# Patient Record
Sex: MALE | Race: WHITE | Employment: OTHER | URBAN - METROPOLITAN AREA
[De-identification: names, ages, dates, MRNs, and addresses within clinical notes are randomized per-mention and may not be internally consistent; named-entity substitution may affect disease eponyms.]

---

## 2017-09-20 ENCOUNTER — HOSPITAL ENCOUNTER (OUTPATIENT)
Dept: GENERAL RADIOLOGY | Age: 82
Discharge: HOME | End: 2017-09-20
Payer: MEDICARE

## 2017-09-20 DIAGNOSIS — R94.2 ABNORMAL PFT: ICD-10-CM

## 2017-09-20 PROCEDURE — 71020 XR CHEST PA LAT: CPT

## 2017-10-27 PROBLEM — Z23 ENCOUNTER FOR IMMUNIZATION: Status: ACTIVE | Noted: 2017-10-27

## 2018-03-05 ENCOUNTER — HOSPITAL ENCOUNTER (OUTPATIENT)
Dept: GENERAL RADIOLOGY | Age: 83
Discharge: HOME | End: 2018-03-05
Payer: MEDICARE

## 2018-03-05 DIAGNOSIS — M54.2 CERVICAL PAIN (NECK): ICD-10-CM

## 2018-03-05 PROBLEM — M62.838 MUSCLE SPASM OF RIGHT SHOULDER: Status: ACTIVE | Noted: 2018-03-05

## 2018-03-05 PROCEDURE — 72040 X-RAY EXAM NECK SPINE 2-3 VW: CPT

## 2018-03-06 ENCOUNTER — HOSPITAL ENCOUNTER (OUTPATIENT)
Dept: PHYSICAL THERAPY | Age: 83
Discharge: HOME | End: 2018-03-06
Payer: MEDICARE

## 2018-03-06 DIAGNOSIS — M62.838 MUSCLE SPASM OF RIGHT SHOULDER: ICD-10-CM

## 2018-03-06 DIAGNOSIS — M54.2 CERVICALGIA: ICD-10-CM

## 2018-03-06 DIAGNOSIS — M62.838 MUSCLE SPASM OF LEFT SHOULDER: ICD-10-CM

## 2018-03-06 PROCEDURE — G8981 BODY POS CURRENT STATUS: HCPCS

## 2018-03-06 PROCEDURE — G8982 BODY POS GOAL STATUS: HCPCS

## 2018-03-06 PROCEDURE — 97162 PT EVAL MOD COMPLEX 30 MIN: CPT

## 2018-03-06 NOTE — THERAPY EVALUATION
Yuri Strong  : [facility] [address], [address] [place] [address]  [phone]   [phone]       OUTPATIENT PHYSICAL THERAPY:Initial Assessment 3/6/2018    ICD-10: Treatment Diagnosis: Cervicalgia (M54.2)   Precautions/Allergies:   Review of patient's allergies indicates no known allergies. Fall Risk Score: 1 (? 5 = High Risk)  MD Orders: Eval and Treat MEDICAL/REFERRING DIAGNOSIS:  Cervicalgia [M54.2]  Muscle spasm of right shoulder [M62.838]  Muscle spasm of left shoulder [M62.838]   DATE OF ONSET: two weeks prior  REFERRING PHYSICIAN: Anita Zamora PA-C  RETURN PHYSICIAN APPOINTMENT: TBD by patient      INITIAL ASSESSMENT:  Mr. Yuri Strong has attended 1 physical therapy session including initial evaluation. Yuri Strong presents with S/S of increased pain, decreased ROM, decreased strength, decreased functional tolerance consistent with S/S of cervical spine strain with exacerbation of cervical spondylosis. Yuri Strong will benefit from home exercise program, therapeutic and postural strengthening exercises, manual therapeutic techniques (ie. Distraction, SOR, myofascial release/soft tissue mobilization) as appropriate to address [address] [place] [address] [address] current condition. Yuri Strong will benefit from skilled PT (medically necessary) to address above deficits affecting participation in basic ADLs and overall functional tolerance. PROBLEM LIST (Impacting functional limitations):  1. Decreased Strength  2. Decreased ADL/Functional Activities  3. Decreased Transfer Abilities  4. Increased Pain  5. Decreased Activity Tolerance  6. Decreased Flexibility/Joint Mobility  7. Decreased Knowledge of Precautions  8. Decreased Hines with Home Exercise Program  9. postural awareness INTERVENTIONS PLANNED:  1. Balance Exercise  2. Bed Mobility  3. Cold  4. Cryotherapy  5. Family Education  6.  Gait Training  7. Heat  8. Home Exercise Program (HEP)  9. Manual Therapy  10. Neuromuscular Re-education/Strengthening  11. Range of Motion (ROM)  12. Therapeutic Activites  13. Therapeutic Exercise/Strengthening  14. Transfer Training   TREATMENT PLAN:  Effective Dates: 3/6/2018 TO 6/6/2018. Frequency/Duration: up to 2 times a week for 12 weeks   GOALS: (Goals have been discussed and agreed upon with patient.)  SHORT-TERM FUNCTIONAL GOALS: Time Frame: 4 weeks  1. Cody Chavez will report <=4/10 pain to cervical spine with performance of functional spinal mobility and rotation. 2.  Cody Chavez will demonstrate improved NDI score, indicating spinal improvement from 20/50 to 13/50 affecting minimal to no difficulty with performance of cervical mobility and strengthening. 3.  Cody Chavez to be independent with initial HEP for cervical region and UE's.   [address] will improve ROM to >=90% to assist with improved function during instrumental activities of daily living. Forest Blakely will improve MMT to >=4+/5 to all UE strengthening to return to PLOF and improve functional tolerance. DISCHARGE GOALS: Time Frame: 8 weeks  1. Cody Chavez will report <=2/10 pain to cervical spine with performance of functional spinal mobility and rotation and minimal to no difficulty with such tasks. 2. Cody Chavez will demonstrate improved NDI score, indicating spinal improvement from 13/50 to 6/50 affecting minimal to no difficulty with performance of cervical mobility and strengthening. 3. Cody Chavez to be independent with advanced HEP for cervical region and UE's. Rehabilitation Potential For Stated Goals: Good  Regarding Tequilalillian Angel Choudhury's therapy, I certify that the treatment plan above will be carried out by a therapist or under their direction.   Thank you for this referral,  Luc Arvizu, PT     Referring Physician Signature: Yessy Guillaume, PACO              Date                    HISTORY:   History of Present Injury/Illness (Reason for Referral):  Mr. Syd aLy reports that he has had persistent neck pain after looking up to watch a tree be cut down two weeks ago and he spent an extended period of time looking up. Since that time the has had pain with turing during driving (right>left), looking up, looking down, and pain with getting into and out of bed. He reports frequent sharp pain with turning or neck movement that has limited his daily activities, sleep, and community activities. His goals are to improve his neck pain, improve his tolerance with driving, and his sleep tolerance. Past Medical History/Comorbidities:   Mr. Syd Lay  has a past medical history of Acute maxillary sinusitis; Acute pharyngitis; Acute type 2 diabetes mellitus with manifestations ([address] Allergic rhinitis; Arrhythmia; Arthritis; Atrial fibrillation ([address] Backache; Body mass index 31.0-31.9, adult; Body mass index 32.0-32.9, adult; Body mass index 33.0-33.9, adult; Body mass index 34.0-34.9, adult; CAD (coronary artery disease); Chronic kidney disease, stage III (moderate); Chronic pain; Colon polyp; Diabetes ([address] (2006); ED (erectile dysfunction); Edema; Encounter for long-term (current) use of other medications; Fracture of left fibula (12/10/2001); Furrowed tongue; GERD (gastroesophageal reflux disease); Glaucoma; Hypertension; Hypertension, essential, benign; Indigestion; Long term (current) use of anticoagulants; Mixed hyperlipidemia (6/3/2015); Obesity (BMI 30-39.9); Open wound of finger without complication; Other ill-defined conditions(799.89); Other ill-defined conditions(799.89); Plantar fasciitis; Pneumonia; PUD (peptic ulcer disease); Right inguinal hernia; Sciatica; Sinusitis; Tobacco use disorder; Type II or unspecified type diabetes mellitus with renal manifestations, 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   Mr. Syd Lay  has a past surgical history that includes ablation, atrial fibrillation ; hx other surgical (1980's); hx back surgery (2012); hx knee arthroscopy (Right, 2012); and hx colonoscopy (2008). Social History/Living Environment:     lives alone in a single story home   Prior Level of Function/Work/Activity:  Retired      Current Medications:    Current Outpatient Prescriptions:     metaxalone (SKELAXIN) 800 mg tablet, Take 1 Tab by mouth three (3) times daily for 7 days. , Disp: 21 Tab, Rfl: 1    Omeprazole delayed release (PRILOSEC D/R) 20 mg tablet, Take 1 Tab by mouth two (2) times a day., Disp: 180 Tab, Rfl: 1    pravastatin (PRAVACHOL) 80 mg tablet, Take 1 Tab by mouth nightly., Disp: 90 Tab, Rfl: 1    amLODIPine (NORVASC) 5 mg tablet, Take 1 Tab by mouth nightly., Disp: 90 Tab, Rfl: 1    warfarin (COUMADIN) 5 mg tablet, TAKE 1 TAB BY MOUTH DAILY. , Disp: 90 Tab, Rfl: 4    albuterol sulfate (PROAIR RESPICLICK) 90 mcg/actuation aepb, Take 2 Puffs by inhalation every four (4) hours as needed (Wheeze). , Disp: 1 Inhaler, Rfl: 11    SITagliptin (JANUVIA) 100 mg tablet, Take 1 Tab by mouth daily. , Disp: 90 Tab, Rfl: 1    repaglinide (PRANDIN) 0.5 mg tablet, Take 1 Tab by mouth Before breakfast, lunch, and dinner., Disp: 270 Tab, Rfl: 1    warfarin (COUMADIN) 5 mg tablet, Take 1 Tab by mouth daily. , Disp: 90 Tab, Rfl: 5    bumetanide (BUMEX) 1 mg tablet, Take 1 Tab by mouth every morning., Disp: 90 Tab, Rfl: 1    amiodarone (CORDARONE) 200 mg tablet, Take 1 Tab by mouth daily. , Disp: 90 Tab, Rfl: 1    losartan-hydroCHLOROthiazide (HYZAAR) 100-25 mg per tablet, Take 1 Tab by mouth every morning. (Patient taking differently: Take 1 Tab by mouth every morning.  Take 1/2 tablet daily), Disp: 90 Tab, Rfl: 1    glucose blood VI test strips (TRUETEST TEST STRIPS) strip, Use to check blood sugars twice daily DX: E11.9, Disp: 200 Strip, Rfl: 5    fluticasone (FLONASE) 50 mcg/actuation nasal spray, 2 Sprays by Both Nostrils route two (2) times a day., Disp: 3 Bottle, Rfl: 1    TRAVATAN Z 0.004 % ophthalmic solution, PLACE 1 DROP INTO OU QHS, Disp: , Rfl: 12    melatonin tab tablet, Take  by mouth nightly., Disp: , Rfl:     cyanocobalamin 1,000 mcg tablet, Take 1,000 mcg by mouth daily. , Disp: , Rfl:     Potassium Gluconate 595 mg (99 mg) tablet, Take  by mouth daily. , Disp: , Rfl:     ONETOUCH DELICA LANCETS, by Does Not Apply route., Disp: , Rfl:     Lancets (MICROLET LANCET) misc, by Does Not Apply route., Disp: , Rfl:     Blood-Glucose Meter (TRUERESULT BLOOD GLUCOSE SYSTM) monitoring kit, by Does Not Apply route., Disp: , Rfl:     multivitamin (ONE A DAY) tablet, Take 1 Tab by mouth daily. Hold until after surgery , Disp: , Rfl:      Date Last Reviewed:  3/6/2018   Number of Personal Factors/Comorbidities that affect the Plan of Care: 3+: HIGH COMPLEXITY   EXAMINATION:   Observation/Orthostatic Postural Assessment:          Significant forward head posture, increased thoracic kyphosis and cervical mid-spine lordosis. Left mid-cervical shift. Palpation:          Significant cervical spine pain with palpation. X-rays negative for fracture. ttp in bilateral cervical paraspinals and bilateral upper trapezius. TTP in right greater than left sub-occipitals  ROM:    Joint: Eval Date: 3/6/2018 Re-Assess Date: Re-Assess Date:         Cervical AROM      Flexion (% of normal): 50     Extension (% of normal): 50     L sidebending (% of normal): 25     R sidebending (% of normal): >25     L rotation (% of normal): 25     R rotation (% of normal): >25                 Pain at end-range or with AROM? Yes/No Yes in all directions     Any pain with overpressure?  Yes/No Not tested due to patients pain with palpation       Strength:    Joint: Eval Date: 3/6/2018 Re-Assess Date:  Re-Assess Date:     RIGHT LEFT RIGHT LEFT RIGHT LEFT   Shoulder flexion:  4+/5 4+/5       Shoulder extension: 5/5 5/5       Shoulder abduction: 4+/5 4+/5 Shoulder IR: 5/5 5/5       Shoulder ER: 5/5 5/5       Elbow flexion: 5/5 5/5       Elbow extension: 5/5 5/5       Wrist flexion/extension: 5/5 5/5           Special Tests: Assessed @ Initial Visit    Spurling's Test: not tested     Neurological Screen: Radiating symptoms? Yes to right upper scapular region     Functional Mobility:  Assessed @ Initial Visit: pain with bed mobility and sit-supine transfers. Body Structures Involved:  1. Nerves  2. Bones  3. Joints  4. Muscles  5. Ligaments Body Functions Affected:  1. Sensory/Pain  2. Neuromusculoskeletal  3. Movement Related Activities and Participation Affected:  1. General Tasks and Demands  2. Mobility  3. Self Care  4. Domestic Life  5. Interpersonal Interactions and Relationships  6. Community, Social and [place]   Number of elements that affect the Plan of Care: 3: MODERATE COMPLEXITY   CLINICAL PRESENTATION:   Presentation: Evolving clinical presentation with changing clinical characteristics: MODERATE COMPLEXITY   CLINICAL DECISION MAKING:   Outcome Measure: Tool Used: Neck Disability Index (NDI)  Score:  Initial: 20/50  Most Recent: X/50 (Date: -- )   Interpretation of Score: The Neck Disability Index is a revised form of the Oswestry Low Back Pain Index and is designed to measure the activities of daily living in person's with neck pain. Each section is scored on a 0-5 scale, 5 representing the greatest disability. The scores of each section are added together for a total score of 50. Score 0 1-10 11-20 21-30 31-40 41-49 50   Modifier CH CI CJ CK CL CM CN     ?  Changing and Maintaining Body Position:     - CURRENT STATUS: CJ - 20%-39% impaired, limited or restricted    - GOAL STATUS: CI - 1%-19% impaired, limited or restricted    - D/C STATUS:  ---------------To be determined---------------    Medical Necessity:   · Skilled intervention continues to be required due to address above deficits affecting participation in basic ADLs and overall functional tolerance. Reason for Services/Other Comments:  · Patient continues to require skilled intervention due to address above deficits affecting participation in basic ADLs and overall functional tolerance. Use of outcome tool(s) and clinical judgement create a POC that gives a: Questionable prediction of patient's progress: MODERATE COMPLEXITY   TREATMENT:   (In addition to Assessment/Re-Assessment sessions the following treatments were rendered)  THERAPEUTIC EXERCISE: (10 minutes):  Exercises per grid below to improve mobility, strength, balance and coordination. Required minimal visual and verbal cues to promote proper body alignment and promote proper body posture. Progressed resistance, range, repetitions and complexity of movement as indicated. Date:  3/6/2018 Date:   Date:     Activity/Exercise Parameters Parameters Parameters   Supine cervcial rotation AROM 2x10 each direction     Scapular retraction with ER 2x10                                         MANUAL THERAPY: (0 minutes): Joint mobilization, Soft tissue mobilization and Manipulation was utilized and necessary because of the patient's restricted joint motion, painful spasm, loss of articular motion and restricted motion of soft tissue. MODALITIES: (0 minutes):    (Used abbreviations: MET - muscle energy technique; PNF - proprioceptive neuromuscular facilitation; NMR - neuromuscular re-education; AP - anterior to posterior; PA - posterior to anterior)    Pre-Treatment Assessment: See patient history. Treatment/Session Assessment: Patient demonstrates high pain irritability. · Pain/ Symptoms: Initial:   8 Post Session:  8 ·   Compliance with Program/Exercises: Will assess as treatment progresses. · Recommendations/Intent for next treatment session: \"Next visit will focus on advancements to more challenging activities and reduction in assistance provided\".   Total Treatment Duration:  PT Patient Time In/Time Out  Time In: 1600  Time Out: 1208 [facility], PT

## 2018-03-06 NOTE — PROGRESS NOTES
Neck xray noted no kylie changes that are new but seeing DDD - if wants referred to ortho for further eval let me know

## 2018-03-09 PROBLEM — E11.21 TYPE 2 DIABETES WITH NEPHROPATHY (HCC): Status: ACTIVE | Noted: 2018-03-09

## 2018-03-09 NOTE — PROGRESS NOTES
Ambulatory/Rehab Services H2 Model Falls Risk Assessment    Risk Factor Pts. ·   Confusion/Disorientation/Impulsivity  []    4 ·   Symptomatic Depression  []   2 ·   Altered Elimination  []   1 ·   Dizziness/Vertigo  []   1 ·   Gender (Male)  []   1 ·   Any administered antiepileptics (anticonvulsants):  []   2 ·   Any administered benzodiazepines:  []   1 ·   Visual Impairment (specify):  []   1 ·   Portable Oxygen Use  []   1 ·   Orthostatic ? BP  []   1 ·   History of Recent Falls (within 3 mos.)  []   5     Ability to Rise from Chair (choose one) Pts. ·   Ability to rise in a single movement  []   0 ·   Pushes up, successful in one attempt  [x]   1 ·   Multiple attempts, but successful  []   3 ·   Unable to rise without assistance  []   4   Total: (5 or greater = High Risk) 2     Falls Prevention Plan:   []                Physical Limitations to Exercise (specify):   []                Mobility Assistance Device (type):   []                Exercise/Equipment Adaptation (specify):    ©2010 [facility] [address] Patent #[id number].  Federal Law prohibits the replication, distribution or use without written permission from [facility] [employer]

## 2018-03-12 ENCOUNTER — HOSPITAL ENCOUNTER (OUTPATIENT)
Dept: PHYSICAL THERAPY | Age: 83
Discharge: HOME | End: 2018-03-12
Payer: MEDICARE

## 2018-03-12 PROCEDURE — 97140 MANUAL THERAPY 1/> REGIONS: CPT

## 2018-03-12 PROCEDURE — 97110 THERAPEUTIC EXERCISES: CPT

## 2018-03-12 NOTE — PROGRESS NOTES
Kae Michel  : [facility] [address], [address] [place] [address]  [phone]   [phone]       OUTPATIENT PHYSICAL THERAPY:Daily Note 3/12/2018    ICD-10: Treatment Diagnosis: Cervicalgia (M54.2)   Precautions/Allergies:   Review of patient's allergies indicates no known allergies. Fall Risk Score: 1 (? 5 = High Risk)  MD Orders: Eval and Treat MEDICAL/REFERRING DIAGNOSIS:  Cervicalgia [M54.2]  Muscle spasm of right shoulder [M62.838]  Muscle spasm of left shoulder [M62.838]   DATE OF ONSET: two weeks prior  REFERRING PHYSICIAN: Jana Lopez PA-C  RETURN PHYSICIAN APPOINTMENT: TBD by patient      INITIAL ASSESSMENT:  Mr. Kae Michel has attended 1 physical therapy session including initial evaluation. Kae Michel presents with S/S of increased pain, decreased ROM, decreased strength, decreased functional tolerance consistent with S/S of cervical spine strain with exacerbation of cervical spondylosis. Kae Michel will benefit from home exercise program, therapeutic and postural strengthening exercises, manual therapeutic techniques (ie. Distraction, SOR, myofascial release/soft tissue mobilization) as appropriate to address [address] [facility] [address] current condition. Kae Michel will benefit from skilled PT (medically necessary) to address above deficits affecting participation in basic ADLs and overall functional tolerance. PROBLEM LIST (Impacting functional limitations):  1. Decreased Strength  2. Decreased ADL/Functional Activities  3. Decreased Transfer Abilities  4. Increased Pain  5. Decreased Activity Tolerance  6. Decreased Flexibility/Joint Mobility  7. Decreased Knowledge of Precautions  8. Decreased [place] with Home Exercise Program  9. postural awareness INTERVENTIONS PLANNED:  1. Balance Exercise  2. Bed Mobility  3. Cold  4. Cryotherapy  5. Family Education  6.  Gait Training  7. Heat  8. Home Exercise Program (HEP)  9. Manual Therapy  10. Neuromuscular Re-education/Strengthening  11. Range of Motion (ROM)  12. Therapeutic Activites  13. Therapeutic Exercise/Strengthening  14. Transfer Training   TREATMENT PLAN:  Effective Dates: 3/6/2018 TO 6/6/2018. Frequency/Duration: up to 2 times a week for 12 weeks   GOALS: (Goals have been discussed and agreed upon with patient.)  SHORT-TERM FUNCTIONAL GOALS: Time Frame: 4 weeks  1. Jessica Mo will report <=4/10 pain to cervical spine with performance of functional spinal mobility and rotation. 2.  Jessica Mo will demonstrate improved NDI score, indicating spinal improvement from 20/50 to 13/50 affecting minimal to no difficulty with performance of cervical mobility and strengthening. 3.  Jessica Mo to be independent with initial HEP for cervical region and UE's.   [address] will improve ROM to >=90% to assist with improved function during instrumental activities of daily living. [address] will improve MMT to >=4+/5 to all UE strengthening to return to PLOF and improve functional tolerance. DISCHARGE GOALS: Time Frame: 8 weeks  1. Jessica Mo will report <=2/10 pain to cervical spine with performance of functional spinal mobility and rotation and minimal to no difficulty with such tasks. 2. Jessica Mo will demonstrate improved NDI score, indicating spinal improvement from 13/50 to 6/50 affecting minimal to no difficulty with performance of cervical mobility and strengthening. 3. Jessica Mo to be independent with advanced HEP for cervical region and UE's. Rehabilitation Potential For Stated Goals: Good  Regarding Shan Choudhury's therapy, I certify that the treatment plan above will be carried out by a therapist or under their direction.   Thank you for this referral,  Chrissie Arizmendi, PT     Referring Physician Signature: Chris Sherman, PACO              Date                    HISTORY:   History of Present Injury/Illness (Reason for Referral):  Mr. Silvio Pinto reports that he has had persistent neck pain after looking up to watch a tree be cut down two weeks ago and he spent an extended period of time looking up. Since that time the has had pain with turing during driving (right>left), looking up, looking down, and pain with getting into and out of bed. He reports frequent sharp pain with turning or neck movement that has limited his daily activities, sleep, and community activities. His goals are to improve his neck pain, improve his tolerance with driving, and his sleep tolerance. Past Medical History/Comorbidities:   Mr. Silvio Pinto  has a past medical history of Acute maxillary sinusitis; Acute pharyngitis; Acute type 2 diabetes mellitus with manifestations ([address] Allergic rhinitis; Arrhythmia; Arthritis; Atrial fibrillation ([address] Backache; Body mass index 31.0-31.9, adult; Body mass index 32.0-32.9, adult; Body mass index 33.0-33.9, adult; Body mass index 34.0-34.9, adult; CAD (coronary artery disease); Chronic kidney disease, stage III (moderate); Chronic pain; Colon polyp; Diabetes ([address] (2006); ED (erectile dysfunction); Edema; Encounter for long-term (current) use of other medications; Fracture of left fibula (12/10/2001); Furrowed tongue; GERD (gastroesophageal reflux disease); Glaucoma; Hypertension; Hypertension, essential, benign; Indigestion; Long term (current) use of anticoagulants; Mixed hyperlipidemia (6/3/2015); Obesity (BMI 30-39.9); Open wound of finger without complication; Other ill-defined conditions(799.89); Other ill-defined conditions(799.89); Plantar fasciitis; Pneumonia; PUD (peptic ulcer disease); Right inguinal hernia; Sciatica; Sinusitis; Tobacco use disorder; Type II or unspecified type diabetes mellitus with renal manifestations, 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   Mr. Silvio Pinto  has a past surgical history that includes ablation, atrial fibrillation ; hx other surgical (1980's); hx back surgery (2012); hx knee arthroscopy (Right, 2012); and hx colonoscopy (2008). Social History/Living Environment:     lives alone in a single story home   Prior Level of Function/Work/Activity:  Retired      Current Medications:    Current Outpatient Prescriptions:     Omeprazole delayed release (PRILOSEC D/R) 20 mg tablet, Take 1 Tab by mouth two (2) times a day., Disp: 180 Tab, Rfl: 1    pravastatin (PRAVACHOL) 80 mg tablet, Take 1 Tab by mouth nightly., Disp: 90 Tab, Rfl: 1    amLODIPine (NORVASC) 5 mg tablet, Take 1 Tab by mouth daily. , Disp: 90 Tab, Rfl: 1    warfarin (COUMADIN) 5 mg tablet, TAKE 1 TAB BY MOUTH DAILY. , Disp: 90 Tab, Rfl: 4    SITagliptin (JANUVIA) 100 mg tablet, Take 1 Tab by mouth daily. , Disp: 90 Tab, Rfl: 1    repaglinide (PRANDIN) 0.5 mg tablet, Take 1 Tab by mouth Before breakfast, lunch, and dinner., Disp: 270 Tab, Rfl: 1    bumetanide (BUMEX) 1 mg tablet, Take 1 Tab by mouth every morning., Disp: 90 Tab, Rfl: 1    amiodarone (CORDARONE) 200 mg tablet, Take 1 Tab by mouth daily. , Disp: 90 Tab, Rfl: 1    losartan-hydroCHLOROthiazide (HYZAAR) 100-25 mg per tablet, Take 1 Tab by mouth daily. , Disp: 90 Tab, Rfl: 1    glucose blood VI test strips (TRUETEST TEST STRIPS) strip, Use to check blood sugars twice daily DX: E11.9, Disp: 200 Strip, Rfl: 5    fluticasone (FLONASE) 50 mcg/actuation nasal spray, 2 Sprays by Both Nostrils route two (2) times a day., Disp: 3 Bottle, Rfl: 1    metaxalone (SKELAXIN) 800 mg tablet, Take 1 Tab by mouth three (3) times daily for 7 days. , Disp: 21 Tab, Rfl: 1    albuterol sulfate (PROAIR RESPICLICK) 90 mcg/actuation aepb, Take 2 Puffs by inhalation every four (4) hours as needed ([place]eze). , Disp: 1 Inhaler, Rfl: 11    warfarin (COUMADIN) 5 mg tablet, Take 1 Tab by mouth daily. , Disp: 90 Tab, Rfl: 5    TRAVATAN Z 0.004 % ophthalmic solution, PLACE 1 DROP INTO OU QHS, Disp: , Rfl: 12    melatonin tab tablet, Take  by mouth nightly., Disp: , Rfl:     cyanocobalamin 1,000 mcg tablet, Take 1,000 mcg by mouth daily. , Disp: , Rfl:     Potassium Gluconate 595 mg (99 mg) tablet, Take  by mouth daily. , Disp: , Rfl:     ONETOUCH DELICA LANCETS, by Does Not Apply route., Disp: , Rfl:     Lancets (MICROLET LANCET) misc, by Does Not Apply route., Disp: , Rfl:     Blood-Glucose Meter (TRUERESULT BLOOD GLUCOSE SYSTM) monitoring kit, by Does Not Apply route., Disp: , Rfl:     multivitamin (ONE A DAY) tablet, Take 1 Tab by mouth daily. Hold until after surgery , Disp: , Rfl:      Date Last Reviewed:  3/12/2018   Number of Personal Factors/Comorbidities that affect the Plan of Care: 3+: HIGH COMPLEXITY   EXAMINATION:   Observation/Orthostatic Postural Assessment:          Significant forward head posture, increased thoracic kyphosis and cervical mid-spine lordosis. Left mid-cervical shift. Palpation:          Significant cervical spine pain with palpation. X-rays negative for fracture. ttp in bilateral cervical paraspinals and bilateral upper trapezius. TTP in right greater than left sub-occipitals  ROM:    Joint: Eval Date: 3/6/2018 Re-Assess Date: Re-Assess Date:         Cervical AROM      Flexion (% of normal): 50     Extension (% of normal): 50     L sidebending (% of normal): 25     R sidebending (% of normal): >25     L rotation (% of normal): 25     R rotation (% of normal): >25                 Pain at end-range or with AROM? Yes/No Yes in all directions     Any pain with overpressure?  Yes/No Not tested due to patients pain with palpation       Strength:    Joint: Eval Date: 3/6/2018 Re-Assess Date:  Re-Assess Date:     RIGHT LEFT RIGHT LEFT RIGHT LEFT   Shoulder flexion:  4+/5 4+/5       Shoulder extension: 5/5 5/5       Shoulder abduction: 4+/5 4+/5       Shoulder IR: 5/5 5/5       Shoulder ER: 5/5 5/5       Elbow flexion: 5/5 5/5       Elbow extension: 5/5 5/5       Wrist flexion/extension: 5/5 5/5           Special Tests: Assessed @ Initial Visit    Spurling's Test: not tested     Neurological Screen: Radiating symptoms? Yes to right upper scapular region     Functional Mobility:  Assessed @ Initial Visit: pain with bed mobility and sit-supine transfers. Body Structures Involved:  1. Nerves  2. Bones  3. Joints  4. Muscles  5. Ligaments Body Functions Affected:  1. Sensory/Pain  2. Neuromusculoskeletal  3. Movement Related Activities and Participation Affected:  1. General Tasks and Demands  2. Mobility  3. Self Care  4. Domestic Life  5. Interpersonal Interactions and Relationships  6. Community, Social and [place]   Number of elements that affect the Plan of Care: 3: MODERATE COMPLEXITY   CLINICAL PRESENTATION:   Presentation: Evolving clinical presentation with changing clinical characteristics: MODERATE COMPLEXITY   CLINICAL DECISION MAKING:   Outcome Measure: Tool Used: Neck Disability Index (NDI)  Score:  Initial: 20/50  Most Recent: X/50 (Date: -- )   Interpretation of Score: The Neck Disability Index is a revised form of the Oswestry Low Back Pain Index and is designed to measure the activities of daily living in person's with neck pain. Each section is scored on a 0-5 scale, 5 representing the greatest disability. The scores of each section are added together for a total score of 50. Score 0 1-10 11-20 21-30 31-40 41-49 50   Modifier CH CI CJ CK CL CM CN     ? Changing and Maintaining Body Position:     - CURRENT STATUS: CJ - 20%-39% impaired, limited or restricted    - GOAL STATUS: CI - 1%-19% impaired, limited or restricted    - D/C STATUS:  ---------------To be determined---------------    Medical Necessity:   · Skilled intervention continues to be required due to address above deficits affecting participation in basic ADLs and overall functional tolerance.   Reason for Services/Other Comments:  · Patient continues to require skilled intervention due to address above deficits affecting participation in basic ADLs and overall functional tolerance. Use of outcome tool(s) and clinical judgement create a POC that gives a: Questionable prediction of patient's progress: MODERATE COMPLEXITY   TREATMENT:   (In addition to Assessment/Re-Assessment sessions the following treatments were rendered)  THERAPEUTIC EXERCISE: (15 minutes):  Exercises per grid below to improve mobility, strength, balance and coordination. Required minimal visual and verbal cues to promote proper body alignment and promote proper body posture. Progressed resistance, range, repetitions and complexity of movement as indicated. Date:  3/6/2018 Date:  3/12/2018 Date:     Activity/Exercise Parameters Parameters Parameters   Supine cervcial rotation AROM 2x10 each direction 2x10 each direction    Scapular retraction with ER 2x10 2x10    Nu-step with moist heat pack to lumbar and cervical spine  x10' for improved reciprocal UE and LE motion                                  MANUAL THERAPY: (25 minutes): Joint mobilization, Soft tissue mobilization and Manipulation was utilized and necessary because of the patient's restricted joint motion, painful spasm, loss of articular motion and restricted motion of soft tissue. Instrument assisted soft tissue mobilization to bilateral upper traps and levators with rolling technique and trigger point release. MODALITIES: (0 minutes):    (Used abbreviations: MET - muscle energy technique; PNF - proprioceptive neuromuscular facilitation; NMR - neuromuscular re-education; AP - anterior to posterior; PA - posterior to anterior)    Pre-Treatment Assessment: Patient reports no improved symptoms since last visit. Treatment/Session Assessment: Patient demonstrate improved pain with all ROM after treatment. · Pain/ Symptoms: Initial:   7 Post Session:  5 ·   Compliance with Program/Exercises:  Will assess as treatment progresses. · Recommendations/Intent for next treatment session: \"Next visit will focus on advancements to more challenging activities and reduction in assistance provided\".   Total Treatment Duration:  PT Patient Time In/Time Out  Time In: 0930  Time Out: [address], [place]

## 2018-03-14 ENCOUNTER — APPOINTMENT (OUTPATIENT)
Dept: PHYSICAL THERAPY | Age: 83
End: 2018-03-14
Payer: MEDICARE

## 2018-03-15 ENCOUNTER — HOSPITAL ENCOUNTER (OUTPATIENT)
Dept: PHYSICAL THERAPY | Age: 83
Discharge: HOME | End: 2018-03-15
Payer: MEDICARE

## 2018-03-15 PROCEDURE — 97140 MANUAL THERAPY 1/> REGIONS: CPT

## 2018-03-15 PROCEDURE — 97110 THERAPEUTIC EXERCISES: CPT

## 2018-03-15 NOTE — PROGRESS NOTES
Sukhdev Lopes  : [facility] [address]  [phone]   [phone]       OUTPATIENT PHYSICAL THERAPY:Daily Note 3/15/2018    ICD-10: Treatment Diagnosis: Cervicalgia (M54.2)   Precautions/Allergies:   Review of patient's allergies indicates no known allergies. Fall Risk Score: 1 (? 5 = High Risk)  MD Orders: Eval and Treat MEDICAL/REFERRING DIAGNOSIS:  Cervicalgia [M54.2]  Muscle spasm of right shoulder [M62.838]  Muscle spasm of left shoulder [M62.838]   DATE OF ONSET: two weeks prior  REFERRING PHYSICIAN: Curt Cervantes PA-C  RETURN PHYSICIAN APPOINTMENT: TBD by patient      INITIAL ASSESSMENT:  Mr. Sukhdev Lopes has attended 1 physical therapy session including initial evaluation. Sukhdev Lopes presents with S/S of increased pain, decreased ROM, decreased strength, decreased functional tolerance consistent with S/S of cervical spine strain with exacerbation of cervical spondylosis. Sukhdev Lopes will benefit from home exercise program, therapeutic and postural strengthening exercises, manual therapeutic techniques (ie. Distraction, SOR, myofascial release/soft tissue mobilization) as appropriate to address [address] [place] [address] [address] current condition. Sukhdev Lopes will benefit from skilled PT (medically necessary) to address above deficits affecting participation in basic ADLs and overall functional tolerance. PROBLEM LIST (Impacting functional limitations):  1. Decreased Strength  2. Decreased ADL/Functional Activities  3. Decreased Transfer Abilities  4. Increased Pain  5. Decreased Activity Tolerance  6. Decreased Flexibility/Joint Mobility  7. Decreased Knowledge of Precautions  8. Decreased [place] with Home Exercise Program  9. postural awareness INTERVENTIONS PLANNED:  1. Balance Exercise  2. Bed Mobility  3. Cold  4. Cryotherapy  5. Family Education  6.  Gait Training  7. Heat  8. Home Exercise Program (HEP)  9. Manual Therapy  10. Neuromuscular Re-education/Strengthening  11. Range of Motion (ROM)  12. Therapeutic Activites  13. Therapeutic Exercise/Strengthening  14. Transfer Training   TREATMENT PLAN:  Effective Dates: 3/6/2018 TO 6/6/2018. Frequency/Duration: up to 2 times a week for 12 weeks   GOALS: (Goals have been discussed and agreed upon with patient.)  SHORT-TERM FUNCTIONAL GOALS: Time Frame: 4 weeks  1. Jocelyn Brock will report <=4/10 pain to cervical spine with performance of functional spinal mobility and rotation. 2.  Jocelyn Brock will demonstrate improved NDI score, indicating spinal improvement from 20/50 to 13/50 affecting minimal to no difficulty with performance of cervical mobility and strengthening. 3.  Jocelyn Brock to be independent with initial HEP for cervical region and UE's.   [address] will improve ROM to >=90% to assist with improved function during instrumental activities of daily living. [address] will improve MMT to >=4+/5 to all UE strengthening to return to PLOF and improve functional tolerance. DISCHARGE GOALS: Time Frame: 8 weeks  1. Jocelyn Brock will report <=2/10 pain to cervical spine with performance of functional spinal mobility and rotation and minimal to no difficulty with such tasks. 2. Jocelyn Brock will demonstrate improved NDI score, indicating spinal improvement from 13/50 to 6/50 affecting minimal to no difficulty with performance of cervical mobility and strengthening. 3. Jocelyn Brock to be independent with advanced HEP for cervical region and UE's. Rehabilitation Potential For Stated Goals: Good  Regarding Silva Choudhury's therapy, I certify that the treatment plan above will be carried out by a therapist or under their direction.   Thank you for this referral,  Sindy Knight, PT     Referring Physician Signature: Richy Srivastava, PACO              Date                    HISTORY:   History of Present Injury/Illness (Reason for Referral):  Mr. Dulce Abel reports that he has had persistent neck pain after looking up to watch a tree be cut down two weeks ago and he spent an extended period of time looking up. Since that time the has had pain with turing during driving (right>left), looking up, looking down, and pain with getting into and out of bed. He reports frequent sharp pain with turning or neck movement that has limited his daily activities, sleep, and community activities. His goals are to improve his neck pain, improve his tolerance with driving, and his sleep tolerance. Past Medical History/Comorbidities:   Mr. Dulce Abel  has a past medical history of Acute maxillary sinusitis; Acute pharyngitis; Acute type 2 diabetes mellitus with manifestations ([address] Allergic rhinitis; Arrhythmia; Arthritis; Atrial fibrillation ([address] Backache; Body mass index 31.0-31.9, adult; Body mass index 32.0-32.9, adult; Body mass index 33.0-33.9, adult; Body mass index 34.0-34.9, adult; CAD (coronary artery disease); Chronic kidney disease, stage III (moderate); Chronic pain; Colon polyp; Diabetes ([address] (2006); ED (erectile dysfunction); Edema; Encounter for long-term (current) use of other medications; Fracture of left fibula (12/10/2001); Furrowed tongue; GERD (gastroesophageal reflux disease); Glaucoma; Hypertension; Hypertension, essential, benign; Indigestion; Long term (current) use of anticoagulants; Mixed hyperlipidemia (6/3/2015); Obesity (BMI 30-39.9); Open wound of finger without complication; Other ill-defined conditions(799.89); Other ill-defined conditions(799.89); Plantar fasciitis; Pneumonia; PUD (peptic ulcer disease); Right inguinal hernia; Sciatica; Sinusitis; Tobacco use disorder; Type II or unspecified type diabetes mellitus with renal manifestations, 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   Mr. Dulce Abel  has a past surgical history that includes ablation, atrial fibrillation ; hx other surgical (1980's); hx back surgery (2012); hx knee arthroscopy (Right, 2012); and hx colonoscopy (2008). Social History/Living Environment:     lives alone in a single story home   Prior Level of Function/Work/Activity:  Retired      Current Medications:    Current Outpatient Prescriptions:     amiodarone (CORDARONE) 200 mg tablet, TAKE 1 TABLET BY MOUTH EVERY DAY, Disp: 90 Tab, Rfl: 1    Omeprazole delayed release (PRILOSEC D/R) 20 mg tablet, Take 1 Tab by mouth two (2) times a day., Disp: 180 Tab, Rfl: 1    pravastatin (PRAVACHOL) 80 mg tablet, Take 1 Tab by mouth nightly., Disp: 90 Tab, Rfl: 1    amLODIPine (NORVASC) 5 mg tablet, Take 1 Tab by mouth daily. , Disp: 90 Tab, Rfl: 1    warfarin (COUMADIN) 5 mg tablet, TAKE 1 TAB BY MOUTH DAILY. , Disp: 90 Tab, Rfl: 4    SITagliptin (JANUVIA) 100 mg tablet, Take 1 Tab by mouth daily. , Disp: 90 Tab, Rfl: 1    repaglinide (PRANDIN) 0.5 mg tablet, Take 1 Tab by mouth Before breakfast, lunch, and dinner., Disp: 270 Tab, Rfl: 1    bumetanide (BUMEX) 1 mg tablet, Take 1 Tab by mouth every morning., Disp: 90 Tab, Rfl: 1    losartan-hydroCHLOROthiazide (HYZAAR) 100-25 mg per tablet, Take 1 Tab by mouth daily. , Disp: 90 Tab, Rfl: 1    glucose blood VI test strips (TRUETEST TEST STRIPS) strip, Use to check blood sugars twice daily DX: E11.9, Disp: 200 Strip, Rfl: 5    fluticasone (FLONASE) 50 mcg/actuation nasal spray, 2 Sprays by Both Nostrils route two (2) times a day., Disp: 3 Bottle, Rfl: 1    albuterol sulfate (PROAIR RESPICLICK) 90 mcg/actuation aepb, Take 2 Puffs by inhalation every four (4) hours as needed (Wheeze). , Disp: 1 Inhaler, Rfl: 11    warfarin (COUMADIN) 5 mg tablet, Take 1 Tab by mouth daily. , Disp: 90 Tab, Rfl: 5    TRAVATAN Z 0.004 % ophthalmic solution, PLACE 1 DROP INTO OU QHS, Disp: , Rfl: 12    melatonin tab tablet, Take  by mouth nightly., Disp: , Rfl:   cyanocobalamin 1,000 mcg tablet, Take 1,000 mcg by mouth daily. , Disp: , Rfl:     Potassium Gluconate 595 mg (99 mg) tablet, Take  by mouth daily. , Disp: , Rfl:     ONETOUCH DELICA LANCETS, by Does Not Apply route., Disp: , Rfl:     Lancets (MICROLET LANCET) misc, by Does Not Apply route., Disp: , Rfl:     Blood-Glucose Meter (TRUERESULT BLOOD GLUCOSE SYSTM) monitoring kit, by Does Not Apply route., Disp: , Rfl:     multivitamin (ONE A DAY) tablet, Take 1 Tab by mouth daily. Hold until after surgery , Disp: , Rfl:      Date Last Reviewed:  3/15/2018   Number of Personal Factors/Comorbidities that affect the Plan of Care: 3+: HIGH COMPLEXITY   EXAMINATION:   Observation/Orthostatic Postural Assessment:          Significant forward head posture, increased thoracic kyphosis and cervical mid-spine lordosis. Left mid-cervical shift. Palpation:          Significant cervical spine pain with palpation. X-rays negative for fracture. ttp in bilateral cervical paraspinals and bilateral upper trapezius. TTP in right greater than left sub-occipitals  ROM:    Joint: Eval Date: 3/6/2018 Re-Assess Date: Re-Assess Date:         Cervical AROM      Flexion (% of normal): 50     Extension (% of normal): 50     L sidebending (% of normal): 25     R sidebending (% of normal): >25     L rotation (% of normal): 25     R rotation (% of normal): >25                 Pain at end-range or with AROM? Yes/No Yes in all directions     Any pain with overpressure?  Yes/No Not tested due to patients pain with palpation       Strength:    Joint: Eval Date: 3/6/2018 Re-Assess Date:  Re-Assess Date:     RIGHT LEFT RIGHT LEFT RIGHT LEFT   Shoulder flexion:  4+/5 4+/5       Shoulder extension: 5/5 5/5       Shoulder abduction: 4+/5 4+/5       Shoulder IR: 5/5 5/5       Shoulder ER: 5/5 5/5       Elbow flexion: 5/5 5/5       Elbow extension: 5/5 5/5       Wrist flexion/extension: 5/5 5/5           Special Tests: Assessed @ Initial Visit Spurling's Test: not tested     Neurological Screen: Radiating symptoms? Yes to right upper scapular region     Functional Mobility:  Assessed @ Initial Visit: pain with bed mobility and sit-supine transfers. Body Structures Involved:  1. Nerves  2. Bones  3. Joints  4. Muscles  5. Ligaments Body Functions Affected:  1. Sensory/Pain  2. Neuromusculoskeletal  3. Movement Related Activities and Participation Affected:  1. General Tasks and Demands  2. Mobility  3. Self Care  4. Domestic Life  5. Interpersonal Interactions and Relationships  6. Community, Social and [place]   Number of elements that affect the Plan of Care: 3: MODERATE COMPLEXITY   CLINICAL PRESENTATION:   Presentation: Evolving clinical presentation with changing clinical characteristics: MODERATE COMPLEXITY   CLINICAL DECISION MAKING:   Outcome Measure: Tool Used: Neck Disability Index (NDI)  Score:  Initial: 20/50  Most Recent: X/50 (Date: -- )   Interpretation of Score: The Neck Disability Index is a revised form of the Oswestry Low Back Pain Index and is designed to measure the activities of daily living in person's with neck pain. Each section is scored on a 0-5 scale, 5 representing the greatest disability. The scores of each section are added together for a total score of 50. Score 0 1-10 11-20 21-30 31-40 41-49 50   Modifier CH CI CJ CK CL CM CN     ? Changing and Maintaining Body Position:     - CURRENT STATUS: CJ - 20%-39% impaired, limited or restricted    - GOAL STATUS: CI - 1%-19% impaired, limited or restricted    - D/C STATUS:  ---------------To be determined---------------    Medical Necessity:   · Skilled intervention continues to be required due to address above deficits affecting participation in basic ADLs and overall functional tolerance.   Reason for Services/Other Comments:  · Patient continues to require skilled intervention due to address above deficits affecting participation in basic ADLs and overall functional tolerance. Use of outcome tool(s) and clinical judgement create a POC that gives a: Questionable prediction of patient's progress: MODERATE COMPLEXITY   TREATMENT:   (In addition to Assessment/Re-Assessment sessions the following treatments were rendered)  THERAPEUTIC EXERCISE: (20 minutes):  Exercises per grid below to improve mobility, strength, balance and coordination. Required minimal visual and verbal cues to promote proper body alignment and promote proper body posture. Progressed resistance, range, repetitions and complexity of movement as indicated. Date:  3/6/2018 Date:  3/12/2018 Date:  3/14/2018   Activity/Exercise Parameters Parameters Parameters   Supine cervcial rotation AROM 2x10 each direction 2x10 each direction 2x10 each   Scapular retraction with ER 2x10 2x10 2x10   Nu-step with moist heat pack to lumbar and cervical spine  x10' for improved reciprocal UE and LE motion x10' for improved reciprocal UE and LE motion   TB rows   Green 2x10                           MANUAL THERAPY: (25 minutes): Joint mobilization, Soft tissue mobilization and Manipulation was utilized and necessary because of the patient's restricted joint motion, painful spasm, loss of articular motion and restricted motion of soft tissue. Instrument assisted soft tissue mobilization to bilateral upper traps and levators with rolling technique and trigger point release. MODALITIES: (0 minutes):    (Used abbreviations: MET - muscle energy technique; PNF - proprioceptive neuromuscular facilitation; NMR - neuromuscular re-education; AP - anterior to posterior; PA - posterior to anterior)    Pre-Treatment Assessment: Patient reports that he is feeling better overall  Treatment/Session Assessment: Patient demonstrate improved pain with all ROM after treatment. · Pain/ Symptoms: Initial:   5 Post Session:  3 ·   Compliance with Program/Exercises: Will assess as treatment progresses.   · Recommendations/Intent for next treatment session: \"Next visit will focus on advancements to more challenging activities and reduction in assistance provided\".   Total Treatment Duration:  PT Patient Time In/Time Out  Time In: 0900  Time Out: ERNESTINE Pascual

## 2018-03-19 ENCOUNTER — HOSPITAL ENCOUNTER (OUTPATIENT)
Dept: PHYSICAL THERAPY | Age: 83
Discharge: HOME | End: 2018-03-19
Payer: MEDICARE

## 2018-03-19 PROCEDURE — 97140 MANUAL THERAPY 1/> REGIONS: CPT

## 2018-03-19 PROCEDURE — 97110 THERAPEUTIC EXERCISES: CPT

## 2018-03-19 NOTE — PROGRESS NOTES
Shawn Jessica  : [facility] [address]  [phone]   [phone]       OUTPATIENT PHYSICAL THERAPY:Daily Note 3/19/2018    ICD-10: Treatment Diagnosis: Cervicalgia (M54.2)   Precautions/Allergies:   Review of patient's allergies indicates no known allergies. Fall Risk Score: 1 (? 5 = High Risk)  MD Orders: Eval and Treat MEDICAL/REFERRING DIAGNOSIS:  Cervicalgia [M54.2]  Muscle spasm of right shoulder [M62.838]  Muscle spasm of left shoulder [M62.838]   DATE OF ONSET: two weeks prior  REFERRING PHYSICIAN: Brie España PA-C  RETURN PHYSICIAN APPOINTMENT: TBD by patient      INITIAL ASSESSMENT:  Mr. Shawn Jessica has attended 1 physical therapy session including initial evaluation. Shawn Jessica presents with S/S of increased pain, decreased ROM, decreased strength, decreased functional tolerance consistent with S/S of cervical spine strain with exacerbation of cervical spondylosis. Shawn Jessica will benefit from home exercise program, therapeutic and postural strengthening exercises, manual therapeutic techniques (ie. Distraction, SOR, myofascial release/soft tissue mobilization) as appropriate to address [address] [facility] [address] current condition. Shawn Jessica will benefit from skilled PT (medically necessary) to address above deficits affecting participation in basic ADLs and overall functional tolerance. PROBLEM LIST (Impacting functional limitations):  1. Decreased Strength  2. Decreased ADL/Functional Activities  3. Decreased Transfer Abilities  4. Increased Pain  5. Decreased Activity Tolerance  6. Decreased Flexibility/Joint Mobility  7. Decreased Knowledge of Precautions  8. Decreased [place] with Home Exercise Program  9. postural awareness INTERVENTIONS PLANNED:  1. Balance Exercise  2. Bed Mobility  3. Cold  4. Cryotherapy  5. Family Education  6.  Gait Training  7. Heat  8. Home Exercise Program (HEP)  9. Manual Therapy  10. Neuromuscular Re-education/Strengthening  11. Range of Motion (ROM)  12. Therapeutic Activites  13. Therapeutic Exercise/Strengthening  14. Transfer Training   TREATMENT PLAN:  Effective Dates: 3/6/2018 TO 6/6/2018. Frequency/Duration: up to 2 times a week for 12 weeks   GOALS: (Goals have been discussed and agreed upon with patient.)  SHORT-TERM FUNCTIONAL GOALS: Time Frame: 4 weeks  1. Jocelyn Brock will report <=4/10 pain to cervical spine with performance of functional spinal mobility and rotation. 2.  Jocelyn Brock will demonstrate improved NDI score, indicating spinal improvement from 20/50 to 13/50 affecting minimal to no difficulty with performance of cervical mobility and strengthening. 3.  Jocelyn Brock to be independent with initial HEP for cervical region and UE's.   [address] will improve ROM to >=90% to assist with improved function during instrumental activities of daily living. [address] will improve MMT to >=4+/5 to all UE strengthening to return to PLOF and improve functional tolerance. DISCHARGE GOALS: Time Frame: 8 weeks  1. Jocelyn Brock will report <=2/10 pain to cervical spine with performance of functional spinal mobility and rotation and minimal to no difficulty with such tasks. 2. Jocelyn Brock will demonstrate improved NDI score, indicating spinal improvement from 13/50 to 6/50 affecting minimal to no difficulty with performance of cervical mobility and strengthening. 3. Jocelyn Brock to be independent with advanced HEP for cervical region and UE's. Rehabilitation Potential For Stated Goals: Good  Regarding Silva Choudhury's therapy, I certify that the treatment plan above will be carried out by a therapist or under their direction.   Thank you for this referral,  Sindy nKight, PT     Referring Physician Signature: Richy Srivastava, PACO              Date                    HISTORY:   History of Present Injury/Illness (Reason for Referral):  Mr. Tonja Barrow reports that he has had persistent neck pain after looking up to watch a tree be cut down two weeks ago and he spent an extended period of time looking up. Since that time the has had pain with turing during driving (right>left), looking up, looking down, and pain with getting into and out of bed. He reports frequent sharp pain with turning or neck movement that has limited his daily activities, sleep, and community activities. His goals are to improve his neck pain, improve his tolerance with driving, and his sleep tolerance. Past Medical History/Comorbidities:   Mr. Tonja Barrow  has a past medical history of Acute maxillary sinusitis; Acute pharyngitis; Acute type 2 diabetes mellitus with manifestations ([address] Allergic rhinitis; Arrhythmia; Arthritis; Atrial fibrillation ([address] Backache; Body mass index 31.0-31.9, adult; Body mass index 32.0-32.9, adult; Body mass index 33.0-33.9, adult; Body mass index 34.0-34.9, adult; CAD (coronary artery disease); Chronic kidney disease, stage III (moderate); Chronic pain; Colon polyp; Diabetes ([address] (2006); ED (erectile dysfunction); Edema; Encounter for long-term (current) use of other medications; Fracture of left fibula (12/10/2001); Furrowed tongue; GERD (gastroesophageal reflux disease); Glaucoma; Hypertension; Hypertension, essential, benign; Indigestion; Long term (current) use of anticoagulants; Mixed hyperlipidemia (6/3/2015); Obesity (BMI 30-39.9); Open wound of finger without complication; Other ill-defined conditions(799.89); Other ill-defined conditions(799.89); Plantar fasciitis; Pneumonia; PUD (peptic ulcer disease); Right inguinal hernia; Sciatica; Sinusitis; Tobacco use disorder; Type II or unspecified type diabetes mellitus with renal manifestations, 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   Mr. Tonja Barrow  has a past surgical history that includes ablation, atrial fibrillation ; hx other surgical (1980's); hx back surgery (2012); hx knee arthroscopy (Right, 2012); and hx colonoscopy (2008). Social History/Living Environment:     lives alone in a single story home   Prior Level of Function/Work/Activity:  Retired      Current Medications:    Current Outpatient Prescriptions:     bumetanide (BUMEX) 1 mg tablet, TAKE 1 TABLET BY MOUTH EVERY MORNING, Disp: 90 Tab, Rfl: 1    amiodarone (CORDARONE) 200 mg tablet, TAKE 1 TABLET BY MOUTH EVERY DAY, Disp: 90 Tab, Rfl: 1    Omeprazole delayed release (PRILOSEC D/R) 20 mg tablet, Take 1 Tab by mouth two (2) times a day., Disp: 180 Tab, Rfl: 1    pravastatin (PRAVACHOL) 80 mg tablet, Take 1 Tab by mouth nightly., Disp: 90 Tab, Rfl: 1    amLODIPine (NORVASC) 5 mg tablet, Take 1 Tab by mouth daily. , Disp: 90 Tab, Rfl: 1    warfarin (COUMADIN) 5 mg tablet, TAKE 1 TAB BY MOUTH DAILY. , Disp: 90 Tab, Rfl: 4    SITagliptin (JANUVIA) 100 mg tablet, Take 1 Tab by mouth daily. , Disp: 90 Tab, Rfl: 1    repaglinide (PRANDIN) 0.5 mg tablet, Take 1 Tab by mouth Before breakfast, lunch, and dinner., Disp: 270 Tab, Rfl: 1    losartan-hydroCHLOROthiazide (HYZAAR) 100-25 mg per tablet, Take 1 Tab by mouth daily. , Disp: 90 Tab, Rfl: 1    glucose blood VI test strips (TRUETEST TEST STRIPS) strip, Use to check blood sugars twice daily DX: E11.9, Disp: 200 Strip, Rfl: 5    fluticasone (FLONASE) 50 mcg/actuation nasal spray, 2 Sprays by Both Nostrils route two (2) times a day., Disp: 3 Bottle, Rfl: 1    albuterol sulfate (PROAIR RESPICLICK) 90 mcg/actuation aepb, Take 2 Puffs by inhalation every four (4) hours as needed (Wheeze). , Disp: 1 Inhaler, Rfl: 11    warfarin (COUMADIN) 5 mg tablet, Take 1 Tab by mouth daily. , Disp: 90 Tab, Rfl: 5    TRAVATAN Z 0.004 % ophthalmic solution, PLACE 1 DROP INTO OU QHS, Disp: , Rfl: 12    melatonin tab tablet, Take  by mouth nightly., Disp: , Rfl:     cyanocobalamin 1,000 mcg tablet, Take 1,000 mcg by mouth daily. , Disp: , Rfl:     Potassium Gluconate 595 mg (99 mg) tablet, Take  by mouth daily. , Disp: , Rfl:     ONETOUCH DELICA LANCETS, by Does Not Apply route., Disp: , Rfl:     Lancets (MICROLET LANCET) misc, by Does Not Apply route., Disp: , Rfl:     Blood-Glucose Meter (TRUERESULT BLOOD GLUCOSE SYSTM) monitoring kit, by Does Not Apply route., Disp: , Rfl:     multivitamin (ONE A DAY) tablet, Take 1 Tab by mouth daily. Hold until after surgery , Disp: , Rfl:      Date Last Reviewed:  3/19/2018   Number of Personal Factors/Comorbidities that affect the Plan of Care: 3+: HIGH COMPLEXITY   EXAMINATION:   Observation/Orthostatic Postural Assessment:          Significant forward head posture, increased thoracic kyphosis and cervical mid-spine lordosis. Left mid-cervical shift. Palpation:          Significant cervical spine pain with palpation. X-rays negative for fracture. ttp in bilateral cervical paraspinals and bilateral upper trapezius. TTP in right greater than left sub-occipitals  ROM:    Joint: Eval Date: 3/6/2018 Re-Assess Date: Re-Assess Date:         Cervical AROM      Flexion (% of normal): 50     Extension (% of normal): 50     L sidebending (% of normal): 25     R sidebending (% of normal): >25     L rotation (% of normal): 25     R rotation (% of normal): >25                 Pain at end-range or with AROM? Yes/No Yes in all directions     Any pain with overpressure?  Yes/No Not tested due to patients pain with palpation       Strength:    Joint: Eval Date: 3/6/2018 Re-Assess Date:  Re-Assess Date:     RIGHT LEFT RIGHT LEFT RIGHT LEFT   Shoulder flexion:  4+/5 4+/5       Shoulder extension: 5/5 5/5       Shoulder abduction: 4+/5 4+/5       Shoulder IR: 5/5 5/5       Shoulder ER: 5/5 5/5       Elbow flexion: 5/5 5/5       Elbow extension: 5/5 5/5       Wrist flexion/extension: 5/5 5/5           Special Tests: Assessed @ Initial Visit Spurling's Test: not tested     Neurological Screen: Radiating symptoms? Yes to right upper scapular region     Functional Mobility:  Assessed @ Initial Visit: pain with bed mobility and sit-supine transfers. Body Structures Involved:  1. Nerves  2. Bones  3. Joints  4. Muscles  5. Ligaments Body Functions Affected:  1. Sensory/Pain  2. Neuromusculoskeletal  3. Movement Related Activities and Participation Affected:  1. General Tasks and Demands  2. Mobility  3. Self Care  4. Domestic Life  5. Interpersonal Interactions and Relationships  6. Community, Social and [place]   Number of elements that affect the Plan of Care: 3: MODERATE COMPLEXITY   CLINICAL PRESENTATION:   Presentation: Evolving clinical presentation with changing clinical characteristics: MODERATE COMPLEXITY   CLINICAL DECISION MAKING:   Outcome Measure: Tool Used: Neck Disability Index (NDI)  Score:  Initial: 20/50  Most Recent: X/50 (Date: -- )   Interpretation of Score: The Neck Disability Index is a revised form of the Oswestry Low Back Pain Index and is designed to measure the activities of daily living in person's with neck pain. Each section is scored on a 0-5 scale, 5 representing the greatest disability. The scores of each section are added together for a total score of 50. Score 0 1-10 11-20 21-30 31-40 41-49 50   Modifier CH CI CJ CK CL CM CN     ? Changing and Maintaining Body Position:     - CURRENT STATUS: CJ - 20%-39% impaired, limited or restricted    - GOAL STATUS: CI - 1%-19% impaired, limited or restricted    - D/C STATUS:  ---------------To be determined---------------    Medical Necessity:   · Skilled intervention continues to be required due to address above deficits affecting participation in basic ADLs and overall functional tolerance.   Reason for Services/Other Comments:  · Patient continues to require skilled intervention due to address above deficits affecting participation in basic ADLs and overall functional tolerance. Use of outcome tool(s) and clinical judgement create a POC that gives a: Questionable prediction of patient's progress: MODERATE COMPLEXITY   TREATMENT:   (In addition to Assessment/Re-Assessment sessions the following treatments were rendered)  THERAPEUTIC EXERCISE: (25 minutes):  Exercises per grid below to improve mobility, strength, balance and coordination. Required minimal visual and verbal cues to promote proper body alignment and promote proper body posture. Progressed resistance, range, repetitions and complexity of movement as indicated. Date:  3/6/2018 Date:  3/12/2018 Date:  3/14/2018 Date:  3/19/2018   Activity/Exercise Parameters Parameters Parameters    Supine cervcial rotation AROM 2x10 each direction 2x10 each direction 2x10 each 2x10 ea   Scapular retraction with ER 2x10 2x10 2x10 Pink TB 2x10   Nu-step with moist heat pack to lumbar and cervical spine  x10' for improved reciprocal UE and LE motion x10' for improved reciprocal UE and LE motion x10' for improved reciprocal UE and LE motion   TB rows   Green 2x10 Green 2x10   SB up wall    2x10                       MANUAL THERAPY: (15 minutes): Joint mobilization, Soft tissue mobilization and Manipulation was utilized and necessary because of the patient's restricted joint motion, painful spasm, loss of articular motion and restricted motion of soft tissue. Instrument assisted soft tissue mobilization to bilateral upper traps and levators with rolling technique and trigger point release. MODALITIES: (0 minutes):    (Used abbreviations: MET - muscle energy technique; PNF - proprioceptive neuromuscular facilitation; NMR - neuromuscular re-education; AP - anterior to posterior; PA - posterior to anterior)    Pre-Treatment Assessment: Patient reports improved neck pain and ROM with all daily activities. Treatment/Session Assessment: Patient demonstrate improved pain with all ROM after treatment.   · Pain/ Symptoms: Initial:   2 Post Session:  0 ·   Compliance with Program/Exercises: Will assess as treatment progresses. · Recommendations/Intent for next treatment session: \"Next visit will focus on advancements to more challenging activities and reduction in assistance provided\".   Total Treatment Duration:  PT Patient Time In/Time Out  Time In: 1100  Time Out: [facility]

## 2018-03-21 ENCOUNTER — APPOINTMENT (OUTPATIENT)
Dept: PHYSICAL THERAPY | Age: 83
End: 2018-03-21
Payer: MEDICARE

## 2018-03-22 ENCOUNTER — HOSPITAL ENCOUNTER (OUTPATIENT)
Dept: PHYSICAL THERAPY | Age: 83
Discharge: HOME | End: 2018-03-22
Payer: MEDICARE

## 2018-03-22 PROCEDURE — 97110 THERAPEUTIC EXERCISES: CPT

## 2018-03-22 PROCEDURE — 97140 MANUAL THERAPY 1/> REGIONS: CPT

## 2018-03-22 NOTE — PROGRESS NOTES
Lance Bailon  : [facility] [address], [address] [place] [address]  [phone]   [phone]       OUTPATIENT PHYSICAL THERAPY:Daily Note 3/22/2018    ICD-10: Treatment Diagnosis: Cervicalgia (M54.2)   Precautions/Allergies:   Review of patient's allergies indicates no known allergies. Fall Risk Score: 1 (? 5 = High Risk)  MD Orders: Eval and Treat MEDICAL/REFERRING DIAGNOSIS:  Cervicalgia [M54.2]  Muscle spasm of right shoulder [M62.838]  Muscle spasm of left shoulder [M62.838]   DATE OF ONSET: two weeks prior  REFERRING PHYSICIAN: Niurka Peters PA-C  RETURN PHYSICIAN APPOINTMENT: TBD by patient      INITIAL ASSESSMENT:  Mr. Lance Bailon has attended 1 physical therapy session including initial evaluation. Lance Bailon presents with S/S of increased pain, decreased ROM, decreased strength, decreased functional tolerance consistent with S/S of cervical spine strain with exacerbation of cervical spondylosis. Lance Bailon will benefit from home exercise program, therapeutic and postural strengthening exercises, manual therapeutic techniques (ie. Distraction, SOR, myofascial release/soft tissue mobilization) as appropriate to address [address] [place] [address] [address] current condition. Lance Bailon will benefit from skilled PT (medically necessary) to address above deficits affecting participation in basic ADLs and overall functional tolerance. PROBLEM LIST (Impacting functional limitations):  1. Decreased Strength  2. Decreased ADL/Functional Activities  3. Decreased Transfer Abilities  4. Increased Pain  5. Decreased Activity Tolerance  6. Decreased Flexibility/Joint Mobility  7. Decreased Knowledge of Precautions  8. Decreased [place] with Home Exercise Program  9. postural awareness INTERVENTIONS PLANNED:  1. Balance Exercise  2. Bed Mobility  3. Cold  4. Cryotherapy  5. Family Education  6.  Gait Training  7. Heat  8. Home Exercise Program (HEP)  9. Manual Therapy  10. Neuromuscular Re-education/Strengthening  11. Range of Motion (ROM)  12. Therapeutic Activites  13. Therapeutic Exercise/Strengthening  14. Transfer Training   TREATMENT PLAN:  Effective Dates: 3/6/2018 TO 6/6/2018. Frequency/Duration: up to 2 times a week for 12 weeks   GOALS: (Goals have been discussed and agreed upon with patient.)  SHORT-TERM FUNCTIONAL GOALS: Time Frame: 4 weeks  1. Shira Guido will report <=4/10 pain to cervical spine with performance of functional spinal mobility and rotation. 2.  Shira Guido will demonstrate improved NDI score, indicating spinal improvement from 20/50 to 13/50 affecting minimal to no difficulty with performance of cervical mobility and strengthening. 3.  Shira Guido to be independent with initial HEP for cervical region and UE's.   [address] will improve ROM to >=90% to assist with improved function during instrumental activities of daily living. [address] Mirabeilene Blakely will improve MMT to >=4+/5 to all UE strengthening to return to PLOF and improve functional tolerance. DISCHARGE GOALS: Time Frame: 8 weeks  1. Shira Guido will report <=2/10 pain to cervical spine with performance of functional spinal mobility and rotation and minimal to no difficulty with such tasks. 2. Shira Guido will demonstrate improved NDI score, indicating spinal improvement from 13/50 to 6/50 affecting minimal to no difficulty with performance of cervical mobility and strengthening. 3. Shira Guido to be independent with advanced HEP for cervical region and UE's. Rehabilitation Potential For Stated Goals: Good  Regarding Terri Choudhury's therapy, I certify that the treatment plan above will be carried out by a therapist or under their direction.   Thank you for this referral,  Angel Cruz PT     Referring Physician Signature: Leonidas Naik PACO              Date                    HISTORY:   History of Present Injury/Illness (Reason for Referral):  Mr. Qamar Nelson reports that he has had persistent neck pain after looking up to watch a tree be cut down two weeks ago and he spent an extended period of time looking up. Since that time the has had pain with turing during driving (right>left), looking up, looking down, and pain with getting into and out of bed. He reports frequent sharp pain with turning or neck movement that has limited his daily activities, sleep, and community activities. His goals are to improve his neck pain, improve his tolerance with driving, and his sleep tolerance. Past Medical History/Comorbidities:   Mr. Qamar Nelson  has a past medical history of Acute maxillary sinusitis; Acute pharyngitis; Acute type 2 diabetes mellitus with manifestations ([address] Allergic rhinitis; Arrhythmia; Arthritis; Atrial fibrillation ([address] Backache; Body mass index 31.0-31.9, adult; Body mass index 32.0-32.9, adult; Body mass index 33.0-33.9, adult; Body mass index 34.0-34.9, adult; CAD (coronary artery disease); Chronic kidney disease, stage III (moderate); Chronic pain; Colon polyp; Diabetes ([address] (2006); ED (erectile dysfunction); Edema; Encounter for long-term (current) use of other medications; Fracture of left fibula (12/10/2001); Furrowed tongue; GERD (gastroesophageal reflux disease); Glaucoma; Hypertension; Hypertension, essential, benign; Indigestion; Long term (current) use of anticoagulants; Mixed hyperlipidemia (6/3/2015); Obesity (BMI 30-39.9); Open wound of finger without complication; Other ill-defined conditions(799.89); Other ill-defined conditions(799.89); Plantar fasciitis; Pneumonia; PUD (peptic ulcer disease); Right inguinal hernia; Sciatica; Sinusitis; Tobacco use disorder; Type II or unspecified type diabetes mellitus with renal manifestations, 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   Mr. Qamar Nelson  has a past surgical history that includes ablation, atrial fibrillation ; hx other surgical (1980's); hx back surgery (2012); hx knee arthroscopy (Right, 2012); and hx colonoscopy (2008). Social History/Living Environment:     lives alone in a single story home   Prior Level of Function/Work/Activity:  Retired      Current Medications:    Current Outpatient Prescriptions:     bumetanide (BUMEX) 1 mg tablet, TAKE 1 TABLET BY MOUTH EVERY MORNING, Disp: 90 Tab, Rfl: 1    amiodarone (CORDARONE) 200 mg tablet, TAKE 1 TABLET BY MOUTH EVERY DAY, Disp: 90 Tab, Rfl: 1    Omeprazole delayed release (PRILOSEC D/R) 20 mg tablet, Take 1 Tab by mouth two (2) times a day., Disp: 180 Tab, Rfl: 1    pravastatin (PRAVACHOL) 80 mg tablet, Take 1 Tab by mouth nightly., Disp: 90 Tab, Rfl: 1    amLODIPine (NORVASC) 5 mg tablet, Take 1 Tab by mouth daily. , Disp: 90 Tab, Rfl: 1    warfarin (COUMADIN) 5 mg tablet, TAKE 1 TAB BY MOUTH DAILY. , Disp: 90 Tab, Rfl: 4    SITagliptin (JANUVIA) 100 mg tablet, Take 1 Tab by mouth daily. , Disp: 90 Tab, Rfl: 1    repaglinide (PRANDIN) 0.5 mg tablet, Take 1 Tab by mouth Before breakfast, lunch, and dinner., Disp: 270 Tab, Rfl: 1    losartan-hydroCHLOROthiazide (HYZAAR) 100-25 mg per tablet, Take 1 Tab by mouth daily. , Disp: 90 Tab, Rfl: 1    glucose blood VI test strips (TRUETEST TEST STRIPS) strip, Use to check blood sugars twice daily DX: E11.9, Disp: 200 Strip, Rfl: 5    fluticasone (FLONASE) 50 mcg/actuation nasal spray, 2 Sprays by Both Nostrils route two (2) times a day., Disp: 3 Bottle, Rfl: 1    albuterol sulfate (PROAIR RESPICLICK) 90 mcg/actuation aepb, Take 2 Puffs by inhalation every four (4) hours as needed (Wheeze). , Disp: 1 Inhaler, Rfl: 11    warfarin (COUMADIN) 5 mg tablet, Take 1 Tab by mouth daily. , Disp: 90 Tab, Rfl: 5    TRAVATAN Z 0.004 % ophthalmic solution, PLACE 1 DROP INTO OU QHS, Disp: , Rfl: 12    melatonin tab tablet, Take  by mouth nightly., Disp: , Rfl:     cyanocobalamin 1,000 mcg tablet, Take 1,000 mcg by mouth daily. , Disp: , Rfl:     Potassium Gluconate 595 mg (99 mg) tablet, Take  by mouth daily. , Disp: , Rfl:     ONETOUCH DELICA LANCETS, by Does Not Apply route., Disp: , Rfl:     Lancets (MICROLET LANCET) misc, by Does Not Apply route., Disp: , Rfl:     Blood-Glucose Meter (TRUERESULT BLOOD GLUCOSE SYSTM) monitoring kit, by Does Not Apply route., Disp: , Rfl:     multivitamin (ONE A DAY) tablet, Take 1 Tab by mouth daily. Hold until after surgery , Disp: , Rfl:      Date Last Reviewed:  3/22/2018   Number of Personal Factors/Comorbidities that affect the Plan of Care: 3+: HIGH COMPLEXITY   EXAMINATION:   Observation/Orthostatic Postural Assessment:          Significant forward head posture, increased thoracic kyphosis and cervical mid-spine lordosis. Left mid-cervical shift. Palpation:          Significant cervical spine pain with palpation. X-rays negative for fracture. ttp in bilateral cervical paraspinals and bilateral upper trapezius. TTP in right greater than left sub-occipitals  ROM:    Joint: Eval Date: 3/6/2018 Re-Assess Date: Re-Assess Date:         Cervical AROM      Flexion (% of normal): 50     Extension (% of normal): 50     L sidebending (% of normal): 25     R sidebending (% of normal): >25     L rotation (% of normal): 25     R rotation (% of normal): >25                 Pain at end-range or with AROM? Yes/No Yes in all directions     Any pain with overpressure?  Yes/No Not tested due to patients pain with palpation       Strength:    Joint: Eval Date: 3/6/2018 Re-Assess Date:  Re-Assess Date:     RIGHT LEFT RIGHT LEFT RIGHT LEFT   Shoulder flexion:  4+/5 4+/5       Shoulder extension: 5/5 5/5       Shoulder abduction: 4+/5 4+/5       Shoulder IR: 5/5 5/5       Shoulder ER: 5/5 5/5       Elbow flexion: 5/5 5/5       Elbow extension: 5/5 5/5       Wrist flexion/extension: 5/5 5/5           Special Tests: Assessed @ Initial Visit Spurling's Test: not tested     Neurological Screen: Radiating symptoms? Yes to right upper scapular region     Functional Mobility:  Assessed @ Initial Visit: pain with bed mobility and sit-supine transfers. Body Structures Involved:  1. Nerves  2. Bones  3. Joints  4. Muscles  5. Ligaments Body Functions Affected:  1. Sensory/Pain  2. Neuromusculoskeletal  3. Movement Related Activities and Participation Affected:  1. General Tasks and Demands  2. Mobility  3. Self Care  4. Domestic Life  5. Interpersonal Interactions and Relationships  6. Community, Social and [place]   Number of elements that affect the Plan of Care: 3: MODERATE COMPLEXITY   CLINICAL PRESENTATION:   Presentation: Evolving clinical presentation with changing clinical characteristics: MODERATE COMPLEXITY   CLINICAL DECISION MAKING:   Outcome Measure: Tool Used: Neck Disability Index (NDI)  Score:  Initial: 20/50  Most Recent: X/50 (Date: -- )   Interpretation of Score: The Neck Disability Index is a revised form of the Oswestry Low Back Pain Index and is designed to measure the activities of daily living in person's with neck pain. Each section is scored on a 0-5 scale, 5 representing the greatest disability. The scores of each section are added together for a total score of 50. Score 0 1-10 11-20 21-30 31-40 41-49 50   Modifier CH CI CJ CK CL CM CN     ? Changing and Maintaining Body Position:     - CURRENT STATUS: CJ - 20%-39% impaired, limited or restricted    - GOAL STATUS: CI - 1%-19% impaired, limited or restricted    - D/C STATUS:  ---------------To be determined---------------    Medical Necessity:   · Skilled intervention continues to be required due to address above deficits affecting participation in basic ADLs and overall functional tolerance.   Reason for Services/Other Comments:  · Patient continues to require skilled intervention due to address above deficits affecting participation in basic ADLs and overall functional tolerance. Use of outcome tool(s) and clinical judgement create a POC that gives a: Questionable prediction of patient's progress: MODERATE COMPLEXITY   TREATMENT:   (In addition to Assessment/Re-Assessment sessions the following treatments were rendered)  THERAPEUTIC EXERCISE: (30 minutes):  Exercises per grid below to improve mobility, strength, balance and coordination. Required minimal visual and verbal cues to promote proper body alignment and promote proper body posture. Progressed resistance, range, repetitions and complexity of movement as indicated. Date:  3/23/2018   Activity/Exercise    Supine cervcial rotation AROM 2x10 ea   Scapular retraction with ER Pink TB 2x10   UBE 3'/3'   TB rows Green 2x10   SB up wall 2x10   Laser tracking for improved cervical spine stability x10'             MANUAL THERAPY: (15 minutes): Joint mobilization, Soft tissue mobilization and Manipulation was utilized and necessary because of the patient's restricted joint motion, painful spasm, loss of articular motion and restricted motion of soft tissue. Instrument assisted soft tissue mobilization to bilateral upper traps and levators with rolling technique and trigger point release. MODALITIES: (0 minutes):    (Used abbreviations: MET - muscle energy technique; PNF - proprioceptive neuromuscular facilitation; NMR - neuromuscular re-education; AP - anterior to posterior; PA - posterior to anterior)    Pre-Treatment Assessment: Patient reports improved neck pain and ROM with all daily activities and improved ROM  Treatment/Session Assessment: Patient demonstrate improved pain with all ROM after treatment. Fair control with laser feed back. · Pain/ Symptoms: Initial:   2 Post Session:  0 ·   Compliance with Program/Exercises: Will assess as treatment progresses. · Recommendations/Intent for next treatment session:  \"Next visit will focus on advancements to more challenging activities and reduction in assistance provided\".   Total Treatment Duration:  PT Patient Time In/Time Out  Time In: 0900  Time Out: ERNESTINE Pascual

## 2018-03-26 ENCOUNTER — HOSPITAL ENCOUNTER (OUTPATIENT)
Dept: PHYSICAL THERAPY | Age: 83
Discharge: HOME | End: 2018-03-26
Payer: MEDICARE

## 2018-03-26 PROCEDURE — 97110 THERAPEUTIC EXERCISES: CPT

## 2018-03-26 PROCEDURE — 97140 MANUAL THERAPY 1/> REGIONS: CPT

## 2018-03-26 NOTE — PROGRESS NOTES
Jocelyn Brock  : [facility] [address], [facility]  [phone]   [phone]       OUTPATIENT PHYSICAL THERAPY:Daily Note 3/26/2018    ICD-10: Treatment Diagnosis: Cervicalgia (M54.2)   Precautions/Allergies:   Review of patient's allergies indicates no known allergies. Fall Risk Score: 1 (? 5 = High Risk)  MD Orders: Eval and Treat MEDICAL/REFERRING DIAGNOSIS:  Cervicalgia [M54.2]  Muscle spasm of right shoulder [M62.838]  Muscle spasm of left shoulder [M62.838]   DATE OF ONSET: two weeks prior  REFERRING PHYSICIAN: Richy Srivastava PA-C  RETURN PHYSICIAN APPOINTMENT: TBD by patient      INITIAL ASSESSMENT:  Mr. Jocelyn Brock has attended 1 physical therapy session including initial evaluation. Jocelyn Brock presents with S/S of increased pain, decreased ROM, decreased strength, decreased functional tolerance consistent with S/S of cervical spine strain with exacerbation of cervical spondylosis. Jocelyn Brock will benefit from home exercise program, therapeutic and postural strengthening exercises, manual therapeutic techniques (ie. Distraction, SOR, myofascial release/soft tissue mobilization) as appropriate to address [address] [place] [address] [address] current condition. Jocelyn Brock will benefit from skilled PT (medically necessary) to address above deficits affecting participation in basic ADLs and overall functional tolerance. PROBLEM LIST (Impacting functional limitations):  1. Decreased Strength  2. Decreased ADL/Functional Activities  3. Decreased Transfer Abilities  4. Increased Pain  5. Decreased Activity Tolerance  6. Decreased Flexibility/Joint Mobility  7. Decreased Knowledge of Precautions  8. Decreased [place] with Home Exercise Program  9. postural awareness INTERVENTIONS PLANNED:  1. Balance Exercise  2. Bed Mobility  3. Cold  4. Cryotherapy  5. Family Education  6.  Gait Training  7. Heat  8. Home Exercise Program (HEP)  9. Manual Therapy  10. Neuromuscular Re-education/Strengthening  11. Range of Motion (ROM)  12. Therapeutic Activites  13. Therapeutic Exercise/Strengthening  14. Transfer Training   TREATMENT PLAN:  Effective Dates: 3/6/2018 TO 6/6/2018. Frequency/Duration: up to 2 times a week for 12 weeks   GOALS: (Goals have been discussed and agreed upon with patient.)  SHORT-TERM FUNCTIONAL GOALS: Time Frame: 4 weeks  1. Kae Creola will report <=4/10 pain to cervical spine with performance of functional spinal mobility and rotation. 2.  Kae Creola will demonstrate improved NDI score, indicating spinal improvement from 20/50 to 13/50 affecting minimal to no difficulty with performance of cervical mobility and strengthening. 3.  Kae Creola to be independent with initial HEP for cervical region and UE's.   [address] will improve ROM to >=90% to assist with improved function during instrumental activities of daily living. [address] will improve MMT to >=4+/5 to all UE strengthening to return to PLOF and improve functional tolerance. DISCHARGE GOALS: Time Frame: 8 weeks  1. Kae Creola will report <=2/10 pain to cervical spine with performance of functional spinal mobility and rotation and minimal to no difficulty with such tasks. 2. Kae Creola will demonstrate improved NDI score, indicating spinal improvement from 13/50 to 6/50 affecting minimal to no difficulty with performance of cervical mobility and strengthening. 3. Kae Creola to be independent with advanced HEP for cervical region and UE's. Rehabilitation Potential For Stated Goals: Good  Regarding Alanalillian Choudhury's therapy, I certify that the treatment plan above will be carried out by a therapist or under their direction.   Thank you for this referral,  Sachin Snider, PT     Referring Physician Signature: Jana Lopez, PACO              Date                    HISTORY:   History of Present Injury/Illness (Reason for Referral):  Mr. Paula Peña reports that he has had persistent neck pain after looking up to watch a tree be cut down two weeks ago and he spent an extended period of time looking up. Since that time the has had pain with turing during driving (right>left), looking up, looking down, and pain with getting into and out of bed. He reports frequent sharp pain with turning or neck movement that has limited his daily activities, sleep, and community activities. His goals are to improve his neck pain, improve his tolerance with driving, and his sleep tolerance. Past Medical History/Comorbidities:   Mr. Paula Peña  has a past medical history of Acute maxillary sinusitis; Acute pharyngitis; Acute type 2 diabetes mellitus with manifestations ([address] Allergic rhinitis; Arrhythmia; Arthritis; Atrial fibrillation ([address] Backache; Body mass index 31.0-31.9, adult; Body mass index 32.0-32.9, adult; Body mass index 33.0-33.9, adult; Body mass index 34.0-34.9, adult; CAD (coronary artery disease); Chronic kidney disease, stage III (moderate); Chronic pain; Colon polyp; Diabetes ([address] (2006); ED (erectile dysfunction); Edema; Encounter for long-term (current) use of other medications; Fracture of left fibula (12/10/2001); Furrowed tongue; GERD (gastroesophageal reflux disease); Glaucoma; Hypertension; Hypertension, essential, benign; Indigestion; Long term (current) use of anticoagulants; Mixed hyperlipidemia (6/3/2015); Obesity (BMI 30-39.9); Open wound of finger without complication; Other ill-defined conditions(799.89); Other ill-defined conditions(799.89); Plantar fasciitis; Pneumonia; PUD (peptic ulcer disease); Right inguinal hernia; Sciatica; Sinusitis; Tobacco use disorder; Type II or unspecified type diabetes mellitus with renal manifestations, 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   Mr. Paula Peña  has a past surgical history that includes ablation, atrial fibrillation ; hx other surgical (1980's); hx back surgery (2012); hx knee arthroscopy (Right, 2012); and hx colonoscopy (2008). Social History/Living Environment:     lives alone in a single story home   Prior Level of Function/Work/Activity:  Retired      Current Medications:    Current Outpatient Prescriptions:     bumetanide (BUMEX) 1 mg tablet, TAKE 1 TABLET BY MOUTH EVERY MORNING, Disp: 90 Tab, Rfl: 1    amiodarone (CORDARONE) 200 mg tablet, TAKE 1 TABLET BY MOUTH EVERY DAY, Disp: 90 Tab, Rfl: 1    Omeprazole delayed release (PRILOSEC D/R) 20 mg tablet, Take 1 Tab by mouth two (2) times a day., Disp: 180 Tab, Rfl: 1    pravastatin (PRAVACHOL) 80 mg tablet, Take 1 Tab by mouth nightly., Disp: 90 Tab, Rfl: 1    amLODIPine (NORVASC) 5 mg tablet, Take 1 Tab by mouth daily. , Disp: 90 Tab, Rfl: 1    warfarin (COUMADIN) 5 mg tablet, TAKE 1 TAB BY MOUTH DAILY. , Disp: 90 Tab, Rfl: 4    SITagliptin (JANUVIA) 100 mg tablet, Take 1 Tab by mouth daily. , Disp: 90 Tab, Rfl: 1    repaglinide (PRANDIN) 0.5 mg tablet, Take 1 Tab by mouth Before breakfast, lunch, and dinner., Disp: 270 Tab, Rfl: 1    losartan-hydroCHLOROthiazide (HYZAAR) 100-25 mg per tablet, Take 1 Tab by mouth daily. , Disp: 90 Tab, Rfl: 1    glucose blood VI test strips (TRUETEST TEST STRIPS) strip, Use to check blood sugars twice daily DX: E11.9, Disp: 200 Strip, Rfl: 5    fluticasone (FLONASE) 50 mcg/actuation nasal spray, 2 Sprays by Both Nostrils route two (2) times a day., Disp: 3 Bottle, Rfl: 1    albuterol sulfate (PROAIR RESPICLICK) 90 mcg/actuation aepb, Take 2 Puffs by inhalation every four (4) hours as needed (Wheeze). , Disp: 1 Inhaler, Rfl: 11    warfarin (COUMADIN) 5 mg tablet, Take 1 Tab by mouth daily. , Disp: 90 Tab, Rfl: 5    TRAVATAN Z 0.004 % ophthalmic solution, PLACE 1 DROP INTO OU QHS, Disp: , Rfl: 12    melatonin tab tablet, Take  by mouth nightly., Disp: , Rfl:     cyanocobalamin 1,000 mcg tablet, Take 1,000 mcg by mouth daily. , Disp: , Rfl:     Potassium Gluconate 595 mg (99 mg) tablet, Take  by mouth daily. , Disp: , Rfl:     ONETOUCH DELICA LANCETS, by Does Not Apply route., Disp: , Rfl:     Lancets (MICROLET LANCET) misc, by Does Not Apply route., Disp: , Rfl:     Blood-Glucose Meter (TRUERESULT BLOOD GLUCOSE SYSTM) monitoring kit, by Does Not Apply route., Disp: , Rfl:     multivitamin (ONE A DAY) tablet, Take 1 Tab by mouth daily. Hold until after surgery , Disp: , Rfl:      Date Last Reviewed:  3/26/2018   Number of Personal Factors/Comorbidities that affect the Plan of Care: 3+: HIGH COMPLEXITY   EXAMINATION:   Observation/Orthostatic Postural Assessment:          Significant forward head posture, increased thoracic kyphosis and cervical mid-spine lordosis. Left mid-cervical shift. Palpation:          Significant cervical spine pain with palpation. X-rays negative for fracture. ttp in bilateral cervical paraspinals and bilateral upper trapezius. TTP in right greater than left sub-occipitals  ROM:    Joint: Eval Date: 3/6/2018 Re-Assess Date: Re-Assess Date:         Cervical AROM      Flexion (% of normal): 50     Extension (% of normal): 50     L sidebending (% of normal): 25     R sidebending (% of normal): >25     L rotation (% of normal): 25     R rotation (% of normal): >25                 Pain at end-range or with AROM? Yes/No Yes in all directions     Any pain with overpressure?  Yes/No Not tested due to patients pain with palpation       Strength:    Joint: Eval Date: 3/6/2018 Re-Assess Date:  Re-Assess Date:     RIGHT LEFT RIGHT LEFT RIGHT LEFT   Shoulder flexion:  4+/5 4+/5       Shoulder extension: 5/5 5/5       Shoulder abduction: 4+/5 4+/5       Shoulder IR: 5/5 5/5       Shoulder ER: 5/5 5/5       Elbow flexion: 5/5 5/5       Elbow extension: 5/5 5/5       Wrist flexion/extension: 5/5 5/5           Special Tests: Assessed @ Initial Visit Spurling's Test: not tested     Neurological Screen: Radiating symptoms? Yes to right upper scapular region     Functional Mobility:  Assessed @ Initial Visit: pain with bed mobility and sit-supine transfers. Body Structures Involved:  1. Nerves  2. Bones  3. Joints  4. Muscles  5. Ligaments Body Functions Affected:  1. Sensory/Pain  2. Neuromusculoskeletal  3. Movement Related Activities and Participation Affected:  1. General Tasks and Demands  2. Mobility  3. Self Care  4. Domestic Life  5. Interpersonal Interactions and Relationships  6. Community, Social and [place]   Number of elements that affect the Plan of Care: 3: MODERATE COMPLEXITY   CLINICAL PRESENTATION:   Presentation: Evolving clinical presentation with changing clinical characteristics: MODERATE COMPLEXITY   CLINICAL DECISION MAKING:   Outcome Measure: Tool Used: Neck Disability Index (NDI)  Score:  Initial: 20/50  Most Recent: X/50 (Date: -- )   Interpretation of Score: The Neck Disability Index is a revised form of the Oswestry Low Back Pain Index and is designed to measure the activities of daily living in person's with neck pain. Each section is scored on a 0-5 scale, 5 representing the greatest disability. The scores of each section are added together for a total score of 50. Score 0 1-10 11-20 21-30 31-40 41-49 50   Modifier CH CI CJ CK CL CM CN     ? Changing and Maintaining Body Position:     - CURRENT STATUS: CJ - 20%-39% impaired, limited or restricted    - GOAL STATUS: CI - 1%-19% impaired, limited or restricted    - D/C STATUS:  ---------------To be determined---------------    Medical Necessity:   · Skilled intervention continues to be required due to address above deficits affecting participation in basic ADLs and overall functional tolerance.   Reason for Services/Other Comments:  · Patient continues to require skilled intervention due to address above deficits affecting participation in basic ADLs and overall functional tolerance. Use of outcome tool(s) and clinical judgement create a POC that gives a: Questionable prediction of patient's progress: MODERATE COMPLEXITY   TREATMENT:   (In addition to Assessment/Re-Assessment sessions the following treatments were rendered)  THERAPEUTIC EXERCISE: (30 minutes):  Exercises per grid below to improve mobility, strength, balance and coordination. Required minimal visual and verbal cues to promote proper body alignment and promote proper body posture. Progressed resistance, range, repetitions and complexity of movement as indicated. Date:  3/26/2018   Activity/Exercise    Supine cervcial rotation AROM 2x10 ea   Scapular retraction with ER Pink TB 2x10   UBE 3'/3'   TB rows Green 2x10   SB up wall 2x10   Laser tracking for improved cervical spine stability x10'             MANUAL THERAPY: (15 minutes): Joint mobilization, Soft tissue mobilization and Manipulation was utilized and necessary because of the patient's restricted joint motion, painful spasm, loss of articular motion and restricted motion of soft tissue. Instrument assisted soft tissue mobilization to bilateral upper traps and levators with rolling technique and trigger point release. MODALITIES: (0 minutes):    (Used abbreviations: MET - muscle energy technique; PNF - proprioceptive neuromuscular facilitation; NMR - neuromuscular re-education; AP - anterior to posterior; PA - posterior to anterior)    Pre-Treatment Assessment: Patient reports improved pain with all motions and daily activities. Treatment/Session Assessment: Patient demonstrate improved pain with all ROM after treatment. Fair control with laser feed back. Patient will be decreased to once per week in light of current level of progress  · Pain/ Symptoms: Initial:   2 Post Session:  0 ·   Compliance with Program/Exercises: Will assess as treatment progresses. · Recommendations/Intent for next treatment session:  \"Next visit will focus on advancements to more challenging activities and reduction in assistance provided\".   Total Treatment Duration:  PT Patient Time In/Time Out  Time In: 0930  Time Out: [address], PT

## 2018-03-28 ENCOUNTER — APPOINTMENT (OUTPATIENT)
Dept: PHYSICAL THERAPY | Age: 83
End: 2018-03-28
Payer: MEDICARE

## 2018-03-29 ENCOUNTER — APPOINTMENT (OUTPATIENT)
Dept: PHYSICAL THERAPY | Age: 83
End: 2018-03-29
Payer: MEDICARE

## 2018-04-02 ENCOUNTER — HOSPITAL ENCOUNTER (OUTPATIENT)
Dept: PHYSICAL THERAPY | Age: 83
Discharge: HOME | End: 2018-04-02
Payer: MEDICARE

## 2018-04-02 PROCEDURE — G8982 BODY POS GOAL STATUS: HCPCS

## 2018-04-02 PROCEDURE — G8983 BODY POS D/C STATUS: HCPCS

## 2018-04-02 PROCEDURE — 97110 THERAPEUTIC EXERCISES: CPT

## 2018-04-02 NOTE — THERAPY DISCHARGE
Meghan Early  : [facility] [address]  [phone]   [phone]       OUTPATIENT PHYSICAL THERAPY:Daily Note and discharge 2018    ICD-10: Treatment Diagnosis: Cervicalgia (M54.2)   Precautions/Allergies:   Review of patient's allergies indicates no known allergies. Fall Risk Score: 1 (? 5 = High Risk)  MD Orders: Eval and Treat MEDICAL/REFERRING DIAGNOSIS:  Cervicalgia [M54.2]  Muscle spasm of right shoulder [M62.838]  Muscle spasm of left shoulder [M62.838]   DATE OF ONSET: two weeks prior  REFERRING PHYSICIAN: Osmany Camacho PA-C  RETURN PHYSICIAN APPOINTMENT: TBD by patient      INITIAL ASSESSMENT:  Mr. Meghan Early has attended 7 physical therapy session including initial evaluation. Meghan Early presents with improved ROM, pain, and strength. He reports no symptoms with all daily activities and home duties. Patient will be discharged after this visit. PROBLEM LIST (Impacting functional limitations):  1. Decreased Strength  2. Decreased ADL/Functional Activities  3. Decreased Transfer Abilities  4. Increased Pain  5. Decreased Activity Tolerance  6. Decreased Flexibility/Joint Mobility  7. Decreased Knowledge of Precautions  8. Decreased [place] with Home Exercise Program  9. postural awareness INTERVENTIONS PLANNED:  1. Balance Exercise  2. Bed Mobility  3. Cold  4. Cryotherapy  5. Family Education  6. Gait Training  7. Heat  8. Home Exercise Program (HEP)  9. Manual Therapy  10. Neuromuscular Re-education/Strengthening  11. Range of Motion (ROM)  12. Therapeutic Activites  13. Therapeutic Exercise/Strengthening  14. Transfer Training   TREATMENT PLAN:  Effective Dates: 3/6/2018 TO 2018. Frequency/Duration: up to 2 times a week for 12 weeks. All goals met! GOALS: (Goals have been discussed and agreed upon with patient.)  SHORT-TERM FUNCTIONAL GOALS: Time Frame: 4 weeks  1. Nayeli Nevarez will report <=4/10 pain to cervical spine with performance of functional spinal mobility and rotation. 2.  Nayeli Nevarez will demonstrate improved NDI score, indicating spinal improvement from 20/50 to 13/50 affecting minimal to no difficulty with performance of cervical mobility and strengthening. 3.  Nayeli Nevarez to be independent with initial HEP for cervical region and UE's.   [address] will improve ROM to >=90% to assist with improved function during instrumental activities of daily living. [address] will improve MMT to >=4+/5 to all UE strengthening to return to PLOF and improve functional tolerance. DISCHARGE GOALS: Time Frame: 8 weeks  1. Nayeli Nevarez will report <=2/10 pain to cervical spine with performance of functional spinal mobility and rotation and minimal to no difficulty with such tasks. 2. Nayeli Nevarez will demonstrate improved NDI score, indicating spinal improvement from 13/50 to 6/50 affecting minimal to no difficulty with performance of cervical mobility and strengthening. 3. Nayeli Nevarez to be independent with advanced HEP for cervical region and UE's. Rehabilitation Potential For Stated Goals: Good  Regarding Vee José Luis Choudhury's therapy, I certify that the treatment plan above will be carried out by a therapist or under their direction. Thank you for this referral,  Kenan Thomason, PT     Referring Physician Signature: Nicol Bray              Date                    HISTORY:   History of Present Injury/Illness (Reason for Referral):  Mr. Pipe Hutchison reports that he has had persistent neck pain after looking up to watch a tree be cut down two weeks ago and he spent an extended period of time looking up. Since that time the has had pain with turing during driving (right>left), looking up, looking down, and pain with getting into and out of bed.   He reports frequent sharp pain with turning or neck movement that has limited his daily activities, sleep, and community activities. His goals are to improve his neck pain, improve his tolerance with driving, and his sleep tolerance. Past Medical History/Comorbidities:   Mr. Tomas Marino  has a past medical history of Allergic rhinitis; Atrial fibrillation ([facility] [address] Chronic kidney disease, stage III (moderate); Chronic pain; Colon polyp; Coronary artery disease; Erectile dysfunction; Fracture of left fibula; GERD (gastroesophageal reflux disease); Glaucoma; Hypertension; Multinodular goiter; Nephrolithiasis; Obesity (BMI 30-39.9); Osteoarthritis; Peptic ulcer disease; Plantar fasciitis; Right inguinal hernia; Sciatica; Tobacco use disorder; and Type 2 diabetes mellitus ([facility] [address] Mr. Tomas Marino  has a past surgical history that includes hx back surgery (2012); hx knee arthroscopy (Right, 2012); hx colonoscopy (2008); hx gi (1980's); and hx afib ablation. Social History/Living Environment:     lives alone in a single story home   Prior Level of Function/Work/Activity:  Retired      Current Medications:    Current Outpatient Prescriptions:     bumetanide (BUMEX) 1 mg tablet, TAKE 1 TABLET BY MOUTH EVERY MORNING, Disp: 90 Tab, Rfl: 1    amiodarone (CORDARONE) 200 mg tablet, TAKE 1 TABLET BY MOUTH EVERY DAY, Disp: 90 Tab, Rfl: 1    Omeprazole delayed release (PRILOSEC D/R) 20 mg tablet, Take 1 Tab by mouth two (2) times a day., Disp: 180 Tab, Rfl: 1    pravastatin (PRAVACHOL) 80 mg tablet, Take 1 Tab by mouth nightly., Disp: 90 Tab, Rfl: 1    amLODIPine (NORVASC) 5 mg tablet, Take 1 Tab by mouth daily. , Disp: 90 Tab, Rfl: 1    warfarin (COUMADIN) 5 mg tablet, TAKE 1 TAB BY MOUTH DAILY. , Disp: 90 Tab, Rfl: 4    SITagliptin (JANUVIA) 100 mg tablet, Take 1 Tab by mouth daily. , Disp: 90 Tab, Rfl: 1    repaglinide (PRANDIN) 0.5 mg tablet, Take 1 Tab by mouth Before breakfast, lunch, and dinner., Disp: 270 Tab, Rfl: 1    losartan-hydroCHLOROthiazide (HYZAAR) 100-25 mg per tablet, Take 1 Tab by mouth daily. , Disp: 90 Tab, Rfl: 1    glucose blood VI test strips (TRUETEST TEST STRIPS) strip, Use to check blood sugars twice daily DX: E11.9, Disp: 200 Strip, Rfl: 5    fluticasone (FLONASE) 50 mcg/actuation nasal spray, 2 Sprays by Both Nostrils route two (2) times a day., Disp: 3 Bottle, Rfl: 1    TRAVATAN Z 0.004 % ophthalmic solution, PLACE 1 DROP INTO OU QHS, Disp: , Rfl: 12    melatonin tab tablet, Take  by mouth nightly., Disp: , Rfl:     cyanocobalamin 1,000 mcg tablet, Take 1,000 mcg by mouth daily. , Disp: , Rfl:     Potassium Gluconate 595 mg (99 mg) tablet, Take  by mouth daily. , Disp: , Rfl:     ONETOUCH DELICA LANCETS, by Does Not Apply route., Disp: , Rfl:     Lancets (MICROLET LANCET) misc, by Does Not Apply route., Disp: , Rfl:     Blood-Glucose Meter (TRUERESULT BLOOD GLUCOSE SYSTM) monitoring kit, by Does Not Apply route., Disp: , Rfl:     multivitamin (ONE A DAY) tablet, Take 1 Tab by mouth daily. Hold until after surgery , Disp: , Rfl:      Date Last Reviewed:  4/2/2018   Number of Personal Factors/Comorbidities that affect the Plan of Care: 3+: HIGH COMPLEXITY   EXAMINATION:   Observation/Orthostatic Postural Assessment:          Significant forward head posture, increased thoracic kyphosis and cervical mid-spine lordosis. Palpation:          No cervical spine pain with palpation. X-rays negative for fracture. ROM:    Joint: Eval Date: 3/6/2018 Re-Assess Date: 4/2/2018 Re-Assess Date:         Cervical AROM      Flexion (% of normal): 50 75    Extension (% of normal): 50 50    L sidebending (% of normal): 25 50    R sidebending (% of normal): >25 50    L rotation (% of normal): 25 50    R rotation (% of normal): >25 50                Pain at end-range or with AROM? Yes/No no     Any pain with overpressure?  Yes/No no       Strength:    Joint: Eval Date: 3/6/2018 Re-Assess Date: 4/2/2018 Re-Assess Date:     RIGHT LEFT RIGHT LEFT RIGHT LEFT Shoulder flexion:  4+/5 4+/5 5 5     Shoulder extension: 5/5 5/5 5 5     Shoulder abduction: 4+/5 4+/5 5 5     Shoulder IR: 5/5 5/5 5 5     Shoulder ER: 5/5 5/5 5 5     Elbow flexion: 5/5 5/5 5 5     Elbow extension: 5/5 5/5 5 5     Wrist flexion/extension: 5/5 5/5 5 5         Special Tests: Assessed @ Initial Visit    Spurling's Test: negative     Neurological Screen: Radiating symptoms? no     Functional Mobility:  Assessed @ Initial Visit: pain with bed mobility and sit-supine transfers. Body Structures Involved:  1. Nerves  2. Bones  3. Joints  4. Muscles  5. Ligaments Body Functions Affected:  1. Sensory/Pain  2. Neuromusculoskeletal  3. Movement Related Activities and Participation Affected:  1. General Tasks and Demands  2. Mobility  3. Self Care  4. Domestic Life  5. Interpersonal Interactions and Relationships  6. Community, Social and [place]   Number of elements that affect the Plan of Care: 3: MODERATE COMPLEXITY   CLINICAL PRESENTATION:   Presentation: Evolving clinical presentation with changing clinical characteristics: MODERATE COMPLEXITY   CLINICAL DECISION MAKING:   Outcome Measure: Tool Used: Neck Disability Index (NDI)  Score:  Initial: 20/50  Most Recent: 2/50 (Date: -- )   Interpretation of Score: The Neck Disability Index is a revised form of the Oswestry Low Back Pain Index and is designed to measure the activities of daily living in person's with neck pain. Each section is scored on a 0-5 scale, 5 representing the greatest disability. The scores of each section are added together for a total score of 50. Score 0 1-10 11-20 21-30 31-40 41-49 50   Modifier CH CI CJ CK CL CM CN     ?  Changing and Maintaining Body Position:    [id number] - CURRENT STATUS: CI - 1%-19% impaired, limited or restricted    - GOAL STATUS: CI - 1%-19% impaired, limited or restricted    - D/C STATUS:  CI - 1%-19% impaired, limited or restricted    Medical Necessity:   · Skilled intervention continues to be required due to address above deficits affecting participation in basic ADLs and overall functional tolerance. Reason for Services/Other Comments:  · Patient continues to require skilled intervention due to address above deficits affecting participation in basic ADLs and overall functional tolerance. Use of outcome tool(s) and clinical judgement create a POC that gives a: Questionable prediction of patient's progress: MODERATE COMPLEXITY   TREATMENT:   (In addition to Assessment/Re-Assessment sessions the following treatments were rendered)  THERAPEUTIC EXERCISE: (40 minutes):  Exercises per grid below to improve mobility, strength, balance and coordination. Required minimal visual and verbal cues to promote proper body alignment and promote proper body posture. Progressed resistance, range, repetitions and complexity of movement as indicated. Date:  4/2/2018   Activity/Exercise    Supine cervcial rotation AROM 2x10 ea   Scapular retraction with ER Pink TB 2x10   UBE 3'/3'   TB rows Green 2x10   SB up wall 2x10   Laser tracking for improved cervical spine stability x10'             MANUAL THERAPY: (0 minutes): Joint mobilization, Soft tissue mobilization and Manipulation was utilized and necessary because of the patient's restricted joint motion, painful spasm, loss of articular motion and restricted motion of soft tissue. Instrument assisted soft tissue mobilization to bilateral upper traps and levators with rolling technique and trigger point release. MODALITIES: (0 minutes):    (Used abbreviations: MET - muscle energy technique; PNF - proprioceptive neuromuscular facilitation; NMR - neuromuscular re-education; AP - anterior to posterior; PA - posterior to anterior)    Pre-Treatment Assessment: Patient reports improved pain with all motions and daily activities. Treatment/Session Assessment: Patient demonstrate improved pain with all ROM after treatment.   Fair control with laser feed back.  Patient will be decreased to once per week in light of current level of progress  · Pain/ Symptoms: Initial:   2 Post Session:  0 ·   Compliance with Program/Exercises: Will assess as treatment progresses. · Recommendations/Intent for next treatment session: \"Next visit will focus on advancements to more challenging activities and reduction in assistance provided\".   Total Treatment Duration:  PT Patient Time In/Time Out  Time In: 1030  Time Out: [facility],

## 2018-08-27 ENCOUNTER — HOSPITAL ENCOUNTER (OUTPATIENT)
Dept: LAB | Age: 83
Discharge: HOME | End: 2018-08-27

## 2018-08-27 PROCEDURE — 88305 TISSUE EXAM BY PATHOLOGIST: CPT

## 2018-09-07 PROBLEM — N18.30 TYPE 2 DIABETES MELLITUS WITH STAGE 3 CHRONIC KIDNEY DISEASE, WITHOUT LONG-TERM CURRENT USE OF INSULIN (HCC): Chronic | Status: ACTIVE | Noted: 2018-09-07

## 2018-09-07 PROBLEM — N18.30 TYPE 2 DIABETES MELLITUS WITH STAGE 3 CHRONIC KIDNEY DISEASE, WITHOUT LONG-TERM CURRENT USE OF INSULIN (HCC): Status: ACTIVE | Noted: 2018-09-07

## 2018-09-07 PROBLEM — E11.22 TYPE 2 DIABETES MELLITUS WITH STAGE 3 CHRONIC KIDNEY DISEASE, WITHOUT LONG-TERM CURRENT USE OF INSULIN (HCC): Chronic | Status: ACTIVE | Noted: 2018-09-07

## 2018-09-07 PROBLEM — E11.22 TYPE 2 DIABETES MELLITUS WITH STAGE 3 CHRONIC KIDNEY DISEASE, WITHOUT LONG-TERM CURRENT USE OF INSULIN (HCC): Status: ACTIVE | Noted: 2018-09-07

## 2018-10-07 ENCOUNTER — APPOINTMENT (OUTPATIENT)
Dept: GENERAL RADIOLOGY | Age: 83
End: 2018-10-07
Attending: EMERGENCY MEDICINE
Payer: MEDICARE

## 2018-10-07 ENCOUNTER — HOSPITAL ENCOUNTER (EMERGENCY)
Age: 83
Discharge: HOME | End: 2018-10-07
Attending: EMERGENCY MEDICINE
Payer: MEDICARE

## 2018-10-07 VITALS
RESPIRATION RATE: 16 BRPM | BODY MASS INDEX: 30.48 KG/M2 | DIASTOLIC BLOOD PRESSURE: 75 MMHG | SYSTOLIC BLOOD PRESSURE: 182 MMHG | OXYGEN SATURATION: 95 % | HEART RATE: 61 BPM | HEIGHT: 72 IN | TEMPERATURE: 97.8 F | WEIGHT: 225 LBS

## 2018-10-07 DIAGNOSIS — M25.532 WRIST PAIN, ACUTE, LEFT: Primary | ICD-10-CM

## 2018-10-07 PROCEDURE — 74011250637 HC RX REV CODE- 250/637: Performed by: EMERGENCY MEDICINE

## 2018-10-07 PROCEDURE — 99283 EMERGENCY DEPT VISIT LOW MDM: CPT | Performed by: EMERGENCY MEDICINE

## 2018-10-07 PROCEDURE — 73090 X-RAY EXAM OF FOREARM: CPT

## 2018-10-07 RX ORDER — HYDROCODONE BITARTRATE AND ACETAMINOPHEN 5; 325 MG/1; MG/1
1 TABLET ORAL
Qty: 10 TAB | Refills: 0 | Status: SHIPPED | OUTPATIENT
Start: 2018-10-07 | End: 2019-01-23

## 2018-10-07 RX ORDER — COLCHICINE 0.6 MG/1
TABLET ORAL
Qty: 60 TAB | Refills: 0 | Status: SHIPPED | OUTPATIENT
Start: 2018-10-07 | End: 2019-01-23

## 2018-10-07 RX ORDER — COLCHICINE 0.6 MG/1
0.6 TABLET ORAL
Status: COMPLETED | OUTPATIENT
Start: 2018-10-07 | End: 2018-10-07

## 2018-10-07 RX ADMIN — COLCHICINE 0.6 MG: 0.6 TABLET, FILM COATED ORAL at 23:09

## 2018-10-08 NOTE — ED PROVIDER NOTES
Patient is a 82 y.o. male presenting with arm pain. The history is provided by the patient and the spouse. Arm Pain This is a new problem. The current episode started yesterday. The problem occurs constantly. The problem has been gradually worsening. The pain is present in the left wrist. The quality of the pain is described as aching and sharp. The pain is at a severity of 6/10. Associated symptoms include limited range of motion and stiffness. Pertinent negatives include no numbness, no tingling, no itching, no back pain and no neck pain. The symptoms are aggravated by movement and palpation. He has tried rest for the symptoms. The treatment provided no relief. There has been no history of extremity trauma. Past Medical History:  
Diagnosis Date  Allergic rhinitis  Atrial fibrillation ([address]  Chronic kidney disease, stage III (moderate)  Chronic pain  Colon polyp  Coronary artery disease  Erectile dysfunction  Fracture of left fibula  GERD (gastroesophageal reflux disease)  Glaucoma  Hypertension  Multinodular goiter  Nephrolithiasis  Obesity (BMI 30-39. 9)  Osteoarthritis  Peptic ulcer disease  Plantar fasciitis  Right inguinal hernia  Sciatica  Tobacco use disorder  Type 2 diabetes mellitus ([address] Past Surgical History:  
Procedure Laterality Date  HX AFIB ABLATION    
 HX BACK SURGERY  2012  
 fusion  HX COLONOSCOPY  2008  HX COLONOSCOPY  2018  HX GI  1980's Rectal fistula repair  HX KNEE ARTHROSCOPY Right 2012 Family History:  
Problem Relation Age of Onset  Colon Cancer Father 80  Thyroid Disease Father   
  goiter  Breast Cancer Sister  Heart Disease Brother  Heart Disease Sister  Cancer Sister   
  liver  Heart Disease Brother  Crohn's Disease Son  Thyroid Cancer Neg Hx Social History Social History  Marital status:  Spouse name: N/A  
 Number of children: N/A  
 Years of education: N/A Occupational History  Not on file. Social History Main Topics  Smoking status: Former Smoker Packs/day: 0.25 Years: 5.00 Quit date: 7/8/1996  Smokeless tobacco: Never Used Comment: quit 1990  Alcohol use No  
 Drug use: Yes Special: Prescription  Sexual activity: Yes  
  Partners: Female Other Topics Concern  Not on file Social History Narrative ALLERGIES: Review of patient's allergies indicates no known allergies. Review of Systems Constitutional: Negative for chills and fever. Musculoskeletal: Positive for arthralgias and stiffness. Negative for back pain and neck pain. Skin: Negative for itching. Neurological: Negative for tingling and numbness. All other systems reviewed and are negative. Vitals:  
 10/07/18 2143 BP: 182/75 Pulse: 61 Resp: 16 Temp: 97.8 °F (36.6 °C) SpO2: 95% Weight: 102.1 kg (225 lb) Height: 6' (1.829 m) Physical Exam  
Constitutional: He is oriented to person, place, and time. He appears well-developed and well-nourished. No distress. HENT:  
Head: Normocephalic and atraumatic. Right Ear: Tympanic membrane and external ear normal.  
Left Ear: Tympanic membrane and external ear normal.  
Mouth/Throat: Oropharynx is clear and moist.  
Eyes: Conjunctivae and EOM are normal. Pupils are equal, round, and reactive to light. Neck: Normal range of motion. Neck supple. Cardiovascular: Normal rate, regular rhythm, normal heart sounds and intact distal pulses. Exam reveals no gallop and no friction rub. No murmur heard. Pulmonary/Chest: Effort normal and breath sounds normal. No respiratory distress. He has no wheezes. Musculoskeletal: He exhibits no edema. Left wrist: He exhibits decreased range of motion and tenderness.  He exhibits no swelling, no effusion, no crepitus, no deformity and no laceration. Neurological: He is alert and oriented to person, place, and time. He has normal strength. He displays normal reflexes. No cranial nerve deficit or sensory deficit. Skin: Skin is warm and dry. No rash noted. He is not diaphoretic. No erythema. Psychiatric: He has a normal mood and affect. His speech is normal.  
Nursing note and vitals reviewed. MDM Number of Diagnoses or Management Options Wrist pain, acute, left: new and requires workup Amount and/or Complexity of Data Reviewed Tests in the radiology section of CPT®: ordered and reviewed Review and summarize past medical records: yes Risk of Complications, Morbidity, and/or Mortality Presenting problems: low Diagnostic procedures: low Management options: low Patient Progress Patient progress: stable ED Course Procedures Results Reviewed: 
 
XR FOREARM LT AP/LAT Final Result IMPRESSION: Degenerative change in the radiocarpal joint. No acute findings. I discussed the results of all labs, procedures, radiographs, and treatments with the patient and available family. Treatment plan is agreed upon and the patient is ready for discharge. All voiced understanding of the discharge plan and medication instructions or changes as appropriate. Questions about treatment in the ED were answered. All were encouraged to return should symptoms worsen or new problems develop.

## 2018-10-08 NOTE — ED TRIAGE NOTES
Pt states he is having left arm pain and that it has been going on since yesterday. Denies injury. Noted swelling to wrist area

## 2018-10-08 NOTE — DISCHARGE INSTRUCTIONS
Joint Pain: Care Instructions  Your Care Instructions    Many people have small aches and pains from overuse or injury to muscles and joints. Joint injuries often happen during sports or recreation, work tasks, or projects around the home. An overuse injury can happen when you put too much stress on a joint or when you do an activity that stresses the joint over and over, such as using the computer or rowing a boat. You can take action at home to help your muscles and joints get better. You should feel better in 1 to 2 weeks, but it can take 3 months or more to heal completely. Follow-up care is a key part of your treatment and safety. Be sure to make and go to all appointments, and call your doctor if you are having problems. It's also a good idea to know your test results and keep a list of the medicines you take. How can you care for yourself at home? · Do not put weight on the injured joint for at least a day or two. · For the first day or two after an injury, do not take hot showers or baths, and do not use hot packs. The heat could make swelling worse. · Put ice or a cold pack on the sore joint for 10 to 20 minutes at a time. Try to do this every 1 to 2 hours for the next 3 days (when you are awake) or until the swelling goes down. Put a thin cloth between the ice and your skin. · Wrap the injury in an elastic bandage. Do not wrap it too tightly because this can cause more swelling. · Prop up the sore joint on a pillow when you ice it or anytime you sit or lie down during the next 3 days. Try to keep it above the level of your heart. This will help reduce swelling. · Take an over-the-counter pain medicine, such as acetaminophen (Tylenol), ibuprofen (Advil, Motrin), or naproxen (Aleve). Read and follow all instructions on the label. · After 1 or 2 days of rest, begin moving the joint gently.  While the joint is still healing, you can begin to exercise using activities that do not strain or hurt the painful joint. When should you call for help? Call your doctor now or seek immediate medical care if:    · You have signs of infection, such as:  ¨ Increased pain, swelling, warmth, and redness. ¨ Red streaks leading from the joint. ¨ A fever.    Watch closely for changes in your health, and be sure to contact your doctor if:    · Your movement or symptoms are not getting better after 1 to 2 weeks of home treatment. Where can you learn more? Go to http://jeremie-garrison.info/. Enter P205 in the search box to learn more about \"Joint Pain: Care Instructions. \"  Current as of: November 29, 2017  Content Version: 11.8  © 9580-2861 [employer]. Care instructions adapted under license by [employer] (which disclaims liability or warranty for this information). If you have questions about a medical condition or this instruction, always ask your healthcare professional. [address] any warranty or liability for your use of this information.

## 2018-10-08 NOTE — ED NOTES
I have reviewed discharge instructions with the patient. The patient verbalized understanding. Patient left ED via Discharge Method: ambulatory to Home with family. Opportunity for questions and clarification provided. Patient given 2 scripts. Pt in no acute distress at time of discharge. To continue your aftercare when you leave the hospital, you may receive an automated call from our care team to check in on how you are doing. This is a free service and part of our promise to provide the best care and service to meet your aftercare needs.  If you have questions, or wish to unsubscribe from this service please call [phone]. Thank you for Choosing our [facility] Emergency Department.

## 2019-02-06 ENCOUNTER — HOSPITAL ENCOUNTER (OUTPATIENT)
Dept: MRI IMAGING | Age: 84
Discharge: HOME | End: 2019-02-06
Attending: FAMILY MEDICINE
Payer: MEDICARE

## 2019-02-06 ENCOUNTER — HOSPITAL ENCOUNTER (OUTPATIENT)
Dept: ULTRASOUND IMAGING | Age: 84
Discharge: HOME | End: 2019-02-06
Attending: FAMILY MEDICINE
Payer: MEDICARE

## 2019-02-06 DIAGNOSIS — H93.A3 PULSATILE TINNITUS OF BOTH EARS: ICD-10-CM

## 2019-02-06 PROCEDURE — A9575 INJ GADOTERATE MEGLUMI 0.1ML: HCPCS | Performed by: FAMILY MEDICINE

## 2019-02-06 PROCEDURE — 70553 MRI BRAIN STEM W/O & W/DYE: CPT

## 2019-02-06 PROCEDURE — 93880 EXTRACRANIAL BILAT STUDY: CPT

## 2019-02-06 PROCEDURE — 74011250636 HC RX REV CODE- 250/636: Performed by: FAMILY MEDICINE

## 2019-02-06 RX ORDER — GADOTERATE MEGLUMINE 376.9 MG/ML
20 INJECTION INTRAVENOUS
Status: COMPLETED | OUTPATIENT
Start: 2019-02-06 | End: 2019-02-06

## 2019-02-06 RX ORDER — SODIUM CHLORIDE 0.9 % (FLUSH) 0.9 %
10 SYRINGE (ML) INJECTION
Status: COMPLETED | OUTPATIENT
Start: 2019-02-06 | End: 2019-02-06

## 2019-02-06 RX ADMIN — Medication 10 ML: at 08:50

## 2019-02-06 RX ADMIN — GADOTERATE MEGLUMINE 20 ML: 376.9 INJECTION INTRAVENOUS at 08:49

## 2019-02-07 NOTE — PROGRESS NOTES
Please let Mr. 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 The stroke would have been months or years ago. I would like to refer him to a neurologist for further evaluation.

## 2019-02-15 ENCOUNTER — HOSPITAL ENCOUNTER (OUTPATIENT)
Dept: ULTRASOUND IMAGING | Age: 84
Discharge: HOME | End: 2019-02-15
Attending: INTERNAL MEDICINE
Payer: MEDICARE

## 2019-02-15 DIAGNOSIS — N18.30 CKD (CHRONIC KIDNEY DISEASE), STAGE III (HCC): ICD-10-CM

## 2019-02-15 PROCEDURE — 76770 US EXAM ABDO BACK WALL COMP: CPT

## 2019-02-22 PROBLEM — F41.1 GENERALIZED ANXIETY DISORDER: Status: ACTIVE | Noted: 2019-02-22

## 2019-03-06 ENCOUNTER — HOSPITAL ENCOUNTER (OUTPATIENT)
Dept: MRI IMAGING | Age: 84
Discharge: HOME | End: 2019-03-06
Attending: FAMILY MEDICINE
Payer: MEDICARE

## 2019-03-06 DIAGNOSIS — H93.A9 PULSATILE TINNITUS: ICD-10-CM

## 2019-03-06 PROCEDURE — 70544 MR ANGIOGRAPHY HEAD W/O DYE: CPT

## 2019-03-07 NOTE — PROGRESS NOTES
Called and spoke with patient. He stated he understood. Encouraged him to keep follow up appointment with you.

## 2019-05-28 ENCOUNTER — ANESTHESIA EVENT (OUTPATIENT)
Dept: SURGERY | Age: 84
End: 2019-05-28
Payer: MEDICARE

## 2019-05-28 RX ORDER — LORAZEPAM 0.5 MG/1
TABLET ORAL
COMMUNITY
End: 2020-07-02

## 2019-05-28 NOTE — PROGRESS NOTES
Patient pre-assessment complete for Pacemaker with DR Nel Gordon scheduled for 19 at 12pm, arrival time 10am. Patient verified using . Patient instructed to bring all home medications in labeled bottles on the day of procedure. NPO status reinforced. Patient instructed to HOLD coumadin x 2 days (last dose at 19) & in am HOLD januvia, bumex & losartan/HCTZ. Instructed they can take all other medications excluding vitamins & supplements. Patient verbalizes understanding of all instructions & denies any questions at this time.

## 2019-05-29 ENCOUNTER — HOSPITAL ENCOUNTER (OUTPATIENT)
Age: 84
Setting detail: OBSERVATION
Discharge: HOME | End: 2019-05-30
Attending: INTERNAL MEDICINE | Admitting: INTERNAL MEDICINE
Payer: MEDICARE

## 2019-05-29 ENCOUNTER — APPOINTMENT (OUTPATIENT)
Dept: GENERAL RADIOLOGY | Age: 84
End: 2019-05-29
Attending: INTERNAL MEDICINE
Payer: MEDICARE

## 2019-05-29 ENCOUNTER — APPOINTMENT (OUTPATIENT)
Dept: CARDIAC CATH/INVASIVE PROCEDURES | Age: 84
End: 2019-05-29
Payer: MEDICARE

## 2019-05-29 ENCOUNTER — ANESTHESIA (OUTPATIENT)
Dept: SURGERY | Age: 84
End: 2019-05-29
Payer: MEDICARE

## 2019-05-29 DIAGNOSIS — Z95.0 STATUS POST BIVENTRICULAR PACEMAKER: Primary | ICD-10-CM

## 2019-05-29 LAB
ANION GAP SERPL CALC-SCNC: 8 MMOL/L (ref 7–16)
ATRIAL RATE: 61 BPM
ATRIAL RATE: 61 BPM
BUN SERPL-MCNC: 39 MG/DL (ref 8–23)
CALCIUM SERPL-MCNC: 8.9 MG/DL (ref 8.3–10.4)
CALCULATED P AXIS, ECG09: 49 DEGREES
CALCULATED P AXIS, ECG09: 80 DEGREES
CALCULATED R AXIS, ECG10: -42 DEGREES
CALCULATED R AXIS, ECG10: -7 DEGREES
CALCULATED T AXIS, ECG11: 48 DEGREES
CALCULATED T AXIS, ECG11: 59 DEGREES
CHLORIDE SERPL-SCNC: 106 MMOL/L (ref 98–107)
CO2 SERPL-SCNC: 28 MMOL/L (ref 21–32)
CREAT SERPL-MCNC: 1.74 MG/DL (ref 0.8–1.5)
DIAGNOSIS, 93000: NORMAL
DIAGNOSIS, 93000: NORMAL
ERYTHROCYTE [DISTWIDTH] IN BLOOD BY AUTOMATED COUNT: 12.9 % (ref 11.9–14.6)
GLUCOSE BLD STRIP.AUTO-MCNC: 147 MG/DL (ref 65–100)
GLUCOSE BLD STRIP.AUTO-MCNC: 187 MG/DL (ref 65–100)
GLUCOSE SERPL-MCNC: 178 MG/DL (ref 65–100)
HCT VFR BLD AUTO: 44.7 % (ref 41.1–50.3)
HGB BLD-MCNC: 14.6 G/DL (ref 13.6–17.2)
INR PPP: 1.8
MAGNESIUM SERPL-MCNC: 2.2 MG/DL (ref 1.8–2.4)
MCH RBC QN AUTO: 31.5 PG (ref 26.1–32.9)
MCHC RBC AUTO-ENTMCNC: 32.7 G/DL (ref 31.4–35)
MCV RBC AUTO: 96.5 FL (ref 79.6–97.8)
NRBC # BLD: 0 K/UL (ref 0–0.2)
P-R INTERVAL, ECG05: 200 MS
P-R INTERVAL, ECG05: 212 MS
PLATELET # BLD AUTO: 224 K/UL (ref 150–450)
PMV BLD AUTO: 11.8 FL (ref 9.4–12.3)
POTASSIUM SERPL-SCNC: 3.8 MMOL/L (ref 3.5–5.1)
PROTHROMBIN TIME: 21 SEC (ref 11.7–14.5)
Q-T INTERVAL, ECG07: 466 MS
Q-T INTERVAL, ECG07: 476 MS
QRS DURATION, ECG06: 102 MS
QRS DURATION, ECG06: 108 MS
QTC CALCULATION (BEZET), ECG08: 469 MS
QTC CALCULATION (BEZET), ECG08: 476 MS
RBC # BLD AUTO: 4.63 M/UL (ref 4.23–5.6)
SODIUM SERPL-SCNC: 142 MMOL/L (ref 136–145)
VENTRICULAR RATE, ECG03: 60 BPM
VENTRICULAR RATE, ECG03: 61 BPM
WBC # BLD AUTO: 12.5 K/UL (ref 4.3–11.1)

## 2019-05-29 PROCEDURE — 74011250637 HC RX REV CODE- 250/637: Performed by: ANESTHESIOLOGY

## 2019-05-29 PROCEDURE — 77030013687 HC GD NDL BARD -B

## 2019-05-29 PROCEDURE — C1751 CATH, INF, PER/CENT/MIDLINE: HCPCS

## 2019-05-29 PROCEDURE — C1769 GUIDE WIRE: HCPCS

## 2019-05-29 PROCEDURE — C1892 INTRO/SHEATH,FIXED,PEEL-AWAY: HCPCS

## 2019-05-29 PROCEDURE — 99218 HC RM OBSERVATION: CPT

## 2019-05-29 PROCEDURE — 71045 X-RAY EXAM CHEST 1 VIEW: CPT

## 2019-05-29 PROCEDURE — 74011250637 HC RX REV CODE- 250/637: Performed by: INTERNAL MEDICINE

## 2019-05-29 PROCEDURE — C1900 LEAD, CORONARY VENOUS: HCPCS

## 2019-05-29 PROCEDURE — 74011250636 HC RX REV CODE- 250/636: Performed by: ANESTHESIOLOGY

## 2019-05-29 PROCEDURE — C1898 LEAD, PMKR, OTHER THAN TRANS: HCPCS

## 2019-05-29 PROCEDURE — 33225 L VENTRIC PACING LEAD ADD-ON: CPT

## 2019-05-29 PROCEDURE — A4565 SLINGS: HCPCS

## 2019-05-29 PROCEDURE — 83735 ASSAY OF MAGNESIUM: CPT

## 2019-05-29 PROCEDURE — 74011636320 HC RX REV CODE- 636/320: Performed by: INTERNAL MEDICINE

## 2019-05-29 PROCEDURE — 77030002912 HC SUT ETHBND J&J -A

## 2019-05-29 PROCEDURE — 74011250636 HC RX REV CODE- 250/636

## 2019-05-29 PROCEDURE — 82962 GLUCOSE BLOOD TEST: CPT

## 2019-05-29 PROCEDURE — 74011000250 HC RX REV CODE- 250: Performed by: INTERNAL MEDICINE

## 2019-05-29 PROCEDURE — 77030012935 HC DRSG AQUACEL BMS -B

## 2019-05-29 PROCEDURE — 85027 COMPLETE CBC AUTOMATED: CPT

## 2019-05-29 PROCEDURE — 85610 PROTHROMBIN TIME: CPT

## 2019-05-29 PROCEDURE — 74011250636 HC RX REV CODE- 250/636: Performed by: INTERNAL MEDICINE

## 2019-05-29 PROCEDURE — 80048 BASIC METABOLIC PNL TOTAL CA: CPT

## 2019-05-29 PROCEDURE — 33208 INSRT HEART PM ATRIAL & VENT: CPT

## 2019-05-29 PROCEDURE — C2621 PMKR, OTHER THAN SING/DUAL: HCPCS

## 2019-05-29 PROCEDURE — 93005 ELECTROCARDIOGRAM TRACING: CPT | Performed by: INTERNAL MEDICINE

## 2019-05-29 PROCEDURE — C1887 CATHETER, GUIDING: HCPCS

## 2019-05-29 PROCEDURE — 74011636637 HC RX REV CODE- 636/637: Performed by: INTERNAL MEDICINE

## 2019-05-29 PROCEDURE — 74011000272 HC RX REV CODE- 272: Performed by: INTERNAL MEDICINE

## 2019-05-29 PROCEDURE — 77030022704 HC SUT VLOC COVD -B

## 2019-05-29 PROCEDURE — 76060000034 HC ANESTHESIA 1.5 TO 2 HR: Performed by: INTERNAL MEDICINE

## 2019-05-29 RX ORDER — PRAVASTATIN SODIUM 80 MG/1
80 TABLET ORAL
Status: DISCONTINUED | OUTPATIENT
Start: 2019-05-29 | End: 2019-05-30 | Stop reason: HOSPADM

## 2019-05-29 RX ORDER — SODIUM CHLORIDE 0.9 % (FLUSH) 0.9 %
5-40 SYRINGE (ML) INJECTION EVERY 8 HOURS
Status: DISCONTINUED | OUTPATIENT
Start: 2019-05-29 | End: 2019-05-30 | Stop reason: HOSPADM

## 2019-05-29 RX ORDER — OMEPRAZOLE 10 MG/1
20 CAPSULE, DELAYED RELEASE ORAL 2 TIMES DAILY
Status: DISCONTINUED | OUTPATIENT
Start: 2019-05-29 | End: 2019-05-30 | Stop reason: HOSPADM

## 2019-05-29 RX ORDER — BUPIVACAINE HYDROCHLORIDE AND EPINEPHRINE 5; 5 MG/ML; UG/ML
60 INJECTION, SOLUTION EPIDURAL; INTRACAUDAL; PERINEURAL ONCE
Status: COMPLETED | OUTPATIENT
Start: 2019-05-29 | End: 2019-05-29

## 2019-05-29 RX ORDER — CEFAZOLIN SODIUM/WATER 2 G/20 ML
2 SYRINGE (ML) INTRAVENOUS EVERY 8 HOURS
Status: COMPLETED | OUTPATIENT
Start: 2019-05-29 | End: 2019-05-30

## 2019-05-29 RX ORDER — PROPOFOL 10 MG/ML
INJECTION, EMULSION INTRAVENOUS AS NEEDED
Status: DISCONTINUED | OUTPATIENT
Start: 2019-05-29 | End: 2019-05-29 | Stop reason: HOSPADM

## 2019-05-29 RX ORDER — MORPHINE SULFATE 10 MG/ML
2 INJECTION, SOLUTION INTRAMUSCULAR; INTRAVENOUS
Status: DISCONTINUED | OUTPATIENT
Start: 2019-05-29 | End: 2019-05-30 | Stop reason: HOSPADM

## 2019-05-29 RX ORDER — ACETAMINOPHEN 500 MG
1000 TABLET ORAL
Status: DISCONTINUED | OUTPATIENT
Start: 2019-05-29 | End: 2019-05-30 | Stop reason: SDUPTHER

## 2019-05-29 RX ORDER — LIDOCAINE HYDROCHLORIDE 20 MG/ML
INJECTION, SOLUTION EPIDURAL; INFILTRATION; INTRACAUDAL; PERINEURAL AS NEEDED
Status: DISCONTINUED | OUTPATIENT
Start: 2019-05-29 | End: 2019-05-29 | Stop reason: HOSPADM

## 2019-05-29 RX ORDER — HYDROMORPHONE HYDROCHLORIDE 2 MG/ML
0.5 INJECTION, SOLUTION INTRAMUSCULAR; INTRAVENOUS; SUBCUTANEOUS
Status: DISCONTINUED | OUTPATIENT
Start: 2019-05-29 | End: 2019-05-29 | Stop reason: ALTCHOICE

## 2019-05-29 RX ORDER — SODIUM CHLORIDE, SODIUM LACTATE, POTASSIUM CHLORIDE, CALCIUM CHLORIDE 600; 310; 30; 20 MG/100ML; MG/100ML; MG/100ML; MG/100ML
150 INJECTION, SOLUTION INTRAVENOUS CONTINUOUS
Status: DISCONTINUED | OUTPATIENT
Start: 2019-05-29 | End: 2019-05-29

## 2019-05-29 RX ORDER — ACETAMINOPHEN 325 MG/1
650 TABLET ORAL
Status: DISCONTINUED | OUTPATIENT
Start: 2019-05-29 | End: 2019-05-30 | Stop reason: HOSPADM

## 2019-05-29 RX ORDER — SODIUM CHLORIDE 0.9 % (FLUSH) 0.9 %
5-40 SYRINGE (ML) INJECTION AS NEEDED
Status: DISCONTINUED | OUTPATIENT
Start: 2019-05-29 | End: 2019-05-30 | Stop reason: HOSPADM

## 2019-05-29 RX ORDER — HYDROCODONE BITARTRATE AND ACETAMINOPHEN 5; 325 MG/1; MG/1
1 TABLET ORAL AS NEEDED
Status: DISCONTINUED | OUTPATIENT
Start: 2019-05-29 | End: 2019-05-29 | Stop reason: ALTCHOICE

## 2019-05-29 RX ORDER — SODIUM CHLORIDE, SODIUM LACTATE, POTASSIUM CHLORIDE, CALCIUM CHLORIDE 600; 310; 30; 20 MG/100ML; MG/100ML; MG/100ML; MG/100ML
150 INJECTION, SOLUTION INTRAVENOUS CONTINUOUS
Status: DISCONTINUED | OUTPATIENT
Start: 2019-05-29 | End: 2019-05-30

## 2019-05-29 RX ORDER — LORAZEPAM 0.5 MG/1
0.5 TABLET ORAL
Status: DISCONTINUED | OUTPATIENT
Start: 2019-05-29 | End: 2019-05-30 | Stop reason: HOSPADM

## 2019-05-29 RX ORDER — SODIUM CHLORIDE 9 MG/ML
50 INJECTION, SOLUTION INTRAVENOUS CONTINUOUS
Status: DISCONTINUED | OUTPATIENT
Start: 2019-05-29 | End: 2019-05-30

## 2019-05-29 RX ORDER — PROPOFOL 10 MG/ML
INJECTION, EMULSION INTRAVENOUS
Status: DISCONTINUED | OUTPATIENT
Start: 2019-05-29 | End: 2019-05-29 | Stop reason: HOSPADM

## 2019-05-29 RX ORDER — ACETAMINOPHEN, DIPHENHYDRAMINE HCL, PHENYLEPHRINE HCL 325; 25; 5 MG/1; MG/1; MG/1
10 TABLET ORAL
Status: DISCONTINUED | OUTPATIENT
Start: 2019-05-29 | End: 2019-05-30 | Stop reason: HOSPADM

## 2019-05-29 RX ORDER — INSULIN LISPRO 100 [IU]/ML
INJECTION, SOLUTION INTRAVENOUS; SUBCUTANEOUS
Status: DISCONTINUED | OUTPATIENT
Start: 2019-05-29 | End: 2019-05-30 | Stop reason: HOSPADM

## 2019-05-29 RX ORDER — AMLODIPINE BESYLATE 5 MG/1
5 TABLET ORAL DAILY
Status: DISCONTINUED | OUTPATIENT
Start: 2019-05-30 | End: 2019-05-30 | Stop reason: HOSPADM

## 2019-05-29 RX ORDER — FAMOTIDINE 20 MG/1
20 TABLET, FILM COATED ORAL ONCE
Status: COMPLETED | OUTPATIENT
Start: 2019-05-29 | End: 2019-05-29

## 2019-05-29 RX ORDER — LIDOCAINE HYDROCHLORIDE 10 MG/ML
0.1 INJECTION INFILTRATION; PERINEURAL AS NEEDED
Status: DISCONTINUED | OUTPATIENT
Start: 2019-05-29 | End: 2019-05-29

## 2019-05-29 RX ORDER — AMIODARONE HYDROCHLORIDE 200 MG/1
200 TABLET ORAL DAILY
Status: DISCONTINUED | OUTPATIENT
Start: 2019-05-30 | End: 2019-05-30 | Stop reason: HOSPADM

## 2019-05-29 RX ORDER — SERTRALINE HYDROCHLORIDE 100 MG/1
100 TABLET, FILM COATED ORAL DAILY
Status: DISCONTINUED | OUTPATIENT
Start: 2019-05-30 | End: 2019-05-30 | Stop reason: HOSPADM

## 2019-05-29 RX ORDER — LATANOPROST 50 UG/ML
1 SOLUTION/ DROPS OPHTHALMIC
Status: DISCONTINUED | OUTPATIENT
Start: 2019-05-29 | End: 2019-05-30 | Stop reason: HOSPADM

## 2019-05-29 RX ORDER — HYDROCODONE BITARTRATE AND ACETAMINOPHEN 5; 325 MG/1; MG/1
1 TABLET ORAL
Qty: 10 TAB | Refills: 0 | Status: SHIPPED | OUTPATIENT
Start: 2019-05-29 | End: 2019-06-01

## 2019-05-29 RX ORDER — HYDROCODONE BITARTRATE AND ACETAMINOPHEN 5; 325 MG/1; MG/1
1 TABLET ORAL
Status: DISCONTINUED | OUTPATIENT
Start: 2019-05-29 | End: 2019-05-30 | Stop reason: HOSPADM

## 2019-05-29 RX ORDER — BUMETANIDE 1 MG/1
1 TABLET ORAL DAILY
Status: DISCONTINUED | OUTPATIENT
Start: 2019-05-30 | End: 2019-05-30 | Stop reason: HOSPADM

## 2019-05-29 RX ORDER — CEFAZOLIN SODIUM/WATER 2 G/20 ML
2 SYRINGE (ML) INTRAVENOUS
Status: COMPLETED | OUTPATIENT
Start: 2019-05-29 | End: 2019-05-29

## 2019-05-29 RX ORDER — SODIUM CHLORIDE, SODIUM LACTATE, POTASSIUM CHLORIDE, CALCIUM CHLORIDE 600; 310; 30; 20 MG/100ML; MG/100ML; MG/100ML; MG/100ML
INJECTION, SOLUTION INTRAVENOUS
Status: DISCONTINUED | OUTPATIENT
Start: 2019-05-29 | End: 2019-05-29 | Stop reason: HOSPADM

## 2019-05-29 RX ORDER — REPAGLINIDE 0.5 MG/1
0.5 TABLET ORAL
Status: DISCONTINUED | OUTPATIENT
Start: 2019-05-29 | End: 2019-05-30 | Stop reason: HOSPADM

## 2019-05-29 RX ADMIN — OMEPRAZOLE 20 MG: 10 CAPSULE, DELAYED RELEASE ORAL at 17:09

## 2019-05-29 RX ADMIN — BUPIVACAINE HYDROCHLORIDE AND EPINEPHRINE BITARTRATE 300 MG: 5; .005 INJECTION, SOLUTION EPIDURAL; INTRACAUDAL; PERINEURAL at 11:58

## 2019-05-29 RX ADMIN — CHLORASEPTIC 1 SPRAY: 1.5 LIQUID ORAL at 17:07

## 2019-05-29 RX ADMIN — PRAVASTATIN SODIUM 80 MG: 80 TABLET ORAL at 21:41

## 2019-05-29 RX ADMIN — PROPOFOL 140 MCG/KG/MIN: 10 INJECTION, EMULSION INTRAVENOUS at 10:36

## 2019-05-29 RX ADMIN — ACETAMINOPHEN, DIPHENHYDRAMINE HCL, PHENYLEPHRINE HCL 10 MG: 325; 25; 5 TABLET ORAL at 21:41

## 2019-05-29 RX ADMIN — BACITRACIN: 50000 INJECTION, POWDER, FOR SOLUTION INTRAMUSCULAR at 11:58

## 2019-05-29 RX ADMIN — IOPAMIDOL 20 ML: 755 INJECTION, SOLUTION INTRAVENOUS at 12:01

## 2019-05-29 RX ADMIN — INSULIN LISPRO 2 UNITS: 100 INJECTION, SOLUTION INTRAVENOUS; SUBCUTANEOUS at 21:40

## 2019-05-29 RX ADMIN — REPAGLINIDE 0.5 MG: 0.5 TABLET ORAL at 17:09

## 2019-05-29 RX ADMIN — Medication 2 G: at 20:30

## 2019-05-29 RX ADMIN — PROPOFOL 50 MG: 10 INJECTION, EMULSION INTRAVENOUS at 10:36

## 2019-05-29 RX ADMIN — SODIUM CHLORIDE, SODIUM LACTATE, POTASSIUM CHLORIDE, CALCIUM CHLORIDE: 600; 310; 30; 20 INJECTION, SOLUTION INTRAVENOUS at 10:28

## 2019-05-29 RX ADMIN — LATANOPROST 1 DROP: 50 SOLUTION OPHTHALMIC at 21:40

## 2019-05-29 RX ADMIN — LIDOCAINE HYDROCHLORIDE 40 MG: 20 INJECTION, SOLUTION EPIDURAL; INFILTRATION; INTRACAUDAL; PERINEURAL at 10:36

## 2019-05-29 RX ADMIN — Medication 2 G: at 10:39

## 2019-05-29 RX ADMIN — FAMOTIDINE 20 MG: 20 TABLET ORAL at 09:58

## 2019-05-29 RX ADMIN — Medication 10 ML: at 21:42

## 2019-05-29 NOTE — PROGRESS NOTES
Bedside and Verbal shift change report given to Cramine Hodge RN (oncoming nurse) by self John Flores nurse). Report included the following information SBAR, Kardex, Intake/Output, MAR, Recent Results and Med Rec Status. [facility] site assessed at bedside. BP cycling.

## 2019-05-29 NOTE — PROGRESS NOTES
Bedside and Verbal shift change report given to self (oncoming nurse) by Pb Preston RN (offgoing nurse). Report included the following information SBAR, Kardex, MAR and Recent Results. Left subclavian site assessed and WDL.

## 2019-05-29 NOTE — PROGRESS NOTES
Patient complains of throat pain with deep breathing . Dr. Ely Head notified. No new orders received.

## 2019-05-29 NOTE — PROGRESS NOTES
Report received from [place]. Procedural findings communicated. Intra procedural  medication administration reviewed. Progression of care discussed. Patient received into CPRU [place] [address] post BI-V Pacer implantation.     Access site without bleeding or swelling yes    Dressing dry and intact yes    Patient instructed to limit movement to left upper extremity    Routine post procedural vital signs and site assessment initiated yes

## 2019-05-29 NOTE — PROGRESS NOTES
Care Management Interventions  PCP Verified by CM: Yes  Last Visit to PCP: 05/23/19  Mode of Transport at Discharge: Other (see comment)(Kamila Pappas Friend [phone] )  Transition of Care Consult (CM Consult): Discharge Planning(Kamila Pappas Friend [phone] )  Discharge Durable Medical Equipment: No  Physical Therapy Consult: No  Occupational Therapy Consult: No  Speech Therapy Consult: No  Current Support Network: Own Home(Lives with GF)  Confirm Follow Up Transport: Family  Plan discussed with Pt/Family/Caregiver: Yes  Freedom of Choice Offered: Yes  Discharge Location  Discharge Placement: Home    Pt admitted to 3rd floor tele for symptomatic bradycardia. CM met with pt to discuss CM needs & DCP. Pt is A&Ox4. Pt is indep at home with all ADLS. Pt lives with GF. Pt has no DME needs. Pt has no difficulty with obtaining medications or transport. DCP home with GF. No further needs noted. CM to continue to monitor.

## 2019-05-29 NOTE — PROCEDURES
Pre-Procedure Diagnosis  1. Documented non-reversible symptomatic bradycardia due to sinus node dysfunction. 2. Atrial fibrillation failing medical therapy  3. Anticipated high degree of ventricular pacing, planned staged AV node ablation  4. NYHA class II-III symptoms    Procedure Performed  1. Insertion of Biotronik biventricular permanent pacemaker. 2. Insertion of left ventricular lead at time of new system Implantation. Anesthesia: MAC     Estimated Blood Loss: Less than 10 mL     Specimens: * No specimens in log *     The procedure, indications, risks, benefits, and alternatives were discussed with the patient and family members, who desired to proceed after questions were answered and informed consent was documented. Procedure: After informed consent was obtained, the patient was brought to the Electrophysiology Laboratory in a fasting state and was prepped and draped in sterile fashion. Prophylactic antibiotic was administered 10 minutes prior to skin incision (refer to anesthesia documentation for details). MAC was administered by the anesthesia team with continuous oxygen saturation measurement and blood pressure measurement. Local anesthetic (sensorcaine) was delivered to the left pectoral region and an incision was made parallel to the deltopectoral groove. A subcutaneous pocket was created using blunt dissection and electrocautery, and adequate hemostasis was established. Access x 3 was obtained under ultrasound guidance using a modified Seldinger technique with placement of 3 short wires down into the RA and advanced below the diaphragm. Next, placement of a peel-away sheath over the first guidewire was performed. A permanent RV pacemaker lead was then advanced under fluoroscopic guidance into the RV apex in a stable position with satisfactory sensing and pacing characteristics. The peel away sheath was removed. Another Safe-sheath was then placed over the second guidewire.  A permanent pacing lead was advanced under fluoroscopic guidance and positioned in the right atrium at the location of the RAA. Stable RA appendage position with satisfactory sensing characteristics were obtained. The sheath was peeled. The leads were sutured to the underlying pectoralis fascia using 2 non-absorbable sutures around each lead's collar. The lead slack and position was assessed under fluoroscopy while securing the lead collars to ensure proper length. After positioning the RA and RV leads, a standard BlackStratus Saira LV delivery sheath was advanced over the long access wire and dilator and wire were removed. The braided inner sheath was then advanced into the [id number] and used to cannulate the coronary sinus using contrast when necessary. A Glide wire and was used to allow a smooth transition into the CS body. A coronary sinus venogram was then performed using a balloon occluding catheter. A Merit renal inner sheath was used with an Acuity Straight trak to cannulate a posterolateral branch. The renal was advanced into the posterolateral and a sub selected venogram was performed. The left ventricular lead was then advanced into a posterolateral brach over an angioplasty wire. Adequate thresholds were obtained with an adequate margin between phrenic stim and loss of capture. The delivery sheaths were then slit with fluoroscopy demonstrating stable position. The lead was then sutured to the underlying pectoralis fascia using 2 non-absorbable sutures around the lead collar. Final lead testing via the pacing system analyzer (PSA) demonstrated stable sensing, impedance and pacing thresholds. The lead pins were then cleaned with antibiotic soaked gauze, dried gently, and attached to a new pacemaker generator. Pins were directly observed to pass the tip electrode, and the ring hex wrench screws were secured, and leads tug tested.  The device and leads were gently positioned within the pocket after the pocket was irrigated copiously with a saline antimicrobial solution. The wound was closed with multiple layers of absorbable suture followed skin closure with staples. Fluoroscopic images were interpreted by me, in multiple projections. Final device position was confirmed by stored fluoroscopic image from device pocket to lead tips in AP projection. The patient tolerated the procedure well and left the lab in good condition. Lead Data:    Pulse Generator Model #  Serial # Location Implant/Explant   [id number] Biotronik [id number] Left Pectoral Implant     Lead Model Number  Serial Number Lead position Implant/Explant   RA [id number] Biotronik [id number] RA Appendage Implant   RV [id number] Biotronik [id number] RV [place] Implant   LV [id number] Biotronik [id number] LV Lateral Implant     Lead Sensitivity and Threshold  Lead R or P sensitivity (mv) Threshold (V) Threshold PW (msec) Impedance (ohms) Final output Voltage (V) Final PW (msec)   RA 2.0 0.8 0.4 507 3.6 0.4   RV 6.9 0.6 0.4 624 3.6 0.4   LV 4.3 1.4 0.4 858 2.0 0.4     Bradycardia Settings  Scar Mode LRL URL Pace AVD (ms) Sense AVD (ms) Rate Response Mode Switching Mode SW Rate   DDD 60 120 300 300 On On 160     20 Contrast    LV Pacing Configuration: [id number]? LV1tip     Fluoro Time:  7.2 min    Complications: None    IMPRESSION: Successful implantation of Biotronik biventricular permanent pacemaker    Bayron Bassett MD, MS  Clinical Cardiac Electrophysiology  5/29/2019  12:00 PM

## 2019-05-29 NOTE — PROGRESS NOTES
Patient received to [address] [place] [address] room # 10  Ambulatory from [facility]. Patient scheduled for PPM today with Dr Socrates Carrero. Procedure reviewed & questions answered, voiced good understanding consent obtained & placed on chart. All medications and medical history reviewed. Will prep patient per orders. Patient & family updated on plan of care. The patient has a fraility score of 3-MANAGING WELL, based on patient A&Ox3, patient able to ambulate to room without difficulty.

## 2019-05-29 NOTE — ANESTHESIA PREPROCEDURE EVALUATION
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       Cardiovascular    Hypertension: well controlled        Dysrhythmias (Hx Afib s/p cardioversion) : atrial fibrillation      Exercise tolerance: >4 METS     GI/Hepatic/Renal     GERD: well controlled    Renal disease: CRI       Endo/Other    Diabetes         Other Findings            Physical Exam    Airway  Mallampati: II  TM Distance: > 6 cm  Neck ROM: normal range of motion   Mouth opening: Normal     Cardiovascular    Rhythm: irregular  Rate: abnormal         Dental    Dentition: Edentulous     Pulmonary  Breath sounds clear to auscultation               Abdominal         Other Findings            Anesthetic Plan    ASA: 3  Anesthesia type: total IV anesthesia          Induction: Intravenous  Anesthetic plan and risks discussed with: Patient and Family

## 2019-05-29 NOTE — PROGRESS NOTES
TRANSFER - IN REPORT:    Verbal report received from Brissa Lenz RN on Naveen Nazario being received from [facility] for routine progression of care      Report consisted of patients Situation, Background, Assessment and Recommendations(SBAR). Information from the following report(s) SBAR, Kardex and Procedure Summary was reviewed with the receiving nurse. Opportunity for questions and clarification was provided. Assessment completed upon patients arrival to unit and care assumed. Patient arrived to floor via stretcher. [address] [place] [address] with aquaseal and pressure dressing assessed at bedside. L arm in sling. Patient educated about bedrest for 6 hours and limit movement of L arm. Patient verbalized understanding of education. BP cycling q15 minutes. Bed low and locked. Call light within reach. Side rails X2. Skin assessment completed. Sacrum intact. Heels are intact. [address] [place] [address] s/p pacer. No other skin issues noted at this time.

## 2019-05-29 NOTE — ANESTHESIA POSTPROCEDURE EVALUATION
Procedure(s):  PACEMAKER.    total IV anesthesia    Anesthesia Post Evaluation      Multimodal analgesia: multimodal analgesia not used between 6 hours prior to anesthesia start to PACU discharge  Patient location during evaluation: bedside  Patient participation: complete - patient participated  Level of consciousness: awake and alert  Pain management: adequate  Airway patency: patent  Anesthetic complications: no  Cardiovascular status: hemodynamically stable  Respiratory status: spontaneous ventilation  Hydration status: euvolemic  Comments: Patient stable and may discharge at this time. Vitals Value Taken Time   BP     Temp     Pulse 60 5/29/2019  2:27 PM   Resp 13 5/29/2019  2:27 PM   SpO2 92 % 5/29/2019  2:27 PM   Vitals shown include unvalidated device data.

## 2019-05-29 NOTE — PROGRESS NOTES
Bedside and Verbal shift change report given to LISANDRO SEPULVEDA (oncoming nurse) by self Layman Mini nurse). Report included the following information SBAR, Kardex, MAR and Recent Results. Left subclavian site assessed and WDL.

## 2019-05-29 NOTE — PROGRESS NOTES
TRANSFER - OUT REPORT:    Verbal report given to [address] [place] [address] Darwin RN(name) on Colin Rossi  being transferred to telemetry(unit) for routine progression of care       Report consisted of patients Situation, Background, Assessment and   Recommendations(SBAR). Information from the following report(s) Kardex, Procedure Summary and Cardiac Rhythm NSR was reviewed with the receiving nurse. Lines:   Peripheral IV 05/29/19 Right Antecubital (Active)        Opportunity for questions and clarification was provided. Patient transported with:   O2 @ 0 liters   Accompanied by patient transport.

## 2019-05-29 NOTE — PROGRESS NOTES
Pt admitted at Observation status. Pt instructed on home medication policy; pt voices understanding. Pt provided copies of the following: Admission pamphlet with Observation insert and Medicare FAQ's. Home meds ordered per MD, verified with [facility] and supplied by patient. No narcotic meds. Medications verified and labeled per pharmacy and placed in locked box in patient's room. The medications received from the patient include Amiodarone, amlodipine, bumetadine, pravastatin, sertraline, Losartan-HCTZ, januvia, repaglinide, melatonin, and omeprazole.

## 2019-05-30 VITALS
HEIGHT: 72 IN | TEMPERATURE: 99.2 F | WEIGHT: 198.9 LBS | BODY MASS INDEX: 26.94 KG/M2 | OXYGEN SATURATION: 92 % | DIASTOLIC BLOOD PRESSURE: 59 MMHG | SYSTOLIC BLOOD PRESSURE: 144 MMHG | HEART RATE: 61 BPM | RESPIRATION RATE: 18 BRPM

## 2019-05-30 LAB — GLUCOSE BLD STRIP.AUTO-MCNC: 177 MG/DL (ref 65–100)

## 2019-05-30 PROCEDURE — 82962 GLUCOSE BLOOD TEST: CPT

## 2019-05-30 PROCEDURE — 74011250637 HC RX REV CODE- 250/637: Performed by: INTERNAL MEDICINE

## 2019-05-30 PROCEDURE — 99218 HC RM OBSERVATION: CPT

## 2019-05-30 PROCEDURE — 74011250636 HC RX REV CODE- 250/636: Performed by: INTERNAL MEDICINE

## 2019-05-30 PROCEDURE — 74011636637 HC RX REV CODE- 636/637: Performed by: INTERNAL MEDICINE

## 2019-05-30 RX ADMIN — Medication 2 G: at 02:49

## 2019-05-30 RX ADMIN — ACETAMINOPHEN 650 MG: 325 TABLET, FILM COATED ORAL at 02:58

## 2019-05-30 RX ADMIN — OMEPRAZOLE 20 MG: 10 CAPSULE, DELAYED RELEASE ORAL at 08:26

## 2019-05-30 RX ADMIN — AMLODIPINE BESYLATE 5 MG: 5 TABLET ORAL at 08:25

## 2019-05-30 RX ADMIN — SITAGLIPTIN 50 MG: 50 TABLET, FILM COATED ORAL at 08:22

## 2019-05-30 RX ADMIN — BUMETANIDE 1 MG: 1 TABLET ORAL at 08:25

## 2019-05-30 RX ADMIN — REPAGLINIDE 0.5 MG: 0.5 TABLET ORAL at 08:26

## 2019-05-30 RX ADMIN — AMIODARONE HYDROCHLORIDE 200 MG: 200 TABLET ORAL at 08:24

## 2019-05-30 RX ADMIN — INSULIN LISPRO 2 UNITS: 100 INJECTION, SOLUTION INTRAVENOUS; SUBCUTANEOUS at 08:23

## 2019-05-30 RX ADMIN — Medication 10 ML: at 05:30

## 2019-05-30 NOTE — PROGRESS NOTES
Care Management Interventions  PCP Verified by CM: Yes  Last Visit to PCP: 05/23/19  Mode of Transport at Discharge:  Other (see comment)(MianKamila Friend [phone] )  Transition of Care Consult (CM Consult): Discharge Planning(MianKamila Friend [phone] )  Discharge Durable Medical Equipment: No  Physical Therapy Consult: No  Occupational Therapy Consult: No  Speech Therapy Consult: No  Current Support Network: Own Home(Lives with GF)  Confirm Follow Up Transport: Family  Plan discussed with Pt/Family/Caregiver: Yes  Freedom of Choice Offered: Yes  Discharge Location  Discharge Placement: Home

## 2019-05-30 NOTE — DISCHARGE INSTRUCTIONS
DISPOSITION: Patient has been instructed to keep affected arm below shoulder level for the next 4 weeks or until cleared by doctor. The arm sling should be worn while sleeping. The dressing will be removed at follow-up. The incision site must be kept clean and dry. The patient may shower in a few days. Lotions, powders, or creams should be avoided as these can increase the risk of infection. The affected arm should not be used for any pushing, pulling, or lifting until cleared by doctor. Driving is prohibited until cleared by doctor in a follow up appointment. Any signs of infection including increased redness, suspicious drainage, or unexplained fever should be reported to the doctor immediately by calling [phone]. Bradycardia: Care Instructions  Your Care Instructions    Bradycardia is a slow heart rate. If your heart beats too slowly, it can't supply your body with enough blood. This can make you weak or dizzy. Or it may make you pass out. Sometimes medicine can cause this problem. If this happens, your doctor may have you adjust one of your medicines. If a medicine is not the problem, your doctor may recommend a pacemaker. It is important to treat bradycardia so that you don't get more serious health problems. Your doctor will want to see you on a routine schedule to make sure that your heartbeat is normal.  Follow-up care is a key part of your treatment and safety. Be sure to make and go to all appointments, and call your doctor if you are having problems. It's also a good idea to know your test results and keep a list of the medicines you take. How can you care for yourself at home? · Take your medicines exactly as prescribed. Call your doctor if you think you are having a problem with your medicine. If your bradycardia is caused by another disease, your doctor will try to treat the disease. If it is caused by heart medicines, he or she will adjust your medicines.   · Make lifestyle changes to improve your heart health. ? Get regular exercise. Try for 30 minutes on most days of the week. If you do not have other heart problems, you likely do not have limits on the type or level of activity that you can do. You may want to walk, swim, bike, or do other activities. Ask your doctor what level of exercise is safe for you. ? To control your cholesterol, avoid foods with a lot of fat, saturated fat, or sodium. Try to eat more fiber. And if your doctor says it's okay, get some exercise on most days. ? Do not smoke. Smoking can make your heart condition worse. If you need help quitting, talk to your doctor about stop-smoking programs and medicines. These can increase your chances of quitting for good. ? Limit alcohol to 2 drinks a day for men and 1 drink a day for women. Too much alcohol can cause health problems. Pacemaker  If you have a pacemaker, you will get more specific information about it. Be sure to:  · Check your pulse as your doctor tells you. · Have your pacemaker checked as often as your doctor recommends. You may be able to do this over the phone or computer. · Avoid strong magnetic or electrical fields. These include MRIs, welding equipment, and generators. · You will be checked several times right after you get your pacemaker and when it is time to have the battery changed. Batteries last for 5 to 15 years. · You can talk on a cell phone. But keep it 6 inches away from your pacemaker. · Microwaves, TVs, radios, and kitchen and bathroom appliances won't harm you. When should you call for help? Call 911 anytime you think you may need emergency care. For example, call if:    · You have symptoms of sudden heart failure. These may include:  ? Severe trouble breathing. ? A fast or irregular heartbeat. ? Coughing up pink, foamy mucus. ? You passed out.     · You have symptoms of a stroke.  These may include:  ? Sudden numbness, tingling, weakness, or loss of movement in your face, arm, or leg, especially on only one side of your body. ? Sudden vision changes. ? Sudden trouble speaking. ? Sudden confusion or trouble understanding simple statements. ? Sudden problems with walking or balance. ? A sudden, severe headache that is different from past headaches.    Call your doctor now or seek immediate medical care if:    · You have new or changed symptoms of heart failure, such as:  ? New or increased shortness of breath. ? New or worse swelling in your legs, ankles, or feet. ? Sudden weight gain, such as more than 2 to 3 pounds in a day or 5 pounds in a week. (Your doctor may suggest a different range of weight gain.)  ? Feeling dizzy or lightheaded or like you may faint. ? Feeling so tired or weak that you cannot do your usual activities. ? Not sleeping well. Shortness of breath wakes you at night. You need extra pillows to prop yourself up to breathe easier.    Watch closely for changes in your health, and be sure to contact your doctor if:    · You do not get better as expected. Where can you learn more? Go to http://jeremie-garrison.info/. Enter A441 in the search box to learn more about \"Bradycardia: Care Instructions. \"  Current as of: July 22, 2018  Content Version: 11.9  © 6980-4729 [employer]. Care instructions adapted under license by [employer] (which disclaims liability or warranty for this information). If you have questions about a medical condition or this instruction, always ask your healthcare professional. [place] [address] any warranty or liability for your use of this information. Pacemaker Placement: What to Expect at [facility] placement is surgery to put a pacemaker in your chest. This surgery may be done if you have bradycardia (a slow heart rate). A pacemaker is a small, battery-powered device. It sends electrical signals to the heart. These signals work to keep the heartbeat steady. Thin wires, called leads, carry the signals from the pacemaker to the heart. A pacemaker can prevent or reduce dizziness, fainting, and shortness of breath caused by a slow or unsteady heartbeat. Your chest may be sore where the doctor made the cut (incision) and put in the pacemaker. You also may have a bruise and mild swelling. These symptoms usually get better in 1 to 2 weeks. You may feel a hard ridge along the incision. This usually gets softer in the months after surgery. You may be able to see or feel the outline of the pacemaker under your skin. You will probably be able to go back to work or your usual routine 1 to 2 weeks after surgery. Pacemaker batteries usually last 5 to 15 years. Your doctor will talk to you about how often you will need to have your pacemaker checked. You can safely use most household and office electronics. This includes things such as kitchen appliances, electric power tools, and computers. You will need to stay away from things with strong magnetic and electrical fields. This includes things such as an MRI machine (unless your pacemaker is safe for an MRI), welding equipment, and power generators. You can use a cell phone, but keep it at least 6 inches away from your pacemaker. Check with your doctor about what you need to stay away from, what you need to use with care, and what is okay to use. This care sheet gives you a general idea about how long it will take for you to recover. But each person recovers at a different pace. Follow the steps below to get better as quickly as possible. How can you care for yourself at home? Activity    · Rest when you feel tired.     · Be active. Walking is a good choice.     · For 4 to 6 weeks:  ? Avoid activities that strain your chest or upper arm muscles. This includes pushing a  or vacuum, or mopping floors. It also includes swimming, or swinging a golf club or tennis racquet.   ? Do not raise your arm (the one on the side of your body where the pacemaker is located) above your shoulder. ? Allow your body to heal. Don't move quickly or lift anything heavy until you are feeling better.     · Many people are able to return to work within 1 to 2 weeks after surgery.     · Ask your doctor when it is okay for you to have sex. Diet    · You can eat your normal diet. If your stomach is upset, try bland, low-fat foods like plain rice, broiled chicken, toast, and yogurt. Medicines    · Your doctor will tell you if and when you can restart your medicines. He or she will also give you instructions about taking any new medicines.     · If you take aspirin or some other blood thinner, be sure to talk to your doctor. He or she will tell you if and when to start taking this medicine again. Make sure that you understand exactly what your doctor wants you to do.     · Be safe with medicines. Read and follow all instructions on the label. ? If the doctor gave you a prescription medicine for pain, take it as prescribed. ? If you are not taking a prescription pain medicine, ask your doctor if you can take an over-the-counter medicine. ? Do not take aspirin, ibuprofen (Advil, Motrin), naproxen (Aleve), or other nonsteroidal anti-inflammatory drugs (NSAIDs) unless your doctor says it is okay.     · If your doctor prescribed antibiotics, take them as directed. Do not stop taking them just because you feel better. You need to take the full course of antibiotics. Incision care    · If you have strips of tape on the incision, leave the tape on for a week or until it falls off.     · Keep the incision dry while it heals. Your doctor may recommend sponge baths for about 7 days, but do not get the incision wet. Your doctor will let you know when you may take showers. After a shower, pat the incision dry.     · Don't use hydrogen peroxide or alcohol on the incision, which can slow healing.  You may cover the area with a gauze bandage if it oozes fluid or rubs against clothing. Change the bandage every day.     · Do not take a bath or get into a hot tub for the first 2 weeks, or until your doctor tells you it is okay. Other instructions    · Keep a medical ID card with you at all times that says you have a pacemaker. The card should include the  and model information.     · Wear medical alert jewelry stating that you have a pacemaker. You can buy this at most drugstores.     · Check your pulse as directed by your doctor.     · Have your pacemaker checked as often as your doctor recommends. In some cases, this may be done over the phone or the Internet. Your doctor will give you instructions about how to do this. Follow-up care is a key part of your treatment and safety. Be sure to make and go to all appointments, and call your doctor if you are having problems. It's also a good idea to know your test results and keep a list of the medicines you take. When should you call for help? Call 911 anytime you think you may need emergency care. For example, call if:    · You passed out (lost consciousness).     · You have trouble breathing.    Call your doctor now or seek immediate medical care if:    · You are dizzy or light-headed, or you feel like you may faint.     · You have pain that does not get better after you take pain medicine.     · You hear an alarm or feel a vibration from your pacemaker.     · You have loose stitches, or your incision comes open.     · Bright red blood has soaked through the bandage over your incision.     · You have signs of infection, such as:  ? Increased pain, swelling, warmth, or redness. ? Red streaks leading from the incision. ? Pus draining from the incision. ? A fever.    Watch closely for changes in your health, and be sure to contact your doctor if:    · You have any problems with your pacemaker. Where can you learn more? Go to http://jeremie-garrison.info/.   Enter W153 in the search box to learn more about \"Pacemaker Placement: What to Expect at Home. \"  Current as of: July 22, 2018  Content Version: 11.9  © 1541-6881 [employer]. Care instructions adapted under license by [employer] (which disclaims liability or warranty for this information). If you have questions about a medical condition or this instruction, always ask your healthcare professional. [address] any warranty or liability for your use of this information. Heart-Healthy Diet: Care Instructions  Your Care Instructions    A heart-healthy diet has lots of vegetables, fruits, nuts, beans, and whole grains, and is low in salt. It limits foods that are high in saturated fat, such as meats, cheeses, and fried foods. It may be hard to change your diet, but even small changes can lower your risk of heart attack and heart disease. Follow-up care is a key part of your treatment and safety. Be sure to make and go to all appointments, and call your doctor if you are having problems. It's also a good idea to know your test results and keep a list of the medicines you take. How can you care for yourself at home? Watch your portions  · Learn what a serving is. A \"serving\" and a \"portion\" are not always the same thing. Make sure that you are not eating larger portions than are recommended. For example, a serving of pasta is ½ cup. A serving size of meat is 2 to 3 ounces. A 3-ounce serving is about the size of a deck of cards. Measure serving sizes until you are good at [place]" them. Keep in mind that restaurants often serve portions that are 2 or 3 times the size of one serving. · To keep your energy level up and keep you from feeling hungry, eat often but in smaller portions. · Eat only the number of calories you need to stay at a healthy weight. If you need to lose weight, eat fewer calories than your body burns (through exercise and other physical activity).   Eat more fruits and vegetables  · Eat a variety of fruit and vegetables every day. Dark green, deep orange, red, or yellow fruits and vegetables are especially good for you. Examples include spinach, carrots, peaches, and berries. · Keep carrots, celery, and other veggies handy for snacks. Buy fruit that is in season and store it where you can see it so that you will be tempted to eat it. · Cook dishes that have a lot of veggies in them, such as stir-fries and soups. Limit saturated and trans fat  · Read food labels, and try to avoid saturated and trans fats. They increase your risk of heart disease. Trans fat is found in many processed foods such as cookies and crackers. · Use olive or canola oil when you cook. Try cholesterol-lowering spreads, such as Benecol or Take Control. · Bake, broil, grill, or steam foods instead of frying them. · Choose lean meats instead of high-fat meats such as hot dogs and sausages. Cut off all visible fat when you prepare meat. · Eat fish, skinless poultry, and meat alternatives such as soy products instead of high-fat meats. Soy products, such as tofu, may be especially good for your heart. · Choose low-fat or fat-free milk and dairy products. Eat fish  · Eat at least two servings of fish a week. Certain fish, such as salmon and tuna, contain omega-3 fatty acids, which may help reduce your risk of heart attack. Eat foods high in fiber  · Eat a variety of grain products every day. Include whole-grain foods that have lots of fiber and nutrients. Examples of whole-grain foods include oats, whole wheat bread, and brown rice. · Buy whole-grain breads and cereals, instead of white bread or pastries. Limit salt and sodium  · Limit how much salt and sodium you eat to help lower your blood pressure. · Taste food before you salt it. Add only a little salt when you think you need it. With time, your taste buds will adjust to less salt.   · Eat fewer snack items, fast foods, and other high-salt, processed foods. Check food labels for the amount of sodium in packaged foods. · Choose low-sodium versions of canned goods (such as soups, vegetables, and beans). Limit sugar  · Limit drinks and foods with added sugar. These include candy, desserts, and soda pop. Limit alcohol  · Limit alcohol to no more than 2 drinks a day for men and 1 drink a day for women. Too much alcohol can cause health problems. When should you call for help? Watch closely for changes in your health, and be sure to contact your doctor if:    · You would like help planning heart-healthy meals. Where can you learn more? Go to http://jeremieGanymed Pharmaceuticalsgarrison.info/. Enter V137 in the search box to learn more about \"Heart-Healthy Diet: Care Instructions. \"  Current as of: July 22, 2018  Content Version: 11.9  © 9770-6306 [employer]. Care instructions adapted under license by [employer] (which disclaims liability or warranty for this information). If you have questions about a medical condition or this instruction, always ask your healthcare professional. [place] [address] any warranty or liability for your use of this information. DISCHARGE SUMMARY from Nurse    PATIENT INSTRUCTIONS:    After general anesthesia or intravenous sedation, for 24 hours or while taking prescription Narcotics:  · Limit your activities  · Do not drive and operate hazardous machinery  · Do not make important personal or business decisions  · Do  not drink alcoholic beverages  · If you have not urinated within 8 hours after discharge, please contact your surgeon on call.     Report the following to your surgeon:  · Excessive pain, swelling, redness or odor of or around the surgical area  · Temperature over 100.5  · Nausea and vomiting lasting longer than 4 hours or if unable to take medications  · Any signs of decreased circulation or nerve impairment to extremity: change in color, persistent  numbness, tingling, coldness or increase pain  · Any questions    What to do at Home:    *  Please give a list of your current medications to your Primary Care Provider. *  Please update this list whenever your medications are discontinued, doses are      changed, or new medications (including over-the-counter products) are added. *  Please carry medication information at all times in case of emergency situations. These are general instructions for a healthy lifestyle:    No smoking/ No tobacco products/ Avoid exposure to second hand smoke  Surgeon General's Warning:  Quitting smoking now greatly reduces serious risk to your health. Obesity, smoking, and sedentary lifestyle greatly increases your risk for illness    A healthy diet, regular physical exercise & weight monitoring are important for maintaining a healthy lifestyle    You may be retaining fluid if you have a history of heart failure or if you experience any of the following symptoms:  Weight gain of 3 pounds or more overnight or 5 pounds in a week, increased swelling in our hands or feet or shortness of breath while lying flat in bed. Please call your doctor as soon as you notice any of these symptoms; do not wait until your next office visit. Recognize signs and symptoms of STROKE:    F-face looks uneven    A-arms unable to move or move unevenly    S-speech slurred or non-existent    T-time-call 911 as soon as signs and symptoms begin-DO NOT go       Back to bed or wait to see if you get better-TIME IS BRAIN. Warning Signs of HEART ATTACK     Call 911 if you have these symptoms:   Chest discomfort. Most heart attacks involve discomfort in the center of the chest that lasts more than a few minutes, or that goes away and comes back. It can feel like uncomfortable pressure, squeezing, fullness, or pain.  Discomfort in other areas of the upper body.  Symptoms can include pain or discomfort in one or both arms, the back, neck, jaw, or stomach.  Shortness of breath with or without chest discomfort.  Other signs may include breaking out in a cold sweat, nausea, or lightheadedness. Don't wait more than five minutes to call 911 - MINUTES MATTER! Fast action can save your life. Calling 911 is almost always the fastest way to get lifesaving treatment. Emergency Medical Services staff can begin treatment when they arrive -- up to an hour sooner than if someone gets to the hospital by car. The discharge information has been reviewed with the patient. The patient verbalized understanding. Discharge medications reviewed with the patient and appropriate educational materials and side effects teaching were provided.   ___________________________________________________________________________________________________________________________________

## 2019-05-30 NOTE — PROGRESS NOTES
Verbal bedside report given to Solange Castellanos, oncoming RN. Patient's situation, background, assessment and recommendations provided. Opportunity for questions provided. Oncoming RN assumed care of patient. Left subclavian site visualized.

## 2019-05-30 NOTE — PROGRESS NOTES
Problem: Pacer/ICD: Post-Procedure  Goal: Activity/Safety  Outcome: Progressing Towards Goal  Goal: Nutrition/Diet  Outcome: Progressing Towards Goal  Goal: Medications  Outcome: Progressing Towards Goal  Goal: Respiratory  Outcome: Progressing Towards Goal  Goal: Treatments/Interventions/Procedures  Outcome: Progressing Towards Goal  Goal: Psychosocial  Outcome: Progressing Towards Goal  Goal: *Optimal pain control at patient's stated goal  Outcome: Progressing Towards Goal  Goal: *Absence of signs and symptoms of infection or wound complication  Outcome: Progressing Towards Goal     Problem: Falls - Risk of  Goal: *Absence of Falls  Description  Document Edgar Brunner Fall Risk and appropriate interventions in the flowsheet.   Outcome: Progressing Towards Goal

## 2019-05-30 NOTE — DISCHARGE SUMMARY
[address] [place] [address] Cardiology Discharge Summary     Patient ID:  Chuck Bowen  [id number]  32 y.o.  1935    Admit date: 5/29/2019    Discharge date:  5/30/2019    Admitting Physician: Princess Genesis MD     Discharge Physician: Nazario Jmienez NP/Dr. Domingo Fulton    Admission Diagnoses: Symptomatic bradycardia [R00.1]  Symptomatic bradycardia [R00.1]    Discharge Diagnoses:    Diagnosis    Type 2 diabetes mellitus with stage 3 chronic kidney disease, without long-term current use of insulin (HCC)    Symptomatic bradycardia    Paroxysmal atrial fibrillation (HCC)    Mixed hyperlipidemia    Hypertension    Chronic kidney disease, stage III (moderate) [facility])       Cardiology Procedures this admission:  biventricular pacemaker implantation, CXR  Consults: None    Hospital Course: Patient was seen at the office of [address] [place] [address] Cardiology by Dr. Nida Alcaraz for management of symptomatic bradycardia with AFIB and was subsequently scheduled for an AM Admission PM implantation at [facility] on 5/29/19. Patient was taken to the EP lab and underwent successful implantation of Biotronik biventricular PM by Dr. Nida Alcaraz. Patient tolerated the procedure well and was taken to the telemetry floor for recovery. Follow up chest xray showed no pneumothorax. The following morning patient was up feeling well without any complaints of chest pain, shortness of breath, or palpitations. Patient's left subclavian cath site was clean, dry and intact without hematoma. Patient's labs were WNL. Patient was seen and examined by Dr. Domingo Fulton and determined stable and ready for discharge. Patient was instructed on the importance of medication compliance and outpatient follow up. Patient has been scheduled for follow-up with [address] [place] [address] Cardiology -- device clinic in 10-14 days. DISPOSITION: Patient has been instructed to keep affected arm below shoulder level for the next 4 weeks or until cleared by doctor.   The arm sling should be worn while sleeping. The dressing will be removed at follow-up. The incision site must be kept clean and dry. The patient may shower in a few days. Lotions, powders, or creams should be avoided as these can increase the risk of infection. The affected arm should not be used for any pushing, pulling, or lifting until cleared by doctor. Driving is prohibited until cleared by doctor in a follow up appointment. Any signs of infection including increased redness, suspicious drainage, or unexplained fever should be reported to the doctor immediately by calling [phone]. Discharge Exam:  Patient has been seen by Dr. Contreras Marrow: see his progress note for exam details.   Visit Vitals  /68 (BP 1 Location: Right arm, BP Patient Position: At rest)   Pulse 60   Temp 98.3 °F (36.8 °C)   Resp 18   Ht 6' (1.829 m)   Wt 100.7 kg (222 lb)   SpO2 95%   BMI 30.11 kg/m²         Recent Results (from the past 24 hour(s))   CBC W/O DIFF    Collection Time: 05/29/19  9:34 AM   Result Value Ref Range    WBC 12.5 (H) 4.3 - 11.1 K/uL    RBC 4.63 4.23 - 5.6 M/uL    HGB 14.6 13.6 - 17.2 g/dL    HCT 44.7 41.1 - 50.3 %    MCV 96.5 79.6 - 97.8 FL    MCH 31.5 26.1 - 32.9 PG    MCHC 32.7 31.4 - 35.0 g/dL    RDW 12.9 11.9 - 14.6 %    PLATELET 036 421 - 681 K/uL    MPV 11.8 9.4 - 12.3 FL    ABSOLUTE NRBC 0.00 0.0 - 0.2 K/uL   MAGNESIUM    Collection Time: 05/29/19  9:34 AM   Result Value Ref Range    Magnesium 2.2 1.8 - 2.4 mg/dL   METABOLIC PANEL, BASIC    Collection Time: 05/29/19  9:34 AM   Result Value Ref Range    Sodium 142 136 - 145 mmol/L    Potassium 3.8 3.5 - 5.1 mmol/L    Chloride 106 98 - 107 mmol/L    CO2 28 21 - 32 mmol/L    Anion gap 8 7 - 16 mmol/L    Glucose 178 (H) 65 - 100 mg/dL    BUN 39 (H) 8 - 23 MG/DL    Creatinine 1.74 (H) 0.8 - 1.5 MG/DL    GFR est AA 49 (L) >60 ml/min/1.73m2    GFR est non-AA 40 (L) >60 ml/min/1.73m2    Calcium 8.9 8.3 - 10.4 MG/DL   PROTHROMBIN TIME + INR    Collection Time: 05/29/19  9:34 AM   Result Value Ref Range    Prothrombin time 21.0 (H) 11.7 - 14.5 sec    INR 1.8     EKG, 12 LEAD, INITIAL    Collection Time: 05/29/19 10:05 AM   Result Value Ref Range    Ventricular Rate 61 BPM    Atrial Rate 61 BPM    P-R Interval 200 ms    QRS Duration 108 ms    Q-T Interval 466 ms    QTC Calculation (Bezet) 469 ms    Calculated P Axis 49 degrees    Calculated R Axis -7 degrees    Calculated T Axis 59 degrees    Diagnosis       Normal sinus rhythm  Septal infarct , age undetermined  Abnormal ECG  When compared with ECG of 06-SEP-2013 15:22,  Incomplete right bundle branch block is no longer Present  Septal infarct is now Present  Confirmed by MELANI SAPP ()Joseph ([id number]) on 5/29/2019 10:35:38 AM     GLUCOSE, POC    Collection Time: 05/29/19  3:00 PM   Result Value Ref Range    Glucose (POC) 147 (H) 65 - 100 mg/dL   EKG, 12 LEAD, INITIAL    Collection Time: 05/29/19  3:31 PM   Result Value Ref Range    Ventricular Rate 60 BPM    Atrial Rate 61 BPM    P-R Interval 212 ms    QRS Duration 102 ms    Q-T Interval 476 ms    QTC Calculation (Bezet) 476 ms    Calculated P Axis 80 degrees    Calculated R Axis -42 degrees    Calculated T Axis 48 degrees    Diagnosis       Atrial-paced rhythm with prolonged AV conduction  Left axis deviation  Abnormal ECG  When compared with ECG of 29-MAY-2019 10:05,  Electronic atrial pacemaker has replaced Sinus rhythm  Criteria for Septal infarct are no longer Present  Confirmed by MELANI SAPP ()Joseph ([id number]) on 5/29/2019 4:24:14 PM     GLUCOSE, POC    Collection Time: 05/29/19  9:28 PM   Result Value Ref Range    Glucose (POC) 187 (H) 65 - 100 mg/dL         Patient Instructions:   Current Discharge Medication List      START taking these medications    Details   HYDROcodone-acetaminophen (NORCO) 5-325 mg per tablet Take 1 Tab by mouth every four (4) hours as needed for Pain for up to 3 days. Max Daily Amount: 6 Tabs.   Qty: 10 Tab, Refills: 0    Associated Diagnoses: Status post biventricular pacemaker         CONTINUE these medications which have NOT CHANGED    Details   JANUVIA 100 mg tablet TAKE 1 TABLET BY MOUTH EVERY DAY  Qty: 90 Tab, Refills: 0    Associated Diagnoses: Type 2 diabetes mellitus with stage 3 chronic kidney disease, without long-term current use of insulin ([facility])      amLODIPine (NORVASC) 5 mg tablet TAKE 1 TABLET BY MOUTH EVERY DAY  Qty: 90 Tab, Refills: 0    Associated Diagnoses: Essential hypertension      Omeprazole delayed release (PRILOSEC D/R) 20 mg tablet Take 1 Tab by mouth two (2) times a day. Qty: 180 Tab, Refills: 1    Associated Diagnoses: Gastroesophageal reflux disease without esophagitis      sertraline (ZOLOFT) 100 mg tablet Take 1 Tab by mouth daily. Qty: 90 Tab, Refills: 1    Associated Diagnoses: Generalized anxiety disorder      fluticasone (FLONASE) 50 mcg/actuation nasal spray 2 Sprays by Both Nostrils route two (2) times a day. Qty: 1 Bottle, Refills: 5      amiodarone (CORDARONE) 200 mg tablet TAKE 1 TABLET BY MOUTH EVERY DAY  Qty: 90 Tab, Refills: 1      bumetanide (BUMEX) 1 mg tablet TAKE 1 TABLET BY MOUTH EVERY MORNING  Qty: 90 Tab, Refills: 1      losartan-hydroCHLOROthiazide (HYZAAR) 100-25 mg per tablet Take 1 Tab by mouth daily. Qty: 90 Tab, Refills: 1      pravastatin (PRAVACHOL) 80 mg tablet Take 1 Tab by mouth nightly. Qty: 90 Tab, Refills: 1    Associated Diagnoses: Hyperlipidemia, mixed      repaglinide (PRANDIN) 0.5 mg tablet Take 1 Tab by mouth Before breakfast, lunch, and dinner. Qty: 270 Tab, Refills: 1    Associated Diagnoses: Type 2 diabetes mellitus with stage 3 chronic kidney disease, without long-term current use of insulin ([facility])      TRAVATAN Z 0.004 % ophthalmic solution PLACE 1 DROP INTO OU QHS  Refills: 12      melatonin tab tablet Take  by mouth nightly. cyanocobalamin 1,000 mcg tablet Take 1,000 mcg by mouth daily. Potassium Gluconate 595 mg (99 mg) tablet Take  by mouth daily.       LORazepam (ATIVAN) 0.5 mg tablet Take  by mouth nightly as needed for Anxiety. warfarin (COUMADIN) 5 mg tablet TAKE 1 TAB BY MOUTH DAILY.   Qty: 90 Tab, Refills: 4               Signed:  Octavia Clarke NP  5/29/2019 10:53 PM

## 2019-05-30 NOTE — PROGRESS NOTES
Bedside and Verbal shift change report given to self (oncoming nurse) by Rhina Simmons RN (offgoing nurse). Report included the following information SBAR, Kardex, MAR and Recent Results. Left subclavian site assessed and WDL.

## 2019-05-30 NOTE — PROGRESS NOTES
Discharge instructions were reviewed with patient. An opportunity was given for questions. All medications were reviewed, and information was given on the new medications - norco. Patient verbalized understanding, and has no questions at this time. IV and telemetry monitor removed.

## 2019-06-11 PROBLEM — I63.9 OCCIPITAL CEREBRAL INFARCTION (HCC): Status: ACTIVE | Noted: 2019-06-11

## 2019-06-11 PROBLEM — R26.89 IMBALANCE: Status: ACTIVE | Noted: 2019-06-11

## 2019-07-22 PROBLEM — E11.42 DIABETIC POLYNEUROPATHY ASSOCIATED WITH TYPE 2 DIABETES MELLITUS (HCC): Status: ACTIVE | Noted: 2019-07-22

## 2019-08-18 ENCOUNTER — TELEPHONE (OUTPATIENT)
Dept: CARDIOLOGY | Age: 84
End: 2019-08-18

## 2019-08-18 DIAGNOSIS — I49.3 PVC'S (PREMATURE VENTRICULAR CONTRACTIONS): ICD-10-CM

## 2019-08-18 DIAGNOSIS — I48.20 CHRONIC ATRIAL FIBRILLATION (HCC): Primary | ICD-10-CM

## 2019-08-19 DIAGNOSIS — I49.3 PVC'S (PREMATURE VENTRICULAR CONTRACTIONS): ICD-10-CM

## 2019-08-19 NOTE — TELEPHONE ENCOUNTER
Pt called to report low HR. States took BP tonight and HR on machine reading 33. States he rechecked this and HR now 62. States BP \"fine. \" Feels fine. States had PM placed 5/29/19. Instructed that device has LRL 61 and HR should be lower than that. Sometimes monitors do not  well. Instructed that as long as he feels well and HR is stable is OK. However, can call office in AM for device check to ensure proper function. Pt v/u. Instructed that if he is concerned or has new s/s or persistently low HR can come to ED for evaluation. Pt v/u. Message sent to device clinic.        LAZARUS LeongC Unna Boot Text: An Unna boot was placed to help immobilize the limb and facilitate more rapid healing.

## 2019-08-19 NOTE — TELEPHONE ENCOUNTER
Normal pacer function. PVC counter noted increasing pvc's. Denies complaints. Patient remains on amio and is scheduled to see Dr. Norman Hicks next month. Patient is on bumex and losartan. Patient sent for bmp and mag.

## 2019-08-21 ENCOUNTER — HOSPITAL ENCOUNTER (OUTPATIENT)
Dept: LAB | Age: 84
Discharge: HOME | End: 2019-08-21
Payer: MEDICARE

## 2019-08-21 DIAGNOSIS — I49.3 PVC'S (PREMATURE VENTRICULAR CONTRACTIONS): ICD-10-CM

## 2019-08-21 LAB
ANION GAP SERPL CALC-SCNC: 10 MMOL/L (ref 7–16)
BUN SERPL-MCNC: 46 MG/DL (ref 8–23)
CALCIUM SERPL-MCNC: 8.4 MG/DL (ref 8.3–10.4)
CHLORIDE SERPL-SCNC: 108 MMOL/L (ref 98–107)
CO2 SERPL-SCNC: 26 MMOL/L (ref 21–32)
CREAT SERPL-MCNC: 1.69 MG/DL (ref 0.8–1.5)
GLUCOSE SERPL-MCNC: 296 MG/DL (ref 65–100)
MAGNESIUM SERPL-MCNC: 2 MG/DL (ref 1.8–2.4)
POTASSIUM SERPL-SCNC: 4.1 MMOL/L (ref 3.5–5.1)
SODIUM SERPL-SCNC: 144 MMOL/L (ref 136–145)

## 2019-08-21 PROCEDURE — 83735 ASSAY OF MAGNESIUM: CPT

## 2019-08-21 PROCEDURE — 36415 COLL VENOUS BLD VENIPUNCTURE: CPT

## 2019-08-21 PROCEDURE — 80048 BASIC METABOLIC PNL TOTAL CA: CPT

## 2019-09-24 PROBLEM — Z23 ENCOUNTER FOR IMMUNIZATION: Status: RESOLVED | Noted: 2017-10-27 | Resolved: 2019-09-24

## 2019-09-27 ENCOUNTER — HOSPITAL ENCOUNTER (OUTPATIENT)
Dept: LAB | Age: 84
Discharge: HOME | End: 2019-09-27
Payer: MEDICARE

## 2019-09-27 LAB — MAGNESIUM SERPL-MCNC: 2 MG/DL (ref 1.8–2.4)

## 2019-09-27 PROCEDURE — 83735 ASSAY OF MAGNESIUM: CPT

## 2019-09-27 PROCEDURE — 36415 COLL VENOUS BLD VENIPUNCTURE: CPT

## 2019-10-02 NOTE — PROGRESS NOTES
Patient pre-assessment complete for AV Node Ablation with Dr [employer] scheduled for 10/3/19 at 2pm, arrival time 12pm. Patient verified using . Patient instructed to bring all home medications in labeled bottles on the day of procedure. NPO status reinforced. Patient instructed to HOLD bumex, januvia & losartan/HCTZ in am . Instructed they can take all other medications excluding vitamins & supplements. Patient verbalizes understanding of all instructions & denies any questions at this time.

## 2019-10-03 ENCOUNTER — ANESTHESIA (OUTPATIENT)
Dept: SURGERY | Age: 84
End: 2019-10-03
Payer: MEDICARE

## 2019-10-03 ENCOUNTER — HOSPITAL ENCOUNTER (OUTPATIENT)
Dept: CARDIAC CATH/INVASIVE PROCEDURES | Age: 84
Setting detail: OUTPATIENT SURGERY
Discharge: HOME | End: 2019-10-03
Attending: INTERNAL MEDICINE | Admitting: INTERNAL MEDICINE
Payer: MEDICARE

## 2019-10-03 ENCOUNTER — ANESTHESIA EVENT (OUTPATIENT)
Dept: SURGERY | Age: 84
End: 2019-10-03
Payer: MEDICARE

## 2019-10-03 VITALS
SYSTOLIC BLOOD PRESSURE: 176 MMHG | OXYGEN SATURATION: 97 % | DIASTOLIC BLOOD PRESSURE: 74 MMHG | RESPIRATION RATE: 20 BRPM | HEIGHT: 72 IN | HEART RATE: 62 BPM | BODY MASS INDEX: 30.48 KG/M2 | WEIGHT: 225 LBS | TEMPERATURE: 98.6 F

## 2019-10-03 LAB
ANION GAP SERPL CALC-SCNC: 6 MMOL/L (ref 7–16)
ATRIAL RATE: 69 BPM
BUN SERPL-MCNC: 34 MG/DL (ref 8–23)
CALCIUM SERPL-MCNC: 9.1 MG/DL (ref 8.3–10.4)
CALCULATED P AXIS, ECG09: 36 DEGREES
CALCULATED R AXIS, ECG10: 5 DEGREES
CALCULATED T AXIS, ECG11: 66 DEGREES
CHLORIDE SERPL-SCNC: 107 MMOL/L (ref 98–107)
CO2 SERPL-SCNC: 30 MMOL/L (ref 21–32)
CREAT SERPL-MCNC: 1.45 MG/DL (ref 0.8–1.5)
DIAGNOSIS, 93000: NORMAL
ERYTHROCYTE [DISTWIDTH] IN BLOOD BY AUTOMATED COUNT: 13.2 % (ref 11.9–14.6)
GLUCOSE BLD STRIP.AUTO-MCNC: 154 MG/DL (ref 65–100)
GLUCOSE SERPL-MCNC: 172 MG/DL (ref 65–100)
HCT VFR BLD AUTO: 44.1 % (ref 41.1–50.3)
HGB BLD-MCNC: 14.3 G/DL (ref 13.6–17.2)
INR PPP: 2.3
MAGNESIUM SERPL-MCNC: 2.1 MG/DL (ref 1.8–2.4)
MCH RBC QN AUTO: 31.5 PG (ref 26.1–32.9)
MCHC RBC AUTO-ENTMCNC: 32.4 G/DL (ref 31.4–35)
MCV RBC AUTO: 97.1 FL (ref 79.6–97.8)
NRBC # BLD: 0 K/UL (ref 0–0.2)
P-R INTERVAL, ECG05: 196 MS
PLATELET # BLD AUTO: 206 K/UL (ref 150–450)
PMV BLD AUTO: 11.8 FL (ref 9.4–12.3)
POTASSIUM SERPL-SCNC: 3.7 MMOL/L (ref 3.5–5.1)
PROTHROMBIN TIME: 25.9 SEC (ref 11.7–14.5)
Q-T INTERVAL, ECG07: 446 MS
QRS DURATION, ECG06: 104 MS
QTC CALCULATION (BEZET), ECG08: 477 MS
RBC # BLD AUTO: 4.54 M/UL (ref 4.23–5.6)
SODIUM SERPL-SCNC: 143 MMOL/L (ref 136–145)
VENTRICULAR RATE, ECG03: 69 BPM
WBC # BLD AUTO: 9.5 K/UL (ref 4.3–11.1)

## 2019-10-03 PROCEDURE — 80048 BASIC METABOLIC PNL TOTAL CA: CPT

## 2019-10-03 PROCEDURE — 93650 ICAR CATH ABLTJ AV NODE FUNC: CPT

## 2019-10-03 PROCEDURE — 74011250636 HC RX REV CODE- 250/636: Performed by: INTERNAL MEDICINE

## 2019-10-03 PROCEDURE — 77030002912 HC SUT ETHBND J&J -A

## 2019-10-03 PROCEDURE — 76060000032 HC ANESTHESIA 0.5 TO 1 HR: Performed by: INTERNAL MEDICINE

## 2019-10-03 PROCEDURE — 77030013687 HC GD NDL BARD -B

## 2019-10-03 PROCEDURE — 77030027107 HC PTCH EXT REF CARTO3 J&J -F

## 2019-10-03 PROCEDURE — 74011250636 HC RX REV CODE- 250/636: Performed by: ANESTHESIOLOGY

## 2019-10-03 PROCEDURE — 74011250636 HC RX REV CODE- 250/636

## 2019-10-03 PROCEDURE — 85027 COMPLETE CBC AUTOMATED: CPT

## 2019-10-03 PROCEDURE — 82962 GLUCOSE BLOOD TEST: CPT

## 2019-10-03 PROCEDURE — 74011000250 HC RX REV CODE- 250: Performed by: INTERNAL MEDICINE

## 2019-10-03 PROCEDURE — 93005 ELECTROCARDIOGRAM TRACING: CPT | Performed by: INTERNAL MEDICINE

## 2019-10-03 PROCEDURE — C1732 CATH, EP, DIAG/ABL, 3D/VECT: HCPCS

## 2019-10-03 PROCEDURE — 83735 ASSAY OF MAGNESIUM: CPT

## 2019-10-03 PROCEDURE — 77030035291 HC TBNG PMP SMARTABLATE J&J -B

## 2019-10-03 PROCEDURE — 93281 PM DEVICE PROGR EVAL MULTI: CPT

## 2019-10-03 PROCEDURE — C1894 INTRO/SHEATH, NON-LASER: HCPCS

## 2019-10-03 PROCEDURE — 85610 PROTHROMBIN TIME: CPT

## 2019-10-03 RX ORDER — CEFAZOLIN SODIUM/WATER 2 G/20 ML
2 SYRINGE (ML) INTRAVENOUS ONCE
Status: COMPLETED | OUTPATIENT
Start: 2019-10-03 | End: 2019-10-03

## 2019-10-03 RX ORDER — PROPOFOL 10 MG/ML
INJECTION, EMULSION INTRAVENOUS
Status: DISCONTINUED | OUTPATIENT
Start: 2019-10-03 | End: 2019-10-03 | Stop reason: HOSPADM

## 2019-10-03 RX ORDER — SODIUM CHLORIDE 0.9 % (FLUSH) 0.9 %
5-40 SYRINGE (ML) INJECTION EVERY 8 HOURS
Status: CANCELLED | OUTPATIENT
Start: 2019-10-03

## 2019-10-03 RX ORDER — SODIUM CHLORIDE, SODIUM LACTATE, POTASSIUM CHLORIDE, CALCIUM CHLORIDE 600; 310; 30; 20 MG/100ML; MG/100ML; MG/100ML; MG/100ML
75 INJECTION, SOLUTION INTRAVENOUS CONTINUOUS
Status: DISCONTINUED | OUTPATIENT
Start: 2019-10-03 | End: 2019-10-04 | Stop reason: HOSPADM

## 2019-10-03 RX ORDER — LIDOCAINE HYDROCHLORIDE 10 MG/ML
10 INJECTION, SOLUTION EPIDURAL; INFILTRATION; INTRACAUDAL; PERINEURAL
Status: DISCONTINUED | OUTPATIENT
Start: 2019-10-03 | End: 2019-10-04 | Stop reason: HOSPADM

## 2019-10-03 RX ORDER — LIDOCAINE HYDROCHLORIDE 10 MG/ML
10 INJECTION INFILTRATION; PERINEURAL
Status: DISCONTINUED | OUTPATIENT
Start: 2019-10-03 | End: 2019-10-03 | Stop reason: SDUPTHER

## 2019-10-03 RX ORDER — OXYCODONE AND ACETAMINOPHEN 10; 325 MG/1; MG/1
1 TABLET ORAL AS NEEDED
Status: CANCELLED | OUTPATIENT
Start: 2019-10-03

## 2019-10-03 RX ORDER — PROPOFOL 10 MG/ML
INJECTION, EMULSION INTRAVENOUS AS NEEDED
Status: DISCONTINUED | OUTPATIENT
Start: 2019-10-03 | End: 2019-10-03 | Stop reason: HOSPADM

## 2019-10-03 RX ORDER — HEPARIN SODIUM 200 [USP'U]/100ML
2000 INJECTION, SOLUTION INTRAVENOUS CONTINUOUS
Status: DISCONTINUED | OUTPATIENT
Start: 2019-10-03 | End: 2019-10-04 | Stop reason: HOSPADM

## 2019-10-03 RX ORDER — SODIUM CHLORIDE 0.9 % (FLUSH) 0.9 %
5-40 SYRINGE (ML) INJECTION AS NEEDED
Status: CANCELLED | OUTPATIENT
Start: 2019-10-03

## 2019-10-03 RX ORDER — HYDROMORPHONE HYDROCHLORIDE 2 MG/ML
0.5 INJECTION, SOLUTION INTRAMUSCULAR; INTRAVENOUS; SUBCUTANEOUS
Status: CANCELLED | OUTPATIENT
Start: 2019-10-03

## 2019-10-03 RX ORDER — OXYCODONE HYDROCHLORIDE 5 MG/1
5 TABLET ORAL
Status: CANCELLED | OUTPATIENT
Start: 2019-10-03 | End: 2019-10-04

## 2019-10-03 RX ADMIN — SODIUM CHLORIDE, SODIUM LACTATE, POTASSIUM CHLORIDE, AND CALCIUM CHLORIDE: 600; 310; 30; 20 INJECTION, SOLUTION INTRAVENOUS at 15:08

## 2019-10-03 RX ADMIN — LIDOCAINE HYDROCHLORIDE 10 ML: 10 INJECTION, SOLUTION EPIDURAL; INFILTRATION; INTRACAUDAL; PERINEURAL at 15:30

## 2019-10-03 RX ADMIN — PROPOFOL 100 MCG/KG/MIN: 10 INJECTION, EMULSION INTRAVENOUS at 15:15

## 2019-10-03 RX ADMIN — Medication 2 G: at 15:15

## 2019-10-03 RX ADMIN — HEPARIN SODIUM 2000 UNITS/HR: 200 INJECTION, SOLUTION INTRAVENOUS at 15:33

## 2019-10-03 RX ADMIN — PROPOFOL 20 MG: 10 INJECTION, EMULSION INTRAVENOUS at 15:21

## 2019-10-03 RX ADMIN — PROPOFOL 50 MG: 10 INJECTION, EMULSION INTRAVENOUS at 15:14

## 2019-10-03 NOTE — PROGRESS NOTES
Patient received to [address] [place] [address] room # 10  Ambulatory from [facility]. Patient scheduled for AV Node Ablation today with Dr Abdulkadir Lilly. Procedure reviewed & questions answered, voiced good understanding consent obtained & placed on chart. All medications and medical history reviewed. Will prep patient per orders. Patient & family updated on plan of care. The patient has a fraility score of 3-MANAGING WELL, based on independent of ADLs/ambulation.  Increased symptoms with exertion

## 2019-10-03 NOTE — ANESTHESIA PREPROCEDURE EVALUATION
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       Cardiovascular    Hypertension: well controlled        Dysrhythmias (Hx Afib s/p cardioversion) : atrial fibrillation      Exercise tolerance: >4 METS     GI/Hepatic/Renal     GERD: well controlled    Renal disease: CRI       Endo/Other    Diabetes         Other Findings              Physical Exam    Airway  Mallampati: II  TM Distance: > 6 cm  Neck ROM: normal range of motion   Mouth opening: Normal     Cardiovascular    Rhythm: irregular  Rate: abnormal         Dental    Dentition: Edentulous     Pulmonary  Breath sounds clear to auscultation               Abdominal         Other Findings            Anesthetic Plan    ASA: 3  Anesthesia type: total IV anesthesia          Induction: Intravenous  Anesthetic plan and risks discussed with: Patient and Spouse

## 2019-10-03 NOTE — PROGRESS NOTES
Suture removed from right groin. Dressing reapplied to right groin. 5lb sandbag applied to right groin. Will continue to monitor for any signs of bleeding or hematoma.

## 2019-10-03 NOTE — PROCEDURES
Pre-Procedure Diagnosis  1. Persistent atrial fibrillation     Procedure Performed  1. AV node ablation     Anesthesia: MAC      Estimated Blood Loss: Less than 10 mL          Specimens: * No specimens in log *      The procedure, indications, risks, benefits, and alternatives were discussed with the patient and family members, who desired to proceed after questions were answered and informed consent was documented. Procedure: After informed consent was obtained, the patient was brought to the Electrophysiology Laboratory in a fasting state and was prepped and draped in sterile fashion. Local anesthetic was delivered to the region over the right femoral vein. Access x 1 was obtained under ultrasound guidance using a modified Seldinger technique with placement of a short sheath into the right femoral vein. An 3.5 mm Smart Touch Biosense Seo irrigated ablation catheter was used with CARTO to create an electroanatomical map of the RA focusing on the His location. The ablation catheter was advanced to the region of the AV junction at the location of the marked His on CARTO and with delivery of RF there was complete heart block. This remained after a 15 minute waiting period. Aida Bassett MD, MS  Clinical Cardiac Electrophysiology  10/3/2019  3:37 PM

## 2019-10-03 NOTE — ANESTHESIA POSTPROCEDURE EVALUATION
Procedure(s):  AV NODE  ABLATION. total IV anesthesia    Anesthesia Post Evaluation      Multimodal analgesia: multimodal analgesia not used between 6 hours prior to anesthesia start to PACU discharge  Patient location during evaluation: bedside  Patient participation: complete - patient participated  Level of consciousness: awake and alert  Pain management: adequate  Airway patency: patent  Anesthetic complications: no  Cardiovascular status: hemodynamically stable  Respiratory status: spontaneous ventilation  Hydration status: euvolemic  Comments: Patient stable and may discharge at this time. Vitals Value Taken Time   BP     Temp     Pulse 65 10/3/2019  6:38 PM   Resp     SpO2     Vitals shown include unvalidated device data.

## 2019-10-03 NOTE — DISCHARGE INSTRUCTIONS
AV NODE ABLATION DISCHARGE INSTRUCTIONS    1. Check puncture site frequently for swelling or bleeding. If there is any bleeding, lie down and apply pressure over the area with a clean towel or washcloth. Call 911. Notify your doctor for any redness, swelling, drainage, or oozing from the puncture site. Notify your doctor for any fever or chills. 2. If the extremity becomes cold, numb, or painful call [facility] at [phone].  3. Activity should be limited for the next 48 hours. Climb stairs as little as possible and avoid any stooping, bending, or strenuous activity for 48 hours. No heavy lifting (anything over 10 pounds) for 3 days. 4. You may resume your usual diet. Drink more fluids than usual.  5. Have a responsible person drive you home and stay with you for at least 24 hours after your heart catheterization/angiography. Do not drive for the next 24 hours. 6. You may remove bandage from your Right groin in 24 hours. You may shower in 24 hours. No tub baths, hot tubs, or swimming for 1 week. Do not place any lotions, creams, powders, or ointments over puncture site for 1 week. You may place a clean band-aid over the puncture site each day for 5 days. Change daily. 7. Please continue your medications as prescribed by your physician. I have read the above instructions and have had the opportunity to ask questions.       Patient: ________________________   Date: 10/3/2019    Witness: _______________________   Date: 10/3/2019

## 2019-10-03 NOTE — PROGRESS NOTES
TRANSFER - IN REPORT:    Verbal report received from [id number] Fredrick RN(name) on Candida Israel  being received from EP lab(unit) for routine progression of care      Report consisted of patients Situation, Background, Assessment and   Recommendations(SBAR). Information from the following report(s) Procedure Summary was reviewed with the receiving nurse. Opportunity for questions and clarification was provided. Assessment completed upon patients arrival to unit and care assumed.

## 2020-10-14 ENCOUNTER — HOSPITAL ENCOUNTER (OUTPATIENT)
Dept: PHYSICAL THERAPY | Age: 85
Discharge: HOME | End: 2020-10-14
Payer: MEDICARE

## 2020-10-14 PROCEDURE — 97110 THERAPEUTIC EXERCISES: CPT

## 2020-10-14 PROCEDURE — 97162 PT EVAL MOD COMPLEX 30 MIN: CPT

## 2020-10-14 NOTE — THERAPY EVALUATION
Wilfrido Thao : 1935 Primary: [employer] Medicare Advantage Secondary:  [facility]rapy Center at [address], [address] [place] [address] [phone]   [phone] OUTPATIENT PHYSICAL THERAPY:Initial Assessment 10/14/2020 ICD-10: Treatment Diagnosis: Low back pain [M54.5] Treatment Diagnosis 2: Generalized Muscle Weakness [M62.81] Treatment Diagnosis 3: other abnormalities of gait and mobility [R26.89] Precautions: PACEMAKER, SOB, AFib Allergies: Patient has no known allergies. TREATMENT PLAN: 
Effective Dates: 10/14/2020 TO 2020 (60 days). Frequency/Duration: 2 times a week for 60 Day(s) MEDICAL/REFERRING DIAGNOSIS: 
Low back pain [M54.5] Other specified enthesopathies of right lower limb, excluding foot [C02.046] Unilateral primary osteoarthritis, right knee [M17.11] DATE OF ONSET: Chronic (Greater then 1 year) REFERRING PHYSICIAN: Veda Varela MD MD Orders: Evaluate and Treat Return MD Appointment: Patient says not follow-up is currently scheduled. INITIAL ASSESSMENT:  Mr. Timbo Truong is a 82 y.o. male presenting to physical therapy with complaints of low back pain, right hip, right knee pain. He denies any specific mechanism of injury, reports history of low back pain with a lumbar fusion approximately 10 years ago. He reports using a back brace from time to time. He reports over the last approximately 3-4 weeks he has started having hip and right knee pain. He reports having trouble ascending and descending stairs, inability to kneel, difficulty squatting and with yard work. Patient presents with increased pain, decreased strength, decreased ROM, decreased flexibility, impaired gait, impaired posture, decreased activity tolerance, and overall impaired functional mobility.  Patient is a good candidate for skilled physical therapy interventions to include manual therapy, therapeutic exercise, balance training, gait training, transfer training, postural re-education, body mechanics training, and pain modalities as needed. PROBLEM LIST (Impacting functional limitations): 1. Decreased Strength 2. Decreased ADL/Functional Activities 3. Decreased Transfer Abilities 4. Decreased Ambulation Ability/Technique 5. Decreased Balance 6. Increased Pain 7. Decreased Activity Tolerance 8. Increased Shortness of Breath 9. Decreased Flexibility/Joint Mobility 10. Decreased [place] with Home Exercise Program INTERVENTIONS PLANNED: (Treatment may consist of any combination of the following) 1. Balance Exercise 2. Cryotherapy 3. Electrical Stimulation 4. Gait Training 5. Heat 6. Home Exercise Program (HEP) 7. Manual Therapy 8. Neuromuscular Re-education/Strengthening 9. Range of Motion (ROM) 10. Therapeutic Activites 11. Therapeutic Exercise/Strengthening 12. Transcutaneous Electrical Nerve Stimulation (TENS) GOALS: (Goals have been discussed and agreed upon with patient.) Short-Term Functional Goals: Time Frame: 10/14/2020 to 11/13/2020 1. Patient demonstrates independence with home exercise program without verbal cueing provided by therapist.  
2. Patient will report no more than 5/10 pain at rest in order to demonstrate improved self pain control and tolerance. 3. Patient will be educated in and demonstrate improved upright posture including standing and walking. 4. Patient will be educated in and demonstrate proper squat lift technique in order to reduce stress on low back, hip, knee to improve safety, and reduce risk of injury. 5. Patient will improve lumbar flexion to 30 degrees. 6. Patient will improve dorsiflexion to 10 deg. Discharge Goals: Time Frame: 10/14/2020 to 12/13/2020 1. Patient will improve lumbar flexion to 40 deg to assist with squatting with decreased difficulty. 2. Patient will improve hip flexion to 120 deg to assist with ability to return to yard work and [facility]. 3. Patient will improve knee flexion to 130 deg to assist with ability to kneel. 4. Patient will improve strength to 4/5 to be able to ascend and descend stairs and steps without difficulty. 5. Patient will improve Modified Oswestry Scale score to 25/50 from 18/50. Outcome Measure: Tool Used: Lower Extremity Functional Scale (LEFS) Score:  Initial: 54/80 Most Recent: X/80 (Date: -- ) Interpretation of Score: 20 questions each scored on a 5 point scale with 0 representing \"extreme difficulty or unable to perform\" and 4 representing \"no difficulty\". The lower the score, the greater the functional disability. 80/80 represents no disability. Minimal detectable change is 9 points. Tool Used: Modified Oswestry Low Back Pain Questionnaire Score:  Initial: 25/50  Most Recent: X/50 (Date: -- ) Interpretation of Score: Each section is scored on a 0-5 scale, 5 representing the greatest disability. The scores of each section are added together for a total score of 50. Medical Necessity:  
· Patient is expected to demonstrate progress in strength, range of motion, balance and functional technique to improved tolerance to stairs, kneeling, squatting, household and yardwork activities. · Skilled intervention continues to be required due to weakness, decreased ROM, decreased flexibility, pain, and functional limitations. Reason for Services/Other Comments: 
· Patient continues to require skilled intervention due to increasing complexity of exercises. Total Evaluation Duration: 36 minutes Rehabilitation Potential For Stated Goals: Good Regarding Quan Choudhury's therapy, I certify that the treatment plan above will be carried out by a therapist or under their direction.  
Thank you for this referral, 
Luis Gaspar, PT    
 Referring Physician Signature: Delores Romero MD              Date PAIN/SUBJECTIVE:  
 Initial: Pain Intensity 1: 4 Pain Location 1: Back, Hip, Knee Pain Orientation 1: Right  Post Session:  2/10 HISTORY:  
 History of Injury/Illness (Reason for Referral): Mr. Netta Shipley is a 82 y.o. male presenting to physical therapy with complaints of low back pain, right hip, right knee pain. He denies any specific mechanism of injury, reports history of low back pain with a lumbar fusion approximately 10 years ago. He reports using a back brace from time to time. He reports over the last approximately 3-4 weeks he has started having hip and right knee pain. He reports having trouble ascending and descending stairs, inability to kneel, difficulty squatting and with yard work. Past Medical History/Comorbidities:  
Mr. Netta Shipley  has a past medical history of Allergic rhinitis, Atrial fibrillation ([address] Chronic kidney disease, Chronic kidney disease, stage III (moderate) ([address] Chronic pain, Colon polyp, Coronary artery disease, Diabetic polyneuropathy associated with type 2 diabetes mellitus ([address] (7/22/2019), Erectile dysfunction, Fracture of left fibula, GERD (gastroesophageal reflux disease), Glaucoma, Hypertension, Imbalance (6/11/2019), Multinodular goiter, Nephrolithiasis, Obesity (BMI 30-39.9), Peptic ulcer disease, Plantar fasciitis, Psychiatric disorder, Right inguinal hernia, Sciatica, Stroke ([address] Tobacco use disorder, and Type 2 diabetes mellitus ([address] He also has no past medical history of Arthritis, Asthma, Autoimmune disease ([address] Cancer ([address] Chronic obstructive pulmonary disease ([address] Difficult intubation, Heart failure ([address] Liver disease, Malignant hyperthermia due to anesthesia, Nausea & vomiting, Pseudocholinesterase deficiency, Seizures ([address] Sleep apnea, or Thromboembolus ([address]   Mr. Netta Shipley  has a past surgical history that includes hx back surgery (2012); hx knee arthroscopy (Right, 2012); hx gi (1980's); hx afib ablation; pr cardiac surg procedure unlist; pr cardiac surg procedure unlist (2019); hx colonoscopy (2008); and hx colonoscopy (2018). Social History/Living Environment:  
 Patient does not live alone. Patient has 5 steps/stairs to enter and exit his home. He has both a tub/shower and walk-in shower. Prior Level of Function/Work/Activity: 
       Patient was independent without dysfunction. He is retired. He is avid about working in his yard. Dominant Side:  
      [facility] H2 Model Falls Risk Assessment Risk Factors: 
     (1)  Gender [Male] Ability to Rise from Chair: 
     (1)  Pushes up, successful in one attempt Falls Prevention Plan: No modifications necessary Total: (5 or greater = High Risk): 2  
 ©2010 [facility] [address] [place] [address] Patent #[id number]. Federal Law prohibits the replication, distribution or use without written permission from [facility] [employer] Current Medications:   
   
Current Outpatient Medications:  
  fluticasone propionate (FLONASE) 50 mcg/actuation nasal spray, USE 2 SPRAYS IN EACH NOSTRIL EVERY DAY, Disp: 1 Bottle, Rfl: 1 
  bumetanide (BUMEX) 1 mg tablet, TAKE 1 TABLET BY MOUTH EVERY DAY IN THE MORNING, Disp: 90 Tab, Rfl: 1 
  losartan-hydroCHLOROthiazide (HYZAAR) 50-12.5 mg per tablet, TAKE 1 TABLET BY MOUTH EVERY DAY, Disp: 90 Tab, Rfl: 1 
  latanoprost (XALATAN) 0.005 % ophthalmic solution, , Disp: , Rfl:  
  tamsulosin (FLOMAX) 0.4 mg capsule, Take 0.4 mg by mouth daily. , Disp: , Rfl:  
  sertraline (ZOLOFT) 100 mg tablet, TAKE 1 TABLET BY MOUTH EVERY DAY, Disp: 90 Tab, Rfl: 1   insulin degludec [place] FlexTouch U-200) 200 unit/mL (3 mL) inpn, 30 Units by SubCUTAneous route daily. , Disp: 4.5 mL, Rfl: 3 
  amLODIPine (NORVASC) 5 mg tablet, TAKE 1 1/2 TABLET DAILY, Disp: 135 Tab, Rfl: 1   pravastatin (PRAVACHOL) 80 mg tablet, TAKE 1 TABLET BY MOUTH EVERY DAY AT NIGHT, Disp: 90 Tab, Rfl: 1 
  omeprazole (PRILOSEC) 20 mg capsule, TAKE 1 CAPSULE BY MOUTH TWICE A DAY **INSURANCE WILL PAY FOR 1 CAPSULE PER DAY, Disp: 180 Cap, Rfl: 1 
  Insulin Needles, Disposable, 31 gauge x 5/16\" ndle, Use daily with Tresiba pens, Disp: 1 Package, Rfl: 11 
  magnesium oxide (MAG-OX) 400 mg tablet, Take 400 mg by mouth daily. , Disp: , Rfl:  
  ONETOUCH ULTRA BLUE TEST STRIP strip, USE TO CHECK BLOOD SUGARS TWICE DAILY DX: E11.9 [employer], Disp: 200 Strip, Rfl: 5 
  TRAVATAN Z 0.004 % ophthalmic solution, PLACE 1 DROP INTO OU QHS, Disp: , Rfl: 12 
  melatonin tab tablet, Take  by mouth nightly., Disp: , Rfl:  
  cyanocobalamin 1,000 mcg tablet, Take 1,000 mcg by mouth daily. , Disp: , Rfl:  
  Potassium Gluconate 595 mg (99 mg) tablet, Take  by mouth daily. , Disp: , Rfl:   
 Date Last Reviewed:  10/14/2020 Number of Personal Factors/Comorbidities that affect the Plan of Care: 
 1-2: MODERATE COMPLEXITY EXAMINATION:  
 Patient denies any LE paresthesia. Patient denies any increase of symptoms with cough, sneeze or valsalva. Patient denies any saddle paresthesia or bowel/bladder deficits. Patient denies any headaches, changes in vision, dizziness, vertigo, nausea, drop attacks, black outs, tinnitus, dysphagia, dysarthria, LE symptoms or bowel/bladder dysfunction. Observation/Orthostatic Postural Assessment:   
      Patient presents with decreased lumbar lordosis, bilateral anterior pelvic tilt. Palpation:   
      Patient presents with bilateral thoracolumbar paraspinal tightness and tenderness. Patient presents with right gluteal, Iliotibial band, hamstring, adductor, generalized knee tenderness, tightness, trigger points. ROM:   
AROM (degrees) Lumbar Flexion 23 Lumbar Extension 20 Lumbar Right Sidebend 18 Lumbar Left Sidebend 11  
 
AROM/ PROM Left (degrees) Right (degrees) Hip Flexion 115 110 Hip Extension 0 0 Hip Abduction 28 20 Hip Internal Rotation  
(90 degrees flexion) 25 20 Hip External Rotation  
(90 degrees flexion) 55 52 Knee Flexion 133 125 Knee Extension 0 0 Dorsiflexion Knee Extended 5 2 Dorsiflexion Knee Flexed 8 5 Strength: Motion Tested Left   (*/5) Right  (*/5) Hip Flexion 3 3 Hip Extension 3 3 Hip Abduction 3 3 Knee Flexion 3 3 Knee Extension 4 4 Ankle Dorsiflexion 3 3 Ankle Plantarflexion 3- 3-  
Gross core strength 3/5 Special Tests:   
      Neural tension tests: Passive straight leg raise (SLR) test is Negative. Crossed SLR test is Negative. Slump test is Negative. Femoral nerve stretch test is Negative. Passive Accessory Motion: Hypomobility Thoracic and Lumbar, Right Hip Lateral and Inferior, Right Patellofemoral and Tibiofemoral Hypomobility All Directions. Neurological Screen: Myotomes: Key muscle strength testing through bilateral equal. 
 Dermatomes: Sensation to light touch for bilateral LE is intact from L3 to S1. Reflexes: Patellar (L3/ L4): Intact Bilaterally Equal 
               Achilles (S1/ S2): Intact Bilaterally Equal 
       
Functional Mobility:  
      Gait/Ambulation:  Patient demonstrated decreased trunk rotation, decreased right hip extension. Transfers:  Patient utilized bilateral upper extremity 100% of time. Balance:   
      Patient denies any falls or trouble with balance. Body Structures Involved: 1. Bones 2. Joints 3. Muscles 4. Ligaments Body Functions Affected: 1. Sensory/Pain 2. Neuromusculoskeletal 
3. Movement Related Activities and Participation Affected: 1. General Tasks and Demands 2. Mobility 3. Self Care 4. Domestic Life 5. Interpersonal Interactions and Relationships 6. Community, Social and [place] Number of elements (examined above) that affect the Plan of Care: 3: MODERATE COMPLEXITY CLINICAL PRESENTATION:  
 Presentation: Evolving clinical presentation with changing clinical characteristics: MODERATE COMPLEXITY CLINICAL DECISION MAKING:  
 Use of outcome tool(s) and clinical judgement create a POC that gives a: Questionable prediction of patient's progress: MODERATE COMPLEXITY

## 2020-10-14 NOTE — PROGRESS NOTES
Nathanel Sacks  : 1935  Primary: [employer] Medicare Advantage  Secondary:  [address] at [address], [address] [place] [address]  [phone]   [phone]      OUTPATIENT PHYSICAL THERAPY: Daily Treatment Note 10/14/2020    ICD-10: Treatment Diagnosis: Low back pain [M54.5]                Treatment Diagnosis 2: Generalized Muscle Weakness [M62.81]                Treatment Diagnosis 3: other abnormalities of gait and mobility [R26.89]  Precautions: PACEMAKER, SOB, AFib  Allergies: Patient has no known allergies. TREATMENT PLAN:  Effective Dates: 10/14/2020 TO 2020 (60 days). Frequency/Duration: 2 times a week for 60 Day(s) MEDICAL/REFERRING DIAGNOSIS:  Low back pain [M54.5]  Other specified enthesopathies of right lower limb, excluding foot [M76.891]  Unilateral primary osteoarthritis, right knee [M17.11]   DATE OF ONSET: Chronic (Greater then 1 year)  REFERRING PHYSICIAN: Veronique Olivares MD MD Orders: Evaluate and Treat  Return MD Appointment: Patient says not follow-up is currently scheduled. Pre-treatment Symptoms/Complaints: See evaluation note from today. Pain: Initial: Pain Intensity 1: 4  Pain Location 1: Back, Hip, Knee  Pain Orientation 1: Right  Post Session:  2/10   Medications Last Reviewed:  10/14/2020  Updated Objective Findings:  See evaluation note from today   TREATMENT:   THERAPEUTIC EXERCISE: (24 minutes):  Exercises per grid below to improve mobility and strength. Required moderate visual, verbal, manual and tactile cues to promote proper body alignment, promote proper body posture, promote proper body mechanics and promote proper body breathing techniques. Progressed resistance, range, repetitions and complexity of movement as indicated.      Date:  10/14/2020 Date:   Date:     Activity/Exercise Parameters Parameters Parameters   Knee Fall Outs (Single Leg) 3 x 10 Red (Bilateral)     Bridge 3 x 10 Red     Clams 3 x 10 Red (Bilateral)                               Time spent with patient reviewing proper muscle recruitment and technique with exercises. Therapeutic Neuroscience Education, Sensitive Nerve System 10 minutes    MANUAL THERAPY: (0 minutes): Joint mobilization, Soft tissue mobilization and Manipulation was utilized and necessary because of the patient's restricted joint motion, painful spasm, loss of articular motion and restricted motion of soft tissue   None Today    MODALITIES: (0 minutes):      None Today. HEP: As above; handouts given to patient for all exercises. Treatment/Session Summary:    · Response to Treatment:  Patient tolerated initial session well. He reported no increase in pain with treatment. He required verbal cues for posture and form. He was instructed in an initial home exercise program..  · Baseline vitals (10/14/2020): BP: 138/68, HR: 78, SpO: 96  · Communication/Consultation:  HEP  · Equipment provided today:  Red [employer]ra Band  · Recommendations/Intent for next treatment session: Next visit will focus on range of motion, mobility, flexibility, strength, balance, and functional abilities.     Total Treatment Billable Duration:  60 minutes: 36 minutes evaluation, 24 minutes therapeutic exercise  PT Patient Time In/Time Out  Time In: 1330  Time Out: [place] [address], PT

## 2020-10-21 ENCOUNTER — HOSPITAL ENCOUNTER (OUTPATIENT)
Dept: PHYSICAL THERAPY | Age: 85
Discharge: HOME | End: 2020-10-21
Payer: MEDICARE

## 2020-10-21 PROCEDURE — 97140 MANUAL THERAPY 1/> REGIONS: CPT

## 2020-10-21 PROCEDURE — 97110 THERAPEUTIC EXERCISES: CPT

## 2020-10-21 NOTE — PROGRESS NOTES
Christy Jasmine  : 1935  Primary: [employer] Medicare Advantage  Secondary:  [address] at [address], [address]  [phone]   [phone]      OUTPATIENT PHYSICAL THERAPY: Daily Treatment Note 10/21/2020    ICD-10: Treatment Diagnosis: Low back pain [M54.5]                Treatment Diagnosis 2: Generalized Muscle Weakness [M62.81]                Treatment Diagnosis 3: other abnormalities of gait and mobility [R26.89]  Precautions: PACEMAKER, SOB, AFib  Allergies: Patient has no known allergies. TREATMENT PLAN:  Effective Dates: 10/14/2020 TO 2020 (60 days). Frequency/Duration: 2 times a week for 60 Day(s) MEDICAL/REFERRING DIAGNOSIS:  Low back pain [M54.5]  Other specified enthesopathies of right lower limb, excluding foot [M76.891]  Unilateral primary osteoarthritis, right knee [M17.11]   DATE OF ONSET: Chronic (Greater then 1 year)  REFERRING PHYSICIAN: Delores Romero MD MD Orders: Evaluate and Treat  Return MD Appointment: Patient says not follow-up is currently scheduled. Pre-treatment Symptoms/Complaints: Patient reports pain today is mostly in his distal thighs on the inside and outside of his knees and down his shins. Pain: Initial: Pain Intensity 1: 6  Pain Location 1: Knee  Pain Orientation 1: Right, Left  Post Session:  0/10   Medications Last Reviewed:  10/21/2020  Updated Objective Findings:  Palpation: Bilateral Vastus Lateralis, Vastus Medialis/Adductor [place], Anterior Tibialis Tenderness, Tightness, Trigger Points. TREATMENT:   THERAPEUTIC EXERCISE: (24 minutes):  Exercises per grid below to improve mobility and strength. Required moderate visual, verbal, manual and tactile cues to promote proper body alignment, promote proper body posture, promote proper body mechanics and promote proper body breathing techniques.   Progressed resistance, range, repetitions and complexity of movement as indicated. Date:  10/14/2020 Date:  10/21/2020 Date:     Activity/Exercise Parameters Parameters Parameters   Knee Fall Outs (Single Leg) 3 x 10 Red (Bilateral)     Bridge 3 x 10 Red     Clams 3 x 10 Red (Bilateral)     Nu Step  10 min (Working on Pelvis, Hip, Knee)    Calf Stretch  Slantboard 8 x 30 sec    Trunk Rotation  Supine x 30 reps    Sidelying Rotation  X 30 reps Each Side                        Time spent with patient reviewing proper muscle recruitment and technique with exercises. MANUAL THERAPY: (36 minutes): Joint mobilization, Soft tissue mobilization and Manipulation was utilized and necessary because of the patient's restricted joint motion, painful spasm, loss of articular motion and restricted motion of soft tissue   Bilateral Vastus Medialis, Vastus Lateralis Soft Tissue Mobilization   Bilateral Anterior Tibialis Soft     MODALITIES: (0 minutes):      None Today. HEP: As above; handouts given to patient for all exercises. Treatment/Session Summary:    · Response to Treatment:  Patient reported decreased tightness and reported no pain after treatment. He continues to have deficits soft tissue and joint mobility and overall flexibility. Patient would benefit from continued progression of mobility, flexibility, proximal core and hip activation and strength to progress functionally. .  · Baseline vitals (10/14/2020): BP: 138/68, HR: 78, SpO: 96  · Communication/Consultation:  None today  · Equipment provided today:  None today  · Recommendations/Intent for next treatment session: Next visit will focus on range of motion, mobility, flexibility, strength, balance, and functional abilities.     Total Treatment Billable Duration:  62 minutes  PT Patient Time In/Time Out  Time In: 1325  Time Out: [address] Leonel [address], PT

## 2020-10-23 ENCOUNTER — HOSPITAL ENCOUNTER (OUTPATIENT)
Dept: PHYSICAL THERAPY | Age: 85
Discharge: HOME | End: 2020-10-23
Payer: MEDICARE

## 2020-10-23 PROCEDURE — 97110 THERAPEUTIC EXERCISES: CPT

## 2020-10-23 PROCEDURE — 97140 MANUAL THERAPY 1/> REGIONS: CPT

## 2020-10-23 NOTE — PROGRESS NOTES
Jaimie Venkat  : 1935  Primary: Sofia Sanchez Medicare Advantage  Secondary:  [address] at [address], Gavin hackett, [address] [place] [address]  [phone]   [phone]      OUTPATIENT PHYSICAL THERAPY: Daily Treatment Note 10/23/2020    ICD-10: Treatment Diagnosis: Low back pain [M54.5]                Treatment Diagnosis 2: Generalized Muscle Weakness [M62.81]                Treatment Diagnosis 3: other abnormalities of gait and mobility [R26.89]  Precautions: PACEMAKER, SOB, AFib  Allergies: Patient has no known allergies. TREATMENT PLAN:  Effective Dates: 10/14/2020 TO 2020 (60 days). 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 Pre-treatment Symptoms/Complaints: Patient arrived to therapy with no reports of pain in left leg and minimal pain in the right. Patient stated that he continued to feel the positive effects of the last treatment session. Pain: Initial: Pain Intensity 1: 2  Pain Location 1: Knee  Pain Orientation 1: Right  Post Session:  0/10   Medications Last Reviewed:  10/23/2020  Updated Objective Findings:  Patient required verbal and tactile cueing while performing exercises; pelvic stabilization needed during sidelying trunk rotation. TREATMENT:   THERAPEUTIC EXERCISE: (25 minutes):  Exercises per grid below to improve mobility and strength. Required moderate visual, verbal, manual and tactile cues to promote proper body alignment, promote proper body posture, promote proper body mechanics and promote proper body breathing techniques.   Progressed resistance, range, repetitions and complexity of movement as indicated. Date:  10/14/2020 Date:  10/21/2020 Date:  10/23/2020   Activity/Exercise Parameters Parameters Parameters   Knee Fall Outs (Single Leg) 3 x 10 Red (Bilateral)     Bridge 3 x 10 Red     Clams 3 x 10 Red (Bilateral)     Nu Step  10 min (Working on Pelvis, Hip, Knee) 10 min (Working on Pelvis, Hip, Knee)   Calf Stretch  Slantboard 8 x 30 sec Slantboard 8 x 30 sec   Trunk Rotation  Supine x 30 reps Supine x 30 reps   Sidelying Rotation  X 30 reps Each Side X 30 reps Each Side                       Time spent with patient reviewing proper muscle recruitment and technique with exercises. MANUAL THERAPY: (30 minutes): Joint mobilization, Soft tissue mobilization and Manipulation was utilized and necessary because of the patient's restricted joint motion, painful spasm, loss of articular motion and restricted motion of soft tissue   Bilateral Vastus Medialis, Vastus Lateralis Soft Tissue Mobilization   Bilateral Anterior Tibialis Soft     MODALITIES: (0 minutes):      None Today. HEP: As above; handouts given to patient for all exercises. Treatment/Session Summary:    · Response to Treatment:  Patient continues to have no reports of pain following treatment. Patient would benefit from further skilled therapy to improve Functional strength and ROM within a pain free range. Treatment supervised by Jennifer Ellington PT and Chana Hazel PT but majority of treatment provided by JACKIE Peralta. · Baseline vitals (10/14/2020): BP: 138/68, HR: 78, SpO: 96  · Communication/Consultation:  None today  · Equipment provided today:  None today  · Recommendations/Intent for next treatment session: Next visit will focus on range of motion, mobility, flexibility, strength, balance, and functional abilities.     Total Treatment Billable Duration:  55 minutes  PT Patient Time In/Time Out  Time In: 1100  Time Out: 1700 [facility],

## 2020-10-28 ENCOUNTER — HOSPITAL ENCOUNTER (OUTPATIENT)
Dept: PHYSICAL THERAPY | Age: 85
Discharge: HOME | End: 2020-10-28
Payer: MEDICARE

## 2020-10-28 PROCEDURE — 97110 THERAPEUTIC EXERCISES: CPT

## 2020-10-28 PROCEDURE — 97140 MANUAL THERAPY 1/> REGIONS: CPT

## 2020-10-28 NOTE — PROGRESS NOTES
Chrissie Fairdealing  : 1935  Primary: Jocelyn Purdy Medicare Advantage  Secondary:  [address] at [facility]  [address] [place] [address], Gavin hackett, [address] [place] [address]  [phone]   [phone]      OUTPATIENT PHYSICAL THERAPY: Daily Treatment Note 10/28/2020    ICD-10: Treatment Diagnosis: Low back pain [M54.5]                Treatment Diagnosis 2: Generalized Muscle Weakness [M62.81]                Treatment Diagnosis 3: other abnormalities of gait and mobility [R26.89]  Precautions: PACEMAKER, SOB, AFib  Allergies: Patient has no known allergies. TREATMENT PLAN:  Effective Dates: 10/14/2020 TO 2020 (60 days). 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 Pre-treatment Symptoms/Complaints: Patient reported that overall still feeling improved. He reports having some pain in the outside of his right knee. He reports tweaking it getting out of the car prior to coming into therapy. Pain: Initial: Pain Intensity 1: 2  Pain Location 1: Knee  Pain Orientation 1: Right  Post Session:  0/10   Medications Last Reviewed:  10/28/2020  Updated Objective Findings:  Palpation: Bilateral Anterior Tibialis, Gastrocnemius, Soleus Tenderness, Tightness, Trigger Points. TREATMENT:   THERAPEUTIC EXERCISE: (25 minutes):  Exercises per grid below to improve mobility and strength. Required moderate visual, verbal, manual and tactile cues to promote proper body alignment, promote proper body posture, promote proper body mechanics and promote proper body breathing techniques.   Progressed resistance, range, repetitions and complexity of movement as indicated. Date:  10/28/2020 Date:  10/21/2020 Date:  10/23/2020   Activity/Exercise Parameters Parameters Parameters   Knee Fall Outs (Single Leg) 3 x 10 Red (Bilateral)     Bridge      Clams 3 x 10 Red (Bilateral)     Nu Step 10 min (Working on Pelvis, Hip, Knee) 10 min (Working on Pelvis, Hip, Knee) 10 min (Working on Pelvis, Hip, Knee)   Calf Stretch Slantboard 8 x 30 sec Slantboard 8 x 30 sec Slantboard 8 x 30 sec   Trunk Rotation Supine x 30 reps Supine x 30 reps Supine x 30 reps   Sidelying Rotation X 30 reps Each Side X 30 reps Each Side X 30 reps Each Side                       Time spent with patient reviewing proper muscle recruitment and technique with exercises. MANUAL THERAPY: (30 minutes): Joint mobilization, Soft tissue mobilization and Manipulation was utilized and necessary because of the patient's restricted joint motion, painful spasm, loss of articular motion and restricted motion of soft tissue   Bilateral Gastrocnemius, Soleus Soft Tissue Mobilization   Bilateral Anterior Tibialis Soft     MODALITIES: (0 minutes):      None Today. HEP: As above; handouts given to patient for all exercises. Treatment/Session Summary:    · Response to Treatment:  Patient reported no pain after treatment. He continues to have limitations in mobility, flexibility, and strenght, specifically lower extremity soft tissue mobility and hip muscle activation and strength. He would benefit from continued progression of soft tissue mobility, flexibility, and strength to progress functionally. · Baseline vitals (10/14/2020): BP: 138/68, HR: 78, SpO: 96  · Communication/Consultation:  None today  · Equipment provided today:  None today  · Recommendations/Intent for next treatment session: Next visit will focus on range of motion, mobility, flexibility, strength, balance, and functional abilities.     Total Treatment Billable Duration:  55 minutes  PT Patient Time In/Time Out  Time In: 1429  Time Out: [place] 27, PT

## 2020-10-30 ENCOUNTER — HOSPITAL ENCOUNTER (OUTPATIENT)
Dept: PHYSICAL THERAPY | Age: 85
Discharge: HOME | End: 2020-10-30
Payer: MEDICARE

## 2020-10-30 PROCEDURE — 97110 THERAPEUTIC EXERCISES: CPT

## 2020-10-30 PROCEDURE — 97140 MANUAL THERAPY 1/> REGIONS: CPT

## 2020-10-30 NOTE — PROGRESS NOTES
Brett Blum  : 1935  Primary: Kassidy Penn Medicare Advantage  Secondary:  [employer] at [facility]  [address] [facility], [address]  [phone]   [phone]      OUTPATIENT PHYSICAL THERAPY: Daily Treatment Note 10/30/2020    ICD-10: Treatment Diagnosis: Low back pain [M54.5]                Treatment Diagnosis 2: Generalized Muscle Weakness [M62.81]                Treatment Diagnosis 3: other abnormalities of gait and mobility [R26.89]  Precautions: PACEMAKER, SOB, AFib  Allergies: Patient has no known allergies. TREATMENT PLAN:  Effective Dates: 10/14/2020 TO 2020 (60 days). 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 Pre-treatment Symptoms/Complaints: Patient reported a decrease in nightly \"muscle cramping\" in bilateral lower extremity following last treament. Pain: Initial: Pain Intensity 1: 2  Pain Location 1: Knee  Pain Orientation 1: Right, Left  Post Session:  0/10   Medications Last Reviewed:  10/30/2020  Updated Objective Findings:  Patient able to progress resistance on band exercises to green. TREATMENT:   THERAPEUTIC EXERCISE: (35 minutes):  Exercises per grid below to improve mobility and strength. Required moderate visual, verbal, manual and tactile cues to promote proper body alignment, promote proper body posture, promote proper body mechanics and promote proper body breathing techniques. Progressed resistance, range, repetitions and complexity of movement as indicated.      Date:  10/28/2020 Date:  10/30/2020 Date:  10/23/2020 Activity/Exercise Parameters Parameters Parameters   Knee Fall Outs (Single Leg) 3 x 10 Red (Bilateral) 3 x 10 Green (Bilateral)    Bridge      Clams 3 x 10 Red (Bilateral) 3 x 10 Green (Bilateral)    Nu Step 10 min (Working on Pelvis, Hip, Knee) 10 min (Working on Pelvis, Hip, Knee) 10 min (Working on Pelvis, Hip, Knee)   Calf Stretch Slantboard 8 x 30 sec Slantboard 8 x 30 sec Slantboard 8 x 30 sec   Trunk Rotation Supine x 30 reps Supine x 30 reps Supine x 30 reps   Sidelying Rotation X 30 reps Each Side X 30 reps Each Side X 30 reps Each Side                       Time spent with patient reviewing proper muscle recruitment and technique with exercises. MANUAL THERAPY: (25 minutes): Joint mobilization, Soft tissue mobilization and Manipulation was utilized and necessary because of the patient's restricted joint motion, painful spasm, loss of articular motion and restricted motion of soft tissue   Bilateral Gastrocnemius, Soleus Soft Tissue Mobilization   Bilateral Anterior Tibialis Soft     MODALITIES: (0 minutes):      None Today. HEP: As above; handouts given to patient for all exercises. Treatment/Session Summary:    · Response to Treatment:  Patient was able to progress exercises without significant increase symptoms and continues to show progress towards goals Treatment supervised by Marisa Lockwood, ERNESTINE but treatment administered by JACKIE Gaviria    · Baseline vitals (10/14/2020): BP: 138/68, HR: 78, SpO: 96  · Communication/Consultation:  None today  · Equipment provided today:  None today  · Recommendations/Intent for next treatment session: Next visit will focus on range of motion, mobility, flexibility, strength, balance, and functional abilities.     Total Treatment Billable Duration:  60 minutes  PT Patient Time In/Time Out  Time In: 1105  Time Out: 18[address] [facility],

## 2020-11-03 ENCOUNTER — HOSPITAL ENCOUNTER (OUTPATIENT)
Dept: PHYSICAL THERAPY | Age: 85
Discharge: HOME | End: 2020-11-03
Payer: MEDICARE

## 2020-11-03 PROCEDURE — 97110 THERAPEUTIC EXERCISES: CPT

## 2020-11-03 PROCEDURE — 97140 MANUAL THERAPY 1/> REGIONS: CPT

## 2020-11-06 ENCOUNTER — HOSPITAL ENCOUNTER (OUTPATIENT)
Dept: PHYSICAL THERAPY | Age: 85
Discharge: HOME | End: 2020-11-06
Payer: MEDICARE

## 2020-11-06 PROCEDURE — 97110 THERAPEUTIC EXERCISES: CPT

## 2020-11-06 PROCEDURE — 97140 MANUAL THERAPY 1/> REGIONS: CPT

## 2020-11-06 NOTE — PROGRESS NOTES
Stephanie Running  : 1935  Primary: Clif Damon Medicare Advantage  Secondary:  [address] at [facility]  [address] [place] [address], [place], [address] [place] [address]  [phone]   [phone]      OUTPATIENT PHYSICAL THERAPY: Daily Treatment Note 2020    ICD-10: Treatment Diagnosis: Low back pain [M54.5]                Treatment Diagnosis 2: Generalized Muscle Weakness [M62.81]                Treatment Diagnosis 3: other abnormalities of gait and mobility [R26.89]  Precautions: PACEMAKER, SOB, AFib  Allergies: Patient has no known allergies. TREATMENT PLAN:  Effective Dates: 10/14/2020 TO 2020 (60 days). 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 Pre-treatment Symptoms/Complaints: Patient had nothing new to report. Pain: Initial: Pain Intensity 1: 0  Pain Location 1: Knee  Pain Orientation 1: Right, Left  Post Session:  0/10   Medications Last Reviewed:  2020  Updated Objective Findings:  None today   TREATMENT:   THERAPEUTIC EXERCISE: (45 minutes):  Exercises per grid below to improve mobility and strength. Required moderate visual, verbal, manual and tactile cues to promote proper body alignment, promote proper body posture, promote proper body mechanics and promote proper body breathing techniques. Progressed resistance, range, repetitions and complexity of movement as indicated.      Date:  10/28/2020 Date:  11/3/2020 Date:  2020   Activity/Exercise Parameters Parameters Parameters   Knee Fall Outs (Single Leg) 3 x 10 Red (Bilateral) 3 x 10 Blue (Bilateral) 3 x 10 Blue (Bilateral)   Bridge  1 x10 with 3 second hold in supine 1 x 10 with 3 second hold supine   Clams 3 x 10 Red (Bilateral) 3 x 10 Blue (Bilateral) ---   Nu Step 10 min (Working on Pelvis, Hip, Knee) 5 min Level 4, 5 min level 2 (Working on Pelvis, Hip, Knee) 10 min Level 2 (Working on Pelvis, Hip, Knee)   Calf Stretch Slantboard 8 x 30 sec Slantboard 8 x 30 sec Slantboard 3 x 30 sec, 3 x 30 sec soleus   Trunk Rotation Supine x 30 reps Supine x 30 reps Supine x 30 reps   Sidelying Rotation X 30 reps Each Side  X 30 reps Each Side   Balance board  Anterior/Posterior, Medial/Lateral 1 x 10 second hold each  In standing  Anterior/Posterior, Medial/Lateral 4 x 15 second hold each  In standing  For propreioception, LE strength, and core stability    Alternating Cone Tap   X 20 standing for LE strength, balance, and corrdination   Sit to stand   1 x 10      Time spent with patient reviewing proper muscle recruitment and technique with exercises. MANUAL THERAPY: (10 minutes): Joint mobilization, Soft tissue mobilization and Manipulation was utilized and necessary because of the patient's restricted joint motion, painful spasm, loss of articular motion and restricted motion of soft tissue   Bilateral Gastrocnemius, Soleus Soft Tissue Mobilization   Bilateral Anterior Tibialis Soft     MODALITIES: (0 minutes):      None Today. HEP: As above; handouts given to patient for all exercises.     Treatment/Session Summary:    · Response to Treatment:  Patient continues to show increase strength and ROM progress towards goals Treatment supervised by Denise Henderson PT but treatment administered by Ernst Homans, SPTA    · Baseline vitals (10/14/2020): BP: 138/68, HR: 78, SpO: 96  · Communication/Consultation:  None today  · Equipment provided today:  None today  · Recommendations/Intent for next treatment session: Next visit will focus on range of motion, mobility, flexibility, strength, balance, and functional abilities.     Total Treatment Billable Duration:  55minutes  PT Patient Time In/Time Out  Time In: 1105  Time Out: 1700 [facility],

## 2020-11-11 ENCOUNTER — HOSPITAL ENCOUNTER (OUTPATIENT)
Dept: PHYSICAL THERAPY | Age: 85
Discharge: HOME | End: 2020-11-11
Payer: MEDICARE

## 2020-11-11 PROCEDURE — 97140 MANUAL THERAPY 1/> REGIONS: CPT

## 2020-11-11 PROCEDURE — 97110 THERAPEUTIC EXERCISES: CPT

## 2020-11-11 NOTE — PROGRESS NOTES
Natasha Vishnu  : 1935  Primary: Jarrod Ari Medicare Advantage  Secondary:  [address] at [facility]  [address] [place] [address], [place], [address] [place] [address]  [phone]   [phone]      OUTPATIENT PHYSICAL THERAPY: Daily Treatment Note 2020    ICD-10: Treatment Diagnosis: Low back pain [M54.5]                Treatment Diagnosis 2: Generalized Muscle Weakness [M62.81]                Treatment Diagnosis 3: other abnormalities of gait and mobility [R26.89]  Precautions: PACEMAKER, SOB, AFib  Allergies: Patient has no known allergies. TREATMENT PLAN:  Effective Dates: 10/14/2020 TO 2020 (60 days). 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 Pre-treatment Symptoms/Complaints: Patient reported no pain and doing well. Pain: Initial: Pain Intensity 1: 0  Pain Location 1: Back, Hip, Knee  Pain Orientation 1: Left, Right  Post Session:  0/10   Medications Last Reviewed:  2020  Updated Objective Findings:  Pt. required multiple rest breaks due shortness of breath. Pt. stated he had a check up for his pacemaker next month . TREATMENT:   THERAPEUTIC EXERCISE: (45 minutes):  Exercises per grid below to improve mobility and strength. Required moderate visual, verbal, manual and tactile cues to promote proper body alignment, promote proper body posture, promote proper body mechanics and promote proper body breathing techniques. Progressed resistance, range, repetitions and complexity of movement as indicated.      Date:  2020 Date:  11/3/2020 Date:  2020   Activity/Exercise Parameters Parameters Parameters   Knee Fall Outs (Single Leg) 3 x 10 blue (Bilateral) 3 x 10 Blue (Bilateral) 3 x 10 Blue (Bilateral)   Bridge 2x10 reps 3 sec hold in supine 1 x10 with 3 second hold in supine 1 x 10 with 3 second hold supine   Clams 3 x 10 blue  (Bilateral) 3 x 10 Blue (Bilateral) ---   Nu Step 10 min level 3 (Working on Pelvis, Hip, Knee) 5 min Level 4, 5 min level 2 (Working on Pelvis, Hip, Knee) 10 min Level 2 (Working on Pelvis, Hip, Knee)   Calf Stretch Slantboard 8 x 30 sec Slantboard 8 x 30 sec Slantboard 3 x 30 sec, 3 x 30 sec soleus   Trunk Rotation Supine x 30 reps Supine x 30 reps Supine x 30 reps   Sidelying Rotation X 30 reps Each Side  X 30 reps Each Side   Balance board AP & lateral x 10 reps x 10 sec hold each in standing Anterior/Posterior, Medial/Lateral 1 x 10 second hold each  In standing  Anterior/Posterior, Medial/Lateral 4 x 15 second hold each  In standing  For propreioception, LE strength, and core stability    Alternating Cone Tap X 20 reps standing for LE strength and coordination   X 20 standing for LE strength, balance, and corrdination   Sit to stand 2 X 10 reps cushion no UE assit   1 x 10      Time spent with patient reviewing proper muscle recruitment and technique with exercises. MANUAL THERAPY: (10 minutes): Joint mobilization, Soft tissue mobilization and Manipulation was utilized and necessary because of the patient's restricted joint motion, painful spasm, loss of articular motion and restricted motion of soft tissue   Bilateral Gastrocnemius, Soleus Soft Tissue Mobilization   Bilateral Anterior Tibialis Soft     MODALITIES: (0 minutes):      None Today. HEP: As above; handouts given to patient for all exercises. Treatment/Session Summary:    · Response to Treatment:  Pt. was compliant with all exercises and reported no pain and less tightness in bilateral calves.    · Baseline vitals (10/14/2020): BP: 138/68, HR: 78, SpO: 96  · Communication/Consultation:  None today  · Equipment provided today:  None today  · Recommendations/Intent for next treatment session: Next visit will focus on range of motion, mobility, flexibility, strength, balance, and functional abilities.     Total Treatment Billable Duration:  55 minutes  PT Patient Time In/Time Out  Time In: 1335  Time Out: [place] 37, PTA

## 2020-11-13 ENCOUNTER — HOSPITAL ENCOUNTER (OUTPATIENT)
Dept: PHYSICAL THERAPY | Age: 85
Discharge: HOME | End: 2020-11-13
Payer: MEDICARE

## 2020-11-13 PROCEDURE — 97140 MANUAL THERAPY 1/> REGIONS: CPT

## 2020-11-13 PROCEDURE — 97110 THERAPEUTIC EXERCISES: CPT

## 2020-11-13 NOTE — PROGRESS NOTES
Jaimie Robledo  : 1935  Primary: Sofia Sanchez Medicare Advantage  Secondary:  [address] at [address], [place], [address] [place] [address]  [phone]   [phone]       OUTPATIENT PHYSICAL THERAPY:Progress Report 2020    ICD-10: Treatment Diagnosis: Low back pain [M54.5]                Treatment Diagnosis 2: Generalized Muscle Weakness [M62.81]                Treatment Diagnosis 3: other abnormalities of gait and mobility [R26.89]  Precautions: PACEMAKER, SOB, AFib  Allergies: Patient has no known allergies. TREATMENT PLAN:  Effective Dates: 10/14/2020 TO 2020 (60 days). 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 INITIAL ASSESSMENT:  Mr. Kings Man is a 82 y.o. male presenting to physical therapy with complaints of low back pain, right hip, right knee pain. He denies any specific mechanism of injury, reports history of low back pain with a lumbar fusion approximately 10 years ago. He reports using a back brace from time to time. He reports over the last approximately 3-4 weeks he has started having hip and right knee pain. He reports having trouble ascending and descending stairs, inability to kneel, difficulty squatting and with yard work. Patient presents with increased pain, decreased strength, decreased ROM, decreased flexibility, impaired gait, impaired posture, decreased activity tolerance, and overall impaired functional mobility.  Patient is a good candidate for skilled physical therapy interventions to include manual therapy, therapeutic exercise, balance training, gait training, transfer training, postural re-education, body mechanics training, and pain modalities as needed. PROGRESS NOTE ASSESSMENT: (11/13/2020) Mr. Ernie Guan is a 82 y.o. male presenting to physical therapy with complaints of low back pain, right hip, right knee pain. Patient has been seen for 9 sessions of physical therapy from 10/14/2020 to 11/13/2020. He reports feeling 50% better with all daily activities. He has demonstrated improved range of motion, mobility, flexibility, and strength. He continues to have limitations from a flexibility and strength and from a dynamic stability standpoint. Patient will benefit from continued skilled physical therapy to address remaining goals and deficits. PROBLEM LIST (Impacting functional limitations):  1. Decreased Strength  2. Decreased ADL/Functional Activities  3. Decreased Transfer Abilities  4. Decreased Ambulation Ability/Technique  5. Decreased Balance  6. Increased Pain  7. Decreased Activity Tolerance  8. Increased Shortness of Breath  9. Decreased Flexibility/Joint Mobility  10. Decreased [place] with Home Exercise Program INTERVENTIONS PLANNED: (Treatment may consist of any combination of the following)  1. Balance Exercise  2. Cryotherapy  3. Electrical Stimulation  4. Gait Training  5. Heat  6. Home Exercise Program (HEP)  7. Manual Therapy  8. Neuromuscular Re-education/Strengthening  9. Range of Motion (ROM)  10. Therapeutic Activites  11. Therapeutic Exercise/Strengthening  12. Transcutaneous Electrical Nerve Stimulation (TENS)     GOALS: (Goals have been discussed and agreed upon with patient.)  Short-Term Functional Goals: Time Frame: 10/14/2020 to 11/13/2020  1. Patient demonstrates independence with home exercise program without verbal cueing provided by therapist. -ONGOING  2. Patient will report no more than 5/10 pain at rest in order to demonstrate improved self pain control and tolerance. -MET  3.  Patient will be educated in and demonstrate improved upright posture including standing and walking. -ONGOING  4. Patient will be educated in and demonstrate proper squat lift technique in order to reduce stress on low back, hip, knee to improve safety, and reduce risk of injury. -ONGOING  5. Patient will improve lumbar flexion to 30 degrees. -MET  6. Patient will improve dorsiflexion to 10 deg. -MET  Discharge Goals: Time Frame: 10/14/2020 to 12/13/2020  1. Patient will improve lumbar flexion to 40 deg to assist with squatting with decreased difficulty. -ONGOING  2. Patient will improve hip flexion to 120 deg to assist with ability to return to yard work and [facility]. -MET  3. Patient will improve knee flexion to 130 deg to assist with ability to kneel. -ONGOING  4. Patient will improve strength to 4/5 to be able to ascend and descend stairs and steps without difficulty. -ONGOING   5. Patient will improve Modified Oswestry Scale score to 25/50 from 18/50. -ONGOING    Outcome Measure: Tool Used: Lower Extremity Functional Scale (LEFS)  Score:  Initial: 54/80 Most Recent: 57/80 (Date: 11/13/2020 )   Interpretation of Score: 20 questions each scored on a 5 point scale with 0 representing \"extreme difficulty or unable to perform\" and 4 representing \"no difficulty\". The lower the score, the greater the functional disability. 80/80 represents no disability. Minimal detectable change is 9 points. Tool Used: Modified Oswestry Low Back Pain Questionnaire  Score:  Initial: 25/50  Most Recent: 2/50 (Date: 11/13/2020 )   Interpretation of Score: Each section is scored on a 0-5 scale, 5 representing the greatest disability. The scores of each section are added together for a total score of 50. UPDATED OBJECTIVE MEASURES: (11/13/2020)  Patient denies any LE paresthesia. Patient denies any increase of symptoms with cough, sneeze or valsalva. Patient denies any saddle paresthesia or bowel/bladder deficits.    Patient denies any headaches, changes in vision, dizziness, vertigo, nausea, drop attacks, black outs, tinnitus, dysphagia, dysarthria, LE symptoms or bowel/bladder dysfunction. Observation/Orthostatic Postural Assessment:          Patient presents with decreased lumbar lordosis, bilateral anterior pelvic tilt. Palpation:          Patient presents with bilateral thoracolumbar paraspinal tightness and tenderness. Patient presents with right gluteal, Iliotibial band, hamstring, adductor, generalized knee tenderness, tightness, trigger points. ROM:    AROM (degrees)   Lumbar Flexion 33 (from 23)   Lumbar Extension 23 (from 20)   Lumbar Right Sidebend 25 (from 18)   Lumbar Left Sidebend 20 (from 11)     AROM/ PROM Left (degrees) Right (degrees)   Hip Flexion 120 (from 115) 120 (from 110)   Hip Extension 0 0   Hip Abduction 28 30 (from 20)   Hip Internal Rotation   (90 degrees flexion) 25 30 (from 20)   Hip External Rotation   (90 degrees flexion) 55 52   Knee Flexion 133 135 (from 125)   Knee Extension 0 0   Dorsiflexion Knee Extended 10 (from 5) 10 (from 2)   Dorsiflexion Knee Flexed 11 (from 8) 10 (from 5)     Strength: Motion Tested Left   (*/5) Right  (*/5)   Hip Flexion 3 3   Hip Extension 3 3   Hip Abduction 3 3   Knee Flexion 3 3   Knee Extension 4 4   Ankle Dorsiflexion 3 3   Ankle Plantarflexion 3- 3-   Gross core strength 3/5  Special Tests:          Neural tension tests: Passive straight leg raise (SLR) test is Negative. Crossed SLR test is Negative. Slump test is Negative. Femoral nerve stretch test is Negative. Passive Accessory Motion:         Hypomobility Thoracic and Lumbar, Right Hip Lateral and Inferior, Right Patellofemoral and Tibiofemoral Hypomobility All Directions. Neurological Screen:              Myotomes: Key muscle strength testing through bilateral equal.   Dermatomes: Sensation to light touch for bilateral LE is intact from L3 to S1. Reflexes: Patellar (L3/ L4):  Intact Bilaterally Equal Achilles (S1/ S2): Intact Bilaterally Equal          Functional Mobility:         Gait/Ambulation:  Patient demonstrated decreased trunk rotation, decreased right hip extension. Transfers:  Patient utilized bilateral upper extremity 100% of time. Balance:          Patient denies any falls or trouble with balance. Medical Necessity:   · Patient is expected to demonstrate progress in strength, range of motion, balance and functional technique to improved tolerance to stairs, kneeling, squatting, household and yardwork activities. · Skilled intervention continues to be required due to weakness, decreased ROM, decreased flexibility, pain, and functional limitations. Reason for Services/Other Comments:  · Patient continues to require skilled intervention due to increasing complexity of exercises. Rehabilitation Potential For Stated Goals: Good  Regarding Lizette Choudhury's therapy, I certify that the treatment plan above will be carried out by a therapist or under their direction.   Thank you for this referral,  Terrence Ritchie, PT

## 2020-11-13 NOTE — PROGRESS NOTES
Juliette Click  : 1935  Primary: Laci Renner Medicare Advantage  Secondary:  [address] at [facility]  [address] [place] [address], [place], [address] [place] [address]  [phone]   [phone]      OUTPATIENT PHYSICAL THERAPY: Daily Treatment Note 2020    ICD-10: Treatment Diagnosis: Low back pain [M54.5]                Treatment Diagnosis 2: Generalized Muscle Weakness [M62.81]                Treatment Diagnosis 3: other abnormalities of gait and mobility [R26.89]  Precautions: PACEMAKER, SOB, AFib  Allergies: Patient has no known allergies. TREATMENT PLAN:  Effective Dates: 10/14/2020 TO 2020 (60 days). 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 Pre-treatment Symptoms/Complaints: Patient reports that some things are a whole lot better and still has some troubles, mainly still has right knee pain. He reports overall 50% better since starting therapy. Pain: Initial: Pain Intensity 1: 2  Pain Location 1: Knee  Pain Orientation 1: Right  Post Session:  0/10   Medications Last Reviewed:  2020  Updated Objective Findings:  See evaluation note from today   TREATMENT:   THERAPEUTIC EXERCISE: (38 minutes):  Exercises per grid below to improve mobility and strength. Required moderate visual, verbal, manual and tactile cues to promote proper body alignment, promote proper body posture, promote proper body mechanics and promote proper body breathing techniques. Progressed resistance, range, repetitions and complexity of movement as indicated.      Date:  2020 Date:  2020 Date:  11/5/2020   Activity/Exercise Parameters Parameters Parameters   Knee Fall Outs (Single Leg) 3 x 10 blue (Bilateral)  3 x 10 Blue (Bilateral)   Bridge 2x10 reps 3 sec hold in supine  1 x 10 with 3 second hold supine   Clams 3 x 10 blue  (Bilateral)  ---   Nu Step 10 min level 3 (Working on Pelvis, Hip, Knee) 10 min Level 1 (Working on Pelvis, Hip, Knee) 10 min Level 2 (Working on Pelvis, Hip, Knee)   Calf Stretch Slantboard 8 x 30 sec Slantboard 10 x 30 sec Slantboard 3 x 30 sec, 3 x 30 sec soleus   Trunk Rotation Supine x 30 reps  Supine x 30 reps   Sidelying Rotation X 30 reps Each Side  X 30 reps Each Side   Balance board AP & lateral x 10 reps x 10 sec hold each in standing Anterior/Posteri 4 x 10 second hold each  In standing, 4 x 20 Anterior/Posterior, Medial/Lateral 4 x 15 second hold each  In standing  For propreioception, LE strength, and core stability    Alternating Cone Tap X 20 reps standing for LE strength and coordination  X 20 reps standing for LE strength and coordination each leg X 20 standing for LE strength, balance, and corrdination   Sit to stand 2 X 10 reps cushion no UE assit  2 X 10 reps cushion no UE assit  1 x 10    SKTC  10 x 30 sec Each Leg    Hamstring  Seated 10 x 10 sec Each Leg                  Time spent with patient reviewing proper muscle recruitment and technique with exercises. MANUAL THERAPY: (18 minutes): Joint mobilization, Soft tissue mobilization and Manipulation was utilized and necessary because of the patient's restricted joint motion, painful spasm, loss of articular motion and restricted motion of soft tissue   Bilateral Gastrocnemius, Soleus Soft Tissue Mobilization   Bilateral Anterior Tibialis, Medial and Lateral Hamstring Soft Mobilization    MODALITIES: (0 minutes):      None Today. HEP: As above; handouts given to patient for all exercises.     Treatment/Session Summary:    · Response to Treatment:  Patient continues to require verbal cues for posture, form, and mechanics. He continues to have limited mobility and flexibility in his hamstrings. He required verbal cues for posture, form, and mechanics. He would benefit from continued progression of mobility, flexibility, along with static and dynamic strength and stability to progress functionally. · Baseline vitals (10/14/2020): BP: 138/68, HR: 78, SpO: 96  · Communication/Consultation:  None today  · Equipment provided today:  None today  · Recommendations/Intent for next treatment session: Next visit will focus on range of motion, mobility, flexibility, strength, balance, and functional abilities.     Total Treatment Billable Duration:  56 minutes  PT Patient Time In/Time Out  Time In: 1100  Time Out: [address] [facility],

## 2020-11-18 ENCOUNTER — HOSPITAL ENCOUNTER (OUTPATIENT)
Dept: PHYSICAL THERAPY | Age: 85
Discharge: HOME | End: 2020-11-18
Payer: MEDICARE

## 2020-11-18 PROCEDURE — 97140 MANUAL THERAPY 1/> REGIONS: CPT

## 2020-11-18 PROCEDURE — 97110 THERAPEUTIC EXERCISES: CPT

## 2020-11-18 NOTE — PROGRESS NOTES
Kylah An  : 1935  Primary: Shimon Herrera Medicare Advantage  Secondary:  [address] at [facility]  [address] [facility], [place], [address] [place] [address]  [phone]   [phone]      OUTPATIENT PHYSICAL THERAPY: Daily Treatment Note 2020    ICD-10: Treatment Diagnosis: Low back pain [M54.5]                Treatment Diagnosis 2: Generalized Muscle Weakness [M62.81]                Treatment Diagnosis 3: other abnormalities of gait and mobility [R26.89]  Precautions: PACEMAKER, SOB, AFib  Allergies: Patient has no known allergies. TREATMENT PLAN:  Effective Dates: 10/14/2020 TO 2020 (60 days). 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 Pre-treatment Symptoms/Complaints: Patient reports that some things are a whole lot better and still has some troubles, mainly still has right knee pain. He reports overall 50% better since starting therapy. Pain: Initial: Pain Intensity 1: 3  Pain Location 1: Back, Hip, Leg  Pain Orientation 1: Right  Post Session:  1/10   Medications Last Reviewed:  2020  Updated Objective Findings:  Pt. had shortness of breath and required multiple rest breaks. Pt. has an appointment to get his pace maker checked out. TREATMENT:   THERAPEUTIC EXERCISE: (30 minutes):  Exercises per grid below to improve mobility and strength. Required moderate visual, verbal, manual and tactile cues to promote proper body alignment, promote proper body posture, promote proper body mechanics and promote proper body breathing techniques.   Progressed resistance, range, repetitions and complexity of movement as indicated. Date:  11/11/2020 Date:  11/13/2020 Date:  11/18/2020   Activity/Exercise Parameters Parameters Parameters   Knee Fall Outs (Single Leg) 3 x 10 blue (Bilateral)  3 x 10 Blue (Bilateral)   Bridge 2x10 reps 3 sec hold in supine  1 x 10 with 3 second hold supine   Clams 3 x 10 blue  (Bilateral)  3x10 blue bilateral blue    Nu Step 10 min level 3 (Working on Pelvis, Hip, Knee) 10 min Level 1 (Working on Pelvis, Hip, Knee) 10 min Level 2 (Working on Pelvis, Hip, Knee)   Calf Stretch Slantboard 8 x 30 sec Slantboard 10 x 30 sec Slantboard 3 x 30 sec, 3 x 30 sec soleus   Trunk Rotation Supine x 30 reps  Supine x 30 reps   Sidelying Rotation X 30 reps Each Side  X 30 reps Each Side   Balance board AP & lateral x 10 reps x 10 sec hold each in standing Anterior/Posteri 4 x 10 second hold each  In standing, 4 x 20 Anterior/Posterior, Medial/Lateral 4 x 15 second hold each  In standing  For propreioception, LE strength, and core stability    Alternating Cone Tap X 20 reps standing for LE strength and coordination  X 20 reps standing for LE strength and coordination each leg X 20 standing for LE strength, balance, and corrdination   Sit to stand 2 X 10 reps cushion no UE assit  2 X 10 reps cushion no UE assit  -------   SKTC  10 x 30 sec Each Leg 4x30 sec hold BLE's    IT band stretch   4x30 strap BLE's    Hamstring  Seated 10 x 10 sec Each Leg 4x30 sec hold strap BLE's    Piriformis    4x30 sec hold BLE's            Time spent with patient reviewing proper muscle recruitment and technique with exercises.      MANUAL THERAPY: (25 minutes): Joint mobilization, Soft tissue mobilization and Manipulation was utilized and necessary because of the patient's restricted joint motion, painful spasm, loss of articular motion and restricted motion of soft tissue   Bilateral Gastrocnemius, Soleus Soft Tissue Mobilization   Bilateral Anterior Tibialis, Medial and Lateral Hamstring Soft Mobilization   IT band soft tissue     MODALITIES: (10 minutes):      Pt. received ice pack to right low back and IT band in supine with BLE's on incline to decrease pain and tightness. HEP: As above; handouts given to patient for all exercises. Treatment/Session Summary:    · Response to Treatment:  Patient was compliant with all exercises and had decrease IT band pain and tightness after session. · Baseline vitals (10/14/2020): BP: 138/68, HR: 78, SpO: 96  · Communication/Consultation:  None today  · Equipment provided today:  None today  · Recommendations/Intent for next treatment session: Next visit will focus on range of motion, mobility, flexibility, strength, balance, and functional abilities.     Total Treatment Billable Duration:  55 minutes  PT Patient Time In/Time Out  Time In: 1330  Time Out: Sierra [address], PTA

## 2020-11-20 ENCOUNTER — HOSPITAL ENCOUNTER (OUTPATIENT)
Dept: PHYSICAL THERAPY | Age: 85
Discharge: HOME | End: 2020-11-20
Payer: MEDICARE

## 2020-11-20 PROCEDURE — 97110 THERAPEUTIC EXERCISES: CPT

## 2020-11-20 NOTE — THERAPY DISCHARGE
Constance Ballesteros  : 1935  Primary: Margarito Corrales Medicare Advantage  Secondary:  [address] at [address], [place], [address] [place] [address]  [phone]   [phone]       OUTPATIENT PHYSICAL [facility] 2020    ICD-10: Treatment Diagnosis: Low back pain [M54.5]                Treatment Diagnosis 2: Generalized Muscle Weakness [M62.81]                Treatment Diagnosis 3: other abnormalities of gait and mobility [R26.89]  Precautions: PACEMAKER, SOB, AFib  Allergies: Patient has no known allergies. TREATMENT PLAN:  Effective Dates: 10/14/2020 TO 2020 (60 days). Frequency/Duration: 2 times a week for 60 Day(s) MEDICAL/REFERRING DIAGNOSIS:  Low back pain [M54.5]  Other specified enthesopathies of right lower limb, excluding foot [M76.891]  Unilateral primary osteoarthritis, right knee [M17.11]   DATE OF ONSET: Chronic (Greater then 1 year)  REFERRING PHYSICIAN: Heena Ansari MD MD Orders: Evaluate and Treat  Return MD Appointment: Patient says not follow-up is currently scheduled. INITIAL ASSESSMENT:  Mr. Damaris Jovel is a 82 y.o. male presenting to physical therapy with complaints of low back pain, right hip, right knee pain. He denies any specific mechanism of injury, reports history of low back pain with a lumbar fusion approximately 10 years ago. He reports using a back brace from time to time. He reports over the last approximately 3-4 weeks he has started having hip and right knee pain. He reports having trouble ascending and descending stairs, inability to kneel, difficulty squatting and with yard work. Patient presents with increased pain, decreased strength, decreased ROM, decreased flexibility, impaired gait, impaired posture, decreased activity tolerance, and overall impaired functional mobility.  Patient is a good candidate for skilled physical therapy interventions to include manual therapy, therapeutic exercise, balance training, gait training, transfer training, postural re-education, body mechanics training, and pain modalities as needed. DISCHARGE ASSESSMENT:  Mr. Nicole Michaud is a 82 y.o. male presenting to physical therapy with complaints of low back pain, right hip, right knee pain. Patient has been seen for 11 sessions of physical therapy from 10/14/2020 to 11/20/2020. He reports feeling 80% better with all daily activities. He has demonstrated improved range of motion, mobility, flexibility, and strength. He has achieved established goals and will be discharged at this time to his HEP, per patient request. His HEP was reviewed and progressed. PROBLEM LIST (Impacting functional limitations):  1. Decreased Strength  2. Decreased ADL/Functional Activities  3. Decreased Transfer Abilities  4. Decreased Ambulation Ability/Technique  5. Decreased Balance  6. Increased Pain  7. Decreased Activity Tolerance  8. Increased Shortness of Breath  9. Decreased Flexibility/Joint Mobility  10. Decreased [place] with Home Exercise Program INTERVENTIONS PLANNED: (Treatment may consist of any combination of the following)  1. Balance Exercise  2. Cryotherapy  3. Electrical Stimulation  4. Gait Training  5. Heat  6. Home Exercise Program (HEP)  7. Manual Therapy  8. Neuromuscular Re-education/Strengthening  9. Range of Motion (ROM)  10. Therapeutic Activites  11. Therapeutic Exercise/Strengthening  12. Transcutaneous Electrical Nerve Stimulation (TENS)     GOALS: (Goals have been discussed and agreed upon with patient.)  Short-Term Functional Goals: Time Frame: 10/14/2020 to 11/13/2020  1. Patient demonstrates independence with home exercise program without verbal cueing provided by therapist. -MET  2. Patient will report no more than 5/10 pain at rest in order to demonstrate improved self pain control and tolerance. -MET  3.  Patient will be educated in and demonstrate improved upright posture including standing and walking. -MET  4. Patient will be educated in and demonstrate proper squat lift technique in order to reduce stress on low back, hip, knee to improve safety, and reduce risk of injury. -MET  5. Patient will improve lumbar flexion to 30 degrees. -MET  6. Patient will improve dorsiflexion to 10 deg. -MET  Discharge Goals: Time Frame: 10/14/2020 to 12/13/2020  1. Patient will improve lumbar flexion to 40 deg to assist with squatting with decreased difficulty. -MET  2. Patient will improve hip flexion to 120 deg to assist with ability to return to yard work and [facility]. -MET  3. Patient will improve knee flexion to 130 deg to assist with ability to kneel. -MET  4. Patient will improve strength to 4/5 to be able to ascend and descend stairs and steps without difficulty. -MET  5. Patient will improve Modified Oswestry Scale score to 25/50 from 18/50. -MET    Outcome Measure: Tool Used: Lower Extremity Functional Scale (LEFS)  Score:  Initial: 54/80 Most Recent: 57/80 (Date: 11/20/2020 )   Interpretation of Score: 20 questions each scored on a 5 point scale with 0 representing \"extreme difficulty or unable to perform\" and 4 representing \"no difficulty\". The lower the score, the greater the functional disability. 80/80 represents no disability. Minimal detectable change is 9 points. Tool Used: Modified Oswestry Low Back Pain Questionnaire  Score:  Initial: 25/50  Most Recent: 0/50 (Date: 11/20/2020 )   Interpretation of Score: Each section is scored on a 0-5 scale, 5 representing the greatest disability. The scores of each section are added together for a total score of 50. UPDATED OBJECTIVE MEASURES:   Patient denies any LE paresthesia. Patient denies any increase of symptoms with cough, sneeze or valsalva. Patient denies any saddle paresthesia or bowel/bladder deficits.    Patient denies any headaches, changes in vision, dizziness, vertigo, nausea, drop attacks, black outs, tinnitus, dysphagia, dysarthria, LE symptoms or bowel/bladder dysfunction. Observation/Orthostatic Postural Assessment:          Patient presents with decreased lumbar lordosis, bilateral anterior pelvic tilt. Palpation:          Patient presents with bilateral thoracolumbar paraspinal tightness and tenderness. Patient presents with right gluteal, Iliotibial band, hamstring, adductor, generalized knee tenderness, tightness, trigger points. ROM:    AROM (degrees)   Lumbar Flexion 40 (from 23)   Lumbar Extension 30 (from 20)   Lumbar Right Sidebend 25 (from 18)   Lumbar Left Sidebend 24 (from 11)     AROM/ PROM Left (degrees) Right (degrees)   Hip Flexion 120 (from 115) 120 (from 110)   Hip Extension 0 0   Hip Abduction 28 30 (from 20)   Hip Internal Rotation   (90 degrees flexion) 25 30 (from 20)   Hip External Rotation   (90 degrees flexion) 55 52   Knee Flexion 133 135 (from 125)   Knee Extension 0 0   Dorsiflexion Knee Extended 10 (from 5) 14 (from 2)   Dorsiflexion Knee Flexed 11 (from 8) 20 (from 5)     Strength: Motion Tested Left   (*/5) Right  (*/5)   Hip Flexion 5 (from 3) 5 (from 3)   Hip Extension 5 (from 3) 5 (from 3)   Hip Abduction 5 (from 3) 5 (from 3)   Knee Flexion 5 (from 3) 5 (from 3)   Knee Extension 5 (from 4) 5 (from 4)   Ankle Dorsiflexion 5 (from 3) 5 (from 3)   Ankle Plantarflexion 4 (from 3-) 4 (from 3-)   Gross core strength 4/5 (from 3/5  Special Tests:          Neural tension tests: Passive straight leg raise (SLR) test is Negative. Crossed SLR test is Negative. Slump test is Negative. Femoral nerve stretch test is Negative. Passive Accessory Motion:         Hypomobility Thoracic and Lumbar, Right Hip Lateral and Inferior, Right Patellofemoral and Tibiofemoral Hypomobility All Directions. Neurological Screen:              Myotomes: Key muscle strength testing through bilateral equal.   Dermatomes: Sensation to light touch for bilateral LE is intact from L3 to S1.    Reflexes: Patellar (L3/ L4): Intact Bilaterally Equal                 Achilles (S1/ S2): Intact Bilaterally Equal          Functional Mobility:         Gait/Ambulation:  Patient demonstrated decreased trunk rotation, decreased right hip extension. Transfers:  Patient utilized bilateral upper extremity 100% of time. Balance:          Patient denies any falls or trouble with balance. Reason for Services/Other Comments:  · Discharge to [facility], per patient request. His HEP was reviewed and progress with patient. .    Rehabilitation Potential For Stated Goals: Good  Regarding Liz Choudhury's therapy, I certify that the treatment plan above will be carried out by a therapist or under their direction.   Thank you for this referral,  Mirian Olivas, PT

## 2020-11-20 NOTE — PROGRESS NOTES
Polly Jose  : 1935  Primary: Martin Daily Medicare Advantage  Secondary:  [address] at [facility]  [address] [place] [address], [place], [address] [place] [address]  [phone]   [phone]      OUTPATIENT PHYSICAL THERAPY: Daily Treatment Note 2020    ICD-10: Treatment Diagnosis: Low back pain [M54.5]                Treatment Diagnosis 2: Generalized Muscle Weakness [M62.81]                Treatment Diagnosis 3: other abnormalities of gait and mobility [R26.89]  Precautions: PACEMAKER, SOB, AFib  Allergies: Patient has no known allergies. TREATMENT PLAN:  Effective Dates: 10/14/2020 TO 2020 (60 days). Frequency/Duration: 2 times a week for 60 Day(s) MEDICAL/REFERRING DIAGNOSIS:  Low back pain [M54.5]  Other specified enthesopathies of right lower limb, excluding foot [M76.891]  Unilateral primary osteoarthritis, right knee [M17.11]   DATE OF ONSET: Chronic (Greater then 1 year)  REFERRING PHYSICIAN: Rut Logan MD MD Orders: Evaluate and Treat  Return MD Appointment: Patient says not follow-up is currently scheduled. Pre-treatment Symptoms/Complaints: Patient reports that he is not really having any pain and reports that he is ready to continue on his own with his HEP. Pain: Initial: Pain Intensity 1: 0  Pain Location 1: Back, Hip, Leg  Pain Orientation 1: Right  Post Session:  0/10   Medications Last Reviewed:  2020  Updated Objective Findings:  See evaluation note from today   TREATMENT:   THERAPEUTIC EXERCISE: (55 minutes):  Exercises per grid below to improve mobility and strength. Required moderate visual, verbal, manual and tactile cues to promote proper body alignment, promote proper body posture, promote proper body mechanics and promote proper body breathing techniques. Progressed resistance, range, repetitions and complexity of movement as indicated.      Date:  2020 Date:  2020 Date:  2020   Activity/Exercise Parameters Parameters Parameters   Knee Fall Outs (Single Leg) 3 x 10 blue (Bilateral)  3 x 10 Blue (Bilateral)   Bridge 3x10 reps 3 sec hold in supine  1 x 10 with 3 second hold supine   Clams 3 x 10 blue  (Bilateral)  3x10 blue bilateral blue    Nu Step 10 min level 3 (Working on Pelvis, Hip, Knee) 10 min Level 1 (Working on Pelvis, Hip, Knee) 10 min Level 2 (Working on Pelvis, Hip, Knee)   Calf Stretch Slantboard 8 x 30 sec Slantboard 10 x 30 sec Slantboard 3 x 30 sec, 3 x 30 sec soleus   Trunk Rotation Supine x 30 reps  Supine x 30 reps   Sidelying Rotation X 30 reps Each Side  X 30 reps Each Side   Balance board  Anterior/Posteri 4 x 10 second hold each  In standing, 4 x 20 Anterior/Posterior, Medial/Lateral 4 x 15 second hold each  In standing  For propreioception, LE strength, and core stability    Alternating Cone Tap  X 20 reps standing for LE strength and coordination each leg X 20 standing for LE strength, balance, and corrdination   Sit to stand  2 X 10 reps cushion no UE assit  -------   SKTC Seated 6 x 30 sec Each Side 10 x 30 sec Each Leg 4x30 sec hold BLE's    IT band stretch   4x30 strap BLE's    Hamstring  Seated 10 x 10 sec Each Leg 4x30 sec hold strap BLE's    Piriformis  Seated 6 x 30 sec Each Side  4x30 sec hold BLE's            Time spent with patient reviewing proper muscle recruitment and technique with exercises. MANUAL THERAPY: (0 minutes): Joint mobilization, Soft tissue mobilization and Manipulation was utilized and necessary because of the patient's restricted joint motion, painful spasm, loss of articular motion and restricted motion of soft tissue   None Today    MODALITIES: (0 minutes):      None Today. HEP: As above; handouts given to patient for all exercises. Treatment/Session Summary:    · Response to Treatment:  Patient reported no pain with treatment. His home exercise program was reviewed and progressed with patient and he will be discharged at this time to his HEP. · Baseline vitals (10/14/2020): BP: 138/68, HR: 78, SpO: 96  · Communication/Consultation:  HEP  · Equipment provided today:  [employer]  · Recommendations: Discharge to HEP per patient request.    Total Treatment Billable Duration:  55 minutes  PT Patient Time In/Time Out  Time In: 1056  Time Out: [facility]

## 2020-11-24 PROBLEM — Z95.0 PACEMAKER: Status: ACTIVE | Noted: 2020-11-24

## 2020-11-25 ENCOUNTER — APPOINTMENT (OUTPATIENT)
Dept: PHYSICAL THERAPY | Age: 85
End: 2020-11-25
Payer: MEDICARE

## 2021-01-14 ENCOUNTER — HOSPITAL ENCOUNTER (OUTPATIENT)
Dept: GENERAL RADIOLOGY | Age: 86
Discharge: HOME | End: 2021-01-14
Payer: MEDICARE

## 2021-01-14 DIAGNOSIS — R07.81 RIB PAIN ON LEFT SIDE: ICD-10-CM

## 2021-01-14 PROCEDURE — 71046 X-RAY EXAM CHEST 2 VIEWS: CPT

## 2021-02-05 ENCOUNTER — HOSPITAL ENCOUNTER (OUTPATIENT)
Dept: ULTRASOUND IMAGING | Age: 86
Discharge: HOME | End: 2021-02-05
Attending: FAMILY MEDICINE
Payer: MEDICARE

## 2021-02-05 DIAGNOSIS — R10.10 PAIN OF UPPER ABDOMEN: ICD-10-CM

## 2021-02-05 PROCEDURE — 76700 US EXAM ABDOM COMPLETE: CPT

## 2021-02-23 ENCOUNTER — APPOINTMENT (OUTPATIENT)
Dept: CT IMAGING | Age: 86
End: 2021-02-23
Attending: FAMILY MEDICINE

## 2021-04-02 ENCOUNTER — HOSPITAL ENCOUNTER (OUTPATIENT)
Dept: GENERAL RADIOLOGY | Age: 86
Discharge: HOME | End: 2021-04-02
Payer: MEDICARE

## 2021-04-02 DIAGNOSIS — I27.20 PULMONARY HTN (HCC): ICD-10-CM

## 2021-04-02 DIAGNOSIS — R07.81 RIB PAIN: ICD-10-CM

## 2021-04-02 DIAGNOSIS — J84.9 ILD (INTERSTITIAL LUNG DISEASE) (HCC): ICD-10-CM

## 2021-04-02 PROCEDURE — 71046 X-RAY EXAM CHEST 2 VIEWS: CPT

## 2021-05-28 ENCOUNTER — HOSPITAL ENCOUNTER (OUTPATIENT)
Dept: CT IMAGING | Age: 86
Discharge: HOME | End: 2021-05-28
Attending: INTERNAL MEDICINE
Payer: MEDICARE

## 2021-05-28 DIAGNOSIS — J98.4 RESTRICTIVE LUNG DISEASE: ICD-10-CM

## 2021-05-28 PROCEDURE — 71250 CT THORAX DX C-: CPT

## 2021-06-02 NOTE — PROGRESS NOTES
Chest ct was ok except some scarring in R middle lobe probably from previous infection-please let him know

## 2021-09-05 ENCOUNTER — HOSPITAL ENCOUNTER (OUTPATIENT)
Dept: SLEEP MEDICINE | Age: 86
Discharge: HOME | End: 2021-09-05
Payer: MEDICARE

## 2021-09-05 PROCEDURE — 95811 POLYSOM 6/>YRS CPAP 4/> PARM: CPT

## 2021-10-10 ENCOUNTER — HOSPITAL ENCOUNTER (OUTPATIENT)
Dept: SLEEP MEDICINE | Age: 86
Discharge: HOME | End: 2021-10-10
Payer: MEDICARE

## 2021-10-10 PROCEDURE — 95811 POLYSOM 6/>YRS CPAP 4/> PARM: CPT

## 2022-01-25 ENCOUNTER — HOSPITAL ENCOUNTER (OUTPATIENT)
Dept: GENERAL RADIOLOGY | Age: 87
Discharge: HOME | End: 2022-01-25
Payer: MEDICARE

## 2022-01-25 DIAGNOSIS — L03.032 CELLULITIS OF TOE OF LEFT FOOT: ICD-10-CM

## 2022-01-25 PROCEDURE — 73660 X-RAY EXAM OF TOE(S): CPT

## 2022-01-26 NOTE — PROGRESS NOTES
Please let Mr. Floyd Ramirez know that he did fracture his toe. I would like to refer him to ortho/foot to make sure that there is nothing else we can do for him.

## 2022-02-04 ENCOUNTER — HOSPITAL ENCOUNTER (OUTPATIENT)
Dept: CT IMAGING | Age: 87
Discharge: HOME | End: 2022-02-04
Attending: FAMILY MEDICINE
Payer: MEDICARE

## 2022-02-04 DIAGNOSIS — J98.4 RESTRICTIVE LUNG DISEASE: ICD-10-CM

## 2022-02-04 DIAGNOSIS — R10.84 GENERALIZED ABDOMINAL PAIN: ICD-10-CM

## 2022-02-04 PROCEDURE — 74011000636 HC RX REV CODE- 636: Performed by: FAMILY MEDICINE

## 2022-02-04 PROCEDURE — 74177 CT ABD & PELVIS W/CONTRAST: CPT

## 2022-02-04 PROCEDURE — 74011000258 HC RX REV CODE- 258: Performed by: FAMILY MEDICINE

## 2022-02-04 RX ORDER — SODIUM CHLORIDE 0.9 % (FLUSH) 0.9 %
10 SYRINGE (ML) INJECTION
Status: COMPLETED | OUTPATIENT
Start: 2022-02-04 | End: 2022-02-04

## 2022-02-04 RX ADMIN — IOPAMIDOL 100 ML: 755 INJECTION, SOLUTION INTRAVENOUS at 14:24

## 2022-02-04 RX ADMIN — Medication 10 ML: at 14:25

## 2022-02-04 RX ADMIN — SODIUM CHLORIDE 100 ML: 9 INJECTION, SOLUTION INTRAVENOUS at 14:24

## 2022-02-04 RX ADMIN — DIATRIZOATE MEGLUMINE AND DIATRIZOATE SODIUM 15 ML: 660; 100 LIQUID ORAL; RECTAL at 14:24

## 2022-02-07 NOTE — PROGRESS NOTES
Please let Mr. Nancy Tavera know that his CT scan shows gallstones. I think he might need to see a surgeon to discuss taking his gallbladder out. It might be causing his abdominal pain.

## 2022-02-24 PROBLEM — K80.10 CHRONIC CALCULOUS CHOLECYSTITIS: Status: ACTIVE | Noted: 2022-02-24

## 2022-03-18 PROBLEM — M62.838 MUSCLE SPASM OF LEFT SHOULDER: Status: ACTIVE | Noted: 2018-03-05

## 2022-03-18 PROBLEM — I63.9 OCCIPITAL CEREBRAL INFARCTION (HCC): Status: ACTIVE | Noted: 2019-06-11

## 2022-03-18 PROBLEM — E11.42 DIABETIC POLYNEUROPATHY ASSOCIATED WITH TYPE 2 DIABETES MELLITUS (HCC): Status: ACTIVE | Noted: 2019-07-22

## 2022-03-18 PROBLEM — K80.10 CHRONIC CALCULOUS CHOLECYSTITIS: Status: ACTIVE | Noted: 2022-02-24

## 2022-03-18 PROBLEM — Z95.0 PACEMAKER: Status: ACTIVE | Noted: 2020-11-24

## 2022-03-18 PROBLEM — E11.21 TYPE 2 DIABETES WITH NEPHROPATHY (HCC): Status: ACTIVE | Noted: 2018-03-09

## 2022-03-18 PROBLEM — M54.2 CERVICALGIA: Status: ACTIVE | Noted: 2018-03-05

## 2022-03-18 PROBLEM — F41.1 GENERALIZED ANXIETY DISORDER: Status: ACTIVE | Noted: 2019-02-22

## 2022-03-19 PROBLEM — E11.22 TYPE 2 DIABETES MELLITUS WITH STAGE 3 CHRONIC KIDNEY DISEASE, WITHOUT LONG-TERM CURRENT USE OF INSULIN (HCC): Status: ACTIVE | Noted: 2018-09-07

## 2022-03-19 PROBLEM — R26.89 IMBALANCE: Status: ACTIVE | Noted: 2019-06-11

## 2022-03-19 PROBLEM — N18.30 TYPE 2 DIABETES MELLITUS WITH STAGE 3 CHRONIC KIDNEY DISEASE, WITHOUT LONG-TERM CURRENT USE OF INSULIN (HCC): Status: ACTIVE | Noted: 2018-09-07

## 2022-06-03 ENCOUNTER — NURSE ONLY (OUTPATIENT)
Dept: FAMILY MEDICINE CLINIC | Facility: CLINIC | Age: 87
End: 2022-06-03
Payer: MEDICARE

## 2022-06-03 DIAGNOSIS — Z79.01 LONG TERM CURRENT USE OF ANTICOAGULANT: ICD-10-CM

## 2022-06-03 DIAGNOSIS — I48.0 PAROXYSMAL ATRIAL FIBRILLATION (HCC): Primary | ICD-10-CM

## 2022-06-03 LAB — INTERNATIONAL NORMALIZATION RATIO, POC: 2.9

## 2022-06-03 PROCEDURE — 85610 PROTHROMBIN TIME: CPT | Performed by: STUDENT IN AN ORGANIZED HEALTH CARE EDUCATION/TRAINING PROGRAM

## 2022-06-03 PROCEDURE — 93793 ANTICOAG MGMT PT WARFARIN: CPT | Performed by: STUDENT IN AN ORGANIZED HEALTH CARE EDUCATION/TRAINING PROGRAM

## 2022-06-20 DIAGNOSIS — Z00.00 ROUTINE GENERAL MEDICAL EXAMINATION AT A HEALTH CARE FACILITY: Primary | ICD-10-CM

## 2022-06-24 DIAGNOSIS — Z00.00 ROUTINE GENERAL MEDICAL EXAMINATION AT A HEALTH CARE FACILITY: ICD-10-CM

## 2022-06-24 LAB
ALBUMIN SERPL-MCNC: 3.7 G/DL (ref 3.2–4.6)
ALBUMIN/GLOB SERPL: 1.1 {RATIO} (ref 1.2–3.5)
ALP SERPL-CCNC: 68 U/L (ref 50–136)
ALT SERPL-CCNC: 36 U/L (ref 12–65)
ANION GAP SERPL CALC-SCNC: 9 MMOL/L (ref 7–16)
AST SERPL-CCNC: 26 U/L (ref 15–37)
BILIRUB SERPL-MCNC: 1 MG/DL (ref 0.2–1.1)
BUN SERPL-MCNC: 44 MG/DL (ref 8–23)
CALCIUM SERPL-MCNC: 8.9 MG/DL (ref 8.3–10.4)
CHLORIDE SERPL-SCNC: 102 MMOL/L (ref 98–107)
CHOLEST SERPL-MCNC: 90 MG/DL
CO2 SERPL-SCNC: 29 MMOL/L (ref 21–32)
CREAT SERPL-MCNC: 1.8 MG/DL (ref 0.8–1.5)
GLOBULIN SER CALC-MCNC: 3.3 G/DL (ref 2.3–3.5)
GLUCOSE SERPL-MCNC: 253 MG/DL (ref 65–100)
HDLC SERPL-MCNC: 40 MG/DL (ref 40–60)
HDLC SERPL: 2.3 {RATIO}
LDLC SERPL CALC-MCNC: 32 MG/DL
POTASSIUM SERPL-SCNC: 4.2 MMOL/L (ref 3.5–5.1)
PROT SERPL-MCNC: 7 G/DL (ref 6.3–8.2)
SODIUM SERPL-SCNC: 140 MMOL/L (ref 138–145)
TRIGL SERPL-MCNC: 90 MG/DL (ref 35–150)
TSH W FREE THYROID IF ABNORMAL: 2.4 UIU/ML (ref 0.36–3.74)
VLDLC SERPL CALC-MCNC: 18 MG/DL (ref 6–23)

## 2022-06-25 LAB
BASOPHILS # BLD: 0 K/UL (ref 0–0.2)
BASOPHILS NFR BLD: 0 % (ref 0–2)
CREAT UR-MCNC: 97 MG/DL
DIFFERENTIAL METHOD BLD: ABNORMAL
EOSINOPHIL # BLD: 0.4 K/UL (ref 0–0.8)
EOSINOPHIL NFR BLD: 6 % (ref 0.5–7.8)
ERYTHROCYTE [DISTWIDTH] IN BLOOD BY AUTOMATED COUNT: 13.4 % (ref 11.9–14.6)
EST. AVERAGE GLUCOSE BLD GHB EST-MCNC: 192 MG/DL
HBA1C MFR BLD: 8.3 % (ref 4.2–6.3)
HCT VFR BLD AUTO: 36.8 % (ref 41.1–50.3)
HGB BLD-MCNC: 11.8 G/DL (ref 13.6–17.2)
IMM GRANULOCYTES # BLD AUTO: 0 K/UL (ref 0–0.5)
IMM GRANULOCYTES NFR BLD AUTO: 0 % (ref 0–5)
LYMPHOCYTES # BLD: 1.5 K/UL (ref 0.5–4.6)
LYMPHOCYTES NFR BLD: 21 % (ref 13–44)
MCH RBC QN AUTO: 32.1 PG (ref 26.1–32.9)
MCHC RBC AUTO-ENTMCNC: 32.1 G/DL (ref 31.4–35)
MCV RBC AUTO: 100 FL (ref 79.6–97.8)
MICROALBUMIN UR-MCNC: 4.56 MG/DL
MICROALBUMIN/CREAT UR-RTO: 47 MG/G
MONOCYTES # BLD: 0.6 K/UL (ref 0.1–1.3)
MONOCYTES NFR BLD: 9 % (ref 4–12)
NEUTS SEG # BLD: 4.6 K/UL (ref 1.7–8.2)
NEUTS SEG NFR BLD: 64 % (ref 43–78)
NRBC # BLD: 0 K/UL (ref 0–0.2)
PLATELET # BLD AUTO: 155 K/UL (ref 150–450)
PMV BLD AUTO: 12.2 FL (ref 9.4–12.3)
RBC # BLD AUTO: 3.68 M/UL (ref 4.23–5.6)
WBC # BLD AUTO: 7 K/UL (ref 4.3–11.1)

## 2022-07-08 ENCOUNTER — OFFICE VISIT (OUTPATIENT)
Dept: FAMILY MEDICINE CLINIC | Facility: CLINIC | Age: 87
End: 2022-07-08
Payer: MEDICARE

## 2022-07-08 DIAGNOSIS — R06.09 DOE (DYSPNEA ON EXERTION): ICD-10-CM

## 2022-07-08 DIAGNOSIS — N18.30 TYPE 2 DIABETES MELLITUS WITH STAGE 3 CHRONIC KIDNEY DISEASE, WITHOUT LONG-TERM CURRENT USE OF INSULIN, UNSPECIFIED WHETHER STAGE 3A OR 3B CKD (HCC): Primary | ICD-10-CM

## 2022-07-08 DIAGNOSIS — N18.30 STAGE 3 CHRONIC KIDNEY DISEASE, UNSPECIFIED WHETHER STAGE 3A OR 3B CKD (HCC): ICD-10-CM

## 2022-07-08 DIAGNOSIS — E11.22 TYPE 2 DIABETES MELLITUS WITH STAGE 3 CHRONIC KIDNEY DISEASE, WITHOUT LONG-TERM CURRENT USE OF INSULIN, UNSPECIFIED WHETHER STAGE 3A OR 3B CKD (HCC): Primary | ICD-10-CM

## 2022-07-08 DIAGNOSIS — I10 PRIMARY HYPERTENSION: ICD-10-CM

## 2022-07-08 DIAGNOSIS — I48.0 PAROXYSMAL ATRIAL FIBRILLATION (HCC): ICD-10-CM

## 2022-07-08 PROCEDURE — 3052F HG A1C>EQUAL 8.0%<EQUAL 9.0%: CPT | Performed by: STUDENT IN AN ORGANIZED HEALTH CARE EDUCATION/TRAINING PROGRAM

## 2022-07-08 PROCEDURE — 99214 OFFICE O/P EST MOD 30 MIN: CPT | Performed by: STUDENT IN AN ORGANIZED HEALTH CARE EDUCATION/TRAINING PROGRAM

## 2022-07-08 PROCEDURE — 1123F ACP DISCUSS/DSCN MKR DOCD: CPT | Performed by: STUDENT IN AN ORGANIZED HEALTH CARE EDUCATION/TRAINING PROGRAM

## 2022-07-08 RX ORDER — SERTRALINE HYDROCHLORIDE 100 MG/1
100 TABLET, FILM COATED ORAL DAILY
Qty: 90 TABLET | Refills: 1 | Status: SHIPPED | OUTPATIENT
Start: 2022-07-08 | End: 2022-10-11 | Stop reason: SDUPTHER

## 2022-07-08 RX ORDER — LATANOPROST 50 UG/ML
SOLUTION/ DROPS OPHTHALMIC
COMMUNITY
Start: 2022-05-06

## 2022-07-08 RX ORDER — AMLODIPINE BESYLATE 5 MG/1
TABLET ORAL
Qty: 135 TABLET | Refills: 1 | Status: SHIPPED | OUTPATIENT
Start: 2022-07-08 | End: 2022-10-11 | Stop reason: SDUPTHER

## 2022-07-08 RX ORDER — WARFARIN SODIUM 5 MG/1
TABLET ORAL
Qty: 90 TABLET | Refills: 1 | Status: SHIPPED | OUTPATIENT
Start: 2022-07-08 | End: 2022-10-11 | Stop reason: SDUPTHER

## 2022-07-08 RX ORDER — BRIMONIDINE TARTRATE 0.15 %
1 DROPS OPHTHALMIC (EYE)
COMMUNITY

## 2022-07-08 RX ORDER — PRAVASTATIN SODIUM 80 MG/1
80 TABLET ORAL DAILY
Qty: 90 TABLET | Refills: 1 | Status: SHIPPED | OUTPATIENT
Start: 2022-07-08 | End: 2022-10-11 | Stop reason: SDUPTHER

## 2022-07-08 RX ORDER — LANOLIN ALCOHOL/MO/W.PET/CERES
325 CREAM (GRAM) TOPICAL
Qty: 90 TABLET | Refills: 1 | Status: SHIPPED | OUTPATIENT
Start: 2022-07-08 | End: 2022-10-11 | Stop reason: SDUPTHER

## 2022-07-08 RX ORDER — DOXAZOSIN MESYLATE 4 MG/1
4 TABLET ORAL NIGHTLY
Qty: 90 TABLET | Refills: 1 | Status: SHIPPED | OUTPATIENT
Start: 2022-07-08 | End: 2022-10-11 | Stop reason: SDUPTHER

## 2022-07-08 RX ORDER — OMEPRAZOLE 20 MG/1
20 CAPSULE, DELAYED RELEASE ORAL 2 TIMES DAILY
Qty: 180 CAPSULE | Refills: 1 | Status: SHIPPED | OUTPATIENT
Start: 2022-07-08 | End: 2022-10-11

## 2022-07-08 RX ORDER — LOSARTAN POTASSIUM AND HYDROCHLOROTHIAZIDE 12.5; 5 MG/1; MG/1
1 TABLET ORAL DAILY
Qty: 90 TABLET | Refills: 1 | Status: SHIPPED | OUTPATIENT
Start: 2022-07-08 | End: 2022-10-11 | Stop reason: SDUPTHER

## 2022-07-08 RX ORDER — BUMETANIDE 1 MG/1
1 TABLET ORAL DAILY
Qty: 90 TABLET | Refills: 1 | Status: SHIPPED | OUTPATIENT
Start: 2022-07-08 | End: 2022-10-11 | Stop reason: SDUPTHER

## 2022-07-08 RX ORDER — INSULIN DEGLUDEC 200 U/ML
32 INJECTION, SOLUTION SUBCUTANEOUS DAILY
Qty: 3 ML | Refills: 5 | Status: SHIPPED | OUTPATIENT
Start: 2022-07-08 | End: 2022-10-11 | Stop reason: SDUPTHER

## 2022-07-08 RX ORDER — WARFARIN SODIUM 7.5 MG/1
7.5 TABLET ORAL DAILY
Qty: 90 TABLET | Refills: 1 | Status: SHIPPED | OUTPATIENT
Start: 2022-07-08 | End: 2022-10-11 | Stop reason: SDUPTHER

## 2022-07-08 ASSESSMENT — PATIENT HEALTH QUESTIONNAIRE - PHQ9
1. LITTLE INTEREST OR PLEASURE IN DOING THINGS: 0
SUM OF ALL RESPONSES TO PHQ QUESTIONS 1-9: 0
SUM OF ALL RESPONSES TO PHQ9 QUESTIONS 1 & 2: 0
SUM OF ALL RESPONSES TO PHQ QUESTIONS 1-9: 0
2. FEELING DOWN, DEPRESSED OR HOPELESS: 0

## 2022-07-08 ASSESSMENT — ENCOUNTER SYMPTOMS: BLOOD IN STOOL: 0

## 2022-07-08 ASSESSMENT — ANXIETY QUESTIONNAIRES
GAD7 TOTAL SCORE: 0
IF YOU CHECKED OFF ANY PROBLEMS ON THIS QUESTIONNAIRE, HOW DIFFICULT HAVE THESE PROBLEMS MADE IT FOR YOU TO DO YOUR WORK, TAKE CARE OF THINGS AT HOME, OR GET ALONG WITH OTHER PEOPLE: NOT DIFFICULT AT ALL
3. WORRYING TOO MUCH ABOUT DIFFERENT THINGS: 0
5. BEING SO RESTLESS THAT IT IS HARD TO SIT STILL: 0
6. BECOMING EASILY ANNOYED OR IRRITABLE: 0
2. NOT BEING ABLE TO STOP OR CONTROL WORRYING: 0
7. FEELING AFRAID AS IF SOMETHING AWFUL MIGHT HAPPEN: 0
4. TROUBLE RELAXING: 0
1. FEELING NERVOUS, ANXIOUS, OR ON EDGE: 0

## 2022-07-08 NOTE — PROGRESS NOTES
[facility], [address]  Phone [phone]  Fax:  [phone]    Joan Leone (:  1935) is a 82 y.o. male here for evaluation of the following chief complaint(s):  Atrial Fibrillation, Cholesterol Problem, Hypertension (review labs refills), Diabetes, and Benign Prostatic Hypertrophy       ASSESSMENT/PLAN:  1. Type 2 diabetes mellitus with stage 3 chronic kidney disease, without long-term current use of insulin, unspecified whether stage 3a or 3b CKD (HCC)  2. Paroxysmal atrial fibrillation (HCC)  3. Stage 3 chronic kidney disease, unspecified whether stage 3a or 3b CKD ([facility] [address]  4. RODRIGUEZ (dyspnea on exertion)  5. Primary hypertension    Hemoglobin A1c a few weeks ago elevated at 8.3, down from 9.7 however. Continue Tresiba 32 units daily and Tradjenta. Patient's sugars sometimes go lowish in the late afternoon so we will hold off on being more aggressive with diabetes treatment. Followed by nephrology for his CKD. Blood pressure well controlled, continue current regimen. Reports chronic RODRIGUEZ for the past several years, he is followed by cardiology and sees pulmonology . Return in about 3 months (around 10/8/2022) for routine f/u. Subjective   SUBJECTIVE/OBJECTIVE:  HPI   82-year-old male with PMH of A. fib, CAD, DM2, CKD, CVA, HLD, ANURADHA (on BPAP), and restrictive lung disease who presents for regular follow-up.  -chronic RODRIGUEZ past several years  -not using any inhalers right now, reports no difference in breathing with them, does not feel like any inhalers have helped with his breathing  -last echo 2020  -sees pulm   -on Tresiba 32u daily and Tradjenta   -fasting sugars around 150, sometimes goes low in the late afternoon  -takes Fe daily  -will see Nephro in September    Review of Systems   Gastrointestinal:  Negative for blood in stool.         Objective     Vitals:    22 1447   BP: 128/78   Pulse: 78   Temp: 97 °F (36.1 °C) SpO2: 95%       Physical Exam  Vitals reviewed. Constitutional:       General: He is not in acute distress. Appearance: He is obese. HENT:      Head: Normocephalic and atraumatic. Cardiovascular:      Rate and Rhythm: Normal rate and regular rhythm. Pulmonary:      Effort: Pulmonary effort is normal.      Breath sounds: Normal breath sounds. Musculoskeletal:      Comments: Bilateral 1+ pitting edema lower extremities   Skin:     General: Skin is warm and dry. Neurological:      General: No focal deficit present. Mental Status: He is alert and oriented to person, place, and time. An electronic signature was used to authenticate this note.     --Herman Lee MD

## 2022-07-12 NOTE — PROGRESS NOTES
He is an 44-year-old male seen today for follow-up of shortness of breath, pulmonary hypertension, restrictive lung defect. I saw him 2022 when he reported worsening cough and shortness of breath. Ambulatory oximetry demonstrated adequate saturation on room air. HRCT revealed no evidence of ILD. CPFT's demonstrated restrictive impairment. Has significant truncal obesity. COMPLETE PULMONARY FUNCTION STUDY      SPIROMETRY:      FVC                            2.38 L         (53% predicted). FEV1                                      1.85            (55% predicted). FEV1/FVC was          78%. FEF 25-75 was      64% of predicted. The flow volume loop reveals restrictive lung disease. LUNG VOLUMES:  Obtained by nitrogen wash out. Total Lung Capacity:           59%    FRC:                           54%   Residual Volume:       71%   RV/T      DIFFUSION CAPACITY:      Diffusion Capacity:  76   DLCO corrected for alveolar volume:  135      IMPRESSION:      Spirometry is consistent with a moderate restrictive pattern. Lung volumes were decreased. The diffusion capacity corrected for alveolar volume was normal.  This is study is consistent with a diagnosis of restrictive lung disease. This study was compared  with last study on 10/17/18 and there is decrease in spirometry by 15-20%, decrease TLC by 13% and increase DLCO by 19%.       Alison Locke MD

## 2022-07-13 ENCOUNTER — OFFICE VISIT (OUTPATIENT)
Dept: PULMONOLOGY | Age: 87
End: 2022-07-13
Payer: MEDICARE

## 2022-07-13 VITALS
DIASTOLIC BLOOD PRESSURE: 76 MMHG | SYSTOLIC BLOOD PRESSURE: 128 MMHG | HEIGHT: 72 IN | HEART RATE: 79 BPM | TEMPERATURE: 97.2 F | WEIGHT: 241 LBS | OXYGEN SATURATION: 95 % | BODY MASS INDEX: 32.64 KG/M2

## 2022-07-13 DIAGNOSIS — I27.20 PULMONARY HYPERTENSION (HCC): Primary | ICD-10-CM

## 2022-07-13 DIAGNOSIS — I51.7 CARDIOMEGALY: ICD-10-CM

## 2022-07-13 DIAGNOSIS — R06.02 SHORTNESS OF BREATH: ICD-10-CM

## 2022-07-13 DIAGNOSIS — G47.33 OSA (OBSTRUCTIVE SLEEP APNEA): ICD-10-CM

## 2022-07-13 DIAGNOSIS — J98.4 RESTRICTIVE LUNG DISEASE: ICD-10-CM

## 2022-07-13 PROCEDURE — 99214 OFFICE O/P EST MOD 30 MIN: CPT | Performed by: NURSE PRACTITIONER

## 2022-07-13 PROCEDURE — 1123F ACP DISCUSS/DSCN MKR DOCD: CPT | Performed by: NURSE PRACTITIONER

## 2022-07-13 ASSESSMENT — ENCOUNTER SYMPTOMS
CONSTIPATION: 0
WHEEZING: 0
SHORTNESS OF BREATH: 1
COUGH: 0
NAUSEA: 0
ABDOMINAL PAIN: 0
CHEST TIGHTNESS: 0
VOMITING: 0
DIARRHEA: 0

## 2022-07-13 NOTE — PROGRESS NOTES
[address] [facility]. [address] [facility], [address] [facility]  [phone]    Patient Name:  Zoila Bravo    YOB: 1935    Office Visit 7/13/2022      CHIEF COMPLAINT:      Chief Complaint   Patient presents with    Follow-up     shortness of breath, restsrictive lung defect, obesity, pulm htn         HISTORY OF PRESENT ILLNESS:     He is an 44-year-old male seen today for follow-up of shortness of breath, pulmonary hypertension, restrictive lung defect. I saw him 5/2022 when he reported worsening cough and shortness of breath.     Ambulatory oximetry demonstrated adequate saturation on room air.  HRCT revealed no evidence of ILD.  CPFT's demonstrated restrictive impairment.  Has significant truncal obesity. To recap, he had RX for prednisone, trelegy in December per Dr. Theressa Bamberger. CPFT's were obtained and showed restrictive defect. Prior HCT last year showed no evidence of ILD. At his last visit with me, was advised to use levalbuterol 4 times daily on scheduled basis to assess response. He did not do this. Previously had Trelegy inhaler by Dr. Theressa Bamberger, he only took this for a few doses. Past Medical History:   Diagnosis Date    Allergic rhinitis     Atrial fibrillation (HCC)     Chronic kidney disease     \"a little\"    Chronic kidney disease, stage III (moderate) (HCC)     Chronic pain     Colon polyp     Coronary artery disease     Diabetic polyneuropathy associated with type 2 diabetes mellitus ([address] 7/22/2019    Erectile dysfunction     Fracture of left fibula     GERD (gastroesophageal reflux disease)     Glaucoma     Hypertension     Imbalance 6/11/2019    Multinodular goiter     Nephrolithiasis     Obesity (BMI 30-39. 9)          Peptic ulcer disease     Plantar fasciitis     Psychiatric disorder     Right inguinal hernia     Sciatica     Stroke ([address]     Tobacco use disorder     Type 2 diabetes mellitus ([address] Patient Active Problem List   Diagnosis    Generalized anxiety disorder    Diabetic polyneuropathy associated with type 2 diabetes mellitus ([facility]    Pacemaker    [facility]    Arrhythmia    Chronic calculous cholecystitis    Type 2 diabetes with nephropathy ([facility]    Chronic pain    Hyperlipidemia, mixed    Cervicalgia    Muscle spasm of left shoulder    Occipital cerebral infarction ([facility]    Hypertension    History of vertebral fracture    CAD (coronary artery disease)    Multinodular goiter    Type 2 diabetes mellitus with stage 3 chronic kidney disease, without long-term current use of insulin (HCC)    GERD (gastroesophageal reflux disease)    Tobacco use disorder    Paroxysmal atrial fibrillation (HCC)    Stage 3 chronic kidney disease ([facility]    Long term current use of anticoagulant    Imbalance    URI (upper respiratory infection)    Symptomatic bradycardia         Past Surgical History:   Procedure Laterality Date    ABLATION OF DYSRHYTHMIC FOCUS      BACK SURGERY      fusion    COLONOSCOPY  2018    COLONOSCOPY      GI  's    Rectal fistula repair    KNEE ARTHROSCOPY Right 2012    DC CARDIAC SURG PROCEDURE UNLIST  2019    pacemaker placed    DC CARDIAC SURG PROCEDURE UNLIST      atrial fib ablation         Social History     Socioeconomic History    Marital status: Single     Spouse name: None    Number of children: None    Years of education: None    Highest education level: None   Occupational History    None   Tobacco Use    Smoking status: Former Smoker     Packs/day: 0.25     Years: 44.00     Pack years: 11.00     Quit date: 1996     Years since quittin.0    Smokeless tobacco: Never Used    Tobacco comment: Quit smoking: quit Patient formally smoked 3 packs a day but says he did not smoke the whole cigarette but burned 3 cigarettes 3 packs/day. He smoked close to 40 years but quit in .    Vaping Use    Vaping Use: Never used   Substance and Sexual Activity    Alcohol use: No    Drug use: Yes     Types: Prescription    Sexual activity: Not Currently     Birth control/protection: None   Other Topics Concern    None   Social History Narrative    None     Social Determinants of Health     Financial Resource Strain:     Difficulty of Paying Living Expenses: Not on file   Food Insecurity:     Worried About Running Out of Food in the Last Year: Not on file    Cheryl of Food in the Last Year: Not on file   Transportation Needs:     Lack of Transportation (Medical): Not on file    Lack of Transportation (Non-Medical):  Not on file   Physical Activity:     Days of Exercise per Week: Not on file    Minutes of Exercise per Session: Not on file   Stress:     Feeling of Stress : Not on file   Social Connections:     Frequency of Communication with Friends and Family: Not on file    Frequency of Social Gatherings with Friends and Family: Not on file    Attends Jewish Services: Not on file    Active Member of Clubs or Organizations: Not on file    Attends Club or Organization Meetings: Not on file    Marital Status: Not on file   Intimate Partner Violence:     Fear of Current or Ex-Partner: Not on file    Emotionally Abused: Not on file    Physically Abused: Not on file    Sexually Abused: Not on file   Housing Stability:     Unable to Pay for Housing in the Last Year: Not on file    Number of [place] in the Last Year: Not on file    Unstable Housing in the Last Year: Not on file       Family History   Problem Relation Age of Onset    Colon Cancer Father 80    Thyroid Disease Father         goiter    No Known Problems Paternal Grandfather     No Known Problems Paternal Grandmother     No Known Problems Maternal Grandfather     No Known Problems Maternal Grandmother     Crohn's Disease Son     No Known Problems Brother     No Known Problems Brother     No Known Problems Brother     Heart Disease Brother  Cancer Sister         liver    Heart Disease Sister     Heart Disease Brother     Breast Cancer Sister     Thyroid Cancer Neg Hx        No Known Allergies    Current Outpatient Medications   Medication Sig    brimonidine (ALPHAGAN P) 0.15 % ophthalmic solution 1 drop    latanoprost (XALATAN) 0.005 % ophthalmic solution LOCATION: BOTH EYES. INSTILL ONE DROP INTO BOTH EYES AT NIGHT BEFORE BED.  amLODIPine (NORVASC) 5 MG tablet TAKE 1 AND 1/2 TABLET DAILY    bumetanide (BUMEX) 1 MG tablet Take 1 tablet by mouth daily    cyanocobalamin 1000 MCG tablet Take 1 tablet by mouth daily    doxazosin (CARDURA) 4 MG tablet Take 1 tablet by mouth nightly TAKE 1 TABLET BY MOUTH EVERY DAY    ferrous sulfate (FE TABS 325) 325 (65 Fe) MG EC tablet Take 1 tablet by mouth every morning (before breakfast)    Insulin Degludec (TRESIBA FLEXTOUCH) 200 UNIT/ML SOPN Inject 32 Units into the skin daily    linagliptin (TRADJENTA) 5 MG tablet Take 1 tablet by mouth daily    losartan-hydroCHLOROthiazide (HYZAAR) 50-12.5 MG per tablet Take 1 tablet by mouth daily    omeprazole (PRILOSEC) 20 MG delayed release capsule Take 1 capsule by mouth 2 times daily    pravastatin (PRAVACHOL) 80 MG tablet Take 1 tablet by mouth daily    sertraline (ZOLOFT) 100 MG tablet Take 1 tablet by mouth daily    warfarin (COUMADIN) 5 MG tablet TAKE 1 TABLET BY MOUTH EVERY DAY (Patient taking differently: TAKING ONLY 2 DAYS A WEEK)    warfarin (COUMADIN) 7.5 MG tablet Take 1 tablet by mouth daily (Patient taking differently: Take 7.5 mg by mouth daily Taking only five days a week)    fluticasone (FLONASE) 50 MCG/ACT nasal spray SPRAY 2 SPRAYS INTO EACH NOSTRIL EVERY DAY    magnesium oxide (MAG-OX) 400 MG tablet Take 400 mg by mouth daily    melatonin 5 MG TABS tablet Take by mouth    potassium gluconate 550 mg tablet Take by mouth daily     No current facility-administered medications for this visit.          REVIEW OF SYSTEMS:    Review of Systems   Constitutional: Negative for chills, fatigue, fever and unexpected weight change. Respiratory: Positive for shortness of breath. Negative for cough, chest tightness and wheezing. Cardiovascular: Negative for chest pain, palpitations and leg swelling. Intermittent chest discomfort. Gastrointestinal: Negative for abdominal pain, constipation, diarrhea, nausea and vomiting. Neurological: Positive for headaches. Negative for dizziness, tremors, seizures and weakness. PHYSICAL EXAM:    Vitals:    07/13/22 1346   BP: 128/76   Pulse: 79   Temp: 97.2 °F (36.2 °C)   TempSrc: Skin   SpO2: 95%   Weight: 241 lb (109.3 kg)   Height: 6' (1.829 m)        Body mass index is 32.69 kg/m². GENERAL APPEARANCE:    The patient is obese and in no respiratory distress. HEENT:  PERRL. Conjunctivae unremarkable. NECK/LYMPHATIC:   Symmetrical with no elevation of jugular venous pulsation. Trachea midline. No thyroid enlargement. No cervical adenopathy. LUNGS:   Normal respiratory effort with symmetrical lung expansion. Breath sounds - decreased but completely clear. HEART:   There is a normal rate and regular rhythm. No murmur, rub, or gallop. There is no edema in the lower extremities. ABDOMEN:  Soft and non-tender. No hepatosplenomegaly. Bowel sounds are normal.      NEURO:  The patient is alert and oriented to person, place, and time. Memory appears intact and mood is normal.  No gross sensorimotor deficits are present. DIAGNOSTIC TESTS: Studies were personally reviewed by me and discussed with the patient. Ambulatory oximetry 7/13/2022-baseline saturation 93% room air at rest, 98 with ambulation. Baseline heart rate 95, maximum 120 with ambulation. ASSESSMENT:   (Medical Decision Making)                                                                                                                                          Encounter Diagnoses   Name Primary?     Pulmonary hypertension (HCC) Yes    Shortness of breath     Restrictive lung disease     Cardiomegaly     ANURADHA (obstructive sleep apnea)      Patient has had chronic dyspnea for years, reported increased over the past several months. Was seen 12/2021 by Dr. Marielle Cuevas, was treated with prednisone and given Trelegy inhaler. He stopped Trelegy after only a few doses and reported that there was significant elevation in his blood sugar with prednisone. He had perceived some response to short acting beta agonist, initially reported swelling of lip with albuterol, was changed to levalbuterol; now he is using ProAir respiclick, without any definite allergic response. At his last visit, was advised to use levalbuterol 4 times daily on scheduled basis to assess for response. He did not do this. BMI is 33, has truncal obesity, has not lost weight, in fact weight has increased. Has gained 11 pounds over the past 3 months. He is compliant with CPAP and notes good response. HRCT demonstrated no evidence of interstitial lung disease. Cardiomegaly is noted. Last echo 12/2020 demonstrated RVSP estimated at 46 mmHg. He has reported saturation dropping into the 80s on his personal oximeter at home. Ambulatory oximetry today reveals adequate saturation on room air. PLAN:     Have recommended trial of Trelegy on scheduled basis every morning for minimum of 2 weeks, he never took this more than for a few days. He is agreeable. Sample of Trelegy is provided;  if he perceives shortness of breath is better at the end of that timeframe. We will send in prescription. He may continue either ProAir inhaler or levalbuterol inhaler 2 puffs 4 times daily if needed for shortness of breath or wheezing. Weight loss is encouraged, discussed dietary modification.     Echocardiogram.    Follow-up and Dispositions    · Return for Dr Marielle Cuevas, 3 months, shortness of breath, restrictive defect, ECHO, arrive 15 min prior to appt. Orders Placed This Encounter    Ambulatory referral to Cardiology     Referral Priority:   Routine     Referral Type: Other     Referral Reason:   Specialty Services Required     Number of Visits Requested:   1           Jovani JUAREZ, APRN - CNP    Total  time spent with patient - 35 min. Over 50% of today's office visit was spent in face to face time reviewing test results, prognosis, importance of compliance, education about disease process, benefits of medications, instructions for management of acute symptoms, and follow up plans. Collaborating MD: Dr. Ritesh Orr    Electronically signed. Dictated using voice recognition software.   Proof read but unrecognized errors may exist.

## 2022-07-13 NOTE — PATIENT INSTRUCTIONS
Sample of Trelegy is provided, I have asked him to take this every morning for 2 weeks and contact us if he perceives shortness of breath is better at the end of that timeframe. We will send in prescription. He may continue either ProAir inhaler or levalbuterol inhaler 2 puffs 4 times daily if needed for shortness of breath or wheezing. Weight loss is encouraged, discussed dietary modification.   Echocardiogram.

## 2022-07-15 ENCOUNTER — TELEPHONE (OUTPATIENT)
Dept: CARDIOLOGY CLINIC | Age: 87
End: 2022-07-15

## 2022-07-15 NOTE — TELEPHONE ENCOUNTER
Cardiac Clearance        Physician or Practice Requesting:Dr Noé Blair Person: Heather Luevanoagatha Phone Number: [phone]  Fax Number: [phone]  Date of Surgery/Procedure: 7/27/22  Type of Surgery or Procedure: EDG  Type of Anesthesia:   Type of Clearance Requested: Medication Hold Only  Medication to Hold:Warfarin  Days to Hold: 5 days prior & up to 14 days post

## 2022-07-16 VITALS
HEIGHT: 72 IN | TEMPERATURE: 97 F | HEART RATE: 78 BPM | OXYGEN SATURATION: 95 % | WEIGHT: 239 LBS | DIASTOLIC BLOOD PRESSURE: 78 MMHG | BODY MASS INDEX: 32.37 KG/M2 | SYSTOLIC BLOOD PRESSURE: 128 MMHG

## 2022-07-19 ENCOUNTER — TELEPHONE (OUTPATIENT)
Dept: FAMILY MEDICINE CLINIC | Facility: CLINIC | Age: 87
End: 2022-07-19

## 2022-07-19 NOTE — TELEPHONE ENCOUNTER
Received a call from [facility]. I saw where Dr. Len Rose is out this week and pt will need to start warfarin hold on 7/22. I called the coumadin dept and spoke with José Miguel Taylor who confirmed that patient's warfarin isn't managed by [facility], but by his PCP - Dr. Fran Pritchard. I informed the lady I spoke with at [facility] that Dr. Fran Pritchard manages his warfarin. She verbalized understanding and states she will redirect this request to Dr. Manjit Garcia office.

## 2022-07-19 NOTE — TELEPHONE ENCOUNTER
Call from [facility] requesting pt hold med schedule prior to the EGD he will have on 7-. She states that he would need to start holding 5 days prior. Pt takes for a fib. Last INR was 2.9 on 6-3-2022. Pt takes 7.5 mg daily except wed and fri takes 5 mg.

## 2022-07-20 ENCOUNTER — TELEPHONE (OUTPATIENT)
Dept: FAMILY MEDICINE CLINIC | Facility: CLINIC | Age: 87
End: 2022-07-20

## 2022-07-20 RX ORDER — FLUTICASONE PROPIONATE 50 MCG
2 SPRAY, SUSPENSION (ML) NASAL DAILY
Qty: 16 G | Refills: 5 | Status: SHIPPED | OUTPATIENT
Start: 2022-07-20 | End: 2022-10-11 | Stop reason: SDUPTHER

## 2022-07-27 ENCOUNTER — HOSPITAL ENCOUNTER (OUTPATIENT)
Dept: GENERAL RADIOLOGY | Age: 87
Discharge: HOME | End: 2022-07-30
Payer: MEDICARE

## 2022-07-27 DIAGNOSIS — K21.9 GASTROESOPHAGEAL REFLUX DISEASE WITHOUT ESOPHAGITIS: ICD-10-CM

## 2022-07-27 PROCEDURE — A4641 RADIOPHARM DX AGENT NOC: HCPCS | Performed by: INTERNAL MEDICINE

## 2022-07-27 PROCEDURE — 2500000003 HC RX 250 WO HCPCS: Performed by: INTERNAL MEDICINE

## 2022-07-27 PROCEDURE — 6370000000 HC RX 637 (ALT 250 FOR IP): Performed by: INTERNAL MEDICINE

## 2022-07-27 PROCEDURE — 74221 X-RAY XM ESOPHAGUS 2CNTRST: CPT

## 2022-07-27 PROCEDURE — 6360000004 HC RX CONTRAST MEDICATION: Performed by: INTERNAL MEDICINE

## 2022-07-27 RX ADMIN — ANTACID/ANTIFLATULENT 1 EACH: 380; 550; 10; 10 GRANULE, EFFERVESCENT ORAL at 09:15

## 2022-07-27 RX ADMIN — BARIUM SULFATE 1 TABLET: 700 TABLET ORAL at 09:22

## 2022-07-27 RX ADMIN — BARIUM SULFATE 140 ML: 980 POWDER, FOR SUSPENSION ORAL at 09:15

## 2022-07-27 RX ADMIN — BARIUM SULFATE 355 ML: 0.6 SUSPENSION ORAL at 09:19

## 2022-08-03 ENCOUNTER — NURSE ONLY (OUTPATIENT)
Dept: FAMILY MEDICINE CLINIC | Facility: CLINIC | Age: 87
End: 2022-08-03
Payer: MEDICARE

## 2022-08-03 DIAGNOSIS — Z79.01 LONG TERM CURRENT USE OF ANTICOAGULANT: Primary | ICD-10-CM

## 2022-08-03 LAB — INTERNATIONAL NORMALIZATION RATIO, POC: 2.8

## 2022-08-03 PROCEDURE — 85610 PROTHROMBIN TIME: CPT | Performed by: STUDENT IN AN ORGANIZED HEALTH CARE EDUCATION/TRAINING PROGRAM

## 2022-08-08 DIAGNOSIS — J98.4 RESTRICTIVE LUNG DISEASE: Primary | ICD-10-CM

## 2022-08-08 DIAGNOSIS — R06.02 SHORTNESS OF BREATH: ICD-10-CM

## 2022-08-08 RX ORDER — FLUTICASONE FUROATE, UMECLIDINIUM BROMIDE AND VILANTEROL TRIFENATATE 100; 62.5; 25 UG/1; UG/1; UG/1
POWDER RESPIRATORY (INHALATION)
Qty: 3 EACH | Refills: 3 | Status: SHIPPED | OUTPATIENT
Start: 2022-08-08

## 2022-08-08 NOTE — TELEPHONE ENCOUNTER
Patient says he would like to get a prescription for the Trelegy . He said the sample he had worked good .  Ask for it to be sent to [facility]

## 2022-08-18 ENCOUNTER — TELEPHONE (OUTPATIENT)
Dept: FAMILY MEDICINE CLINIC | Facility: CLINIC | Age: 87
End: 2022-08-18

## 2022-08-18 RX ORDER — GLUCOSAMINE HCL/CHONDROITIN SU 500-400 MG
CAPSULE ORAL
Qty: 200 STRIP | Refills: 5 | Status: SHIPPED | OUTPATIENT
Start: 2022-08-18

## 2022-09-06 PROCEDURE — 93294 REM INTERROG EVL PM/LDLS PM: CPT | Performed by: INTERNAL MEDICINE

## 2022-09-06 PROCEDURE — 93296 REM INTERROG EVL PM/IDS: CPT | Performed by: INTERNAL MEDICINE

## 2022-09-09 ENCOUNTER — NURSE ONLY (OUTPATIENT)
Dept: FAMILY MEDICINE CLINIC | Facility: CLINIC | Age: 87
End: 2022-09-09

## 2022-09-09 DIAGNOSIS — Z79.01 LONG TERM CURRENT USE OF ANTICOAGULANT: Primary | ICD-10-CM

## 2022-09-09 LAB — INR BLD: 2.6

## 2022-09-27 ENCOUNTER — OFFICE VISIT (OUTPATIENT)
Dept: FAMILY MEDICINE CLINIC | Facility: CLINIC | Age: 87
End: 2022-09-27
Payer: MEDICARE

## 2022-09-27 ENCOUNTER — HOSPITAL ENCOUNTER (OUTPATIENT)
Dept: GENERAL RADIOLOGY | Age: 87
Discharge: HOME | End: 2022-09-30
Payer: MEDICARE

## 2022-09-27 VITALS
WEIGHT: 232.5 LBS | BODY MASS INDEX: 31.49 KG/M2 | OXYGEN SATURATION: 95 % | HEIGHT: 72 IN | TEMPERATURE: 97.6 F | SYSTOLIC BLOOD PRESSURE: 115 MMHG | DIASTOLIC BLOOD PRESSURE: 59 MMHG | HEART RATE: 94 BPM

## 2022-09-27 DIAGNOSIS — M25.561 ACUTE PAIN OF RIGHT KNEE: Primary | ICD-10-CM

## 2022-09-27 DIAGNOSIS — M25.561 ACUTE PAIN OF RIGHT KNEE: ICD-10-CM

## 2022-09-27 PROCEDURE — 1123F ACP DISCUSS/DSCN MKR DOCD: CPT | Performed by: FAMILY MEDICINE

## 2022-09-27 PROCEDURE — 73560 X-RAY EXAM OF KNEE 1 OR 2: CPT

## 2022-09-27 PROCEDURE — 99213 OFFICE O/P EST LOW 20 MIN: CPT | Performed by: FAMILY MEDICINE

## 2022-09-27 NOTE — PROGRESS NOTES
[facility]  Jeaneth Escalante  Phone [phone]  Fax:  [phone]    Patient: Christianne Jacob  YOB: 1935  Patient Age 82 y.o. Patient sex: male  Medical Record:  [id number]  Visit Date: 09/27/22    [facility] Note     Chief Complaint   Patient presents with    Knee Injury     Right, fell x 1 week ago, painful & swollen       History of Present Illness:  Pt here c/o R knee pain. Lum Brochure over the coffee table last tues. No dizziness or syncope. R knee swelled immediately. Did ice it but did not continue to ice it. Taking tylenol at bed at night and cannot tell if helps. Pain is worse when walking. But pain all the time. Has no particular injury. Does have arthritis in the knee. Has  had prior injections. Seen by ortho. Last injection over a yr ago. DM - sugars higher since fall. Last A1c in J[place] was 8.3 - better than previous of 9.2. States sugars drop in the pm.       Allergies:No Known Allergies    Current Medications:   Medications marked \"taking\" at this time:    Current Outpatient Medications:     blood glucose monitor strips, Use to check blood sugars twice daily DXE11.9, Disp: 200 strip, Rfl: 5    Insulin Pen Needle 29G X 12MM MISC, 1 each by Does not apply route daily Use daily to inject insulin. DXE11.9, Disp: 100 each, Rfl: 5    fluticasone-umeclidin-vilant (TRELEGY ELLIPTA) 100-62.5-25 MCG/INH AEPB, 1 inhalation every morning, rinse mouth after use, Disp: 3 each, Rfl: 3    fluticasone (FLONASE) 50 MCG/ACT nasal spray, 2 sprays by Nasal route in the morning.  SPRAY 2 SPRAYS INTO EACH NOSTRIL EVERY DAY., Disp: 16 g, Rfl: 5    brimonidine (ALPHAGAN P) 0.15 % ophthalmic solution, 1 drop, Disp: , Rfl:     latanoprost (XALATAN) 0.005 % ophthalmic solution, LOCATION: BOTH EYES. INSTILL ONE DROP INTO BOTH EYES AT NIGHT BEFORE BED., Disp: , Rfl:     amLODIPine (NORVASC) 5 MG tablet, TAKE 1 AND 1/2 TABLET DAILY, Disp: 135 tablet, Rfl: 1 bumetanide (BUMEX) 1 MG tablet, Take 1 tablet by mouth daily, Disp: 90 tablet, Rfl: 1    cyanocobalamin 1000 MCG tablet, Take 1 tablet by mouth daily, Disp: 90 tablet, Rfl: 1    doxazosin (CARDURA) 4 MG tablet, Take 1 tablet by mouth nightly TAKE 1 TABLET BY MOUTH EVERY DAY, Disp: 90 tablet, Rfl: 1    ferrous sulfate (FE TABS 325) 325 (65 Fe) MG EC tablet, Take 1 tablet by mouth every morning (before breakfast), Disp: 90 tablet, Rfl: 1    Insulin Degludec (TRESIBA FLEXTOUCH) 200 UNIT/ML SOPN, Inject 32 Units into the skin daily, Disp: 3 mL, Rfl: 5    linagliptin (TRADJENTA) 5 MG tablet, Take 1 tablet by mouth daily, Disp: 90 tablet, Rfl: 1    losartan-hydroCHLOROthiazide (HYZAAR) 50-12.5 MG per tablet, Take 1 tablet by mouth daily, Disp: 90 tablet, Rfl: 1    omeprazole (PRILOSEC) 20 MG delayed release capsule, Take 1 capsule by mouth 2 times daily, Disp: 180 capsule, Rfl: 1    pravastatin (PRAVACHOL) 80 MG tablet, Take 1 tablet by mouth daily, Disp: 90 tablet, Rfl: 1    sertraline (ZOLOFT) 100 MG tablet, Take 1 tablet by mouth daily, Disp: 90 tablet, Rfl: 1    warfarin (COUMADIN) 5 MG tablet, TAKE 1 TABLET BY MOUTH EVERY DAY (Patient taking differently: TAKING ONLY 2 DAYS A WEEK), Disp: 90 tablet, Rfl: 1    warfarin (COUMADIN) 7.5 MG tablet, Take 1 tablet by mouth daily (Patient taking differently: Take 7.5 mg by mouth daily Taking only five days a week), Disp: 90 tablet, Rfl: 1    magnesium oxide (MAG-OX) 400 MG tablet, Take 400 mg by mouth daily, Disp: , Rfl:     melatonin 5 MG TABS tablet, Take by mouth, Disp: , Rfl:     potassium gluconate 550 mg tablet, Take by mouth daily, Disp: , Rfl:     Current Problem List:   Patient Active Problem List   Diagnosis    Generalized anxiety disorder    Diabetic polyneuropathy associated with type 2 diabetes mellitus (HCC)    Pacemaker    Pulmonary HTN ([facility]    Arrhythmia    Chronic calculous cholecystitis    Type 2 diabetes with nephropathy (HCC)    Chronic pain Hyperlipidemia, mixed    Cervicalgia    Muscle spasm of left shoulder    Occipital cerebral infarction (HCC)    Hypertension    History of vertebral fracture    CAD (coronary artery disease)    Multinodular goiter    Type 2 diabetes mellitus with stage 3 chronic kidney disease, without long-term current use of insulin (HCC)    GERD (gastroesophageal reflux disease)    Tobacco use disorder    Paroxysmal atrial fibrillation (HCC)    Stage 3 chronic kidney disease ([facility]    Long term current use of anticoagulant    Imbalance    URI (upper respiratory infection)    Symptomatic bradycardia       Social History:   Social History     Tobacco Use    Smoking status: Former     Packs/day: 0.25     Years: 44.00     Pack years: 11.00     Types: Cigarettes     Quit date: 1996     Years since quittin.2    Smokeless tobacco: Never    Tobacco comments:     Quit smoking: quit Patient formally smoked 3 packs a day but says he did not smoke the whole cigarette but burned 3 cigarettes 3 packs/day. He smoked close to 40 years but quit in .    Substance Use Topics    Alcohol use: No       Family History:   Family History   Problem Relation Age of Onset    Colon Cancer Father 80    Thyroid Disease Father         goiter    No Known Problems Paternal Grandfather     No Known Problems Paternal Grandmother     No Known Problems Maternal Grandfather     No Known Problems Maternal Grandmother     Crohn's Disease Son     No Known Problems Brother     No Known Problems Brother     No Known Problems Brother     Heart Disease Brother     Cancer Sister         liver    Heart Disease Sister     Heart Disease Brother     Breast Cancer Sister     Thyroid Cancer Neg Hx        Surgical History:  Past Surgical History:   Procedure Laterality Date    ABLATION OF DYSRHYTHMIC FOCUS      BACK SURGERY  2012    fusion    COLONOSCOPY  2018    COLONOSCOPY      GI  's    Rectal fistula repair    KNEE ARTHROSCOPY Right 2012    [facility]

## 2022-09-28 ENCOUNTER — TELEPHONE (OUTPATIENT)
Dept: FAMILY MEDICINE CLINIC | Facility: CLINIC | Age: 87
End: 2022-09-28

## 2022-09-28 NOTE — TELEPHONE ENCOUNTER
Tried to call patient with xray results, mail box full Detail Level: Generalized Detail Level: Detailed Detail Level: Simple

## 2022-09-29 ENCOUNTER — OFFICE VISIT (OUTPATIENT)
Dept: ORTHOPEDIC SURGERY | Age: 87
End: 2022-09-29

## 2022-09-29 VITALS — HEIGHT: 72 IN | WEIGHT: 232 LBS | BODY MASS INDEX: 31.42 KG/M2

## 2022-09-29 DIAGNOSIS — L03.115 CELLULITIS OF RIGHT LOWER EXTREMITY: ICD-10-CM

## 2022-09-29 DIAGNOSIS — M79.89 RIGHT LEG SWELLING: Primary | ICD-10-CM

## 2022-09-29 DIAGNOSIS — S89.91XA INJURY OF RIGHT KNEE, INITIAL ENCOUNTER: ICD-10-CM

## 2022-09-29 DIAGNOSIS — M25.561 ACUTE PAIN OF RIGHT KNEE: ICD-10-CM

## 2022-09-29 DIAGNOSIS — M79.671 RIGHT FOOT PAIN: ICD-10-CM

## 2022-09-29 PROCEDURE — L1830 KO IMMOB CANVAS LONG PRE OTS: HCPCS | Performed by: ORTHOPAEDIC SURGERY

## 2022-09-29 PROCEDURE — L2397 SUSPENSION SLEEVE LOWER EXT: HCPCS | Performed by: ORTHOPAEDIC SURGERY

## 2022-09-29 PROCEDURE — 99214 OFFICE O/P EST MOD 30 MIN: CPT | Performed by: ORTHOPAEDIC SURGERY

## 2022-09-29 PROCEDURE — 1123F ACP DISCUSS/DSCN MKR DOCD: CPT | Performed by: ORTHOPAEDIC SURGERY

## 2022-09-29 RX ORDER — OMEPRAZOLE 40 MG/1
CAPSULE, DELAYED RELEASE ORAL
COMMUNITY
Start: 2022-07-15 | End: 2022-10-11 | Stop reason: SDUPTHER

## 2022-09-29 RX ORDER — AMOXICILLIN AND CLAVULANATE POTASSIUM 875; 125 MG/1; MG/1
1 TABLET, FILM COATED ORAL 2 TIMES DAILY
Qty: 28 TABLET | Refills: 2 | Status: SHIPPED | OUTPATIENT
Start: 2022-09-29 | End: 2022-10-13

## 2022-09-29 NOTE — PROGRESS NOTES
Patient was fitted and instructed on a Reparel Leg Sleeve for the right knee. 18 inch knee immobilizer for patients right knee. I instructed patient that the strips of support velco should align on the medial and lateral sides of the leg. Patient was informed to tigthen the bottom strap first to anchor the brace, followed by the strap above the knee and below the knee. Finally tighthening the very top strap Patient read and signed documenting they understand and agree to [facility]'s current DME return policy.
Reported immediate right knee swelling. X-rays ordered  2022: [facility] [place] [place] x-ray 2 views knee: Radiologic impression negative for acute fracture. Mild osteoarthritis  2022: My review of those x-rays concur with no definite fracture. Knee osteoarthritis. Posterior lateral soft tissue calcification uncertain etiology  Injection: Hold injection today    2022: Right LE Duplex U/S radiology impression: Negative for DVT     Assessment:    Right knee injury with persistent knee effusion/knee swelling  Right anterior tibial shin abrasion with surrounding cellulitis  Right lower extremity bruising and swelling    Plan:   The patient and I discussed the above assessment. We explored treatment options. I believe his injury resulted in a knee effusion and possible ligament tear resulting in Coumadin related bruising into the foot  With his swelling and calf pain, duplex ultrasound ordered even though he is on Coumadin  We also discussed MRI scan of the knee to ensure no surgical pathology  Advanced medical imaging:   Right lower extremity duplex ultrasound: Assess for DVT [read as no DVT]  Right knee MRI scan: Assess acute injury for partial quadriceps tear; meniscal tear beyond degenerative meniscus    DME: Reparel leg sleeve; knee immobilizer  We discussed knee care and sleeve/brace protection  PT: PT will depend on MRI scan    Medication - OTC meds prn: Limited due to Coumadin and diabetes:   Prescribed: Augmentin 875 m p.o. twice daily: 14 days: 2 refills   Surgical discussion: Surgery will depend on MRI scan  Follow up: After MRI scan  Work status: Sitting  History and discussion of management with: Mr. Matthewsandrosalind Johnson    This note was created using Dragon voice recognition software which may result in errors of speech and spelling recognition and word/phrase syntax errors.

## 2022-09-30 ENCOUNTER — HOSPITAL ENCOUNTER (OUTPATIENT)
Dept: ULTRASOUND IMAGING | Age: 87
Discharge: HOME | End: 2022-10-03
Payer: MEDICARE

## 2022-09-30 DIAGNOSIS — L03.115 CELLULITIS OF RIGHT LOWER EXTREMITY: ICD-10-CM

## 2022-09-30 DIAGNOSIS — M79.89 RIGHT LEG SWELLING: ICD-10-CM

## 2022-09-30 PROCEDURE — 93971 EXTREMITY STUDY: CPT

## 2022-10-05 DIAGNOSIS — N18.30 STAGE 3 CHRONIC KIDNEY DISEASE, UNSPECIFIED WHETHER STAGE 3A OR 3B CKD (HCC): ICD-10-CM

## 2022-10-05 DIAGNOSIS — I10 PRIMARY HYPERTENSION: ICD-10-CM

## 2022-10-05 DIAGNOSIS — E11.22 TYPE 2 DIABETES MELLITUS WITH STAGE 3 CHRONIC KIDNEY DISEASE, WITHOUT LONG-TERM CURRENT USE OF INSULIN, UNSPECIFIED WHETHER STAGE 3A OR 3B CKD (HCC): Primary | ICD-10-CM

## 2022-10-05 DIAGNOSIS — D50.9 IRON DEFICIENCY ANEMIA, UNSPECIFIED IRON DEFICIENCY ANEMIA TYPE: ICD-10-CM

## 2022-10-05 DIAGNOSIS — N18.30 TYPE 2 DIABETES MELLITUS WITH STAGE 3 CHRONIC KIDNEY DISEASE, WITHOUT LONG-TERM CURRENT USE OF INSULIN, UNSPECIFIED WHETHER STAGE 3A OR 3B CKD (HCC): Primary | ICD-10-CM

## 2022-10-06 ENCOUNTER — TELEPHONE (OUTPATIENT)
Dept: FAMILY MEDICINE CLINIC | Facility: CLINIC | Age: 87
End: 2022-10-06

## 2022-10-06 DIAGNOSIS — D50.9 IRON DEFICIENCY ANEMIA, UNSPECIFIED IRON DEFICIENCY ANEMIA TYPE: ICD-10-CM

## 2022-10-06 DIAGNOSIS — N18.30 STAGE 3 CHRONIC KIDNEY DISEASE, UNSPECIFIED WHETHER STAGE 3A OR 3B CKD (HCC): ICD-10-CM

## 2022-10-06 DIAGNOSIS — I10 PRIMARY HYPERTENSION: ICD-10-CM

## 2022-10-06 DIAGNOSIS — N18.30 TYPE 2 DIABETES MELLITUS WITH STAGE 3 CHRONIC KIDNEY DISEASE, WITHOUT LONG-TERM CURRENT USE OF INSULIN, UNSPECIFIED WHETHER STAGE 3A OR 3B CKD (HCC): ICD-10-CM

## 2022-10-06 DIAGNOSIS — E11.22 TYPE 2 DIABETES MELLITUS WITH STAGE 3 CHRONIC KIDNEY DISEASE, WITHOUT LONG-TERM CURRENT USE OF INSULIN, UNSPECIFIED WHETHER STAGE 3A OR 3B CKD (HCC): ICD-10-CM

## 2022-10-06 LAB
ALBUMIN SERPL-MCNC: 3.7 G/DL (ref 3.2–4.6)
ALBUMIN/GLOB SERPL: 1.2 {RATIO} (ref 1.2–3.5)
ALP SERPL-CCNC: 54 U/L (ref 50–136)
ALT SERPL-CCNC: 32 U/L (ref 12–65)
ANION GAP SERPL CALC-SCNC: 4 MMOL/L (ref 4–13)
AST SERPL-CCNC: 24 U/L (ref 15–37)
BASOPHILS # BLD: 0.1 K/UL (ref 0–0.2)
BASOPHILS NFR BLD: 1 % (ref 0–2)
BILIRUB SERPL-MCNC: 1.2 MG/DL (ref 0.2–1.1)
BUN SERPL-MCNC: 58 MG/DL (ref 8–23)
CALCIUM SERPL-MCNC: 9 MG/DL (ref 8.3–10.4)
CHLORIDE SERPL-SCNC: 109 MMOL/L (ref 101–110)
CHOLEST SERPL-MCNC: 86 MG/DL
CO2 SERPL-SCNC: 30 MMOL/L (ref 21–32)
CREAT SERPL-MCNC: 2.3 MG/DL (ref 0.8–1.5)
DIFFERENTIAL METHOD BLD: ABNORMAL
EOSINOPHIL # BLD: 0.4 K/UL (ref 0–0.8)
EOSINOPHIL NFR BLD: 4 % (ref 0.5–7.8)
ERYTHROCYTE [DISTWIDTH] IN BLOOD BY AUTOMATED COUNT: 13.4 % (ref 11.9–14.6)
EST. AVERAGE GLUCOSE BLD GHB EST-MCNC: 177 MG/DL
GLOBULIN SER CALC-MCNC: 3 G/DL (ref 2.3–3.5)
GLUCOSE SERPL-MCNC: 144 MG/DL (ref 65–100)
HBA1C MFR BLD: 7.8 % (ref 4.8–5.6)
HCT VFR BLD AUTO: 34.6 % (ref 41.1–50.3)
HDLC SERPL-MCNC: 37 MG/DL (ref 40–60)
HDLC SERPL: 2.3 {RATIO}
HGB BLD-MCNC: 11.1 G/DL (ref 13.6–17.2)
IMM GRANULOCYTES # BLD AUTO: 0 K/UL (ref 0–0.5)
IMM GRANULOCYTES NFR BLD AUTO: 0 % (ref 0–5)
LDLC SERPL CALC-MCNC: 38 MG/DL
LYMPHOCYTES # BLD: 1.6 K/UL (ref 0.5–4.6)
LYMPHOCYTES NFR BLD: 16 % (ref 13–44)
MCH RBC QN AUTO: 32.4 PG (ref 26.1–32.9)
MCHC RBC AUTO-ENTMCNC: 32.1 G/DL (ref 31.4–35)
MCV RBC AUTO: 100.9 FL (ref 79.6–97.8)
MONOCYTES # BLD: 1.1 K/UL (ref 0.1–1.3)
MONOCYTES NFR BLD: 11 % (ref 4–12)
NEUTS SEG # BLD: 6.9 K/UL (ref 1.7–8.2)
NEUTS SEG NFR BLD: 68 % (ref 43–78)
NRBC # BLD: 0 K/UL (ref 0–0.2)
PLATELET # BLD AUTO: 149 K/UL (ref 150–450)
PMV BLD AUTO: 12 FL (ref 9.4–12.3)
POTASSIUM SERPL-SCNC: 4.5 MMOL/L (ref 3.5–5.1)
PROT SERPL-MCNC: 6.7 G/DL (ref 6.3–8.2)
RBC # BLD AUTO: 3.43 M/UL (ref 4.23–5.6)
SODIUM SERPL-SCNC: 143 MMOL/L (ref 138–145)
TRIGL SERPL-MCNC: 55 MG/DL (ref 35–150)
TSH, 3RD GENERATION: 2.29 UIU/ML (ref 0.36–3.74)
VLDLC SERPL CALC-MCNC: 11 MG/DL (ref 6–23)
WBC # BLD AUTO: 10.1 K/UL (ref 4.3–11.1)

## 2022-10-06 NOTE — TELEPHONE ENCOUNTER
Patient called and said Dr. Jonnathan Nix wanted him to let him know if his bs was still dropping. He said it is dropping in the afternoons in the 80's. He has an appointment next Tuesday.  Advised him I will let provider know so he can address at appointment

## 2022-10-10 ENCOUNTER — OFFICE VISIT (OUTPATIENT)
Dept: PULMONOLOGY | Age: 87
End: 2022-10-10
Payer: MEDICARE

## 2022-10-10 VITALS
HEART RATE: 75 BPM | BODY MASS INDEX: 32.51 KG/M2 | WEIGHT: 240 LBS | HEIGHT: 72 IN | OXYGEN SATURATION: 97 % | SYSTOLIC BLOOD PRESSURE: 122 MMHG | TEMPERATURE: 97.1 F | DIASTOLIC BLOOD PRESSURE: 57 MMHG | RESPIRATION RATE: 14 BRPM

## 2022-10-10 DIAGNOSIS — I27.20 PULMONARY HYPERTENSION (HCC): ICD-10-CM

## 2022-10-10 DIAGNOSIS — G47.33 OSA (OBSTRUCTIVE SLEEP APNEA): ICD-10-CM

## 2022-10-10 DIAGNOSIS — N18.4 CKD (CHRONIC KIDNEY DISEASE) STAGE 4, GFR 15-29 ML/MIN (HCC): ICD-10-CM

## 2022-10-10 DIAGNOSIS — J98.4 RESTRICTIVE LUNG DISEASE: Primary | ICD-10-CM

## 2022-10-10 PROCEDURE — 99214 OFFICE O/P EST MOD 30 MIN: CPT | Performed by: INTERNAL MEDICINE

## 2022-10-10 PROCEDURE — 1123F ACP DISCUSS/DSCN MKR DOCD: CPT | Performed by: INTERNAL MEDICINE

## 2022-10-10 NOTE — PROGRESS NOTES
[facility]: FOLLOW-UP Patient Office Visit Note  [address] Zofia Miller. [address], [address] [facility]  [phone]    Patient Name:  Bk Schneider  YOB: 1935            Date of Service:  10/10/2022    Chief Complaint   Patient presents with    Follow-up       History of Present Illness: This is an 22-year-old white male who is here for follow-up of his last visit in July. Patient had had complete PFTs demonstrating restrictive defect but normal diffusion. Chest CT showed no evidence of interstitial lung disease. Amatory oximetry showed adequate oxygenation. Patient does have sleep apnea and is on CPAP. After his last visit an echocardiogram was ordered which demonstrated increase of RVSP at 51 and shows diastolic dysfunction. LV function was normal.  The patient has been evaluated previously by Dr. Gi Davison and has a pacemaker. We have previously tried short course of prednisone, Trelegy inhaler, albuterol inhaler with no significant benefit. Patient continues to be short of breath with minimal exertion. There is some chest pain on the left not necessarily exertional.  No cough. Past Medical History:   Diagnosis Date    Allergic rhinitis     Atrial fibrillation ([address]     Chronic kidney disease     \"a little\"    Chronic kidney disease, stage III (moderate) (HCC)     Chronic pain     Colon polyp     Coronary artery disease     Diabetic polyneuropathy associated with type 2 diabetes mellitus ([facility] 7/22/2019    Erectile dysfunction     Fracture of left fibula     GERD (gastroesophageal reflux disease)     Glaucoma     Hypertension     Imbalance 6/11/2019    Multinodular goiter     Nephrolithiasis     Obesity (BMI 30-39. 9)          Peptic ulcer disease     Plantar fasciitis     Psychiatric disorder     Right inguinal hernia     Sciatica     Stroke [facility])     Tobacco use disorder     Type 2 diabetes mellitus [facility])        Patient Active Problem List   Diagnosis Generalized anxiety disorder    Diabetic polyneuropathy associated with type 2 diabetes mellitus ([facility]    Pacemaker    Pulmonary HTN (HCC)    Arrhythmia    Chronic calculous cholecystitis    Type 2 diabetes with nephropathy (HCC)    Chronic pain    Hyperlipidemia, mixed    Cervicalgia    Muscle spasm of left shoulder    Occipital cerebral infarction ([facility]    Hypertension    History of vertebral fracture    CAD (coronary artery disease)    Multinodular goiter    Type 2 diabetes mellitus with stage 3 chronic kidney disease, without long-term current use of insulin (HCC)    GERD (gastroesophageal reflux disease)    Tobacco use disorder    Paroxysmal atrial fibrillation (HCC)    Stage 3 chronic kidney disease ([facility]    Long term current use of anticoagulant    Imbalance    URI (upper respiratory infection)    Symptomatic bradycardia    Acute pain of right knee    CKD (chronic kidney disease) stage 4, GFR 15-29 ml/min ([facility])         Past Surgical History:   Procedure Laterality Date    ABLATION OF DYSRHYTHMIC FOCUS      BACK SURGERY  2012    fusion    COLONOSCOPY  2018    COLONOSCOPY      GI      Rectal fistula repair    KNEE ARTHROSCOPY Right 2012    DC CARDIAC SURG PROCEDURE UNLIST  2019    pacemaker placed    DC CARDIAC SURG PROCEDURE UNLIST      atrial fib ablation       Social History     Socioeconomic History    Marital status: Single     Spouse name: Not on file    Number of children: Not on file    Years of education: Not on file    Highest education level: Not on file   Occupational History    Not on file   Tobacco Use    Smoking status: Former     Packs/day: 0.25     Years: 44.00     Pack years: 11.00     Types: Cigarettes     Quit date: 1996     Years since quittin.2    Smokeless tobacco: Never    Tobacco comments:     Quit smoking: quit Patient formally smoked 3 packs a day but says he did not smoke the whole cigarette but burned 3 cigarettes 3 packs/day.   He smoked close to 40 years but quit in 1996. Vaping Use    Vaping Use: Never used   Substance and Sexual Activity    Alcohol use: No    Drug use: Yes     Types: Prescription    Sexual activity: Not Currently     Birth control/protection: None   Other Topics Concern    Not on file   Social History Narrative    Not on file     Social Determinants of Health     Financial Resource Strain: Not on file   Food Insecurity: Not on file   Transportation Needs: Not on file   Physical Activity: Not on file   Stress: Not on file   Social Connections: Not on file   Intimate Partner Violence: Not on file   Housing Stability: Not on file       Family History   Problem Relation Age of Onset    Colon Cancer Father 80    Thyroid Disease Father         goiter    No Known Problems Paternal Grandfather     No Known Problems Paternal Grandmother     No Known Problems Maternal Grandfather     No Known Problems Maternal Grandmother     Crohn's Disease Son     No Known Problems Brother     No Known Problems Brother     No Known Problems Brother     Heart Disease Brother     Cancer Sister         liver    Heart Disease Sister     Heart Disease Brother     Breast Cancer Sister     Thyroid Cancer Neg Hx        No Known Allergies        Review of Systems    OBJECTIVE:  Physical Exam:  Vitals:    10/10/22 0949   BP: (!) 122/57   Pulse: 75   Resp: 14   Temp: 97.1 °F (36.2 °C)   SpO2: 97%        GENERAL APPEARANCE:   The patient is obese and in no respiratory distress. HEENT:   PERRL. Conjunctivae unremarkable. Nasal mucosa is without epistaxis, exudate, or polyps. Gums and dentition are unremarkable. There is no oropharyngeal narrowing. TMs are clear. NECK/LYMPHATIC:   Symmetrical with no elevation of jugular venous pulsation. Trachea midline. No thyroid enlargement. No cervical adenopathy. LUNGS:   Normal respiratory effort with symmetrical lung expansion. Breath sounds clear. HEART:   There is a regular rate and rhythm.   No murmur, rub, or gallop. There is no edema in the lower extremities. ABDOMEN:   Soft and non-tender. No hepatosplenomegaly. Bowel sounds are normal.       NEURO:   The patient is alert and oriented to person, place, and time. Memory appears intact and mood is normal.  No gross sensorimotor deficits are present. Current Outpatient Medications   Medication Instructions    amLODIPine (NORVASC) 5 MG tablet TAKE 1 AND 1/2 TABLET DAILY    amoxicillin-clavulanate (AUGMENTIN) 875-125 MG per tablet 1 tablet, Oral, 2 TIMES DAILY    blood glucose monitor strips Use to check blood sugars twice daily DXE11.9    brimonidine (ALPHAGAN P) 0.15 % ophthalmic solution 1 drop    bumetanide (BUMEX) 1 mg, Oral, DAILY    cyanocobalamin 1,000 mcg, Oral, DAILY    doxazosin (CARDURA) 4 mg, Oral, NIGHTLY, TAKE 1 TABLET BY MOUTH EVERY DAY    ferrous sulfate (FE TABS 325) 325 mg, Oral, DAILY BEFORE BREAKFAST    fluticasone (FLONASE) 50 MCG/ACT nasal spray 2 sprays, Nasal, DAILY, SPRAY 2 SPRAYS INTO EACH NOSTRIL EVERY DAY    fluticasone-umeclidin-vilant (TRELEGY ELLIPTA) 100-62.5-25 MCG/INH AEPB 1 inhalation every morning, rinse mouth after use    Insulin Pen Needle 29G X 12MM MISC 1 each, Does not apply, DAILY, Use daily to inject insulin. DXE11.9    latanoprost (XALATAN) 0.005 % ophthalmic solution LOCATION: BOTH EYES. INSTILL ONE DROP INTO BOTH EYES AT NIGHT BEFORE BED.     linagliptin (TRADJENTA) 5 mg, Oral, DAILY    losartan-hydroCHLOROthiazide (HYZAAR) 50-12.5 MG per tablet 1 tablet, Oral, DAILY    magnesium oxide (MAG-OX) 400 mg, Oral, DAILY    melatonin 5 MG TABS tablet Oral    omeprazole (PRILOSEC) 40 MG delayed release capsule TAKE 1 CAPSULE BY MOUTH EVERY DAY BEFORE A MEAL    omeprazole (PRILOSEC) 20 mg, Oral, 2 TIMES DAILY    potassium gluconate 550 mg tablet Oral, DAILY    pravastatin (PRAVACHOL) 80 mg, Oral, DAILY    sertraline (ZOLOFT) 100 mg, Oral, DAILY    Tresiba FlexTouch 32 Units, SubCUTAneous, DAILY    warfarin (COUMADIN) 5 MG tablet TAKE 1 TABLET BY MOUTH EVERY DAY    warfarin (COUMADIN) 7.5 mg, Oral, DAILY         DIAGNOSTIC TESTS:    CXR:    No results found for this or any previous visit from the past 365 days. CT WITHOUT CONTRAST:    No results found for this or any previous visit from the past 365 days. CT WITH CONTRAST:    No results found for this or any previous visit from the past 365 days. CT HIGH RES:    No results found for this or any previous visit from the past 365 days. CT PE PROTOCOL:    No results found for this or any previous visit from the past 365 days. LDCT SCREENING:    No results found for this or any previous visit from the past 365 days. PET SCAN:    No results found for this or any previous visit from the past 365 days. Spirometry:      No flowsheet data found. Exercise oximetry:          ASSESSMENT:   Diagnosis Orders   1. Restrictive lung disease but no evidence of pulmonary fibrosis       2. CKD (chronic kidney disease) stage 4, GFR 15-29 ml/min ([facility])        3. Pulmonary hypertension (HCC) -this to be a significant problem and probably is due to diastolic dysfunction       4. ANURADHA (obstructive sleep apnea) compliant with CPAP           PLAN:  Overnight oximetry on room air and CPAP  Referral to cardiology regarding right heart cath  Continue CPAP    No orders of the defined types were placed in this encounter. No orders of the defined types were placed in this encounter.         Electronically signed by  Lisa Villanueva MD

## 2022-10-10 NOTE — PROGRESS NOTES
Pt is an established pt with Dr. Oly Mitchell. Pt has been seen by Dr. Sukumar Plaza and an echocardiogram revealed RVSP of 51mmHg. Pt needs RHC with Dr. Chaim Ponce. Thank you!

## 2022-10-11 ENCOUNTER — OFFICE VISIT (OUTPATIENT)
Dept: FAMILY MEDICINE CLINIC | Facility: CLINIC | Age: 87
End: 2022-10-11
Payer: MEDICARE

## 2022-10-11 VITALS
OXYGEN SATURATION: 98 % | HEIGHT: 72 IN | BODY MASS INDEX: 33.18 KG/M2 | SYSTOLIC BLOOD PRESSURE: 122 MMHG | DIASTOLIC BLOOD PRESSURE: 60 MMHG | HEART RATE: 75 BPM | TEMPERATURE: 97 F | WEIGHT: 245 LBS

## 2022-10-11 DIAGNOSIS — D53.9 MACROCYTIC ANEMIA: ICD-10-CM

## 2022-10-11 DIAGNOSIS — E11.22 TYPE 2 DIABETES MELLITUS WITH DIABETIC CHRONIC KIDNEY DISEASE, UNSPECIFIED CKD STAGE, UNSPECIFIED WHETHER LONG TERM INSULIN USE (HCC): Primary | ICD-10-CM

## 2022-10-11 DIAGNOSIS — N18.30 STAGE 3 CHRONIC KIDNEY DISEASE, UNSPECIFIED WHETHER STAGE 3A OR 3B CKD (HCC): ICD-10-CM

## 2022-10-11 LAB — INTERNATIONAL NORMALIZATION RATIO, POC: 3.9

## 2022-10-11 PROCEDURE — 85610 PROTHROMBIN TIME: CPT | Performed by: STUDENT IN AN ORGANIZED HEALTH CARE EDUCATION/TRAINING PROGRAM

## 2022-10-11 PROCEDURE — 99214 OFFICE O/P EST MOD 30 MIN: CPT | Performed by: STUDENT IN AN ORGANIZED HEALTH CARE EDUCATION/TRAINING PROGRAM

## 2022-10-11 PROCEDURE — 1123F ACP DISCUSS/DSCN MKR DOCD: CPT | Performed by: STUDENT IN AN ORGANIZED HEALTH CARE EDUCATION/TRAINING PROGRAM

## 2022-10-11 PROCEDURE — 3051F HG A1C>EQUAL 7.0%<8.0%: CPT | Performed by: STUDENT IN AN ORGANIZED HEALTH CARE EDUCATION/TRAINING PROGRAM

## 2022-10-11 RX ORDER — WARFARIN SODIUM 5 MG/1
TABLET ORAL
Qty: 90 TABLET | Refills: 1 | Status: SHIPPED | OUTPATIENT
Start: 2022-10-11

## 2022-10-11 RX ORDER — WARFARIN SODIUM 7.5 MG/1
TABLET ORAL
Qty: 90 TABLET | Refills: 1 | Status: SHIPPED | OUTPATIENT
Start: 2022-10-11

## 2022-10-11 RX ORDER — PRAVASTATIN SODIUM 80 MG/1
80 TABLET ORAL DAILY
Qty: 90 TABLET | Refills: 1 | Status: SHIPPED | OUTPATIENT
Start: 2022-10-11

## 2022-10-11 RX ORDER — SERTRALINE HYDROCHLORIDE 100 MG/1
100 TABLET, FILM COATED ORAL DAILY
Qty: 90 TABLET | Refills: 1 | Status: SHIPPED | OUTPATIENT
Start: 2022-10-11

## 2022-10-11 RX ORDER — BUMETANIDE 1 MG/1
1 TABLET ORAL DAILY
Qty: 90 TABLET | Refills: 1 | Status: SHIPPED | OUTPATIENT
Start: 2022-10-11

## 2022-10-11 RX ORDER — DOXAZOSIN MESYLATE 4 MG/1
4 TABLET ORAL NIGHTLY
Qty: 90 TABLET | Refills: 1 | Status: SHIPPED | OUTPATIENT
Start: 2022-10-11

## 2022-10-11 RX ORDER — INSULIN DEGLUDEC 200 U/ML
26 INJECTION, SOLUTION SUBCUTANEOUS DAILY
Qty: 3 ML | Refills: 5 | Status: SHIPPED | OUTPATIENT
Start: 2022-10-11

## 2022-10-11 RX ORDER — LANOLIN ALCOHOL/MO/W.PET/CERES
325 CREAM (GRAM) TOPICAL
Qty: 90 TABLET | Refills: 1 | Status: SHIPPED | OUTPATIENT
Start: 2022-10-11

## 2022-10-11 RX ORDER — LOSARTAN POTASSIUM AND HYDROCHLOROTHIAZIDE 12.5; 5 MG/1; MG/1
1 TABLET ORAL DAILY
Qty: 90 TABLET | Refills: 1 | Status: SHIPPED | OUTPATIENT
Start: 2022-10-11

## 2022-10-11 RX ORDER — OMEPRAZOLE 40 MG/1
CAPSULE, DELAYED RELEASE ORAL
Qty: 90 CAPSULE | Refills: 1 | Status: SHIPPED | OUTPATIENT
Start: 2022-10-11

## 2022-10-11 RX ORDER — AMLODIPINE BESYLATE 5 MG/1
TABLET ORAL
Qty: 135 TABLET | Refills: 1 | Status: SHIPPED | OUTPATIENT
Start: 2022-10-11

## 2022-10-11 RX ORDER — FLUTICASONE PROPIONATE 50 MCG
2 SPRAY, SUSPENSION (ML) NASAL DAILY
Qty: 16 G | Refills: 5 | Status: SHIPPED | OUTPATIENT
Start: 2022-10-11

## 2022-10-11 ASSESSMENT — ENCOUNTER SYMPTOMS
COUGH: 0
SHORTNESS OF BREATH: 1

## 2022-10-11 NOTE — PROGRESS NOTES
[facility]  [address]  Phone [phone]  Fax:  [phone]    Zoe Sanchez (:  1935) is a 82 y.o. male here for evaluation of the following chief complaint(s):  Depression, Cholesterol Problem, Diabetes, Hypertension, Insomnia (Review labs), and Office Visit for Anticoagulation Management       ASSESSMENT/PLAN:  1. Type 2 diabetes mellitus with diabetic chronic kidney disease, unspecified CKD stage, unspecified whether long term insulin use (HCC)  -     POCT INR  2. Macrocytic anemia  -     CBC with Auto Differential; Future  -     Vitamin B12; Future  -     Folate; Future  -     POCT INR  3. Stage 3 chronic kidney disease, unspecified whether stage 3a or 3b CKD (HCC)  -     POCT INR    Recent hemoglobin A1c 7.8, okay given patient's age. Will have him decrease Tresiba from 32u daily to 26u daily given some mild hypoglycemia in the afternoons. He is to continue monitoring his glucose closely and let me know if he has further lows. Followed by nephrology for CKD. Recent creatinine increased to 2.3. Has appointment with nephrology in November. Recent CBC showed macrocytic anemia, will recheck CBC in 3 months, also check B12 and folate. Patient is on warfarin for his A. fib. INR today high at 3.9. Will have patient hold warfarin for 2 days, then will have him decrease weekly dose by 5 mg total.  Recheck INR in 1 week. Return in about 3 months (around 2023) for routine f/u.          Subjective   SUBJECTIVE/OBJECTIVE:  HPI  63-year-old male with PMH of A. fib, CAD, DM2, CKD, pulmonary hypertension, CVA, HLD, ANURADHA (on BPAP), and restrictive lung disease who presents for regular 3-month follow-up.  -Glucose dropping in the afternoon, lowest it's gotten has been 60s, hypoglycemia aware  -Chronic RODRIGUEZ, has seen cardiology and pulmonology, recent echo showed diastolic dysfunction and increased RVSP, PFTs showed restrictive defect, plan for upcoming right heart cath 10/14  -Saw ortho for right knee pain/swelling  -Right LE venous duplex negative for DVT  -Has right knee MRI planned for tomorrow  -Takes iron and vit B12 daily    Review of Systems   Constitutional:  Negative for fever. Respiratory:  Positive for shortness of breath. Negative for cough. Cardiovascular:  Negative for chest pain. Objective     Vitals:    10/11/22 1136   BP: 122/60   Pulse: 75   Temp: 97 °F (36.1 °C)   SpO2: 98%       Physical Exam  Vitals reviewed. Constitutional:       General: He is not in acute distress. Appearance: He is obese. HENT:      Head: Normocephalic and atraumatic. Cardiovascular:      Rate and Rhythm: Normal rate and regular rhythm. Pulmonary:      Effort: Pulmonary effort is normal.      Breath sounds: Normal breath sounds. No wheezing. Musculoskeletal:      Comments: Mild bilateral lower extremity edema  Right knee swelling and tenderness   Skin:     General: Skin is warm and dry. Comments: Large bruises on both arms, large bruise on right side of abd   Neurological:      General: No focal deficit present. Mental Status: He is alert and oriented to person, place, and time. An electronic signature was used to authenticate this note.     --Travon Fox MD

## 2022-10-12 ENCOUNTER — HOSPITAL ENCOUNTER (OUTPATIENT)
Dept: MRI IMAGING | Age: 87
Discharge: HOME | End: 2022-10-15
Payer: MEDICARE

## 2022-10-12 DIAGNOSIS — M25.561 ACUTE PAIN OF RIGHT KNEE: ICD-10-CM

## 2022-10-12 DIAGNOSIS — S89.91XA INJURY OF RIGHT KNEE, INITIAL ENCOUNTER: ICD-10-CM

## 2022-10-12 PROCEDURE — 73721 MRI JNT OF LWR EXTRE W/O DYE: CPT

## 2022-10-13 NOTE — PROGRESS NOTES
Patient pre-assessment complete for RHC scheduled for 10/14, arrival time 1030. Patient verified using . Patient instructed to bring a list of all home medications on the day of procedure. NPO status reinforced. Patient informed to take a full dose aspirin 325mg  or 81 mg x 4 on the day of procedure. Patient instructed to HOLD all medications, except Amlodipine, Zoloft, and Prilosec. Instructed they can take all other medications excluding vitamins & supplements. Patient verbalizes understanding of all instructions & denies any questions at this time.

## 2022-10-14 ENCOUNTER — HOSPITAL ENCOUNTER (OUTPATIENT)
Age: 87
Setting detail: OUTPATIENT SURGERY
Discharge: HOME | End: 2022-10-14
Attending: INTERNAL MEDICINE | Admitting: INTERNAL MEDICINE
Payer: MEDICARE

## 2022-10-14 VITALS
HEART RATE: 71 BPM | DIASTOLIC BLOOD PRESSURE: 78 MMHG | TEMPERATURE: 97.7 F | HEIGHT: 72 IN | RESPIRATION RATE: 16 BRPM | SYSTOLIC BLOOD PRESSURE: 144 MMHG | BODY MASS INDEX: 32.51 KG/M2 | WEIGHT: 240 LBS | OXYGEN SATURATION: 91 %

## 2022-10-14 DIAGNOSIS — I27.20 PULMONARY HTN (HCC): ICD-10-CM

## 2022-10-14 LAB
ANION GAP SERPL CALC-SCNC: 5 MMOL/L (ref 2–11)
BUN SERPL-MCNC: 52 MG/DL (ref 8–23)
CALCIUM SERPL-MCNC: 9.7 MG/DL (ref 8.3–10.4)
CHLORIDE SERPL-SCNC: 108 MMOL/L (ref 101–110)
CO2 SERPL-SCNC: 29 MMOL/L (ref 21–32)
CREAT SERPL-MCNC: 2.3 MG/DL (ref 0.8–1.5)
ECHO BSA: 2.35 M2
EKG ATRIAL RATE: 89 BPM
EKG DIAGNOSIS: NORMAL
EKG Q-T INTERVAL: 462 MS
EKG QRS DURATION: 146 MS
EKG QTC CALCULATION (BAZETT): 515 MS
EKG R AXIS: 251 DEGREES
EKG T AXIS: 56 DEGREES
EKG VENTRICULAR RATE: 75 BPM
ERYTHROCYTE [DISTWIDTH] IN BLOOD BY AUTOMATED COUNT: 13.5 % (ref 11.9–14.6)
GLUCOSE SERPL-MCNC: 161 MG/DL (ref 65–100)
HCT VFR BLD AUTO: 34 % (ref 41.1–50.3)
HGB BLD-MCNC: 11.1 G/DL (ref 13.6–17.2)
INR PPP: 2.3
MAGNESIUM SERPL-MCNC: 2.7 MG/DL (ref 1.8–2.4)
MCH RBC QN AUTO: 33.3 PG (ref 26.1–32.9)
MCHC RBC AUTO-ENTMCNC: 32.6 G/DL (ref 31.4–35)
MCV RBC AUTO: 102.1 FL (ref 82–102)
NRBC # BLD: 0 K/UL (ref 0–0.2)
PLATELET # BLD AUTO: 159 K/UL (ref 150–450)
PMV BLD AUTO: 11.1 FL (ref 9.4–12.3)
POTASSIUM SERPL-SCNC: 4 MMOL/L (ref 3.5–5.1)
PROTHROMBIN TIME: 26.2 SEC (ref 12.6–14.5)
RBC # BLD AUTO: 3.33 M/UL (ref 4.23–5.6)
SODIUM SERPL-SCNC: 142 MMOL/L (ref 133–143)
WBC # BLD AUTO: 6.5 K/UL (ref 4.3–11.1)

## 2022-10-14 PROCEDURE — 99152 MOD SED SAME PHYS/QHP 5/>YRS: CPT | Performed by: INTERNAL MEDICINE

## 2022-10-14 PROCEDURE — C1894 INTRO/SHEATH, NON-LASER: HCPCS | Performed by: INTERNAL MEDICINE

## 2022-10-14 PROCEDURE — 93451 RIGHT HEART CATH: CPT | Performed by: INTERNAL MEDICINE

## 2022-10-14 PROCEDURE — 83735 ASSAY OF MAGNESIUM: CPT

## 2022-10-14 PROCEDURE — 6360000002 HC RX W HCPCS: Performed by: INTERNAL MEDICINE

## 2022-10-14 PROCEDURE — 85027 COMPLETE CBC AUTOMATED: CPT

## 2022-10-14 PROCEDURE — C1751 CATH, INF, PER/CENT/MIDLINE: HCPCS | Performed by: INTERNAL MEDICINE

## 2022-10-14 PROCEDURE — 93005 ELECTROCARDIOGRAM TRACING: CPT | Performed by: INTERNAL MEDICINE

## 2022-10-14 PROCEDURE — 2500000003 HC RX 250 WO HCPCS: Performed by: INTERNAL MEDICINE

## 2022-10-14 PROCEDURE — 80048 BASIC METABOLIC PNL TOTAL CA: CPT

## 2022-10-14 PROCEDURE — 99153 MOD SED SAME PHYS/QHP EA: CPT | Performed by: INTERNAL MEDICINE

## 2022-10-14 PROCEDURE — 85610 PROTHROMBIN TIME: CPT

## 2022-10-14 RX ORDER — MIDAZOLAM HYDROCHLORIDE 1 MG/ML
INJECTION INTRAMUSCULAR; INTRAVENOUS PRN
Status: DISCONTINUED | OUTPATIENT
Start: 2022-10-14 | End: 2022-10-14 | Stop reason: HOSPADM

## 2022-10-14 RX ORDER — LIDOCAINE HYDROCHLORIDE 10 MG/ML
INJECTION, SOLUTION INFILTRATION; PERINEURAL PRN
Status: DISCONTINUED | OUTPATIENT
Start: 2022-10-14 | End: 2022-10-14 | Stop reason: HOSPADM

## 2022-10-14 RX ORDER — SODIUM CHLORIDE 9 MG/ML
INJECTION, SOLUTION INTRAVENOUS CONTINUOUS
Status: DISCONTINUED | OUTPATIENT
Start: 2022-10-14 | End: 2022-10-14 | Stop reason: HOSPADM

## 2022-10-14 NOTE — PROGRESS NOTES
Report received from [address] [place] [address]. Procedural findings communicated. Intra procedural  medication administration reviewed. Progression of care discussed.      Patient received into [address] post sheath removal.     Access site without bleeding or swelling yes    Dressing dry and intact yes    Patient instructed to limit movement to right upper extremity    Routine post procedural vital signs and site assessment initiated yes

## 2022-10-14 NOTE — PROGRESS NOTES
Patient received to [address] [place] [address] room # 11  Ambulatory from [facility]. Patient scheduled for [facility] today with Dr Lobo Lucero. Procedure reviewed & questions answered, voiced good understanding consent obtained & placed on chart. All medications and medical history reviewed. Will prep patient per orders. Patient & family updated on plan of care. The patient has a fraility score of 3-MANAGING WELL, based on patient A&Ox3, patient able to ambulate to room without difficulty.

## 2022-10-14 NOTE — DISCHARGE INSTRUCTIONS
HEART CATHETERIZATION/ANGIOGRAPHY DISCHARGE INSTRUCTIONS    Check puncture site frequently for swelling or bleeding. If there is any bleeding, apply pressure over the area with a clean towel or washcloth and call 911. Notify your doctor for any redness, swelling, drainage, or oozing from the puncture site. Notify your doctor for any fever or chills. If the extremity becomes cold, numb, or painful call [facility] at [phone]. Activity should be limited for the next 48 hours. No heavy lifting, pushing, pulling  or strenuous activity for 48 hours. No heavy lifting (anything over 10 pounds) for 3 days. You may resume your usual diet. Drink more fluids than usual.  Have a responsible person drive you home and stay with you for at least 24 hours after your heart catheterization/angiography. You may remove bandage from your right arm in 24 hours. You may shower in 24 hours. No tub baths, hot tubs, or swimming for 1 week. Do not place any lotions, creams, powders, or ointments over puncture site for 1 week. You may place a clean band-aid over the puncture site each day for 5 days. Change daily. Sedation for a Medical Procedure: Care Instructions     You were given a sedative medication during your visit. While many of the effects will have worn   off before you leave; you may continue to feel some effects for several hours. Common side effects from sedation include:  Feeling sleepy. (Your doctors and nurses will make sure you are not too sleepy to go home.)  Nausea and vomiting. This usually does not last long. Feeling tired. How can you care for yourself at home? Activity    Don't do anything for 24 hours that requires attention to detail. It takes time for the medicine effects to completely wear off. Do not make important legal decisions for 24 hours. Do not sign any legal documents for 24 hours.      Do not drink alcohol today     For your safety, you should not drive or operate heavy machinery for the remainder of the day     Rest when you feel tired. Getting enough sleep will help you recover. Diet    You can eat your normal diet, unless your doctor gives you other instructions. If your stomach is upset, try clear liquids and bland, low-fat foods like plain toast or rice. Drink plenty of fluids (unless your doctor tells you not to). Don't drink alcohol for 24 hours. Medicines    Be safe with medicines. Read and follow all instructions on the label. If the doctor gave you a prescription medicine for pain, take it as prescribed. If you are not taking a prescription pain medicine, ask your doctor if you can take an over-the-counter medicine. If you think your pain medicine is making you sick to your stomach: Take your medicine after meals (unless your doctor has told you not to). Ask your doctor for a different pain medicine. I have read the above instructions and have had the opportunity to ask questions.       Patient: ________________________   Date: _____________    Witness: _______________________   Date: _____________

## 2022-10-14 NOTE — PROGRESS NOTES
Discharge instructions given. Right arm dsg remains soft with no bleeding or swelling noted. No complaints voiced. Wife at bedside.

## 2022-10-14 NOTE — Clinical Note
Prepped: right groin and right brachial. Site was clipped and prepped. Prepped with: ChloraPrep. Patient was draped after wet prep solution dried.

## 2022-10-14 NOTE — PROGRESS NOTES
RHC Dr Cady Black  7fr sheath right brachial - sheath pulled and manual pressure held until hemostasis achieved.   Versed 1 mg IV

## 2022-10-20 ENCOUNTER — OFFICE VISIT (OUTPATIENT)
Dept: ORTHOPEDIC SURGERY | Age: 87
End: 2022-10-20

## 2022-10-20 DIAGNOSIS — S83.241A ACUTE MEDIAL MENISCUS TEAR OF RIGHT KNEE, INITIAL ENCOUNTER: ICD-10-CM

## 2022-10-20 DIAGNOSIS — S89.91XD INJURY OF RIGHT KNEE, SUBSEQUENT ENCOUNTER: Primary | ICD-10-CM

## 2022-10-20 RX ORDER — METHYLPREDNISOLONE ACETATE 40 MG/ML
40 INJECTION, SUSPENSION INTRA-ARTICULAR; INTRALESIONAL; INTRAMUSCULAR; SOFT TISSUE ONCE
Status: COMPLETED | OUTPATIENT
Start: 2022-10-20 | End: 2022-10-20

## 2022-10-20 RX ORDER — METHOCARBAMOL 500 MG/1
500 TABLET, FILM COATED ORAL 2 TIMES DAILY
Qty: 20 TABLET | Refills: 0 | Status: SHIPPED | OUTPATIENT
Start: 2022-10-20 | End: 2022-10-30

## 2022-10-20 RX ADMIN — METHYLPREDNISOLONE ACETATE 80 MG: 40 INJECTION, SUSPENSION INTRA-ARTICULAR; INTRALESIONAL; INTRAMUSCULAR; SOFT TISSUE at 10:41

## 2022-10-20 NOTE — PROGRESS NOTES
Patient was fitted and instructed on two Reparel Leg Sleeves for the right leg. Patient read and signed documenting they understand and agree to [facility]'s current DME return policy.

## 2022-10-20 NOTE — LETTER
DME Patient Authorization Form    Name: Bk Schneider  : 1935  MRN: [id number]   Age: 82 y.o. Gender: male  Delivery Address: [facility]     Diagnosis:     ICD-10-CM    1. Injury of right knee, subsequent encounter  S89. 91XD methylPREDNISolone acetate (DEPO-MEDROL) injection 40 mg     Ambulatory referral to Physical Therapy      2. Acute medial meniscus tear of right knee, initial encounter  S83.241A methylPREDNISolone acetate (DEPO-MEDROL) injection 40 mg     Ambulatory referral to Physical Therapy     Reparel Knee Sleeve ()           Requested DME:  Reparel Leg Sleeve **ALEG ($60) X 1 - right        Clinical Notes:     **Indicates non-covered items by insurance. Payment expected on date of service. Electronically signed by  Provider: Gin Guerrero MD__Date: 10/20/2022                            [address] Tax ID # [id number]        Durable Medical Equipment and/or Orthotics Patient Consent     I understand that my physician has prescribed this medical supply as part of my treatment plan as a matter of Medical Necessity.  I understand that I have a choice in where I receive my prescribed orthopedic supplies and/or services.  I authorize [facility] to furnish this service/product and to provide my insurance carrier with any information requested in order to process for payment.  I instruct my insurance carrier to pay [facility] directly for these services/products.  I understand that my insurance carrier may deny payment for this supply because it is a non-covered item, deemed not medically necessary or considered experimental.   I understand that any cost not covered by my insurance carrier will be solely my financial responsibility.  I have received the Supplier Standards and have reviewed them.    I have received the prescribed item and have been fully instructed on the proper use of the above services/products.    ______ (Patient Initials) I understand that all DME items are non-returnable after being dispensed. Items still in sealed packaging may be returned up to 14 days after purchasing. [address] will replace items that are defective.    ______ (Patient Initials) I understand that [facility] will not file a claim with my insurance carrier for this service/product and I am waiving my right to file a claim on my own for this service/product with my insurance company as this item is NON-COVERED (Denoted by the **) by my Insurance company/policy. ______ (Patient Initials) I understand that I am responsible to bring my equipment to the hospital for any surgery. ______________________________________________  ________________________  Patient / [place]            Thank you for considering [address] Your physician has prescribed specific medical equipment or devices for your home use. The following describes your rights and responsibilities as our customer. Right to Choose Providers: You have a choice regarding which company supplies your home medical equipment and devices, and to consult your physician in this decision. You may choose a medical supply store, a home medical equipment provider, or a specialist such as [facility]. [facility] will coordinate with your physician to provide the medical equipment or devices prescribed for your home use. Right to Service:  You have the right to considerate, respectful and nondiscriminatory care. You have the right to receive accurate and easily understood information about your health care.   If you speak a foreign language, or don't understand the discussions, assistance will be provided to allow you to make informed health care decisions. You have the right to know your treatment options and to participate in decisions about your care, including the right to accept or refuse treatment. You have the right to expect a reasonable response to your requests for treatment or service. You have the right to talk in confidence with health care providers and to have your health care information protected. You have the right to receive an explanation of your bill. You have the right to complain about the service or product you receive. Patient Responsibilities:  Please provide complete and accurate information about your health insurance benefits and make arrangements for the timely payment of your bill. [facility] will, if possible, assume responsibility for billing your insurance (Medicare, Medicaid and commercial) for the prescribed equipment or devices. If your policy does not cover the prescribed product, or only covers a portion of the bill, you are responsible for any remaining balance. Return and Exchange Policy:  [facility] will honor published  Warranties for products. [facility] will accept returns or exchanges within 14 days from the date of receipt, providin) the product must be in new condition; 2) receipt as required; and 3) used disposable and hygiene products may only be returned due to a defective product. Note: Refunds will be issued in a timely manner, please allow 4-6 weeks for processing. Complaint Procedures and DME Consumer Protection Resources:  [facility] values you as a customer, and is committed to resolving patient concerns. This commitment includes understanding and documenting your concerns, conducting a review of internal procedures, and providing you with an explanation and resolution to your concerns. Should you have any questions about our services or billing process, please contact our office at (practice phone number).   If we are unable to resolve the concern, you have the right to direct comments to the office of Consumer Protection, in the [address] or the [employer]s 'R'  office, without fear of repercussion. DMEPOS SUPPLIER STANDARDS    A supplier must be in compliance with all applicable Federal and [facility] and regulatory requirements. A supplier must provide complete and accurate information on the DMEPOS supplier application. Any changes to this information must be reported to the [facility] [employer] within 30 days. An authorized individual (one whose signature is binding) must sign the application for billing privileges. A supplier must fill orders from its own inventory, or must contract with other companies for the purchase of items necessary to fill the order. A supplier may not contract with any entity that is currently excluded from the Medicare program, any [facility] program, or from any other Federal procurement or Nonprocurement programs. A supplier must advise beneficiaries that they may rent or purchase inexpensive or routinely purchased durable medical equipment, and of the purchase option for capped rental equipment. A supplier must notify beneficiaries of warranty coverage and honor all warranties under applicable State Law, and repair or replace free of charge Medicare covered items that are under warranty. A supplier must maintain a physical facility on an appropriate site. A supplier must permit CMS, or its agents to conduct on-site inspections to ascertain the supplier's compliance with these standards. The supplier location must be accessible to beneficiaries during reasonable business hours, and must maintain a visible sign and posted hours of operation. A supplier must maintain a primary business telephone listed under the name of the business in a [employer] or a toll free number available through directory assistance. The exclusive use of a beeper, answering machine or cell phone is prohibited.   A supplier must have comprehensive liability insurance in the amount of at least $300,000 that covers both the supplier's place of business and all customers and employees of the supplier. If the supplier manufactures its own items, this insurance must also cover product liability and completed operations. A supplier must agree not to initiate telephone contact with beneficiaries, with a few exceptions allowed. This standard prohibits suppliers from calling beneficiaries in order to solicit new business. A supplier is responsible for delivery and must instruct beneficiaries on use of Medicare covered items, and maintain proof of delivery. A supplier must answer questions, and respond to complaints of the beneficiaries, and maintain documentation of such contacts. A supplier must maintain and replace at no charge or repair directly, or through a service contract with another company, Medicare covered items it has rented to beneficiaries. A supplier must accept returns of substandard (less than full quality for the particular item) or unsuitable items (inappropriate for the beneficiary at the time it was fitted and rented or sold) from beneficiaries. A supplier must disclose these supplier standards to each beneficiary to whom it supplies a Medicare-covered item. A supplier must disclose to the government any person having ownership, financial, or control interest in the supplier. A supplier must not convey or reassign a supplier number; i.e., the supplier may not sell or allow another entity to use its Medicare billing number. A supplier must have a complaint resolution protocol established to address beneficiary complaints that relate to these standards. A record of these complaints must be maintained at the physical facility.   Complaint records must include: the name, address, telephone number and health insurance claim number of the beneficiary, a summary of the complaint, and any action taken to resolve it.  A supplier must agree to furnish CMS any information required by the Medicare statute and implementing regulations. A supplier of DMEPOS and other items and services must be accredited by a CMS-approved accreditation organization in order to receive and retain a supplier billing number. The accreditation must indicate the specific products and services, for which the supplier is accredited in order for the supplier to receive payment for those specific products and services. A DMEPOS supplier must notify their accreditation organization when a new DMEPOS location is opened. The accreditation organization may accredit the new supplier location for three months after it is operational without requiring a new site visit. All DMEPOS supplier locations, whether owned or subcontracted, must meet the [employer] and be separately accredited in order to bill Medicare. An accredited supplier may be denied enrollment or their enrollment may be revoked, if CMS determines that they are not in compliance with the DMEPOS quality standards. A DMEPOS supplier must disclose upon enrollment all products and services, including the addition of new product lines for which they are seeking accreditation. If a new product line is added after enrollment, the DMEPOS supplier will be responsible for notifying the accrediting body of the new product so that the DMEPOS supplier can be re-surveyed and accredited for these new products. Must meet the surety bond requirements specified in 42 C. F.R. 424.57(c). Implementation date- May 4, 2009. A supplier must obtain oxygen from a state-licensed oxygen supplier. A supplier must maintain ordering and referring documentation consistent with provisions found in 42 C. F.R. 424.516(f). DMEPOS suppliers are prohibited from sharing a practice location with certain other Medicare providers and suppliers.   DMEPOS suppliers must remain open to the public for a minimum of 30 hours per week with certain exceptions. [employer]  Arthritis oral choices: Individual Turmeric-Curcumin; Nathanel Mas; [place]                           -or-   Combination [employer] Turmeric strength for joints that includes all 3 listed above     Magne topical Sports:   Balm or liquid Spray with frankincense/myrrh      Nerve medication options:   CBD, Alpha Lipoic acid, Lion's cassie and any other recommended neuropathic medication            Immune/healing possibilities:  Echinacea, Elderberry    As Needed: Dead sea bath salts, Essential Oils, scar cream, Gout and cramping medications    The above list of recommended medications is only a starting point. Please allow the experts at [facility] to make the final recommendations. Before using any of these medications, please make sure that you have no concerns, senstivities or allergies to the listed ingredients in each bottle/container. Also, please check with your pharmacist or your primary care physician regarding any and all possible interactions with your other daily medications. [facility] Locations:   [address] [place] [address], [address] [place], [place] [address] -   [address]            Sincerely,      Carley Nguyen MD

## 2022-10-20 NOTE — PROGRESS NOTES
Name: Amanda Kiser  YOB: 1935  Gender: male  MRN: [id number]    09/29/2022: Initial visit with me for: Right knee pain/injury. Duplex ultrasound study with no DVT  10/20/2022: He is here to discuss his knee pain and treatment options    HPI:   09/20/2022: He tripped over a coffee table injuring his right knee. Over time, he noticed more swelling and bruising extending down to his ankle and foot  09/27/2022: Dr. Esteban Dos Santos evaluated with x-rays  09/29/2022: He presented for right knee pain. He also reported swelling and pain    ROS/Meds/PSH/PMH/FH/SH: reviewed today    Tobacco:  reports that he quit smoking about 26 years ago. His smoking use included cigarettes. He has a 11.00 pack-year smoking history. He has never used smokeless tobacco.   Type 2 diabetes: Diabetic polyneuropathy: ESRD stage III: Atrial fibrillation on Coumadin    Physical Examination:  Patient appears to be alert and oriented with acceptable appearance. No obvious distress or SOB  CV: appears to have acceptable vascular color and capillary refill  Neuro: appears to have slight diminished light touch sensation   Skin: Resolved redness; healed eschar; anterior knee swelling  MS: Standing: Plantigrade: Gait much better  Right = anterior knee pain; resolved calf pain; full knee motion; 5/5 strength; no instability or crepitance; no locking    XR: Right side: Standing AP lateral mortise ankle plus AP oblique foot taken 09/29/2022 with proximal os peroneal; plantar and posterior heel enthesopathy; no definite fracture noted; tarsometatarsal arthritis; mild arterial calcification  XR Impression:  As above      Reviewed Test/Records/Documents:  09/27/2022: Dr. Yazmin Ron FP reflects falling over the coffee table   09/20/2022. Reported immediate right knee swelling. X-rays ordered  09/27/2022: [address] XR 2 views knee: Radiologic impression negative for acute fracture. Mild OA   09/27/2022:  My review of those x-rays concur with no definite fracture. Knee osteoarthritis. Posterior lateral soft tissue calcification uncertain etiology     2022: Right LE Duplex U/S radiology impression: Negative for DVT   10/12/2022: Right knee MRI scan without contrast: Radiologic impression:  1. Displaced meniscal flap tear that arises from the free margin of the posterior horn of the medial meniscus and displaces into the posterior medial notch  2. Trace joint effusion  3. Large amount of generalized subcutaneous edema which is nonspecific    Injection: We discussed the risks and complications of the procedure and the decision was made to proceed; the right knee joint was injected with 2 cc Xylocaine: 80 mg Depo-Medrol : He understands how the Depomedrol may affect glucose control. He desired to proceed with the injection and agrees to monitor, treat and control the glucose level; he appeared to do well:     Assessment:    Right knee injury; displaced meniscal flap tear;persistent knee effusion/knee swelling  Right anterior tibial shin abrasion with surrounding cellulitis - resolved   Right lower extremity bruising and swelling - no DVT per duplex ultrasound    Plan:   The patient and I discussed the above assessment. We explored treatment options. After receiving his MRI scan, I discussed the case with my sports medicine expert Dr. Cindy Jerome was planning to see him and discuss surgical options.   I called Mr. Lc Soto to discuss MRI scan and options and he preferred no surgery  The plan today is injection and PT  If his pain and swelling persist, he will need to see Dr. Cindy Jerome for surgical consultation  He is out of the Reparel leg sleeve and knee immobilizer  PT: Prescribed at [address] [place] [address]  We discussed the importance of continued knee care and protection  He requested a new Reparel leg sleeve today    Medication - OTC meds prn: Limited due to Coumadin and diabetes:   He stopped prior prescribed: Augmentin 875 m p.o. twice daily: 14 days: 2 refills   He requested medication for cramping. It looks in the past that he has been on Zanaflex and Baclofen. Prescribed a short course of Robaxin but I recommend he get with Dr. Joelle Lee over more long-term treatment   I also prescribed natural medication from [facility] related to cramping  Prescribed: Robaxin 500 mg: #20: 1 p.o. twice daily as needed for muscle spasm    Surgical discussion: Surgery will depend on his response to treatment today  Follow up: 2 weeks  Work status: Sitting  History and discussion of management with:  Corby Sharma    This note was created using Dragon voice recognition software which may result in errors of speech and spelling recognition and word/phrase syntax errors.

## 2022-10-27 ENCOUNTER — HOSPITAL ENCOUNTER (OUTPATIENT)
Dept: PHYSICAL THERAPY | Age: 87
Setting detail: RECURRING SERIES
Discharge: HOME | End: 2022-10-30
Payer: MEDICARE

## 2022-10-27 PROCEDURE — 97110 THERAPEUTIC EXERCISES: CPT

## 2022-10-27 PROCEDURE — 97161 PT EVAL LOW COMPLEX 20 MIN: CPT

## 2022-10-27 ASSESSMENT — PAIN SCALES - GENERAL: PAINLEVEL_OUTOF10: 4

## 2022-10-27 NOTE — THERAPY EVALUATION
Amanda Kiser  : 1935  Primary: [employer]  Secondary:  [address] @ [address] [place] [id number]  Phone: [phone]  Fax: [phone] Plan Frequency: 2 sessions per week for 8 weeks  Plan of Care/Certification Expiration Date: 23    PT Visit Info:         Visit Count:  Visit count could not be calculated. Make sure you are using a visit which is associated with an episode. OUTPATIENT PHYSICAL THERAPY:             OP NOTE TYPE: Initial Assessment 10/27/2022               Episode  Appt Desk         Treatment Diagnosis:  Pain in Right Hip (M25.551)  Pain in Right Knee (M25.561)  Stiffness of Right Knee, Not elsewhere classified (M25.661)  Difficulty in walking, Not elsewhere classified (R26.2)  Other abnormalities of gait and mobility (R26.89)  Medical/Referring Diagnosis:  Injury of right knee, subsequent encounter [S89.91XD]  Acute medial meniscus tear of right knee, initial encounter [B64.276M]  Referring Physician: Alanna Briones MD MD Orders:  PT Eval and Treat   Return MD Appt:  22  Date of Onset:  Onset Date: 22 (Patient reports falling 4 weeks ago)   Allergies:  Patient has no known allergies. Restrictions/Precautions:  PACEMAKER         Medications Last Reviewed:  10/27/2022     SUBJECTIVE   History of Injury/Illness (Reason for Referral):  Mr. Amanda Kiser has attended 1 physical therapy session including initial evaluation as of 10/27/2022. Amanda Kiser is a 82 y.o. male with complaints of R knee and hip pain after running into his coffee table and falling onto it 4 weeks ago. Patient reports the pain is achy and sharp at times; it also refers towards his R gluteals. The patient reports having trouble walking and uses a step-to pattern when using stairs. Patient reports rest alleviates his symptoms.  Patient denies any other falls in the last 3 months. He also reports having night cramps in B LE. He reports having a medical history of cardiac problems, pacemaker, diabetes mellitus, and a pacemaker. Manuel Jewell presents with increased pain, decreased ROM, decreased strength, decreased functional tolerance, decreased balance, and increased knee swelling. Patient Stated Goal(s):  \"I would like to be able to carry my groceries into the house with less trips and less breaks\"  Initial:     4/10 Post Session:     4/10  Past Medical History/Comorbidities:   Mr. Anam Casper  has a past medical history of Allergic rhinitis, Atrial fibrillation ([address] Chronic kidney disease, Chronic kidney disease, stage III (moderate) ([address] Chronic pain, Colon polyp, Coronary artery disease, Diabetic polyneuropathy associated with type 2 diabetes mellitus ([address] Erectile dysfunction, Fracture of left fibula, GERD (gastroesophageal reflux disease), Glaucoma, Hypertension, Imbalance, Multinodular goiter, Nephrolithiasis, Obesity (BMI 30-39.9), Peptic ulcer disease, Plantar fasciitis, Psychiatric disorder, Right inguinal hernia, Sciatica, Stroke ([address] Tobacco use disorder, and Type 2 diabetes mellitus ([address] Mr. Anam Casper  has a past surgical history that includes Colonoscopy (2018); Colonoscopy (2008); pr cardiac surg procedure unlist (2019); pr cardiac surg procedure unlist; ablation of dysrhythmic focus; Knee arthroscopy (Right, 2012); gi (1980's); back surgery (2012); and Cardiac procedure (N/A, 10/14/2022).   Social History/Living Environment:   Lives With: Spouse  Type of Home: House  Home Layout: One level  Home Access: Stairs to enter with rails  Entrance Stairs - Rails: Left     Prior Level of Function/Work/Activity:   Prior level of function: Patient was able to complete ADLs and carry his groceries without pain  Prior level of function: Independent  Occupation: Retired           Learning:   Does the patient/guardian have any barriers to learning?: No barriers  What is the preferred language of the patient/guardian?: English  How does the patient/guardian prefer to learn new concepts?: Listening; Reading; Demonstration; Pictures/Videos     Fall Risk Scale: [place] Total Score: 25  [place] Fall Risk: Medium (25-44)     Dominant Side:  right handed      OBJECTIVE       ORTHOSTATIC/POSTURE OBSERVATION:   Date:   10/27/2022   SITTING RESTING POSTURE -Patient sits with forward head and rounded shoulders which indicate tight anterior chest musculature, upper trapezius, and levator scapula and weak posterior scapula musculature and deep cervical flexors. STANDING RESTING POSTURE -Patient displays decreased core motor control indicating weak core and low back musculature. ROM Date:  10/27/2022   KNEE ROM (TESTED IN SUPINE)    RIGHT LEFT   Flexion 122° 124°   Extension +4° +3°     PALPATION/TONE/TISSUE TEXTURE:   Date:   10/27/2022   SOFT TISSUE:   Quads Unremarkable     Hamstrings Unremarkable     TFL/IT band Unremarkable     Gastroc/soleus/post tib Unremarkable     Skin integrity   Mass at L groin, possible lymph node.  Advised patient to inform physician     BALANCE Date: 10/27/2022   Right 2 seconds   Left 2 seconds     STRENGTH   Date: 10/27/2022     Right Left   Hip Abduction 4 4   Hip IR 4+ 5   Hip ER 4+ 4+   Hip Flexion 3+, pain traveled to lowback 4   Knee Extension 5 5   Knee Flexion 4+ 4+   Ankle DF 4+ 5   Ankle PF 5 5     FUNCTIONAL MOBILITY   Date:   10/27/2022   Transfers Independent with UE support   Gait deviations Mild antalgic gait   Assistive device None   Stairs Step-to gait pattern    Bed mobility Modifications needed to go from supine to sidelying     EDEMA Date:  10/27/2022      Activity/Exercise Left  Right   Mid patella  44 cm 47 cm        SPECIAL TESTS: Assessed @ Initial Visit ;   *STABILITY TESTS:    -Lachman's: negative    -Varus Test (LCL laxity): negative    -Valgus Test (MCL laxity): positive, R    -Posterior drawer: negative -Posterior sag: negative    *MENISCUS CLUSTER TEST:    -End range flexion: positive, R    -End range extension: negative    -Joint line tenderness:positive, R    -Report of locking: negative    -McMurrays:negative        NEUROLOGICAL SCREEN: Assessed @ Initial Visit    -RADIATING SYMPTOMS:    -DERMATOMES: Normal and equal B    MYOTOMES Date: 10/27/2022     Right Left   L2 & L3   (Hip Flexors) 5/5 5/5   L3-L4  (Knee Extensors) 5/5 5/5   L4  (Ankle DFs) 5/5 5/5   L5  (Hallux Ext) 5/5 5/5   L5-S1  (Ankle PFs) 5/5 5/5   S1-S2  (Ankle EVs) 5/5 5/5     Reflexes Date: 10/27/2022     Right Left   L4  (Quadriceps) 2+ 2+   S1  (Achilles) 2+ 2+     Note: Patient denies any increase of symptoms with cough, sneeze or valsalva. Patient denies any saddle paresthesia or bowel/bladder deficits. ASSESSMENT   Initial Assessment:  Mr. Flaco Boss has attended 1 physical therapy session including initial evaluation as of 10/27/2022. Flaco Boss is a 82 y.o. male with complaints of R knee and hip pain after running into his coffee table and falling onto it 4 weeks ago. Patient reports the pain is achy and sharp at times; it also refers towards his R gluteals. The patient reports having trouble walking and uses a step-to pattern when using stairs. Patient reports rest alleviates his symptoms. Patient denies any other falls in the last 3 months. He also reports having night cramps in B LE. He reports having a medical history of cardiac problems, pacemaker, diabetes mellitus, and a pacemaker. Flaco Boss presents with increased pain, decreased ROM, decreased strength, decreased functional tolerance, decreased balance, and increased knee swelling. Flaco Boss will benefit from home exercise program, therapeutic and postural strengthening exercises, manual therapeutic techniques as appropriate to address their current condition.   Flaco Boss will benefit from skilled PT (medically necessary) to address above deficits affecting participation in basic ADLs and overall functional tolerance. Problem List: (Impacting functional limitations): Body Structures, Functions, Activity Limitations Requiring Skilled Therapeutic Intervention: Decreased functional mobility ; Decreased ROM; Decreased body mechanics; Decreased tolerance to work activity; Decreased strength; Decreased endurance; Decreased balance; Increased pain; Decreased posture     Therapy Prognosis:   Therapy Prognosis: Good     Assessment Complexity:   Low Complexity  PLAN   Effective Dates: 10/27/2022  TO Plan of Care/Certification Expiration Date: 01/25/23   Frequency/Duration: Plan Frequency: 2 sessions per week for 8 weeks   Interventions Planned (Treatment may consist of any combination of the following):    Current Treatment Recommendations: Strengthening; ROM; Balance training; Functional mobility training; Endurance training; Gait training; Stair training; Neuromuscular re-education; Manual Therapy - Soft Tissue Mobilization; Manual Therapy - Joint Manipulation; Pain management; Home exercise program; Modalities; Integrated dry needling; Therapeutic activities     GOALS: (Goals have been discussed and agreed upon with patient.)  Short Term Goals 4 weeks   [place] will be independent with HEP to promote self-management of symptoms. Lincoln Enrique will perform Nu Step x 10 minutes for hip and knee AAROM. Lincoln Enrique will tolerate manual therapy/joint mobilizations to increase knee flexion ROM so pt can ambulate stairs and walk with a more normalized gait pattern. [place] will participate in static and dynamic balance activities for 5 minutes to help improve proprioception and decrease risk of falls.      Long Term Goals 12 weeks  [place] will be able to perform sit to stand transfers independently with increased knee flexion and decreased use Visit Info MD Guidelines  MyChart

## 2022-10-27 NOTE — PROGRESS NOTES
Kelechi Estes  : 1935  Primary: [employer]reshi [employer]  Secondary:  [address] @ [address] [place] [id number]  Phone: [phone]  Fax: [phone] Plan Frequency: 2 sessions per week for 8 weeks  Plan of Care/Certification Expiration Date: 23    PT Visit Info:  No data recorded   Visit Count:  Visit count could not be calculated. Make sure you are using a visit which is associated with an episode. OUTPATIENT PHYSICAL THERAPY:OP NOTE TYPE: Treatment Note 10/27/2022       Episode  }Appt Desk             Treatment Diagnosis:  Pain in Right Hip (M25.551)  Pain in Right Knee (M25.561)  Stiffness of Right Knee, Not elsewhere classified (M25.661)  Difficulty in walking, Not elsewhere classified (R26.2)  Other abnormalities of gait and mobility (R26.89)  Medical/Referring Diagnosis:  Injury of right knee, subsequent encounter [S89.91XD]  Acute medial meniscus tear of right knee, initial encounter [L24.709S]  Referring Physician: Jerilyn Landeros MD MD Orders:  PT Eval and Treat   Date of Onset:  Onset Date: 22 (Patient reports falling 4 weeks ago)   Allergies:   Patient has no known allergies. Restrictions/Precautions:  No data recorded  No data recorded   Interventions Planned (Treatment may consist of any combination of the following):    Current Treatment Recommendations: Strengthening; ROM; Balance training; Functional mobility training; Endurance training; Gait training; Stair training; Neuromuscular re-education; Manual Therapy - Soft Tissue Mobilization; Manual Therapy - Joint Manipulation; Pain management; Home exercise program; Modalities; Integrated dry needling;  Therapeutic activities     Subjective Comments:  Patient reports to therapy with R knee pain  Initial:}    4/10Post Session:       4/10  Medications Last Reviewed:  10/27/2022  Updated Objective Findings:  See evaluation note from today  Treatment THERAPEUTIC EXERCISE: (8 minutes):    Exercises per grid below to improve mobility, strength, balance, and coordination. Required moderate visual, verbal, manual, and tactile cues to promote proper body alignment, promote proper body posture, promote proper body mechanics, and promote proper body breathing techniques. Progressed resistance, range, repetitions, and complexity of movement as indicated. Date:  10/27/2022  Date:   Date:     Activity/Exercise Parameters Parameters Parameters   Heel Slides 2x10     Short Arc Quads NEXT SESSION                             Education Treatment, home exercise plan, plan of care, prognosis, pain modulation       Audible [employer] Access Code: [email or web address]    Time spent with patient reviewing proper muscle recruitment and technique with exercises. MANUAL THERAPY: (0 minutes):   Joint mobilization, Soft tissue mobilization, and Manipulation was utilized and necessary because of the patient's restricted joint motion, painful spasm, and loss of articular motion. None today    MODALITIES: (0 minutes):        None today      HEP: As above; handouts given to patient for all exercises. Treatment/Session Summary:    Treatment Assessment:  See evaluation note from today  Communication/Consultation:   Treatment, home exercise plan, plan of care, prognosis, pain modulation  Equipment provided today:  None  Recommendations/Intent for next treatment session: Next visit will focus on advancements to more challenging activities to include progressing strength, functional mobility, pain tolerance, and ROM as tolerated.        Total Treatment Billable Duration:  47 minutes: 39 minute low complexity evaluation; 8 minute therapeutic exercise  Time In: 1115  Time Out: 1202    Brittni Wilburn, PT       Charge Capture  }Post Session Pain  PT Visit Info  [employer] Portal  MD Guidelines  Scanned Media  Benefits  MyChart    Future Appointments   Date Time Provider Jessica Mccord   11/1/2022  1:30 PM Leia Daub, PT [facility]   11/2/2022  9:00 AM Danette Kaufman MD [facility]   11/3/2022 11:00 AM Leia Daub, PT [facility]   11/7/2022 11:00 AM Leia Daub, PT [facility]   11/9/2022  2:30 PM Brooksie Dues, PTA [facility]   11/10/2022 10:50 AM Lilly Sam MD [facility]   11/14/2022  9:00 AM Brooksie Dues, PTA [facility]   11/16/2022 10:00 AM Brooksie Dues, PTA [facility]   11/21/2022 10:00 AM Leia Daub, PT [facility]   11/28/2022  9:00 AM Brooksie Dues, PTA [facility]   11/30/2022 11:00 AM Leia Daub, PT [facility]   12/5/2022 12:30 PM Brooksie Dues, PTA [facility]   12/7/2022 11:00 AM Leia Daub, PT [facility]   12/12/2022 10:00 AM Brooksie Dues, PTA [facility]   12/14/2022 11:00 AM Leia Daub, PT [facility]   12/15/2022 10:45 AM Alondra Norwood MD END [facility]   12/19/2022 10:00 AM Brooksie Dues, PTA [facility]   12/21/2022 11:00 AM Leia Daub, PT [facility]   1/11/2023 11:30 AM Lawrence Moore MD PFP [facility]   1/18/2023 11:10 AM HEATHER Mandel PPS [facility]

## 2022-10-28 ENCOUNTER — NURSE ONLY (OUTPATIENT)
Dept: FAMILY MEDICINE CLINIC | Facility: CLINIC | Age: 87
End: 2022-10-28

## 2022-10-28 DIAGNOSIS — Z79.01 LONG TERM CURRENT USE OF ANTICOAGULANT: Primary | ICD-10-CM

## 2022-10-28 LAB — INR BLD: 2

## 2022-10-31 RX ORDER — FERROUS SULFATE 325(65) MG
TABLET ORAL
Qty: 90 TABLET | Refills: 1 | OUTPATIENT
Start: 2022-10-31

## 2022-11-01 ENCOUNTER — HOSPITAL ENCOUNTER (OUTPATIENT)
Dept: PHYSICAL THERAPY | Age: 87
Setting detail: RECURRING SERIES
Discharge: HOME | End: 2022-11-04
Payer: MEDICARE

## 2022-11-01 DIAGNOSIS — R00.1 SYMPTOMATIC BRADYCARDIA: ICD-10-CM

## 2022-11-01 DIAGNOSIS — Z95.0 PACEMAKER: ICD-10-CM

## 2022-11-01 DIAGNOSIS — Z95.0 PACEMAKER: Primary | ICD-10-CM

## 2022-11-01 PROCEDURE — 97110 THERAPEUTIC EXERCISES: CPT

## 2022-11-01 PROCEDURE — 97140 MANUAL THERAPY 1/> REGIONS: CPT

## 2022-11-01 ASSESSMENT — PAIN SCALES - GENERAL: PAINLEVEL_OUTOF10: 3

## 2022-11-01 NOTE — PROGRESS NOTES
Willie Nunez  : 1935  Primary: [employer]  Secondary:  [address] @ [address] [place] [id number]  Phone: [phone]  Fax: [phone] Plan Frequency: 2 sessions per week for 8 weeks  Plan of Care/Certification Expiration Date: 23      PT Visit Info:  No data recorded   Visit Count:  2   OUTPATIENT PHYSICAL THERAPY:OP NOTE TYPE: Treatment Note 2022       Episode  }Appt Desk             Treatment Diagnosis:  Pain in Right Hip (M25.551)  Pain in Right Knee (M25.561)  Stiffness of Right Knee, Not elsewhere classified (M25.661)  Difficulty in walking, Not elsewhere classified (R26.2)  Other abnormalities of gait and mobility (R26.89)  Medical/Referring Diagnosis:  Injury of right knee, subsequent encounter [S89.91XD]  Acute medial meniscus tear of right knee, initial encounter [Z99.618V]  Referring Physician: Saeed Levy MD MD Orders:  PT Eval and Treat   Date of Onset:  Onset Date: 22 (Patient reports falling 4 weeks ago)     Allergies:   Patient has no known allergies. Restrictions/Precautions:  No data recorded  No data recorded   Interventions Planned (Treatment may consist of any combination of the following):    Current Treatment Recommendations: Strengthening; ROM; Balance training; Functional mobility training; Endurance training; Gait training; Stair training; Neuromuscular re-education; Manual Therapy - Soft Tissue Mobilization; Manual Therapy - Joint Manipulation; Pain management; Home exercise program; Modalities; Integrated dry needling; Therapeutic activities     Subjective Comments:  Patient reports knee pain and nightly leg cramps which wake him up 3-4 times a night.   Initial:}    3/10Post Session:        (NA)/10  Medications Last Reviewed:  2022  Updated Objective Findings:   pain to palpation at posterior L medial knee  Treatment   THERAPEUTIC EXERCISE: (45 minutes): Exercises per grid below to improve mobility, strength, balance, and coordination. Required moderate visual, verbal, manual, and tactile cues to promote proper body alignment, promote proper body posture, promote proper body mechanics, and promote proper body breathing techniques. Progressed resistance, range, repetitions, and complexity of movement as indicated. Date:  10/27/2022  Date:  11/1/22 Date:     Activity/Exercise Parameters Parameters Parameters   NuStep  5 minutes, Level 2    Heel Slides 2x10 X10, stopped exercise because patient experienced severe leg cramps    Short Arc Quads NEXT SESSION 3x10    Quad Sets  3x10, 3 second hold, supine                      Education Treatment, home exercise plan, plan of care, prognosis, pain modulation       P[employer] Access Code: [email or web address]    Time spent with patient reviewing proper muscle recruitment and technique with exercises. MANUAL THERAPY: (10 minutes):   Joint mobilization, Soft tissue mobilization, and Manipulation was utilized and necessary because of the patient's restricted joint motion, painful spasm, and loss of articular motion. Patellar mobilizations in all planes, supine  Soft tissue mobilization of R knee joint and distal thigh, supine    MODALITIES: (0 minutes):        None today      HEP: As above; handouts given to patient for all exercises. Treatment/Session Summary:    Treatment Assessment:  Patient experiences severe leg cramps while performing LE exercises. Patient also reports fatigue during and following session. Patient requires verbal and tactile cueing for TA activation. Communication/Consultation:  None today  Equipment provided today:  None  Recommendations/Intent for next treatment session: Next visit will focus on advancements to more challenging activities to include progressing strength, functional mobility, pain tolerance, and ROM as tolerated.  Will also educate on hydration, electrolytes, and other ways to minimize leg cramps.        Total Treatment Billable Duration:  55 minutes  Time In: 1330  Time Out: [address], PT       Charge Capture  }Post Session Pain  PT Visit [address] Portal  MD Guidelines  Scanned Media  Benefits  MyChart    Future Appointments   Date Time Provider Jessica Suri   11/3/2022 11:00 AM Threasa [place], PT [facility]   11/4/2022  1:00 PM Ana Renteria MD PFP [facility]   11/7/2022 11:00 AM Threasa [place], PT [facility]   11/9/2022  2:30 PM Darek Pat, PTA [facility]   11/10/2022 10:50 AM Ernesto Lozano MD [facility]   11/14/2022  9:00 AM Darek Pat, PTA [facility]   11/14/2022  1:00 PM SFD CATH/CV FLOAT RN [facility]   11/16/2022 10:00 AM Darek Pat, PTA [facility]   11/21/2022 10:00 AM Threasa Pe[place], PT [facility]   11/28/2022  9:00 AM Darek Pat, PTA [facility]   11/28/2022 10:00 AM PFP NURSE [facility]FP [facility]   11/30/2022 11:00 AM Threasa Pe[place], PT [facility]   12/2/2022  8:00 AM Kayleigh Avery MD [facility]   12/5/2022 12:30 PM Darek Pat, PTA [facility]   12/7/2022 11:00 AM Threasa [place], PT [facility]   12/12/2022 10:00 AM Darek Pat, PTA [facility]   12/14/2022 11:00 AM Threasa Pe[place], PT [facility]   12/15/2022 10:45 AM Saira De Souza MD END [facility]   12/19/2022 10:00 AM Darek Pat, PTA [facility]   12/21/2022 11:00 AM Threasa [place], PT [facility]   1/11/2023 11:30 AM To Mcfadden MD PFP [facility]   1/18/2023 11:10 AM HEATHER Brenner PPS [facility]

## 2022-11-02 ENCOUNTER — OFFICE VISIT (OUTPATIENT)
Dept: CARDIOLOGY CLINIC | Age: 87
End: 2022-11-02
Payer: MEDICARE

## 2022-11-02 VITALS
SYSTOLIC BLOOD PRESSURE: 128 MMHG | HEIGHT: 72 IN | HEART RATE: 88 BPM | WEIGHT: 228.5 LBS | BODY MASS INDEX: 30.95 KG/M2 | DIASTOLIC BLOOD PRESSURE: 58 MMHG

## 2022-11-02 DIAGNOSIS — I27.20 PULMONARY HTN (HCC): Primary | ICD-10-CM

## 2022-11-02 DIAGNOSIS — I48.0 PAROXYSMAL ATRIAL FIBRILLATION (HCC): ICD-10-CM

## 2022-11-02 DIAGNOSIS — I34.0 NONRHEUMATIC MITRAL VALVE REGURGITATION: ICD-10-CM

## 2022-11-02 DIAGNOSIS — E78.2 HYPERLIPIDEMIA, MIXED: ICD-10-CM

## 2022-11-02 PROCEDURE — 99214 OFFICE O/P EST MOD 30 MIN: CPT | Performed by: INTERNAL MEDICINE

## 2022-11-02 PROCEDURE — 3074F SYST BP LT 130 MM HG: CPT | Performed by: INTERNAL MEDICINE

## 2022-11-02 PROCEDURE — 1123F ACP DISCUSS/DSCN MKR DOCD: CPT | Performed by: INTERNAL MEDICINE

## 2022-11-02 PROCEDURE — 3078F DIAST BP <80 MM HG: CPT | Performed by: INTERNAL MEDICINE

## 2022-11-02 ASSESSMENT — ENCOUNTER SYMPTOMS
SHORTNESS OF BREATH: 1
PHOTOPHOBIA: 0
EYES NEGATIVE: 1
CHEST TIGHTNESS: 0
ALLERGIC/IMMUNOLOGIC NEGATIVE: 1
EYE PAIN: 0
GASTROINTESTINAL NEGATIVE: 1
ABDOMINAL PAIN: 0
BACK PAIN: 0

## 2022-11-02 NOTE — PROGRESS NOTES
[address], [address] [facility], [address] [place] [address]  PHONE: [phone]    Zoe Sanchez  1935    SUBJECTIVE:   Zoe Sanchez is a 82 y.o. male seen for initial evaluation of:      Chief Complaint   Patient presents with    Atrial Fibrillation        Cardiac Hx (Reviewed and summarized by me):  1) Afib - on warfarin followed by Dr Mara Osorio  2) PPM/AVN ablation   3) Moderate/severe MR and Severe TR  4) NM stress test 7/20/20 - Normal perfusion    HPI:  Followed by Dr. Mara Osorio and also by pulmonology for history of lung disease. He is a former smoker but quit 30 years ago. I saw him in the hospital after a right heart cath was ordered due to pulmonary hypertension. Numbers indicated an elevated pulmonary capillary wedge pressure and mild to moderate pulmonary hypertension. Waveforms also suggested severe mitral valve regurgitation echocardiogram shows severe tricuspid valve regurgitation. It seems as though his symptoms are not well explained solely by the findings of his lung doctor. We are consulted to evaluate the severity of his valvular disease. Past Medical History, Past Surgical History, Family history, Social History, and Medications were all reviewed with the patient today and updated as necessary.        Current Outpatient Medications:     omeprazole (PRILOSEC) 40 MG delayed release capsule, TAKE 1 CAPSULE BY MOUTH EVERY DAY BEFORE A MEAL, Disp: 90 capsule, Rfl: 1    fluticasone (FLONASE) 50 MCG/ACT nasal spray, 2 sprays by Nasal route daily SPRAY 2 SPRAYS INTO EACH NOSTRIL EVERY DAY, Disp: 16 g, Rfl: 5    amLODIPine (NORVASC) 5 MG tablet, TAKE 1 AND 1/2 TABLET DAILY, Disp: 135 tablet, Rfl: 1    bumetanide (BUMEX) 1 MG tablet, Take 1 tablet by mouth daily, Disp: 90 tablet, Rfl: 1    cyanocobalamin 1000 MCG tablet, Take 1 tablet by mouth daily, Disp: 90 tablet, Rfl: 1    doxazosin (CARDURA) 4 MG tablet, Take 1 tablet by mouth nightly TAKE 1 TABLET BY MOUTH EVERY DAY, Disp: 90 tablet, Rfl: 1    ferrous sulfate (FE TABS 325) 325 (65 Fe) MG EC tablet, Take 1 tablet by mouth every morning (before breakfast), Disp: 90 tablet, Rfl: 1    Insulin Degludec (TRESIBA FLEXTOUCH) 200 UNIT/ML SOPN, Inject 26 Units into the skin daily, Disp: 3 mL, Rfl: 5    linagliptin (TRADJENTA) 5 MG tablet, Take 1 tablet by mouth daily, Disp: 90 tablet, Rfl: 1    losartan-hydroCHLOROthiazide (HYZAAR) 50-12.5 MG per tablet, Take 1 tablet by mouth daily, Disp: 90 tablet, Rfl: 1    pravastatin (PRAVACHOL) 80 MG tablet, Take 1 tablet by mouth daily, Disp: 90 tablet, Rfl: 1    sertraline (ZOLOFT) 100 MG tablet, Take 1 tablet by mouth daily, Disp: 90 tablet, Rfl: 1    warfarin (COUMADIN) 5 MG tablet, TAKING ONLY 2 DAYS A WEEK, Disp: 90 tablet, Rfl: 1    warfarin (COUMADIN) 7.5 MG tablet, Taking only five days a week, Disp: 90 tablet, Rfl: 1    blood glucose monitor strips, Use to check blood sugars twice daily DXE11.9, Disp: 200 strip, Rfl: 5    Insulin Pen Needle 29G X 12MM MISC, 1 each by Does not apply route daily Use daily to inject insulin.  DXE11.9, Disp: 100 each, Rfl: 5    fluticasone-umeclidin-vilant (TRELEGY ELLIPTA) 100-62.5-25 MCG/INH AEPB, 1 inhalation every morning, rinse mouth after use, Disp: 3 each, Rfl: 3    brimonidine (ALPHAGAN P) 0.15 % ophthalmic solution, 1 drop, Disp: , Rfl:     latanoprost (XALATAN) 0.005 % ophthalmic solution, LOCATION: BOTH EYES. INSTILL ONE DROP INTO BOTH EYES AT NIGHT BEFORE BED., Disp: , Rfl:     magnesium oxide (MAG-OX) 400 MG tablet, Take 400 mg by mouth daily, Disp: , Rfl:     melatonin 5 MG TABS tablet, Take by mouth, Disp: , Rfl:     potassium gluconate 550 mg tablet, Take by mouth daily, Disp: , Rfl:   No Known Allergies  Past Medical History:   Diagnosis Date    Allergic rhinitis     Atrial fibrillation (HCC)     Chronic kidney disease     \"a little\"    Chronic kidney disease, stage III (moderate) (HCC)     Chronic pain     Colon polyp     Coronary artery to 40 years but quit in 1996. Substance Use Topics    Alcohol use: No       ROS:  Review of Systems   Constitutional: Negative. Negative for fever. HENT:  Negative for hearing loss, nosebleeds and tinnitus. Eyes: Negative. Negative for photophobia and pain. Respiratory:  Positive for shortness of breath. Negative for chest tightness. Cardiovascular: Negative. Negative for chest pain, palpitations and leg swelling. Gastrointestinal: Negative. Negative for abdominal pain. Endocrine: Negative. Negative for cold intolerance and heat intolerance. Genitourinary: Negative. Negative for dysuria. Musculoskeletal: Negative. Negative for back pain and joint swelling. Skin: Negative. Negative for rash. Allergic/Immunologic: Negative. Negative for immunocompromised state. Neurological: Negative. Negative for dizziness, syncope and light-headedness. Hematological: Negative. Does not bruise/bleed easily. Psychiatric/Behavioral: Negative. Negative for suicidal ideas. PHYSICAL EXAM:  Physical Exam  Constitutional:       General: He is not in acute distress. Appearance: He is not ill-appearing. HENT:      Head: Normocephalic and atraumatic. Nose: No congestion. Mouth/Throat:      Mouth: Mucous membranes are moist.   Eyes:      Extraocular Movements: Extraocular movements intact. Pupils: Pupils are equal, round, and reactive to light. Cardiovascular:      Rate and Rhythm: Normal rate and regular rhythm. Heart sounds: No murmur heard. No friction rub. No gallop. Pulmonary:      Effort: No respiratory distress. Breath sounds: No wheezing or rhonchi. Musculoskeletal:         General: No swelling. Cervical back: Normal range of motion. Right lower leg: No edema. Left lower leg: No edema. Skin:     General: Skin is warm and dry. Findings: No rash. Neurological:      General: No focal deficit present.       Mental Status: He is oriented to person, place, and time. Psychiatric:         Mood and Affect: Mood normal.         Behavior: Behavior normal.         Judgment: Judgment normal.        BP (!) 128/58   Pulse 88   Ht 6' (1.829 m)   Wt 228 lb 8 oz (103.6 kg)   BMI 30.99 kg/m²      Wt Readings from Last 10 Encounters:   11/02/22 228 lb 8 oz (103.6 kg)   10/14/22 240 lb (108.9 kg)   10/11/22 245 lb (111.1 kg)   10/10/22 240 lb (108.9 kg)   09/29/22 232 lb (105.2 kg)   09/27/22 232 lb 8 oz (105.5 kg)   08/30/22 241 lb (109.3 kg)   07/13/22 241 lb (109.3 kg)   07/08/22 239 lb (108.4 kg)   05/13/22 234 lb (106.1 kg)           Medical problems and test results were reviewed with the patient today. Lab Results   Component Value Date/Time    BUN 52 10/14/2022 10:56 AM     No results found for: DHIRAJ, CREAPOC, CREA  Lab Results   Component Value Date/Time    K 4.0 10/14/2022 10:56 AM       Lab Results   Component Value Date/Time    CHOL 86 10/06/2022 08:45 AM    HDL 37 10/06/2022 08:45 AM    VLDL 14 01/25/2022 02:32 PM       ASSESSMENT and PLAN    ICD-10-CM    1. Pulmonary HTN (HCC)  I27.20       2. Paroxysmal atrial fibrillation (HCC)  I48.0       3. Hyperlipidemia, mixed  E78.2       4. Nonrheumatic mitral valve regurgitation  I34.0 Transesophageal echocardiogram (ÁNGEL) with contrast and 3D PRN          IMPRESSION:  1) mitral valve and tricuspid regurgitation I will refer him for a transesophageal echocardiogram to be performed by Dr. Magan Boss to evaluate the severity of his mitral valve regurgitation and tricuspid valve regurgitation. I am concerned that his valvular disease may be a major contributor to his shortness of breath as his lung exam does not seem to be consistent with somebody with severe end-stage COPD. He has New York Heart Association class III-IV dyspnea symptoms.   2) Afib -continue warfarin  3) he has a permanent pacemaker this followed by our device clinic he had AV node ablation    ORDERS  No orders of the defined types were placed in this encounter. Follow up in 1 months. Thank you for allowing me to participate in this patient's care. Please call or contact me if there are any questions or concerns regarding the above.       Dolores Wilson MD  11/02/22  9:31 AM

## 2022-11-03 ENCOUNTER — HOSPITAL ENCOUNTER (OUTPATIENT)
Dept: PHYSICAL THERAPY | Age: 87
Setting detail: RECURRING SERIES
Discharge: HOME | End: 2022-11-06
Payer: MEDICARE

## 2022-11-03 PROCEDURE — 97110 THERAPEUTIC EXERCISES: CPT

## 2022-11-03 ASSESSMENT — PAIN SCALES - GENERAL: PAINLEVEL_OUTOF10: 3

## 2022-11-03 NOTE — PROGRESS NOTES
Jef Franklin  : 1935  Primary: [address]  [employer]  Secondary:  [address] @ [address] [place] [id number]  Phone: [phone]  Fax: [phone] Plan Frequency: 2 sessions per week for 8 weeks  Plan of Care/Certification Expiration Date: 23      PT Visit Info:  No data recorded   Visit Count:  3   OUTPATIENT PHYSICAL THERAPY:OP NOTE TYPE: Treatment Note 11/3/2022       Episode  }Appt Desk             Treatment Diagnosis:  Pain in Right Hip (M25.551)  Pain in Right Knee (M25.561)  Stiffness of Right Knee, Not elsewhere classified (M25.661)  Difficulty in walking, Not elsewhere classified (R26.2)  Other abnormalities of gait and mobility (R26.89)  Medical/Referring Diagnosis:  Injury of right knee, subsequent encounter [S89.91XD]  Acute medial meniscus tear of right knee, initial encounter [Q54.511F]  Referring Physician: Suzanne Sanderson MD MD Orders:  PT Eval and Treat   Date of Onset:  Onset Date: 22 (Patient reports falling 4 weeks ago)     Allergies:   Patient has no known allergies. Restrictions/Precautions:  No data recorded  No data recorded   Interventions Planned (Treatment may consist of any combination of the following):    Current Treatment Recommendations: Strengthening; ROM; Balance training; Functional mobility training; Endurance training; Gait training; Stair training; Neuromuscular re-education; Manual Therapy - Soft Tissue Mobilization; Manual Therapy - Joint Manipulation; Pain management; Home exercise program; Modalities; Integrated dry needling; Therapeutic activities     Subjective Comments:  Patient arrives to therapy stating he is compliant with his HEP and did not experience cramps.   Initial:}    3/10Post Session:       3/10  Medications Last Reviewed:  11/3/2022  Updated Objective Findings:   Quad weakness, RLE  Treatment   THERAPEUTIC EXERCISE: (57 minutes):    Exercises per grid below to improve mobility, strength, balance, and coordination. Required moderate visual, verbal, manual, and tactile cues to promote proper body alignment, promote proper body posture, promote proper body mechanics, and promote proper body breathing techniques. Progressed resistance, range, repetitions, and complexity of movement as indicated. Date:  10/27/2022  Date:  11/1/22 Date:  11/3/22   Activity/Exercise Parameters Parameters Parameters   NuStep  5 minutes, Level 2 10 minutes, Level 2   Heel Slides 2x10 X10, stopped exercise because patient experienced severe leg cramps 3x10   Leg curls       Straight Leg Raise   2x10, to tibial tuberosity    Quad Sets  3x10, 3 second hold, supine 3x10, 3 second hold, supine   Heel raises   2x10, light touch balance               Education Treatment, home exercise plan, plan of care, prognosis, pain modulation    New treatment   [facility] Access Code: [email or web address]    Time spent with patient reviewing proper muscle recruitment and technique with exercises. MANUAL THERAPY: (0 minutes):   Joint mobilization, Soft tissue mobilization, and Manipulation was utilized and necessary because of the patient's restricted joint motion, painful spasm, and loss of articular motion. R Patellar mobilizations in all planes, supine  Soft tissue mobilization of R knee joint and distal thigh, supine    MODALITIES: (0 minutes):        None today      HEP: As above; handouts given to patient for all exercises. Treatment/Session Summary:    Treatment Assessment:  Patient completes all exercises without aggravating symptoms. He did present with shortness of breath after table-to exercises. The patient was educated and understood the importance of hydration. Patient did not experience cramps today.   Communication/Consultation:   hydration through appropriate water intake  Equipment provided today:  None  Recommendations/Intent for next treatment session: Next visit will focus on advancements to more challenging activities to include progressing strength, functional mobility, pain tolerance, and ROM as tolerated.         Total Treatment Billable Duration:  57 minutes  Time In: 0030  Time Out: [address] [facility], PT       Charge Capture  }Post Session Pain  PT Visit [address] Portal  MD Guidelines  Scanned Media  Benefits  MyChart    Future Appointments   Date Time Provider Jessica Suri   11/4/2022  1:00 PM Eddi Mukherjee MD PFP [facility] AMB   11/7/2022 11:00 AM Ha Dove, PT SFORPWD SFO   11/9/2022  2:30 PM Andreas Nowak, PTA [facility]   11/10/2022 10:50 AM Tabatha De La Cruz MD POAG [facility] AMB   11/14/2022  9:00 AM Andreas Nowak, PTA [facility]FO   11/14/2022  1:00 PM SFD CATH/CV FLOAT RN SFDCCL SFD   11/16/2022 10:00 AM Andreas Nowak, PTA [facility]   11/21/2022 10:00 AM Ha Dove, PT [facility]   11/28/2022  9:00 AM Andreas Nowak, PTA [facility]   11/28/2022 10:00 AM PFP NURSE PFP [facility] AMB   11/30/2022 11:00 AM Ha Dove, PT [facility]   12/2/2022  8:00 AM Darron Middleton MD DG [facility]VL AMB   12/5/2022 12:30 PM Andreas Nowak, PTA [facility]FO   12/7/2022 11:00 AM Ha Dove, PT [facility]   12/12/2022 10:00 AM Andreas Nowak, PTA [facility]   12/14/2022 11:00 AM Ha Dove, PT [facility]   12/15/2022 10:45 AM MD FRANCO Gross G[facility] AMB   12/19/2022 10:00 AM Andreas Nowak, PTA [facility]FO   12/21/2022 11:00 AM Ha Dove, PT [facility]   1/11/2023 11:30 AM Srinath Tran MD PFP [facility] AMB   1/18/2023 11:10 AM HEATHER Mendez PPS GVL AMB

## 2022-11-04 ENCOUNTER — OFFICE VISIT (OUTPATIENT)
Dept: FAMILY MEDICINE CLINIC | Facility: CLINIC | Age: 87
End: 2022-11-04
Payer: MEDICARE

## 2022-11-04 VITALS
HEART RATE: 83 BPM | WEIGHT: 229 LBS | TEMPERATURE: 97.3 F | HEIGHT: 72 IN | BODY MASS INDEX: 31.02 KG/M2 | SYSTOLIC BLOOD PRESSURE: 135 MMHG | OXYGEN SATURATION: 98 % | DIASTOLIC BLOOD PRESSURE: 60 MMHG

## 2022-11-04 DIAGNOSIS — N18.4 ANEMIA DUE TO STAGE 4 CHRONIC KIDNEY DISEASE (HCC): ICD-10-CM

## 2022-11-04 DIAGNOSIS — G89.29 CHRONIC PAIN OF RIGHT KNEE: ICD-10-CM

## 2022-11-04 DIAGNOSIS — Z91.81 AT HIGH RISK FOR FALLS: ICD-10-CM

## 2022-11-04 DIAGNOSIS — I48.0 PAROXYSMAL ATRIAL FIBRILLATION (HCC): Primary | ICD-10-CM

## 2022-11-04 DIAGNOSIS — E11.21 TYPE 2 DIABETES WITH NEPHROPATHY (HCC): ICD-10-CM

## 2022-11-04 DIAGNOSIS — D63.1 ANEMIA DUE TO STAGE 4 CHRONIC KIDNEY DISEASE (HCC): ICD-10-CM

## 2022-11-04 DIAGNOSIS — N18.4 CKD (CHRONIC KIDNEY DISEASE) STAGE 4, GFR 15-29 ML/MIN (HCC): ICD-10-CM

## 2022-11-04 DIAGNOSIS — G47.62 NOCTURNAL LEG CRAMPS: ICD-10-CM

## 2022-11-04 DIAGNOSIS — M25.561 CHRONIC PAIN OF RIGHT KNEE: ICD-10-CM

## 2022-11-04 DIAGNOSIS — E78.2 HYPERLIPIDEMIA, MIXED: ICD-10-CM

## 2022-11-04 PROCEDURE — 1123F ACP DISCUSS/DSCN MKR DOCD: CPT | Performed by: FAMILY MEDICINE

## 2022-11-04 PROCEDURE — 3051F HG A1C>EQUAL 7.0%<8.0%: CPT | Performed by: FAMILY MEDICINE

## 2022-11-04 PROCEDURE — 3074F SYST BP LT 130 MM HG: CPT | Performed by: FAMILY MEDICINE

## 2022-11-04 PROCEDURE — 99214 OFFICE O/P EST MOD 30 MIN: CPT | Performed by: FAMILY MEDICINE

## 2022-11-04 PROCEDURE — 90694 VACC AIIV4 NO PRSRV 0.5ML IM: CPT | Performed by: FAMILY MEDICINE

## 2022-11-04 PROCEDURE — 3078F DIAST BP <80 MM HG: CPT | Performed by: FAMILY MEDICINE

## 2022-11-04 PROCEDURE — G0008 ADMIN INFLUENZA VIRUS VAC: HCPCS | Performed by: FAMILY MEDICINE

## 2022-11-04 ASSESSMENT — ANXIETY QUESTIONNAIRES
6. BECOMING EASILY ANNOYED OR IRRITABLE: 0
5. BEING SO RESTLESS THAT IT IS HARD TO SIT STILL: 0
IF YOU CHECKED OFF ANY PROBLEMS ON THIS QUESTIONNAIRE, HOW DIFFICULT HAVE THESE PROBLEMS MADE IT FOR YOU TO DO YOUR WORK, TAKE CARE OF THINGS AT HOME, OR GET ALONG WITH OTHER PEOPLE: NOT DIFFICULT AT ALL
2. NOT BEING ABLE TO STOP OR CONTROL WORRYING: 0
1. FEELING NERVOUS, ANXIOUS, OR ON EDGE: 0
7. FEELING AFRAID AS IF SOMETHING AWFUL MIGHT HAPPEN: 0
4. TROUBLE RELAXING: 0
GAD7 TOTAL SCORE: 0
3. WORRYING TOO MUCH ABOUT DIFFERENT THINGS: 0

## 2022-11-04 ASSESSMENT — ENCOUNTER SYMPTOMS
NAUSEA: 0
BACK PAIN: 0
DIARRHEA: 0
VOMITING: 0
ABDOMINAL PAIN: 0
CONSTIPATION: 0

## 2022-11-04 ASSESSMENT — PATIENT HEALTH QUESTIONNAIRE - PHQ9
2. FEELING DOWN, DEPRESSED OR HOPELESS: 0
SUM OF ALL RESPONSES TO PHQ9 QUESTIONS 1 & 2: 0
SUM OF ALL RESPONSES TO PHQ QUESTIONS 1-9: 0
SUM OF ALL RESPONSES TO PHQ QUESTIONS 1-9: 0
1. LITTLE INTEREST OR PLEASURE IN DOING THINGS: 0
SUM OF ALL RESPONSES TO PHQ QUESTIONS 1-9: 0
SUM OF ALL RESPONSES TO PHQ QUESTIONS 1-9: 0

## 2022-11-04 NOTE — PROGRESS NOTES
[facility]  [address]  Phone [phone]  Fax:  [phone]    Patient: Vargas Herndon  YOB: 1935  Patient Age 82 y.o. Patient sex: male  Medical Record:  [id number]  Visit Date: 11/04/22    Family Practice Clinic Note     Chief Complaint   Patient presents with    Other     Lump in the upper right leg,also having cramps in legs       History of Present Illness:  Pt here for c/o  R knee pain -     Also has a knot in Upper L groin - just appeared. Not painful. Not growing. Does not think he had a groin stick there in the past.     Afib/CAD - Just had cath done. No CP. Has sob. No new stents. Has a pacemaker. Going to have a TERESO on 11/14 done as the TTE was not clear per pt. Pt has been having more SOB so cath was done. Pt fell about 4 wks ago and feels like knee cap is slipping. Seen by Dr Tawana Soria and sent him for therapy. Improving but has some cramping in his legs at night. No blood clot and had an mri done 10/12. 1. Displaced meniscal flap tear that arises from the free margin of the  posterior horn of the medial meniscus and displaces into the posterior medial  notch. 2. Trace joint effusion. 3. Large amount of generalized subcutaneous edema which is nonspecific    Night cramps - For yrs. Has not tried anything for it. No exercise. Eating a lot of beets. Is taking  mg. Not really helping. DM - A1c good - checking sugars bid. Discussed diet  - lunch poor. Some mid pm lows. Hyperlipidemia-he is on Pravachol. He is tolerating medication. Other  Pertinent negatives include no abdominal pain, chest pain, headaches, joint swelling, nausea, vomiting or weakness.      Allergies:No Known Allergies    Current Medications:   Medications marked \"taking\" at this time:    Current Outpatient Medications:     apixaban (ELIQUIS) 2.5 MG TABS tablet, Take by mouth 2 times daily, Disp: , Rfl:     omeprazole (PRILOSEC) 40 MG delayed release capsule, TAKE 1 CAPSULE BY MOUTH EVERY DAY BEFORE A MEAL, Disp: 90 capsule, Rfl: 1    fluticasone (FLONASE) 50 MCG/ACT nasal spray, 2 sprays by Nasal route daily SPRAY 2 SPRAYS INTO EACH NOSTRIL EVERY DAY, Disp: 16 g, Rfl: 5    amLODIPine (NORVASC) 5 MG tablet, TAKE 1 AND 1/2 TABLET DAILY, Disp: 135 tablet, Rfl: 1    bumetanide (BUMEX) 1 MG tablet, Take 1 tablet by mouth daily, Disp: 90 tablet, Rfl: 1    cyanocobalamin 1000 MCG tablet, Take 1 tablet by mouth daily, Disp: 90 tablet, Rfl: 1    doxazosin (CARDURA) 4 MG tablet, Take 1 tablet by mouth nightly TAKE 1 TABLET BY MOUTH EVERY DAY, Disp: 90 tablet, Rfl: 1    ferrous sulfate (FE TABS 325) 325 (65 Fe) MG EC tablet, Take 1 tablet by mouth every morning (before breakfast), Disp: 90 tablet, Rfl: 1    Insulin Degludec (TRESIBA FLEXTOUCH) 200 UNIT/ML SOPN, Inject 26 Units into the skin daily, Disp: 3 mL, Rfl: 5    linagliptin (TRADJENTA) 5 MG tablet, Take 1 tablet by mouth daily, Disp: 90 tablet, Rfl: 1    losartan-hydroCHLOROthiazide (HYZAAR) 50-12.5 MG per tablet, Take 1 tablet by mouth daily, Disp: 90 tablet, Rfl: 1    pravastatin (PRAVACHOL) 80 MG tablet, Take 1 tablet by mouth daily, Disp: 90 tablet, Rfl: 1    warfarin (COUMADIN) 5 MG tablet, TAKING ONLY 2 DAYS A WEEK, Disp: 90 tablet, Rfl: 1    warfarin (COUMADIN) 7.5 MG tablet, Taking only five days a week, Disp: 90 tablet, Rfl: 1    blood glucose monitor strips, Use to check blood sugars twice daily DXE11.9, Disp: 200 strip, Rfl: 5    Insulin Pen Needle 29G X 12MM MISC, 1 each by Does not apply route daily Use daily to inject insulin.  DXE11.9, Disp: 100 each, Rfl: 5    fluticasone-umeclidin-vilant (TRELEGY ELLIPTA) 100-62.5-25 MCG/INH AEPB, 1 inhalation every morning, rinse mouth after use, Disp: 3 each, Rfl: 3    brimonidine (ALPHAGAN P) 0.15 % ophthalmic solution, 1 drop, Disp: , Rfl:     latanoprost (XALATAN) 0.005 % ophthalmic solution, LOCATION: BOTH EYES. INSTILL ONE DROP INTO BOTH EYES AT NIGHT BEFORE BED., Disp: , Rfl:     magnesium oxide (MAG-OX) 400 MG tablet, Take 400 mg by mouth daily, Disp: , Rfl:     melatonin 5 MG TABS tablet, Take by mouth, Disp: , Rfl:     potassium gluconate 550 mg tablet, Take by mouth daily, Disp: , Rfl:     sertraline (ZOLOFT) 100 MG tablet, Take 1 tablet by mouth daily (Patient not taking: Reported on 2022), Disp: 90 tablet, Rfl: 1    Current Problem List:   Patient Active Problem List   Diagnosis    Generalized anxiety disorder    Diabetic polyneuropathy associated with type 2 diabetes mellitus ([facility]    Pacemaker    Pulmonary HTN ([facility]    Arrhythmia    Chronic calculous cholecystitis    Type 2 diabetes with nephropathy (HCC)    Chronic pain    Hyperlipidemia, mixed    Cervicalgia    Muscle spasm of left shoulder    Occipital cerebral infarction ([facility]    Hypertension    History of vertebral fracture    Coronary artery disease involving native coronary artery of native heart without angina pectoris    Multinodular goiter    Type 2 diabetes mellitus with stage 3 chronic kidney disease, without long-term current use of insulin (HCC)    GERD (gastroesophageal reflux disease)    Tobacco use disorder    Paroxysmal atrial fibrillation (HCC)    Stage 3 chronic kidney disease ([facility]    Long term current use of anticoagulant    Imbalance    URI (upper respiratory infection)    Symptomatic bradycardia    Chronic pain of right knee    Anemia due to stage 4 chronic kidney disease (HCC)    Nonrheumatic mitral valve regurgitation    At high risk for falls    Nocturnal leg cramps       Social History:   Social History     Tobacco Use    Smoking status: Former     Packs/day: 0.25     Years: 44.00     Pack years: 11.00     Types: Cigarettes     Quit date: 1996     Years since quittin.3    Smokeless tobacco: Never    Tobacco comments:     Quit smoking: quit Patient formally smoked 3 packs a day but says he did not smoke the whole cigarette but burned 3 cigarettes 3 packs/day. He smoked close to 40 years but quit in 1996. Substance Use Topics    Alcohol use: No       Family History:   Family History   Problem Relation Age of Onset    Colon Cancer Father 80    Thyroid Disease Father         goiter    No Known Problems Paternal Grandfather     No Known Problems Paternal Grandmother     No Known Problems Maternal Grandfather     No Known Problems Maternal Grandmother     Crohn's Disease Son     No Known Problems Brother     No Known Problems Brother     No Known Problems Brother     Heart Disease Brother     Cancer Sister         liver    Heart Disease Sister     Heart Disease Brother     Breast Cancer Sister     Thyroid Cancer Neg Hx        Surgical History:  Past Surgical History:   Procedure Laterality Date    ABLATION OF DYSRHYTHMIC FOCUS      BACK SURGERY  2012    fusion    CARDIAC PROCEDURE N/A 10/14/2022    RIGHT HEART CATH performed by Collette Cody MD at [facility] CATH LAB    COLONOSCOPY  2018    COLONOSCOPY  2008    GI  1980's    Rectal fistula repair    KNEE ARTHROSCOPY Right 2012    [place] CARDIAC SURG PROCEDURE UNLIST  2019    pacemaker placed    [place] CARDIAC SURG PROCEDURE UNLIST      atrial fib ablation       ROS  Review of Systems   Constitutional: Negative. Eyes:  Negative for visual disturbance. Cardiovascular:  Negative for chest pain, palpitations and leg swelling. Gastrointestinal:  Negative for abdominal pain, constipation, diarrhea, nausea and vomiting. Endocrine: Negative for cold intolerance and heat intolerance. Genitourinary:  Negative for decreased urine volume, difficulty urinating, dysuria, frequency, hematuria, penile discharge, scrotal swelling and urgency. Musculoskeletal:  Negative for back pain, gait problem and joint swelling. Skin: Negative. Allergic/Immunologic: Negative for environmental allergies. Neurological:  Negative for dizziness, tremors, weakness and headaches. Hematological: Negative.     Psychiatric/Behavioral:  Negative for behavioral problems and sleep disturbance. Visit Vitals  /60   Pulse 83   Temp 97.3 °F (36.3 °C)   Ht 6' (1.829 m)   Wt 229 lb (103.9 kg)   SpO2 98%   BMI 31.06 kg/m²       Physical Exam  Physical Exam  Vitals reviewed. Constitutional:       Appearance: Normal appearance. HENT:      Head: Normocephalic and atraumatic. Nose: Nose normal.   Eyes:      Pupils: Pupils are equal, round, and reactive to light. Neck:      Vascular: No carotid bruit. Cardiovascular:      Rate and Rhythm: Normal rate and regular rhythm. Heart sounds: Normal heart sounds. Pulmonary:      Effort: Pulmonary effort is normal.      Breath sounds: Normal breath sounds. Musculoskeletal:         General: Swelling present. No tenderness or deformity. Normal range of motion. Cervical back: Normal range of motion and neck supple. Comments: R knee - +warmth and swelling  No redness. FROM in knee   Lymphadenopathy:      Cervical: No cervical adenopathy. Skin:     General: Skin is warm and dry. Neurological:      General: No focal deficit present. Mental Status: He is alert. Mental status is at baseline. Psychiatric:         Mood and Affect: Mood normal.         ASSESSMENT & PLAN      I have reviewed the patient's past medical history, social history and family history and vitals. We have discussed treatment plan and follow up and given patient instructions. Patient's questions are answered and we will follow up as indicated. Afib /CAD-  Followed by cards - Recent cath and planning ÁNGEL. ON coumadin. DM -Checking sugars bid. Last A1c at 7.8. Some lows in mid pms. Discussed need to eat protein at lunch. Eating a banana and a piece of bread at lunch. Makes a lot of fudge at home and will eat that if sugar drops. R knee pain- improving with therapy. Meniscal tear on MRI Recommend ice  intermittently for swelling     Anemia - on iron tabs. Hh.11.1/34.-stable. No dizziness.   Likely from ckd. On iron too. CKD Stage 4  - Followed by renal. Last Cr at 2.3. Egfr at 27    Hyperlipidemia - ldl at 45. Cont meds. On pravachol. Lesion in L groin - benign. Monitor for growth. Night cramps-bilateral lower extremities. This is been occurring for a few years now. He is on magnesium which does not seem to help I did recommend a heating pad and some stretching exercises. 6 mos f/u        Arminda Turcios MD    On the basis of positive falls risk screening, assessment and plan is as follows: home safety tips provided, patient declines any further evaluation/treatment for increased falls risk. Pt is in PT now for his R knee and working on gait.

## 2022-11-07 ENCOUNTER — HOSPITAL ENCOUNTER (OUTPATIENT)
Dept: PHYSICAL THERAPY | Age: 87
Setting detail: RECURRING SERIES
Discharge: HOME | End: 2022-11-10
Payer: MEDICARE

## 2022-11-07 PROCEDURE — 97110 THERAPEUTIC EXERCISES: CPT

## 2022-11-07 ASSESSMENT — PAIN SCALES - GENERAL: PAINLEVEL_OUTOF10: 0

## 2022-11-07 NOTE — DISCHARGE SUMMARY
Awilda Mcduffie  : 1935  Primary: [employer]  Secondary:  [address] @ [address] [place] [address]  Phone: [phone]  Fax: [phone] Plan Frequency: Patient to be discharged at this time  Plan of Care/Certification Expiration Date: 23      PT Visit Info:         Visit Count:  4                OUTPATIENT PHYSICAL THERAPY:             OP NOTE TYPE: Discharge Summary 2022               Episode  Appt Desk         Treatment Diagnosis:  Pain in Right Hip (M25.551)  Pain in Right Knee (M25.561)  Stiffness of Right Knee, Not elsewhere classified (M25.661)  Difficulty in walking, Not elsewhere classified (R26.2)  Other abnormalities of gait and mobility (R26.89)  Medical/Referring Diagnosis:  Injury of right knee, subsequent encounter [S89.91XD]  Acute medial meniscus tear of right knee, initial encounter [Y21.080Z]  Referring Physician: Juan Reed MD MD Orders:  PT Eval and Treat   Return MD Appt:  22  Date of Onset:  Onset Date: 22 (Patient reports falling 4 weeks ago)     Allergies:  Patient has no known allergies.   Restrictions/Precautions:  PACEMAKER         Medications Last Reviewed:  2022           OBJECTIVE         ROM Date:  2022   KNEE ROM (TESTED IN SUPINE)    RIGHT LEFT   Flexion 130 from 122° 124°   Extension +1 from +4° +3°     PALPATION/TONE/TISSUE TEXTURE:   Date:   2022   SOFT TISSUE:   Quads Unremarkable     Hamstrings Unremarkable     TFL/IT band Unremarkable     Gastroc/soleus/post tib Unremarkable     Skin integrity   Mild swelling of R knee       STRENGTH   Date: 2022     Right Left   Hip Abduction NT  NT    Hip IR 5 from 4+ 5 from 5   Hip ER 5 from 4+ 5 from 4+   Hip Flexion 4+ from 3+, pain traveled to [facility] 4+ from 4   Knee Extension 5 from 5 5 from 5    Knee Flexion 5 from 4+ 5 from 4+      FUNCTIONAL MOBILITY   Date:   2022 Transfers Independent and now without UE support   Gait deviations Walks now without antalgic gait   Assistive device None   Stairs Reciprocating from Step-to gait pattern    Bed mobility Independent      EDEMA Date:  10/27/2022      Activity/Exercise Left  Right   Mid patella  44 cm 45 from 47 cm        SPECIAL TESTS: Assessed @ Initial Visit ;   *STABILITY TESTS:    -Lachman's: negative    -Varus Test (LCL laxity): negative    -Valgus Test (MCL laxity): negative    -Posterior drawer: negative    -Posterior sag: negative    *MENISCUS CLUSTER TEST:    -End range flexion: negative    -End range extension: negative    -Joint line tenderness: mild pain    -Report of locking: negative    -McMurrays:negative             ASSESSMENT   Initial Assessment:  Mr. Johanny Coates has attended 1 physical therapy session including initial evaluation as of 10/27/2022. Johanny Coates is a 82 y.o. male with complaints of R knee and hip pain after running into his coffee table and falling onto it 4 weeks ago. Patient reports the pain is achy and sharp at times; it also refers towards his R gluteals. The patient reports having trouble walking and uses a step-to pattern when using stairs. Patient reports rest alleviates his symptoms. Patient denies any other falls in the last 3 months. He also reports having night cramps in B LE. He reports having a medical history of cardiac problems, pacemaker, diabetes mellitus, and a pacemaker. Johanny Coates presents with increased pain, decreased ROM, decreased strength, decreased functional tolerance, decreased balance, and increased knee swelling. Johanny Coates will benefit from home exercise program, therapeutic and postural strengthening exercises, manual therapeutic techniques as appropriate to address their current condition.   Johanny Coates will benefit from skilled PT (medically necessary) to address above deficits affecting participation in basic ADLs and overall functional tolerance. Discharge Summary 11/7/2022: Mr. Todd Johnson has attended 1 physical therapy session including initial evaluation as of 10/27/2022. He states he feels 90% better since his initial evaluation. The patient states he no longer feels knee pain when standing or ambulating. He now ascends and descends stairs with reciprocating gait and without complaints. Patient ambulates without an antalgic gait. The patient also has made improvements in ROM, strength, and pain tolerance. He also reports being able to carry all his groceries in one trip from the car into his house. The patient still has mild R knee pain with palpation. The patient has met all 9 of his goals. The patient is appropriate and agreeable to be discharged at this time and continue with an independent HEP. PLAN   Effective Dates: 10/27/2022  TO Plan of Care/Certification Expiration Date: 01/25/23     Frequency/Duration: Plan Frequency: Patient to be discharged at this time          GOALS: (Goals have been discussed and agreed upon with patient.)  Short Term Goals 4 weeks   Todd Johnson will be independent with HEP to promote self-management of symptoms. GOAL MET 11/7/2022   Todd Johnson will perform Nu Step x 10 minutes for hip and knee AAROM. Todd Johnson will tolerate manual therapy/joint mobilizations to increase knee flexion ROM so pt can ambulate stairs and walk with a more normalized gait pattern. GOAL MET 11/7/2022   Todd Johnson will participate in static and dynamic balance activities for 5 minutes to help improve proprioception and decrease risk of falls. GOAL MET 11/7/2022     Long Term Goals 12 weeks  Todd Johnson will be able to perform sit to stand transfers independently with increased knee flexion and decreased use of upper extremities.   GOAL MET 11/7/2022   Todd Johnson will ascend/descend 12 steps with reciprocal gait pattern and rail. GOAL MET 11/7/2022   Manuel Jewell will improve MMT B LE to >=4+/5 to improve current level of independence and community reintegration GOAL MET 11/7/2022   Manuel Jewell will report carrying his groceries into his house with minimal complaints to demonstrate return to prior level of function. GOAL MET 11/7/2022   Manuel Jewell will increase their score on the Lower Extremity Functional Scale by 10 points from their initial score to show improvement in areas of difficulty. Outcome Measure: Tool Used: Lower Extremity Functional Scale (LEFS)  Score:  Initial: 30/80 Most Recent: 62/80 (Date: 11/7/2022 )   Interpretation of Score: 20 questions each scored on a 5 point scale with 0 representing \"extreme difficulty or unable to perform\" and 4 representing \"no difficulty\". The lower the score, the greater the functional disability. 80/80 represents no disability. Minimal detectable change is 9 points. Total Duration:  Time In: 1100  Time Out: 1155    Regarding Manuel Jewell therapy, I certify that the treatment plan above will be carried out by a therapist or under their direction. Thank you for this referral,  Anupama Morrison, PT     Referring Physician Signature: Andra Mann MD No Signature is Required for this note.         Post Session Pain  Charge Capture  PT Visit Info MD Vinnie Solomon

## 2022-11-07 NOTE — PROGRESS NOTES
Zoe Sanchez  : 1935  Primary: [employer]  Secondary:  [address] @ [address] [facility] [address]  Phone: [phone]  Fax: [phone] Plan Frequency: Patient to be discharged at this time  Plan of Care/Certification Expiration Date: 23      PT Visit Info:  No data recorded   Visit Count:  4   OUTPATIENT PHYSICAL THERAPY:OP NOTE TYPE: Treatment Note 2022       Episode  }Appt Desk             Treatment Diagnosis:  Pain in Right Hip (M25.551)  Pain in Right Knee (M25.561)  Stiffness of Right Knee, Not elsewhere classified (M25.661)  Difficulty in walking, Not elsewhere classified (R26.2)  Other abnormalities of gait and mobility (R26.89)  Medical/Referring Diagnosis:  Injury of right knee, subsequent encounter [S89.91XD]  Acute medial meniscus tear of right knee, initial encounter [R47.360E]  Referring Physician: Shravan Alexander MD MD Orders:  PT Eval and Treat   Date of Onset:  Onset Date: 22 (Patient reports falling 4 weeks ago)     Allergies:   Patient has no known allergies. Restrictions/Precautions:  No data recorded  No data recorded   Interventions Planned (Treatment may consist of any combination of the following):    Current Treatment Recommendations: Strengthening; ROM; Balance training; Functional mobility training; Endurance training; Gait training; Stair training; Neuromuscular re-education; Manual Therapy - Soft Tissue Mobilization; Manual Therapy - Joint Manipulation; Pain management; Home exercise program; Modalities; Integrated dry needling; Therapeutic activities     Subjective Comments:  Patient still reports leg cramps at night.   Initial:}    0/10Post Session:       0/10  Medications Last Reviewed:  2022  Updated Objective Findings:   See DC note from today  Treatment   THERAPEUTIC EXERCISE: (53 minutes):    Exercises per grid below to improve mobility, strength, balance, and coordination. Required moderate visual, verbal, manual, and tactile cues to promote proper body alignment, promote proper body posture, promote proper body mechanics, and promote proper body breathing techniques. Progressed resistance, range, repetitions, and complexity of movement as indicated. Date:  11/7/2022  Date:  11/1/22 Date:  11/3/22   Activity/Exercise Parameters Parameters Parameters   NuStep 8 minutes, Level 2 5 minutes, Level 2 10 minutes, Level 2   Heel Slides - X10, stopped exercise because patient experienced severe leg cramps 3x10   Straight Leg Raise 2x10, parallel with hooklying L LE  2x10, to tibial tuberosity    Quad Sets 3x10, 3 second hold, supine 3x10, 3 second hold, supine 3x10, 3 second hold, supine   Heel raises 2x10  2x10, light touch balance   Stairs X5 clinic stairs, ascend and descend     Gait 2 laps     Education Reviewed HEP    New treatment    Assessment and Measurements      Popular[employer] Access Code: [email or web address]    Time spent with patient reviewing proper muscle recruitment and technique with exercises. MANUAL THERAPY: (0 minutes):   Joint mobilization, Soft tissue mobilization, and Manipulation was utilized and necessary because of the patient's restricted joint motion, painful spasm, and loss of articular motion. MODALITIES: (0 minutes):        None today      HEP: As above; handouts given to patient for all exercises.       Treatment/Session Summary:    Treatment Assessment:  See DC note from today  Communication/Consultation:   Independent HEP  Equipment provided today:  Updated HEP  Recommendations/Intent for next treatment session: Patient to be discharged      Total Treatment Billable Duration:  53 minutes  Time In: 1100  Time Out: Jose Avendano, PT       Charge Capture  }Post Session Pain  PT Visit [address] [facility]  MD [address]    Future Appointments   Date Time Provider Jessica Mccord   11/10/2022 10:50 AM Santiago Floyd MD POAG [facility] AM[facility]   11/11/2022 11:00 AM PFP NURSE PFP [facility] AM[facility]   11/14/2022  1:00 PM SFD CATH/CV FLOAT RN SFDCCL SFD   11/28/2022 10:00 AM PFP NURSE PFP [facility]   12/2/2022  8:00 AM Nahid Venegas MD UCDG [facility] AM[facility]   12/15/2022 10:45 AM Tg Baires MD END [facility] AMB   1/11/2023 11:30 AM Nicho Nuñez MD PFP [facility] AM[facility]   1/18/2023 11:10 AM HEATHER Waddell PPS [facility] AMB

## 2022-11-09 ENCOUNTER — APPOINTMENT (OUTPATIENT)
Dept: PHYSICAL THERAPY | Age: 87
End: 2022-11-09
Payer: MEDICARE

## 2022-11-10 ENCOUNTER — OFFICE VISIT (OUTPATIENT)
Dept: ORTHOPEDIC SURGERY | Age: 87
End: 2022-11-10
Payer: MEDICARE

## 2022-11-10 DIAGNOSIS — M17.11 UNILATERAL PRIMARY OSTEOARTHRITIS, RIGHT KNEE: Primary | ICD-10-CM

## 2022-11-10 PROCEDURE — 99213 OFFICE O/P EST LOW 20 MIN: CPT | Performed by: ORTHOPAEDIC SURGERY

## 2022-11-10 PROCEDURE — 1123F ACP DISCUSS/DSCN MKR DOCD: CPT | Performed by: ORTHOPAEDIC SURGERY

## 2022-11-10 NOTE — PROGRESS NOTES
Name: Jewels Cervantes  YOB: 1935  Gender: male  MRN: [id number]    09/29/2022: Initial visit with me for: Right knee pain/injury. Duplex ultrasound study with no DVT  10/20/2022: Last visit with me; knee injection; PT  11/10/2022: He reports no pain or limitations. Injection resolved all of his pain. He reports PT discharged him    HPI:   09/20/2022: He tripped over a coffee table injuring his right knee. Over time, he noticed more swelling and bruising extending down to his ankle and foot  09/27/2022: Dr. Xi Carmona evaluated with x-rays  09/29/2022: He presented for right knee pain. He also reported swelling and pain    ROS/Meds/PSH/PMH/FH/SH: reviewed today    Tobacco:  reports that he quit smoking about 26 years ago. His smoking use included cigarettes. He has a 11.00 pack-year smoking history. He has never used smokeless tobacco.   Type 2 diabetes: Diabetic polyneuropathy: ESRD stage III: Atrial fibrillation on Coumadin    Physical Examination:  Patient appears to be alert and oriented with acceptable appearance. No obvious distress or SOB  CV: appears to have acceptable vascular color and capillary refill  Neuro: appears to have slight diminished light touch sensation   Skin: No knee swelling  MS: Standing: Plantigrade: Gait full and age-dependent  Right = no reproducible knee pain; no calf pain; full knee motion; 5/5 strength; no instability or crepitance; no locking    XR: Right side: Standing AP lateral mortise ankle plus AP oblique foot taken 09/29/2022 with proximal os peroneal; plantar and posterior heel enthesopathy; no definite fracture noted; tarsometatarsal arthritis; mild arterial calcification  XR Impression:  As above      Reviewed Test/Records/Documents:  09/27/2022: Dr. Puma Greenberg FP reflects falling over the coffee table   09/20/2022. Reported immediate right knee swelling.   X-rays ordered  09/27/2022: [facility] XR 2 views knee: Radiologic impression negative for acute fracture. Mild OA   2022: My review of those x-rays concur with no definite fracture. Knee osteoarthritis. Posterior lateral soft tissue calcification uncertain etiology     2022: Right LE Duplex U/S radiology impression: Negative for DVT   10/12/2022: Right knee MRI scan without contrast: Radiologic impression:  1. Displaced meniscal flap tear that arises from the free margin of the posterior horn of the medial meniscus and displaces into the posterior medial notch  2. Trace joint effusion  3. Large amount of generalized subcutaneous edema which is nonspecific    10/20/2022: Right knee joint was injected with 2 cc Xylocaine: 80 mg Depo-Medrol : He understood how the Depomedrol may affect glucose control; he reported no problems. Assessment:    Right knee injury; displaced meniscal flap tear;persistent knee effusion/knee swelling  Right anterior tibial shin abrasion with surrounding cellulitis - resolved   Right lower extremity bruising and swelling - no DVT per duplex ultrasound    Plan:   The patient and I discussed the above assessment. We explored treatment options. He is doing really well. He reports no pain or limitations  Reports next week cardiac procedures and work-up so he will return to see me as needed. He no longer uses the Reparel leg sleeve and has not used the knee immobilizer since the initial visit  PT: He reports being discharged by [facility]     Medication - OTC meds prn: Limited due to Coumadin and diabetes:   He stopped prior prescribed: Augmentin 875 m p.o. twice daily: 14 days: 2 refills   In the past, he is use Zanaflex and baclofen for cramping  10/20/2022:  I did prescribe a short course of Robaxin 500 mg 1 p.o. twice daily  At this point he seems to be stable but I recommend he get with Dr. Olsen over more long-term treatment   He was given a handout for natural medication from [employer] related to cramping     Surgical discussion: Surgery potential discussed, but he has no indications for surgery today   Follow up: as needed   Work status: Sitting     This note was created using Dragon voice recognition software which may result in errors of speech and spelling recognition and word/phrase syntax errors.

## 2022-11-11 ENCOUNTER — NURSE ONLY (OUTPATIENT)
Dept: FAMILY MEDICINE CLINIC | Facility: CLINIC | Age: 87
End: 2022-11-11

## 2022-11-11 DIAGNOSIS — Z79.01 LONG TERM CURRENT USE OF ANTICOAGULANT: Primary | ICD-10-CM

## 2022-11-11 LAB — INR BLD: 1.8

## 2022-11-14 ENCOUNTER — APPOINTMENT (OUTPATIENT)
Dept: PHYSICAL THERAPY | Age: 87
End: 2022-11-14
Payer: MEDICARE

## 2022-11-14 ENCOUNTER — HOSPITAL ENCOUNTER (OUTPATIENT)
Dept: CARDIAC CATH/INVASIVE PROCEDURES | Age: 87
Discharge: HOME | End: 2022-11-14
Attending: INTERNAL MEDICINE | Admitting: INTERNAL MEDICINE
Payer: MEDICARE

## 2022-11-14 VITALS
SYSTOLIC BLOOD PRESSURE: 127 MMHG | BODY MASS INDEX: 31.02 KG/M2 | HEART RATE: 75 BPM | RESPIRATION RATE: 18 BRPM | HEIGHT: 72 IN | WEIGHT: 229 LBS | DIASTOLIC BLOOD PRESSURE: 60 MMHG | OXYGEN SATURATION: 93 %

## 2022-11-14 DIAGNOSIS — I34.0 NONRHEUMATIC MITRAL VALVE REGURGITATION: ICD-10-CM

## 2022-11-14 LAB
ANION GAP SERPL CALC-SCNC: 4 MMOL/L (ref 2–11)
BASOPHILS # BLD: 0 K/UL (ref 0–0.2)
BASOPHILS NFR BLD: 0 % (ref 0–2)
BUN SERPL-MCNC: 50 MG/DL (ref 8–23)
CALCIUM SERPL-MCNC: 9.8 MG/DL (ref 8.3–10.4)
CHLORIDE SERPL-SCNC: 104 MMOL/L (ref 101–110)
CO2 SERPL-SCNC: 31 MMOL/L (ref 21–32)
CREAT SERPL-MCNC: 1.9 MG/DL (ref 0.8–1.5)
DIFFERENTIAL METHOD BLD: ABNORMAL
EKG ATRIAL RATE: 163 BPM
EKG DIAGNOSIS: NORMAL
EKG Q-T INTERVAL: 430 MS
EKG QRS DURATION: 150 MS
EKG QTC CALCULATION (BAZETT): 480 MS
EKG R AXIS: 264 DEGREES
EKG T AXIS: 59 DEGREES
EKG VENTRICULAR RATE: 75 BPM
EOSINOPHIL # BLD: 0.2 K/UL (ref 0–0.8)
EOSINOPHIL NFR BLD: 2 % (ref 0.5–7.8)
ERYTHROCYTE [DISTWIDTH] IN BLOOD BY AUTOMATED COUNT: 13.1 % (ref 11.9–14.6)
GLUCOSE SERPL-MCNC: 179 MG/DL (ref 65–100)
HCT VFR BLD AUTO: 39.7 % (ref 41.1–50.3)
HGB BLD-MCNC: 13 G/DL (ref 13.6–17.2)
IMM GRANULOCYTES # BLD AUTO: 0 K/UL (ref 0–0.5)
IMM GRANULOCYTES NFR BLD AUTO: 0 % (ref 0–5)
INR PPP: 1.7
LYMPHOCYTES # BLD: 1.1 K/UL (ref 0.5–4.6)
LYMPHOCYTES NFR BLD: 12 % (ref 13–44)
MAGNESIUM SERPL-MCNC: 2.3 MG/DL (ref 1.8–2.4)
MCH RBC QN AUTO: 33.1 PG (ref 26.1–32.9)
MCHC RBC AUTO-ENTMCNC: 32.7 G/DL (ref 31.4–35)
MCV RBC AUTO: 101 FL (ref 82–102)
MONOCYTES # BLD: 0.7 K/UL (ref 0.1–1.3)
MONOCYTES NFR BLD: 8 % (ref 4–12)
NEUTS SEG # BLD: 6.9 K/UL (ref 1.7–8.2)
NEUTS SEG NFR BLD: 78 % (ref 43–78)
NRBC # BLD: 0 K/UL (ref 0–0.2)
PLATELET # BLD AUTO: 172 K/UL (ref 150–450)
PMV BLD AUTO: 11.7 FL (ref 9.4–12.3)
POTASSIUM SERPL-SCNC: 3.9 MMOL/L (ref 3.5–5.1)
PROTHROMBIN TIME: 20.1 SEC (ref 12.6–14.3)
RBC # BLD AUTO: 3.93 M/UL (ref 4.23–5.6)
SODIUM SERPL-SCNC: 139 MMOL/L (ref 133–143)
WBC # BLD AUTO: 8.8 K/UL (ref 4.3–11.1)

## 2022-11-14 PROCEDURE — 93320 DOPPLER ECHO COMPLETE: CPT | Performed by: INTERNAL MEDICINE

## 2022-11-14 PROCEDURE — 93005 ELECTROCARDIOGRAM TRACING: CPT | Performed by: INTERNAL MEDICINE

## 2022-11-14 PROCEDURE — 83735 ASSAY OF MAGNESIUM: CPT

## 2022-11-14 PROCEDURE — 93320 DOPPLER ECHO COMPLETE: CPT

## 2022-11-14 PROCEDURE — 93312 ECHO TRANSESOPHAGEAL: CPT | Performed by: INTERNAL MEDICINE

## 2022-11-14 PROCEDURE — 85610 PROTHROMBIN TIME: CPT

## 2022-11-14 PROCEDURE — 99152 MOD SED SAME PHYS/QHP 5/>YRS: CPT | Performed by: INTERNAL MEDICINE

## 2022-11-14 PROCEDURE — 6360000002 HC RX W HCPCS: Performed by: INTERNAL MEDICINE

## 2022-11-14 PROCEDURE — 80048 BASIC METABOLIC PNL TOTAL CA: CPT

## 2022-11-14 PROCEDURE — 6370000000 HC RX 637 (ALT 250 FOR IP): Performed by: INTERNAL MEDICINE

## 2022-11-14 PROCEDURE — 93319 3D ECHO IMG CGEN CAR ANOMAL: CPT | Performed by: INTERNAL MEDICINE

## 2022-11-14 PROCEDURE — 99152 MOD SED SAME PHYS/QHP 5/>YRS: CPT

## 2022-11-14 PROCEDURE — 85025 COMPLETE CBC W/AUTO DIFF WBC: CPT

## 2022-11-14 RX ORDER — SODIUM CHLORIDE 9 MG/ML
INJECTION, SOLUTION INTRAVENOUS CONTINUOUS
Status: DISCONTINUED | OUTPATIENT
Start: 2022-11-14 | End: 2022-11-14 | Stop reason: HOSPADM

## 2022-11-14 RX ORDER — FENTANYL CITRATE 50 UG/ML
INJECTION, SOLUTION INTRAMUSCULAR; INTRAVENOUS
Status: COMPLETED | OUTPATIENT
Start: 2022-11-14 | End: 2022-11-14

## 2022-11-14 RX ORDER — MIDAZOLAM HYDROCHLORIDE 2 MG/2ML
INJECTION, SOLUTION INTRAMUSCULAR; INTRAVENOUS
Status: COMPLETED | OUTPATIENT
Start: 2022-11-14 | End: 2022-11-14

## 2022-11-14 RX ORDER — LIDOCAINE HYDROCHLORIDE 20 MG/ML
SOLUTION OROPHARYNGEAL
Status: COMPLETED | OUTPATIENT
Start: 2022-11-14 | End: 2022-11-14

## 2022-11-14 RX ADMIN — FENTANYL CITRATE 50 MCG: 50 INJECTION, SOLUTION INTRAMUSCULAR; INTRAVENOUS at 14:24

## 2022-11-14 RX ADMIN — LIDOCAINE HYDROCHLORIDE 15 ML: 20 SOLUTION ORAL at 14:11

## 2022-11-14 RX ADMIN — MIDAZOLAM HYDROCHLORIDE 2 MG: 1 INJECTION, SOLUTION INTRAMUSCULAR; INTRAVENOUS at 14:24

## 2022-11-14 NOTE — DISCHARGE INSTRUCTIONS
Can eat or drink after 4pm today, start with sips of water and advance as tolerated. No hot liquids until this evening. Sedation for a Medical Procedure: Care Instructions     You were given a sedative medication during your visit. While many of the effects will have worn   off before you leave; you may continue to feel some effects for several hours. Common side effects from sedation include:  Feeling sleepy. (Your doctors and nurses will make sure you are not too sleepy to go home.)  Nausea and vomiting. This usually does not last long. Feeling tired. How can you care for yourself at home? Activity    Don't do anything for 24 hours that requires attention to detail. It takes time for the medicine effects to completely wear off. Do not make important legal decisions for 24 hours. Do not sign any legal documents for 24 hours. Do not drink alcohol today     For your safety, you should not drive or operate heavy machinery for the remainder of the day     Rest when you feel tired. Getting enough sleep will help you recover. Diet    You can eat your normal diet, unless your doctor gives you other instructions. If your stomach is upset, try clear liquids and bland, low-fat foods like plain toast or rice. Drink plenty of fluids (unless your doctor tells you not to). Don't drink alcohol for 24 hours. Medicines    Be safe with medicines. Read and follow all instructions on the label. If the doctor gave you a prescription medicine for pain, take it as prescribed. If you are not taking a prescription pain medicine, ask your doctor if you can take an over-the-counter medicine. If you think your pain medicine is making you sick to your stomach: Take your medicine after meals (unless your doctor has told you not to). Ask your doctor for a different pain medicine. I have read the above instructions and have had the opportunity to ask questions.       Patient: ________________________   Date: _____________    Witness: _______________________   Date: _____________       Transesophageal Echocardiogram: What to Expect at [address] [facility]  A transesophageal echocardiogram is a test to help your doctor look at the inside of your heart. A small device called a transducer directs sound waves toward your heart. The sound waves make a picture of the heart's valves and chambers. Before the test, your throat was sprayed with medicine to numb it. Your throat may be sore for a few days. You may have had a sedative to help you relax. You may be unsteady after having sedation. It can take a few hours for the medicine's effects to wear off. Common side effects include nausea, vomiting, and feeling sleepy or tired. This care sheet gives you a general idea about how long it will take for you to recover. But each person recovers at a different pace. Follow the steps below to feel better as quickly as possible. How can you care for yourself at home? Activity    If a sedative was used, your doctor will tell you when it is safe for you to do your normal activities. For your safety, do not drive or operate any machinery that could be dangerous. Wait until the medicine wears off and you can think clearly and react easily. Diet    Do not eat or drink until the numbness in your throat wears off. When the numbness is gone, you can eat your normal diet. Throat lozenges and warm saltwater gargles can help relieve throat soreness. Throat lozenges can be used by people age 3 or older. And most people can gargle at age 6 and older. Do not drink alcohol for 24 hours. Follow-up care is a key part of your treatment and safety. Be sure to make and go to all appointments, and call your doctor if you are having problems. It's also a good idea to know your test results and keep a list of the medicines you take. When should you call for help?    Call 911 anytime you think you may need emergency care. For example, call if:    Your stools are maroon or very bloody. You vomit blood or what looks like coffee grounds. Call your doctor now or seek immediate medical care if:    You have pain in your chest, belly, or back. You have new or worse trouble swallowing. You have trouble breathing. Watch closely for changes in your health, and be sure to contact your doctor if you have any problems. Current as of: January 10, 2022               Content Version: 13.4  © 2006-2022 [employer]. Care instructions adapted under license by [facility] [place] ([facility]). If you have questions about a medical condition or this instruction, always ask your healthcare professional. [address] any warranty or liability for your use of this information.

## 2022-11-14 NOTE — PROGRESS NOTES
ÁNGEL complete with   Sedation start 1422  Sedation end 1436  Numbed with viscous lidocaine at 1411.    2mg of Versed and 50mcg of Fentanyl given for sedation.

## 2022-11-15 LAB
ECHO BSA: 2.3 M2
ECHO TV REGURGITANT MAX VELOCITY: 2.8 M/S
ECHO TV REGURGITANT PEAK GRADIENT: 32 MMHG
LV EF: 58 %
LVEF MODALITY: NORMAL

## 2022-11-16 ENCOUNTER — APPOINTMENT (OUTPATIENT)
Dept: PHYSICAL THERAPY | Age: 87
End: 2022-11-16
Payer: MEDICARE

## 2022-11-17 ENCOUNTER — OFFICE VISIT (OUTPATIENT)
Dept: ORTHOPEDIC SURGERY | Age: 87
End: 2022-11-17
Payer: MEDICARE

## 2022-11-17 VITALS — BODY MASS INDEX: 31.15 KG/M2 | HEIGHT: 72 IN | WEIGHT: 230 LBS

## 2022-11-17 DIAGNOSIS — M25.552 HIP PAIN, LEFT: Primary | ICD-10-CM

## 2022-11-17 PROCEDURE — 99203 OFFICE O/P NEW LOW 30 MIN: CPT | Performed by: PHYSICIAN ASSISTANT

## 2022-11-17 PROCEDURE — 1123F ACP DISCUSS/DSCN MKR DOCD: CPT | Performed by: PHYSICIAN ASSISTANT

## 2022-11-17 NOTE — PROGRESS NOTES
Name: Amanda Liu  YOB: 1935  Gender: male  MRN: [id number]    CC:   Chief Complaint   Patient presents with    Hip Pain     Left hip and groin pain         HPI: Amanda Liu is a 82 y.o. male with a PMHx of DM, HLD, CAD, and atrial fibrillations/p pacemaker and currently on warfarin here for evaluation of left hip pain  He also has a history of L1 kyphoplasty in 2013. There was not an acute injury  Regarding the hip pain, it has been present for about 4 months and is becoming worse. The pain is primarily anterior into the groin  he describes the pain as an intermittent sharp grabbing pain. He also notes he has notes a \"knot\" in the groin  The pain is worse with walking  The pain does not radiate down the leg. he denies numbness and tingling down the leg. Treatment so far has been activity modification and Tylenol with little relief. He denies feeling of pain with coughing or sitting up. He denies being able to push the night back in. He denies any changes in bowel or bladder habits. Review of Systems  As per HPI. Pertinent positives and negatives are addressed with the patient, particularly those related to musculoskeletal concerns. Non-orthopaedic concerns were referred back to the primary care physician. No Known Allergies                 PHYSICAL EXAMINATION:   The patient is alert and oriented, in no distress. There were no vitals filed for this visit. The cervical, thoracic and lumbar spine are without compromising deformity. The upper extremities demonstrate reasonable tone, strength and function bilaterally. The lower extremities are as described below. Circulation is normal with palpable pedal pulses bilaterally and no edema. Skin of the leg is normal with no chronic stasis disease bilaterally. Sensation is intact to light touch bilaterally.   The gait is noted to be with a slight trendelenburg and antalgia  There no tenderness to palpation over the left greater trochanter  left hip range of motion is 0-90 degrees of flexion and 20 degrees on abduction and adduction. There is 15 degrees internal rotation and 25 degrees of external rotation without groin pain  Limb lengths are equal.  Straight leg test is Positive left  Stability is normal bilaterally. There is a blueberry sized soft, mobile, nontender lipomatous-like lesion just medial to the ASIS  No explicit hernia felt with patient lying down and coughing  Their judgment and insight are normal.  They are oriented to time, place and person. Their memory is good and the mood and affect appropriate. XRAYS:   AP pelvis and lateral views of the left hip are reviewed. There is  Some mild joint space loss along the inferior acetabulum without any significant eburnated bone and osteophyte formation of the hip. X-ray impression:  Early degenerative changes of the left hip      IMPRESSION:      ICD-10-CM    1. Hip pain, left  M25.552 XR HIP 2-3 VW W PELVIS LEFT            RECOMMENDATION:    X-rays were reviewed with the patient today. I see no acute findings on exam or x-ray today. I would like to get an MRI of the left hip for further evaluation. Differential diagnosis at this point includes inguinal hernia, labral tear, mild OA of the left hip.    ----The patient will be treated observantly with self directed symptomatic care. Instructions were given to follow up if there is any neurologic or functional decline.  ---- An MRI was ordered to delineate anatomy, confirm the diagnosis and assess the severity.     4--this is an undiagnosed new problem with uncertain prognosis    HEATHER Matthew

## 2022-11-21 ENCOUNTER — NURSE ONLY (OUTPATIENT)
Dept: FAMILY MEDICINE CLINIC | Facility: CLINIC | Age: 87
End: 2022-11-21

## 2022-11-21 ENCOUNTER — APPOINTMENT (OUTPATIENT)
Dept: PHYSICAL THERAPY | Age: 87
End: 2022-11-21
Payer: MEDICARE

## 2022-11-21 DIAGNOSIS — Z79.01 LONG TERM CURRENT USE OF ANTICOAGULANT: Primary | ICD-10-CM

## 2022-11-21 LAB — INR BLD: 2.9

## 2022-11-23 ENCOUNTER — APPOINTMENT (OUTPATIENT)
Dept: PHYSICAL THERAPY | Age: 87
End: 2022-11-23
Payer: MEDICARE

## 2022-11-28 ENCOUNTER — APPOINTMENT (OUTPATIENT)
Dept: PHYSICAL THERAPY | Age: 87
End: 2022-11-28
Payer: MEDICARE

## 2022-11-30 ENCOUNTER — APPOINTMENT (OUTPATIENT)
Dept: PHYSICAL THERAPY | Age: 87
End: 2022-11-30
Payer: MEDICARE

## 2022-11-30 ENCOUNTER — TELEPHONE (OUTPATIENT)
Dept: ORTHOPEDIC SURGERY | Age: 87
End: 2022-11-30

## 2022-12-02 ENCOUNTER — OFFICE VISIT (OUTPATIENT)
Dept: CARDIOLOGY CLINIC | Age: 87
End: 2022-12-02
Payer: MEDICARE

## 2022-12-02 ENCOUNTER — HOSPITAL ENCOUNTER (OUTPATIENT)
Dept: MRI IMAGING | Age: 87
End: 2022-12-02
Payer: MEDICARE

## 2022-12-02 VITALS
SYSTOLIC BLOOD PRESSURE: 128 MMHG | WEIGHT: 225.7 LBS | HEIGHT: 72 IN | BODY MASS INDEX: 30.57 KG/M2 | HEART RATE: 72 BPM | DIASTOLIC BLOOD PRESSURE: 56 MMHG

## 2022-12-02 DIAGNOSIS — M25.552 HIP PAIN, LEFT: ICD-10-CM

## 2022-12-02 DIAGNOSIS — I10 PRIMARY HYPERTENSION: Primary | ICD-10-CM

## 2022-12-02 DIAGNOSIS — I25.10 CORONARY ARTERY DISEASE INVOLVING NATIVE CORONARY ARTERY OF NATIVE HEART WITHOUT ANGINA PECTORIS: ICD-10-CM

## 2022-12-02 PROCEDURE — 73721 MRI JNT OF LWR EXTRE W/O DYE: CPT

## 2022-12-02 PROCEDURE — 3078F DIAST BP <80 MM HG: CPT | Performed by: INTERNAL MEDICINE

## 2022-12-02 PROCEDURE — 99214 OFFICE O/P EST MOD 30 MIN: CPT | Performed by: INTERNAL MEDICINE

## 2022-12-02 PROCEDURE — 3074F SYST BP LT 130 MM HG: CPT | Performed by: INTERNAL MEDICINE

## 2022-12-02 PROCEDURE — 1123F ACP DISCUSS/DSCN MKR DOCD: CPT | Performed by: INTERNAL MEDICINE

## 2022-12-02 RX ORDER — LOSARTAN POTASSIUM 50 MG/1
50 TABLET ORAL DAILY
Qty: 30 TABLET | Refills: 11 | Status: SHIPPED | OUTPATIENT
Start: 2022-12-02

## 2022-12-02 RX ORDER — SPIRONOLACTONE 25 MG/1
25 TABLET ORAL DAILY
Qty: 30 TABLET | Refills: 11 | Status: SHIPPED | OUTPATIENT
Start: 2022-12-02

## 2022-12-02 ASSESSMENT — ENCOUNTER SYMPTOMS
EYE PAIN: 0
SHORTNESS OF BREATH: 1
ABDOMINAL PAIN: 0
GASTROINTESTINAL NEGATIVE: 1
EYES NEGATIVE: 1
ALLERGIC/IMMUNOLOGIC NEGATIVE: 1
BACK PAIN: 0
PHOTOPHOBIA: 0
CHEST TIGHTNESS: 0

## 2022-12-02 NOTE — PROGRESS NOTES
[facility] CARDIOLOGY  [address] [facility] [address], [address]  PHONE: [phone]      22    NAME:  Julio Alvarez  : 1935  MRN: [id number]         SUBJECTIVE:   Julio Alvarez is a 82 y.o. male seen for follow up of:      Chief Complaint   Patient presents with    Coronary Artery Disease        Cardiac Hx (Reviewed and summarized by me):  1) Afib - on warfarin followed by Dr Maddie Livingston  2) PPM/AVN ablation   3) Moderate/severe MR and Severe TR   ÁNGEL 22 - Mild/Mod MR, Mod TR  4) NM stress test 20 - Normal perfusion      HPI:  Middle of the month we did a ÁNGEL (Dr. Sandy Bentley) to look at both his mitral and tricuspid valve. Both were in the mild to moderate range in terms of the severity of the regurgitation. This argues against valvular disease contributing significantly to his shortness of breath. He already takes a diuretic with potassium supplement daily recently has had this increased to twice daily. He continues to have shortness of breath with exertion. Past Medical History, Past Surgical History, Family history, Social History, and Medications were all reviewed with the patient today and updated as necessary.        Current Outpatient Medications:     spironolactone (ALDACTONE) 25 MG tablet, Take 1 tablet by mouth daily, Disp: 30 tablet, Rfl: 11    losartan (COZAAR) 50 MG tablet, Take 1 tablet by mouth daily, Disp: 30 tablet, Rfl: 11    omeprazole (PRILOSEC) 40 MG delayed release capsule, TAKE 1 CAPSULE BY MOUTH EVERY DAY BEFORE A MEAL, Disp: 90 capsule, Rfl: 1    fluticasone (FLONASE) 50 MCG/ACT nasal spray, 2 sprays by Nasal route daily SPRAY 2 SPRAYS INTO EACH NOSTRIL EVERY DAY, Disp: 16 g, Rfl: 5    bumetanide (BUMEX) 1 MG tablet, Take 1 tablet by mouth daily, Disp: 90 tablet, Rfl: 1    cyanocobalamin 1000 MCG tablet, Take 1 tablet by mouth daily, Disp: 90 tablet, Rfl: 1    doxazosin (CARDURA) 4 MG tablet, Take 1 tablet by mouth nightly TAKE 1 TABLET BY MOUTH EVERY DAY, Disp: 90 tablet, Rfl: 1    ferrous sulfate (FE TABS 325) 325 (65 Fe) MG EC tablet, Take 1 tablet by mouth every morning (before breakfast), Disp: 90 tablet, Rfl: 1    Insulin Degludec (TRESIBA FLEXTOUCH) 200 UNIT/ML SOPN, Inject 26 Units into the skin daily, Disp: 3 mL, Rfl: 5    linagliptin (TRADJENTA) 5 MG tablet, Take 1 tablet by mouth daily, Disp: 90 tablet, Rfl: 1    pravastatin (PRAVACHOL) 80 MG tablet, Take 1 tablet by mouth daily, Disp: 90 tablet, Rfl: 1    sertraline (ZOLOFT) 100 MG tablet, Take 1 tablet by mouth daily, Disp: 90 tablet, Rfl: 1    warfarin (COUMADIN) 5 MG tablet, TAKING ONLY 2 DAYS A WEEK, Disp: 90 tablet, Rfl: 1    warfarin (COUMADIN) 7.5 MG tablet, Taking only five days a week, Disp: 90 tablet, Rfl: 1    blood glucose monitor strips, Use to check blood sugars twice daily DXE11.9, Disp: 200 strip, Rfl: 5    Insulin Pen Needle 29G X 12MM MISC, 1 each by Does not apply route daily Use daily to inject insulin.  DXE11.9, Disp: 100 each, Rfl: 5    fluticasone-umeclidin-vilant (TRELEGY ELLIPTA) 100-62.5-25 MCG/INH AEPB, 1 inhalation every morning, rinse mouth after use, Disp: 3 each, Rfl: 3    brimonidine (ALPHAGAN P) 0.15 % ophthalmic solution, 1 drop, Disp: , Rfl:     latanoprost (XALATAN) 0.005 % ophthalmic solution, LOCATION: BOTH EYES. INSTILL ONE DROP INTO BOTH EYES AT NIGHT BEFORE BED., Disp: , Rfl:     magnesium oxide (MAG-OX) 400 MG tablet, Take 400 mg by mouth daily, Disp: , Rfl:     melatonin 5 MG TABS tablet, Take by mouth, Disp: , Rfl:     potassium gluconate 550 mg tablet, Take by mouth daily, Disp: , Rfl:   No Known Allergies  Past Medical History:   Diagnosis Date    Allergic rhinitis     Atrial fibrillation (HCC)     Chronic kidney disease     \"a little\"    Chronic kidney disease, stage III (moderate) (HCC)     Chronic pain     Colon polyp     Coronary artery disease     Diabetic polyneuropathy associated with type 2 diabetes mellitus ([facility] 7/22/2019 Erectile dysfunction     Fracture of left fibula     GERD (gastroesophageal reflux disease)     Glaucoma     Hypertension     Imbalance 2019    Multinodular goiter     Nephrolithiasis     Obesity (BMI 30-39. 9)          Peptic ulcer disease     Plantar fasciitis     Psychiatric disorder     Right inguinal hernia     Sciatica     Stroke ([facility]     Tobacco use disorder     Type 2 diabetes mellitus ([facility]      Past Surgical History:   Procedure Laterality Date    ABLATION OF DYSRHYTHMIC FOCUS      BACK SURGERY  2012    fusion    CARDIAC PROCEDURE N/A 10/14/2022    RIGHT HEART CATH performed by Darron Middleton MD at [address] [facility] CATH LAB    COLONOSCOPY  2018    COLONOSCOPY      GI  1980's    Rectal fistula repair    KNEE ARTHROSCOPY Right 2012    LA CARDIAC SURG PROCEDURE UNLIST  2019    pacemaker placed    LA CARDIAC SURG PROCEDURE UNLIST      atrial fib ablation     Family History   Problem Relation Age of Onset    Colon Cancer Father 80    Thyroid Disease Father         goiter    No Known Problems Paternal Grandfather     No Known Problems Paternal Grandmother     No Known Problems Maternal Grandfather     No Known Problems Maternal Grandmother     Crohn's Disease Son     No Known Problems Brother     No Known Problems Brother     No Known Problems Brother     Heart Disease Brother     Cancer Sister         liver    Heart Disease Sister     Heart Disease Brother     Breast Cancer Sister     Thyroid Cancer Neg Hx      Social History     Tobacco Use    Smoking status: Former     Packs/day: 0.25     Years: 44.00     Pack years: 11.00     Types: Cigarettes     Quit date: 1996     Years since quittin.4    Smokeless tobacco: Never    Tobacco comments:     Quit smoking: quit Patient formally smoked 3 packs a day but says he did not smoke the whole cigarette but burned 3 cigarettes 3 packs/day. He smoked close to 40 years but quit in .    Substance Use Topics    Alcohol use: No       ROS:  Review of Systems   Constitutional: Negative. Negative for fever. HENT:  Negative for hearing loss, nosebleeds and tinnitus. Eyes: Negative. Negative for photophobia and pain. Respiratory:  Positive for shortness of breath. Negative for chest tightness. Cardiovascular: Negative. Negative for chest pain, palpitations and leg swelling. Gastrointestinal: Negative. Negative for abdominal pain. Endocrine: Negative. Negative for cold intolerance and heat intolerance. Genitourinary: Negative. Negative for dysuria. Musculoskeletal: Negative. Negative for back pain and joint swelling. Skin: Negative. Negative for rash. Allergic/Immunologic: Negative. Negative for immunocompromised state. Neurological: Negative. Negative for dizziness, syncope and light-headedness. Hematological: Negative. Does not bruise/bleed easily. Psychiatric/Behavioral: Negative. Negative for suicidal ideas. PHYSICAL EXAM:  Physical Exam  Constitutional:       General: He is not in acute distress. Appearance: He is not ill-appearing. HENT:      Head: Normocephalic and atraumatic. Nose: No congestion. Mouth/Throat:      Mouth: Mucous membranes are moist.   Eyes:      Extraocular Movements: Extraocular movements intact. Pupils: Pupils are equal, round, and reactive to light. Cardiovascular:      Rate and Rhythm: Normal rate and regular rhythm. Heart sounds: Murmur heard. No friction rub. No gallop. Pulmonary:      Effort: No respiratory distress. Breath sounds: No wheezing or rhonchi. Musculoskeletal:         General: No swelling. Cervical back: Normal range of motion. Right lower leg: No edema. Left lower leg: No edema. Skin:     General: Skin is warm and dry. Findings: No rash. Neurological:      General: No focal deficit present. Mental Status: He is oriented to person, place, and time.    Psychiatric:         Mood and Affect: Mood normal. Behavior: Behavior normal.         Judgment: Judgment normal.        BP (!) 128/56   Pulse 72   Ht 6' (1.829 m)   Wt 225 lb 11.2 oz (102.4 kg)   BMI 30.61 kg/m²      Wt Readings from Last 10 Encounters:   12/02/22 225 lb 11.2 oz (102.4 kg)   11/17/22 230 lb (104.3 kg)   11/14/22 229 lb (103.9 kg)   11/04/22 229 lb (103.9 kg)   11/02/22 228 lb 8 oz (103.6 kg)   10/14/22 240 lb (108.9 kg)   10/11/22 245 lb (111.1 kg)   10/10/22 240 lb (108.9 kg)   09/29/22 232 lb (105.2 kg)   09/27/22 232 lb 8 oz (105.5 kg)           Medical problems and test results were reviewed with the patient today. Lab Results   Component Value Date/Time    BUN 50 11/14/2022 01:27 PM     No results found for: DHIRAJ, CREAPOC, CREA  Lab Results   Component Value Date/Time    K 3.9 11/14/2022 01:27 PM       Lab Results   Component Value Date/Time    CHOL 86 10/06/2022 08:45 AM    HDL 37 10/06/2022 08:45 AM    VLDL 14 01/25/2022 02:32 PM       ASSESSMENT and PLAN    ICD-10-CM    1. Primary hypertension  R71 Basic Metabolic Panel      2. Coronary artery disease involving native coronary artery of native heart without angina pectoris  I25.10           IMPRESSION:  1) mitral valve and tricuspid regurgitation -we will continue to monitor however the transesophageal echocardiogram argues against these being significant contributors to shortness of breath. 2) Afib -continue warfarin  3) he has a permanent pacemaker this followed by our device clinic he had AV node ablation  4) SOB -his right heart cath showed elevated pulmonary capillary wedge pressure. He is already on a diuretic. I would like to stop his amlodipine and his hydrochlorothiazide and we will start him on spironolactone 25 mg daily. We will have him come back in 1 week for a BMP check to check potassium and renal function. .      ALL ORDERS THIS ENCOUNTER  Orders Placed This Encounter    Basic Metabolic Panel     Standing Status:   Future     Standing Expiration Date: 12/2/2023    spironolactone (ALDACTONE) 25 MG tablet     Sig: Take 1 tablet by mouth daily     Dispense:  30 tablet     Refill:  11    losartan (COZAAR) 50 MG tablet     Sig: Take 1 tablet by mouth daily     Dispense:  30 tablet     Refill:  11          Follow up in 1 months. Thank you for allowing me to participate in this patient's care. Please call or contact me if there are any questions or concerns regarding the above.       Gretel Tompkins MD  12/02/22  8:23 AM

## 2022-12-02 NOTE — PATIENT INSTRUCTIONS
STOP taking Hyzaar  STOP taking Amlodipine  Start taking losartan 50 mg daily  Start taking spironolactone 25 mg daily    Follow up with RN in our office in one week with BP check and BMP

## 2022-12-06 PROCEDURE — 93294 REM INTERROG EVL PM/LDLS PM: CPT | Performed by: INTERNAL MEDICINE

## 2022-12-06 PROCEDURE — 93296 REM INTERROG EVL PM/IDS: CPT | Performed by: INTERNAL MEDICINE

## 2022-12-07 ENCOUNTER — TELEPHONE (OUTPATIENT)
Dept: CARDIOLOGY CLINIC | Age: 87
End: 2022-12-07

## 2022-12-07 ENCOUNTER — OFFICE VISIT (OUTPATIENT)
Dept: ORTHOPEDIC SURGERY | Age: 87
End: 2022-12-07
Payer: MEDICARE

## 2022-12-07 VITALS — BODY MASS INDEX: 30.48 KG/M2 | WEIGHT: 225 LBS | HEIGHT: 72 IN

## 2022-12-07 DIAGNOSIS — S73.192A TEAR OF LEFT ACETABULAR LABRUM, INITIAL ENCOUNTER: Primary | ICD-10-CM

## 2022-12-07 DIAGNOSIS — M51.36 DDD (DEGENERATIVE DISC DISEASE), LUMBAR: ICD-10-CM

## 2022-12-07 DIAGNOSIS — M47.816 FACET ARTHROPATHY, LUMBAR: ICD-10-CM

## 2022-12-07 PROCEDURE — 99214 OFFICE O/P EST MOD 30 MIN: CPT | Performed by: PHYSICIAN ASSISTANT

## 2022-12-07 PROCEDURE — 1123F ACP DISCUSS/DSCN MKR DOCD: CPT | Performed by: PHYSICIAN ASSISTANT

## 2022-12-07 RX ORDER — METHOCARBAMOL 500 MG/1
500 TABLET, FILM COATED ORAL 3 TIMES DAILY PRN
Qty: 30 TABLET | Refills: 0 | Status: SHIPPED | OUTPATIENT
Start: 2022-12-07 | End: 2022-12-17

## 2022-12-07 NOTE — PROGRESS NOTES
Name: Jewels Cervantes  YOB: 1935  Gender: male  MRN: [id number]    CC:   Chief Complaint   Patient presents with    Results     MRI results          HPI: Jewels Cervantes is a 82 y.o. male with a PMHx of DM, HLD, CAD, and atrial fibrillations/p pacemaker and currently on warfarin here for follow up evaluation of left hip pain and review of MRI results. He also has a history of L1 kyphoplasty in 2013. There was not an acute injury  Regarding the hip pain, it has been present for about 5 months now and is becoming worse. The pain is primarily anterior into the groin  he describes the pain as an intermittent sharp grabbing pain. He also notes he has a \"knot\" in the groin  The pain is worse with walking  The patient states the pain does not necessarily radiate down the leg, but he does admit to more left knee pain and muscle cramps recently. He does have a history of injections with Dr. Blade Headley. he denies numbness and tingling down the leg. Treatment so far has been activity modification and Tylenol with little relief. He has also done 4-5 sessions of physical therapy in the last 8 weeks. Patient states his therapist told him PT would not help him with this problem. He is unsure why the therapist told him this. He denies feeling of pain with coughing or sitting up. He denies being able to push the night back in. He denies any changes in bowel or bladder habits. Review of Systems  As per HPI. Pertinent positives and negatives are addressed with the patient, particularly those related to musculoskeletal concerns. Non-orthopaedic concerns were referred back to the primary care physician. No Known Allergies                 PHYSICAL EXAMINATION:   The patient is alert and oriented, in no distress. There were no vitals filed for this visit. The cervical, thoracic and lumbar spine are without compromising deformity.  The upper extremities demonstrate reasonable tone, strength and function bilaterally. The lower extremities are as described below. Circulation is normal with palpable pedal pulses bilaterally and no edema. Skin of the leg is normal with no chronic stasis disease bilaterally. Sensation is intact to light touch bilaterally. The gait is noted to be with a slight trendelenburg and antalgia  There no tenderness to palpation over the left greater trochanter  left hip range of motion is 0-90 degrees of flexion and 20 degrees on abduction and adduction. There is 15 degrees internal rotation and 25 degrees of external rotation without groin pain  Limb lengths are equal.  Straight leg test is Positive left  Positive FADIR on the left   Stability is normal bilaterally. There is a blueberry sized soft, mobile, nontender lipomatous-like lesion just medial to the ASIS  No explicit hernia felt with patient lying down and coughing  Their judgment and insight are normal.  They are oriented to time, place and person. Their memory is good and the mood and affect appropriate. XRAYS:   AP pelvis and lateral views of the left hip are reviewed. There is  Some mild joint space loss along the inferior acetabulum without any significant eburnated bone and osteophyte formation of the hip. X-ray impression:  Early degenerative changes of the left hip    MRI Result (most recent):  MRI HIP LEFT WO CONTRAST 12/02/2022    Narrative  EXAM: MRI left hip without contrast.  INDICATION: Left hip pain  COMPARISON: Pelvis and left hip radiographs, 11/17/2022  TECHNIQUE: Multiplanar multisequence imaging of the left hip without contrast.    FINDINGS:  There are mild left hip degenerative changes with marginal osteophytes and  subchondral cystic changes along the lateral acetabulum. There is focal chondral  loss along the superolateral joint margin.  There is degenerative tearing along  the anterosuperior to the posterior superior labral zones with small paralabral  cyst formation. The ligamentum teres is grossly intact. No joint effusion. Partially imaged mild right hip degenerative changes on the large coronal  field-of-view sequences. There is also a potential small right paralabral cyst  suggesting occult labral pathology. The pubic symphysis is intact. Mild  bilateral SI joint degenerative changes. Lower lumbar degenerative disease and  facet arthropathy. No evidence of fracture, contusion, or malalignment. No  aggressive marrow signal abnormality. No avascular necrosis. No significant bursitis. Mild tendinosis of the left gluteus minimus and medius  tendons without evidence of tear. The iliopsoas, rectus femoris, and hamstring  tendons are intact. Sciatic nerve bundles are normal. Prostatomegaly. Colonic  diverticulosis. Impression  1. 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 2. No evidence of fracture or AVN. 3. Left gluteal tendinosis without evidence of tear. X-RAY:  AP, lateral, and focused lateral lumbosacral views of the lumbar spine are reviewed. Findings: Prior L1 kyphoplasty appears stable. There are multiple spondylotic changes noted including loss of disk height and osteophytes. No destructive lesion. No evidence for instability. Bone quality appears normal.    Interpretation: Lumbar spondylosis  Status post L1 Kyphoplasty      IMPRESSION:      ICD-10-CM    1. Tear of left acetabular labrum, initial encounter  S73.192A       2. DDD (degenerative disc disease), lumbar  M51.36 XR LUMBAR SPINE (2-3 VIEWS)      3. Facet arthropathy, lumbar  M47.816               RECOMMENDATION:    X-rays and MRI were reviewed with the patient today. He does have evidence of a labral tear without any significant joint degeneration. He does have lumbar spondylosis, but it is difficult to tell whether his symptoms are coming from his back or this labral tear.   He is unable to take NSAIDs due to warfarin and his last A1c was elevated. Patient states oral steroids elevate his glucose levels so we would like to hold off on these medications at this time. I did talk with Payal Miller PA-C since the patient has seen Dr. Jose R Pulliam in the past for knee problems about getting the patient into try an intra-articular injection into the left hip as I believe this will be diagnostic if not therapeutic. If he has no relief from the injection that I recommend MRI of the lumbar spine and follow-up with our spine team.  Patient expressed understanding this plan.    ----The patient will be treated observantly with self directed symptomatic care. Instructions were given to follow up if there is any neurologic or functional decline.  ---- A home exercise program was prescribed for stretching and strengthening. A list of exercises was provided. ---- If the patient fails to respond to these efforts, I would recommend further imaging of the lumbar spine for further evaluation.  ---- Muscle Relaxant: The patient was prescribed a muscle relaxant. The patient understands that this is a temporary measure to bring acute spasm under control.       4--this is an undiagnosed new problem with uncertain prognosis    HEATHER Carlisle

## 2022-12-07 NOTE — TELEPHONE ENCOUNTER
I called the pt and went over Dr. Magdalena Perdue notes about his medications with him. Also pt stated he can not come in this Friday for the nurse visit.   He is rescheduled for next Friday at 9:30am.

## 2022-12-07 NOTE — TELEPHONE ENCOUNTER
Pt called for clarification about his medications. States he was supposed to stop his losartan and amlodipine but states that he was then restarted on his losartan. Pt is not sure what he's supposed to be taking and what he's supposed to stop. Pt would appreciate a call back.

## 2022-12-09 ENCOUNTER — TELEPHONE (OUTPATIENT)
Dept: ORTHOPEDIC SURGERY | Age: 87
End: 2022-12-09

## 2022-12-09 NOTE — TELEPHONE ENCOUNTER
Left patient a VM that his appointment is scheduled for 12/19/22 @ 8:20am @ the [facility] location.

## 2022-12-15 ENCOUNTER — OFFICE VISIT (OUTPATIENT)
Dept: ENDOCRINOLOGY | Age: 87
End: 2022-12-15
Payer: MEDICARE

## 2022-12-15 VITALS
DIASTOLIC BLOOD PRESSURE: 58 MMHG | OXYGEN SATURATION: 97 % | BODY MASS INDEX: 30.38 KG/M2 | HEART RATE: 75 BPM | WEIGHT: 224 LBS | SYSTOLIC BLOOD PRESSURE: 118 MMHG

## 2022-12-15 DIAGNOSIS — E04.2 MULTINODULAR GOITER: Primary | ICD-10-CM

## 2022-12-15 PROCEDURE — 3078F DIAST BP <80 MM HG: CPT | Performed by: INTERNAL MEDICINE

## 2022-12-15 PROCEDURE — 76536 US EXAM OF HEAD AND NECK: CPT | Performed by: INTERNAL MEDICINE

## 2022-12-15 PROCEDURE — 1123F ACP DISCUSS/DSCN MKR DOCD: CPT | Performed by: INTERNAL MEDICINE

## 2022-12-15 PROCEDURE — 99213 OFFICE O/P EST LOW 20 MIN: CPT | Performed by: INTERNAL MEDICINE

## 2022-12-15 PROCEDURE — 3074F SYST BP LT 130 MM HG: CPT | Performed by: INTERNAL MEDICINE

## 2022-12-15 ASSESSMENT — ENCOUNTER SYMPTOMS
TROUBLE SWALLOWING: 0
VOICE CHANGE: 0

## 2022-12-15 NOTE — PROGRESS NOTES
Mariusz Bojorquez MD, [address]            Reason for visit: follow-up of multinodular goiter      ASSESSMENT AND PLAN:    1. Multinodular goiter  Ultrasound was repeated today and is not significantly changed. Ongoing ultrasound follow-up is not necessary. He can be followed henceforth with physical examination. If his exam changes, please let me know and I will be happy to get him back in for repeat ultrasound. Otherwise, follow-up with me is not necessary.  - US,HEAD/NECK TISSUES,REAL TIME      PROCEDURES:    HEAD AND NECK ULTRASOUND    Date of study:  12/15/2022    Performing/interpreting physician:  Mariusz Bojorquez MD, FACE    Indication:  thyroid nodule    Technique:  Using a 12 MHz linear transducer, multiple real-time planar images were obtained of the thyroid and surrounding tissues. Findings:  Right lobe 2.17 x 2.18 x 4.49 cm, isthmus 0.31 cm, left lobe 1.50 x 1.73 x 4.20 cm. Homogeneous echotexture. Normal blood flow. Multiple nodules in both lobes, most less than 1 cm in maximal diameter. The largest are as follows: 1.03 x 1.46 x 1.59 cm heterogeneous primarily isoechoic nodule without calcifications or increased blood flow at the right upper pole (TR 3). 0.77 x 0.87 x 1.08 cm complex colloid cyst without calcifications or increased blood flow in the posterior midportion of the right lobe. 0.96 x 1.29 x 1.53 cm isoechoic nodule without calcifications or increased blood flow in the upper to midportion of the left lobe (TR 3). Impression: Multiple thyroid nodules as noted. Follow-up and Dispositions    Return if symptoms worsen or fail to improve. History of Present Illness:    Kirk Sharma presents to the office for follow up of multinodular goiter. He denies interval development of obstructive symptoms. Presentation of thyroid nodules: incidentally discovered on thoracic MRI.      Imaging results: 9/5/2013: Ultrasound ([facility])- Right lobe 2.1 x 4.7 x 2.6 cm, left lobe 1.9 x 5.0 x 2.6 cm. Heterogeneous echotexture. 1.3 x 1.2 x 1.0 cm solid isoechoic vascular nodule at the right upper pole. 1.7 x 1.2 x 1.9 cm complex nodule with possible internal microcalcifications and mild internal and peripheral blood flow at the right upper pole. 1.1 x 1.0 x 0.7 cm complex cystic nodule at the left upper pole. 1.3 x 0.7 cm solid isoechoic nodule in the midportion of the left lobe. 1.1 x 1.0 cm complex cystic nodule at the left lower pole. 10/28/2013: Ultrasound ([place])- Right lobe 2.47 x 2.94 x 4.82 cm, isthmus 0.48 cm, left lobe 2.45 x 2.48 x 5.26 cm. Heterogeneous echotexture. Multiple nodules in both lobes, most less than 1 cm. 1.16 x 1.67 x 1.74 cm complex (primarily cystic) nodule without calcifications or increased blood flow in the midportion of the right lobe, 0.77 x 0.78 x 1.14 cm anechoic cyst in the anterior midportion of the left lobe. 0.61 x 0.87 x 0.94 cm anechoic colloid cyst in the posterior midportion of the left lobe. 0.95 x 0.91 x 0.91 cm hypoechoic nodule without calcifications or increased blood flow at the left lower pole. 4/7/2014: Ultrasound ([facility])- Right lobe 2.50 x 2.83 x 4.52 cm, isthmus 0.60 cm, left lobe 2.18 x 2.51 x 3.88 cm. Heterogeneous echotexture. Multiple nodules in both lobes, most less than 1 cm. 1.31 x 1.68 x 1.91 cm complex (primarily cystic) nodule without calcifications or increased blood flow in the anterior midportion of the right lobe. 0.86 x 0.79 x 1.18 cm anechoic cyst in the anterior midportion of the left lobe. 0.48 x 0.92 x 0.90 cm anechoic colloid cyst in the posterior midportion of the left lobe. 0.66 x 0.87 x 0.99 cm hypoechoic nodule without calcifications or increased blood flow at the left lower pole. 10/7/2014: Ultrasound ([place])- Right lobe 2.58 x 3.07 x 4.30 cm, isthmus 0.55 cm, left lobe 2.39 x 2.29 x 4.17 cm. Heterogeneous echotexture. Multiple nodules in both lobes, most less than 1 cm. 0.61 x 0.82 x 0.89 cm isoechoic nodule without calcifications or increased blood flow at the right upper pole. 1.35 x 1.83 x 2.10 cm complex (primarily cystic) nodule with comet tails and without calcifications or increased blood flow in the anterior midportion of the right lobe. 0.88 x 0.82 x 0.94 cm anechoic cyst in the anterior midportion of the left lobe. 0.94 x 0.97 x 1.02 cm anechoic colloid cyst in the posterior midportion of the left lobe. 0.98 x 1.06 x 1.08 cm hypoechoic nodule without calcifications or increased blood flow at the left lower pole. 4/9/2015: Ultrasound ([place])- Right lobe 2.62 x 2.79 x 4.71 cm, isthmus 0.54 cm, left lobe 1.99 x 2.28 x 3.89 cm. Heterogeneous echotexture. Multiple nodules in both lobes, most less than 1 cm. 0.93 x 0.89 x 0.95 cm complex isoechoic nodule without calcifications or increased blood flow at the right upper pole. 1.61 x 1.76 x 2.29 cm complex (primarily cystic) nodule with comet tails and without calcifications or increased blood flow in the anterior midportion of the right lobe. 0.86 x 0.80 x 1.08 cm anechoic cyst in the anterior midportion of the left lobe. 0.69 x 0.81 x 0.88 cm anechoic colloid cyst in the posterior midportion of the left lobe. 0.94 x 1.09 x 1.05 cm hypoechoic nodule without calcifications or increased blood flow at the left lower pole. 4/11/2016: Ultrasound ([facility])- Right lobe 2.96 x 3.15 x 4.83 cm, isthmus 0.54 cm, left lobe 1.98 x 2.15 x 3.48 cm. Heterogeneous echotexture. Multiple nodules in both lobes. The largest are as follows: 0.92 x 1.30 x 0.81 cm complex isoechoic nodule without calcifications or increased blood flow at the right upper pole. 1.44 x 1.89 x 2.15 cm complex primarily cystic nodule without calcifications or increased blood flow in the midportion of the right lobe. 0.89 x 0.88 x 1.22 cm hypoechoic/anechoic nodule/cyst in the midportion of the left lobe.  0.55 x 0.96 x 1.27 cm hypoechoic/anechoic nodule/cyst in the posterior portion of the left lower pole. 3/30/2018: Ultrasound ([place])- Right lobe 2.70 x 2.50 x 4.57 cm, isthmus 0.47 cm, left lobe 2.05 x 2.15 x 5.06 cm. Mildly heterogeneous echotexture. Multiple nodules in both lobes, most less than 1 cm. The largest are as follows: 1.01 x 1.13 x 1.14 cm isoechoic spongiform colloid nodule without calcifications or increased blood flow at the right upper pole. 0.92 x 1.37 x 1.66 cm complex (primarily cystic) nodule without calcifications or increased blood flow in the upper to midportion of the right lobe. 0.93 x 1.64 x 1.75 cm complex (primarily cystic) nodule without calcifications or increased blood flow in the upper to midportion of the right lobe. 0.84 x 0.88 x 0.89 cm hypoechoic nodule without calcifications or increased blood flow in the posterior midportion of the right lobe. 0.79 x 0.91 x 1.37 cm hypoechoic/anechoic nodule/cyst in the midportion of the left lobe. 0.76 x 0.75 x 1.22 cm complex (primarily cystic) nodule without calcifications or increased blood flow at the left lower pole. 7/27/2020: Ultrasound ([place])- Right lobe 2.90 x 2.80 x 5.19 cm, isthmus 0.43 cm, left lobe 1.92 x 2.09 x 4.73 cm. Homogeneous echotexture. Normal blood flow. Multiple nodules in both lobes, most less than 1 cm. The largest are as follows: 1.04 x 1.26 x 1.03 cm isoechoic spongiform colloid nodule without calcifications or increased blood flow at the right upper pole. 0.92 x 1.28 x 1.52 cm complex cyst at the right upper pole. 1.59 x 1.99 x 2.20 cm complex cyst in the midportion of the right lobe. 0.63 x 0.73 x 0.94 cm complex cyst in the posterior mid to lower portion of the right lobe. 0.51 x 0.80 x 0.83 cm complex cyst in the midportion of the left lobe. 0.90 x 0.93 x 1.05 cm simple cyst in the midportion of the left lobe.     12/15/2022: Ultrasound ([place])- Right lobe 2.17 x 2.18 x 4.49 cm, isthmus 0.31 cm, left lobe 1.50 x 1.73 x 4.20 cm. Homogeneous echotexture. Normal blood flow. Multiple nodules in both lobes, most less than 1 cm in maximal diameter. The largest are as follows: 1.03 x 1.46 x 1.59 cm heterogeneous primarily isoechoic nodule without calcifications or increased blood flow at the right upper pole (TR 3). 0.77 x 0.87 x 1.08 cm complex colloid cyst without calcifications or increased blood flow in the posterior midportion of the right lobe. 0.96 x 1.29 x 1.53 cm isoechoic nodule without calcifications or increased blood flow in the upper to midportion of the left lobe (TR 3). Associated features: He denies history of thyroid radiation exposure or family history of thyroid cancer. Current symptoms: See review of systems below    Prior biopsies:   9/26/2013: Ultrasound-guided fine needle aspiration biopsy of 1.7 x 1.2 cm complex nodule in right lobe ([facility])- nondiagnostic. Labs:  10/28/2013: TSH 2.250.  3/2/2018: TSH 3.650.  8/20/2018: TSH 2.820.  /29/2019: TSH 2.990.  1/21/2020: TSH 3.260.  10/6/2022: TSH 2.290. Review of Systems   HENT:  Negative for trouble swallowing and voice change. BP (!) 118/58   Pulse 75   Wt 224 lb (101.6 kg)   SpO2 97%   BMI 30.38 kg/m²   Wt Readings from Last 3 Encounters:   12/15/22 224 lb (101.6 kg)   12/07/22 225 lb (102.1 kg)   12/02/22 225 lb 11.2 oz (102.4 kg)       Physical Exam  Constitutional:       Appearance: Normal appearance. HENT:      Head: Normocephalic. Neck:      Thyroid: No thyroid mass or thyromegaly. Cardiovascular:      Rate and Rhythm: Normal rate and regular rhythm. Pulmonary:      Effort: Pulmonary effort is normal.      Breath sounds: Normal breath sounds. Neurological:      Mental Status: He is alert.    Psychiatric:         Mood and Affect: Mood normal.         Behavior: Behavior normal.       Orders Placed This Encounter   Procedures    US,HEAD/NECK TISSUES,REAL TIME       Current Outpatient Medications Medication Sig Dispense Refill    losartan (COZAAR) 50 MG tablet Take 1 tablet by mouth daily 30 tablet 11    omeprazole (PRILOSEC) 40 MG delayed release capsule TAKE 1 CAPSULE BY MOUTH EVERY DAY BEFORE A MEAL 90 capsule 1    fluticasone (FLONASE) 50 MCG/ACT nasal spray 2 sprays by Nasal route daily SPRAY 2 SPRAYS INTO EACH NOSTRIL EVERY DAY 16 g 5    bumetanide (BUMEX) 1 MG tablet Take 1 tablet by mouth daily 90 tablet 1    cyanocobalamin 1000 MCG tablet Take 1 tablet by mouth daily 90 tablet 1    doxazosin (CARDURA) 4 MG tablet Take 1 tablet by mouth nightly TAKE 1 TABLET BY MOUTH EVERY DAY 90 tablet 1    ferrous sulfate (FE TABS 325) 325 (65 Fe) MG EC tablet Take 1 tablet by mouth every morning (before breakfast) 90 tablet 1    Insulin Degludec (TRESIBA FLEXTOUCH) 200 UNIT/ML SOPN Inject 26 Units into the skin daily 3 mL 5    linagliptin (TRADJENTA) 5 MG tablet Take 1 tablet by mouth daily 90 tablet 1    pravastatin (PRAVACHOL) 80 MG tablet Take 1 tablet by mouth daily 90 tablet 1    sertraline (ZOLOFT) 100 MG tablet Take 1 tablet by mouth daily 90 tablet 1    warfarin (COUMADIN) 5 MG tablet TAKING ONLY 2 DAYS A WEEK 90 tablet 1    warfarin (COUMADIN) 7.5 MG tablet Taking only five days a week 90 tablet 1    blood glucose monitor strips Use to check blood sugars twice daily DXE11.9 200 strip 5    Insulin Pen Needle 29G X 12MM MISC 1 each by Does not apply route daily Use daily to inject insulin.  DXE11.9 100 each 5    brimonidine (ALPHAGAN P) 0.15 % ophthalmic solution 1 drop      latanoprost (XALATAN) 0.005 % ophthalmic solution LOCATION: BOTH EYES. INSTILL ONE DROP INTO BOTH EYES AT NIGHT BEFORE BED.      magnesium oxide (MAG-OX) 400 MG tablet Take 400 mg by mouth daily      potassium gluconate 550 mg tablet Take by mouth daily      methocarbamol (ROBAXIN) 500 MG tablet Take 1 tablet by mouth 3 times daily as needed (muscle spasm) 30 tablet 0    spironolactone (ALDACTONE) 25 MG tablet Take 1 tablet by mouth daily 30 tablet 11    fluticasone-umeclidin-vilant (TRELEGY ELLIPTA) 100-62.5-25 MCG/INH AEPB 1 inhalation every morning, rinse mouth after use (Patient not taking: Reported on 12/15/2022) 3 each 3    melatonin 5 MG TABS tablet Take by mouth (Patient not taking: Reported on 12/15/2022)       No current facility-administered medications for this visit. Ray Lindo MD, FACE      Portions of this note were generated with the assistance of voice recognition software. As such, some errors in transcription may be present.

## 2022-12-16 ENCOUNTER — NURSE ONLY (OUTPATIENT)
Dept: CARDIOLOGY CLINIC | Age: 87
End: 2022-12-16

## 2022-12-16 VITALS
DIASTOLIC BLOOD PRESSURE: 50 MMHG | SYSTOLIC BLOOD PRESSURE: 102 MMHG | HEART RATE: 74 BPM | HEIGHT: 72 IN | BODY MASS INDEX: 30.38 KG/M2

## 2022-12-16 DIAGNOSIS — Z01.30 BLOOD PRESSURE CHECK: Primary | ICD-10-CM

## 2022-12-16 DIAGNOSIS — I10 PRIMARY HYPERTENSION: ICD-10-CM

## 2022-12-16 LAB
ANION GAP SERPL CALC-SCNC: 5 MMOL/L (ref 2–11)
BUN SERPL-MCNC: 50 MG/DL (ref 8–23)
CALCIUM SERPL-MCNC: 9.2 MG/DL (ref 8.3–10.4)
CHLORIDE SERPL-SCNC: 105 MMOL/L (ref 101–110)
CO2 SERPL-SCNC: 28 MMOL/L (ref 21–32)
CREAT SERPL-MCNC: 2.1 MG/DL (ref 0.8–1.5)
GLUCOSE SERPL-MCNC: 297 MG/DL (ref 65–100)
POTASSIUM SERPL-SCNC: 5.1 MMOL/L (ref 3.5–5.1)
SODIUM SERPL-SCNC: 138 MMOL/L (ref 133–143)

## 2022-12-19 ENCOUNTER — OFFICE VISIT (OUTPATIENT)
Dept: ORTHOPEDIC SURGERY | Age: 87
End: 2022-12-19

## 2022-12-19 DIAGNOSIS — M51.36 DDD (DEGENERATIVE DISC DISEASE), LUMBAR: Primary | ICD-10-CM

## 2022-12-19 DIAGNOSIS — M25.552 LEFT HIP PAIN: ICD-10-CM

## 2022-12-19 RX ORDER — METHYLPREDNISOLONE ACETATE 40 MG/ML
40 INJECTION, SUSPENSION INTRA-ARTICULAR; INTRALESIONAL; INTRAMUSCULAR; SOFT TISSUE ONCE
Status: COMPLETED | OUTPATIENT
Start: 2022-12-19 | End: 2022-12-19

## 2022-12-19 RX ADMIN — METHYLPREDNISOLONE ACETATE 40 MG: 40 INJECTION, SUSPENSION INTRA-ARTICULAR; INTRALESIONAL; INTRAMUSCULAR; SOFT TISSUE at 08:44

## 2022-12-19 NOTE — PROGRESS NOTES
Name: Romie Germain  YOB: 1935  Gender: male  MRN: [id number]          Patient Name: Romie Germain    Date of Procedure: December 19, 2022    Preoperative Diagnosis:     ICD-10-CM    1. DDD (degenerative disc disease), lumbar  M51.36 methylPREDNISolone acetate (DEPO-MEDROL) injection 40 mg     FL GUIDED NEEDLE PLACEMENT      2. Left hip pain  M25.552           Post Operative Diagnosis: same    Procedure: left Hip Joint Injection    Consent: Informed consent was obtained prior to the procedure. The patient was given the opportunity to ask questions regarding the procedure and its associated risks. In addition to the potential risks associated with the procedure itself, the patient was informed both verbally and in writing of potential side effects of the use of glucocorticoids. The patient appeared to comprehend the informed consent and desired to have the procedure performed. Procedure: The patient was placed in the supine position on the fluoroscopy table and the hip was prepped and draped in the usual sterile manner. The hip joint was visualized using fluoroscopy, and after a local Lidocaine injection, a #22 gauge spinal needle was advanced to the hip joint. A total of 40 mg DepoMedrol  and 1 cc of Marcaine was slowly injected. The patient tolerated the procedure well. The injection area was cleaned and bandaids applied. No excessive bleeding was noted. Patient dressed and was discharged to home with instructions. Discussion:  The patient tolerated the procedure well. I did discuss the situation with the patient. We will follow him back up in about 4 weeks injection did. We will discuss more fully at that time the potential locations and etiology of his pain.         Boris Blanco MD  12/19/22  8:24 AM

## 2022-12-21 ENCOUNTER — NURSE ONLY (OUTPATIENT)
Dept: FAMILY MEDICINE CLINIC | Facility: CLINIC | Age: 87
End: 2022-12-21

## 2022-12-21 DIAGNOSIS — Z79.01 LONG TERM CURRENT USE OF ANTICOAGULANT: Primary | ICD-10-CM

## 2022-12-21 LAB — INR BLD: 1.8

## 2022-12-21 NOTE — PROGRESS NOTES
Patient is on warfarin for A. fib. INR today was 1.8, goal 2-3. INR 1 month ago was 2.9. Patient is currently taking warfarin 7.5 mg 4 days/week and 5 mg the other 3 days. Will increase to 7.5 mg 5 days/week. Recheck INR in 1 week.

## 2022-12-22 ENCOUNTER — TELEPHONE (OUTPATIENT)
Dept: FAMILY MEDICINE CLINIC | Facility: CLINIC | Age: 87
End: 2022-12-22

## 2022-12-22 RX ORDER — FERROUS SULFATE 325(65) MG
TABLET ORAL
Qty: 90 TABLET | Refills: 1 | OUTPATIENT
Start: 2022-12-22

## 2022-12-22 RX ORDER — GABAPENTIN 100 MG/1
100 CAPSULE ORAL NIGHTLY
Qty: 30 CAPSULE | Refills: 0 | Status: SHIPPED | OUTPATIENT
Start: 2022-12-22 | End: 2023-06-20

## 2022-12-28 ENCOUNTER — NURSE ONLY (OUTPATIENT)
Dept: FAMILY MEDICINE CLINIC | Facility: CLINIC | Age: 87
End: 2022-12-28

## 2022-12-28 ENCOUNTER — TELEPHONE (OUTPATIENT)
Dept: ORTHOPEDIC SURGERY | Age: 87
End: 2022-12-28

## 2022-12-28 DIAGNOSIS — Z79.01 LONG TERM CURRENT USE OF ANTICOAGULANT: Primary | ICD-10-CM

## 2022-12-28 LAB — INR BLD: 2.4

## 2022-12-28 NOTE — TELEPHONE ENCOUNTER
Patient still having pain in lower back/hip area and needs something soon. In reading the notes I believe I should schedule him with Cam Fung or Angella Mcnamara and just wanted confirmation of that please before scheduling or advise as to what to do.

## 2022-12-28 NOTE — TELEPHONE ENCOUNTER
Yes if spine related he was told to follow up with spine team. Dr Marcia Hamilton did his IA hip injection.  He also had his MRI on left hip and need follow up on his spine per last note from [facility], [facility]\"If he has no relief from the injection that I recommend MRI of the lumbar spine and follow-up with our spine team.\"    Thank you,  Hope Flores MA

## 2023-01-01 ENCOUNTER — OFFICE VISIT (OUTPATIENT)
Dept: FAMILY MEDICINE CLINIC | Facility: CLINIC | Age: 88
End: 2023-01-01

## 2023-01-01 ENCOUNTER — HOSPICE ADMISSION (OUTPATIENT)
Dept: HOSPICE | Facility: HOSPICE | Age: 88
End: 2023-01-01
Payer: MEDICARE

## 2023-01-01 VITALS
HEART RATE: 88 BPM | DIASTOLIC BLOOD PRESSURE: 80 MMHG | OXYGEN SATURATION: 96 % | WEIGHT: 216 LBS | HEIGHT: 72 IN | SYSTOLIC BLOOD PRESSURE: 138 MMHG | BODY MASS INDEX: 29.26 KG/M2 | TEMPERATURE: 98 F

## 2023-01-01 VITALS
TEMPERATURE: 97 F | SYSTOLIC BLOOD PRESSURE: 118 MMHG | BODY MASS INDEX: 27.63 KG/M2 | OXYGEN SATURATION: 95 % | HEART RATE: 81 BPM | WEIGHT: 204 LBS | DIASTOLIC BLOOD PRESSURE: 60 MMHG | HEIGHT: 72 IN

## 2023-01-01 DIAGNOSIS — R60.0 LOWER LEG EDEMA: Primary | ICD-10-CM

## 2023-01-01 DIAGNOSIS — E11.21 TYPE 2 DIABETES WITH NEPHROPATHY (HCC): ICD-10-CM

## 2023-01-01 DIAGNOSIS — R60.0 BILATERAL LEG EDEMA: ICD-10-CM

## 2023-01-01 DIAGNOSIS — M79.651 RIGHT THIGH PAIN: ICD-10-CM

## 2023-01-01 DIAGNOSIS — Z09 HOSPITAL DISCHARGE FOLLOW-UP: Primary | ICD-10-CM

## 2023-01-01 DIAGNOSIS — I60.9 SAH (SUBARACHNOID HEMORRHAGE) (HCC): ICD-10-CM

## 2023-01-01 PROCEDURE — 0656 HSPC GENERAL INPATIENT

## 2023-01-01 RX ORDER — LIDOCAINE 50 MG/G
OINTMENT TOPICAL
Qty: 50 G | Refills: 0 | Status: SHIPPED | OUTPATIENT
Start: 2023-01-01 | End: 2023-12-25

## 2023-01-01 RX ORDER — INSULIN ASPART 100 [IU]/ML
5 INJECTION, SOLUTION INTRAVENOUS; SUBCUTANEOUS
Qty: 5 ADJUSTABLE DOSE PRE-FILLED PEN SYRINGE | Refills: 5
Start: 2023-01-01 | End: 2023-12-25

## 2023-01-01 RX ORDER — INSULIN DEGLUDEC 200 U/ML
20 INJECTION, SOLUTION SUBCUTANEOUS DAILY
Qty: 3 ML | Refills: 5 | Status: SHIPPED
Start: 2023-01-01 | End: 2023-12-25

## 2023-01-03 ENCOUNTER — OFFICE VISIT (OUTPATIENT)
Dept: ORTHOPEDIC SURGERY | Age: 88
End: 2023-01-03
Payer: MEDICARE

## 2023-01-03 DIAGNOSIS — M48.061 SPINAL STENOSIS OF LUMBAR REGION, UNSPECIFIED WHETHER NEUROGENIC CLAUDICATION PRESENT: Primary | ICD-10-CM

## 2023-01-03 DIAGNOSIS — M54.6 THORACIC SPINE PAIN: ICD-10-CM

## 2023-01-03 PROCEDURE — 20552 NJX 1/MLT TRIGGER POINT 1/2: CPT | Performed by: PHYSICAL MEDICINE & REHABILITATION

## 2023-01-03 PROCEDURE — 1123F ACP DISCUSS/DSCN MKR DOCD: CPT | Performed by: PHYSICAL MEDICINE & REHABILITATION

## 2023-01-03 PROCEDURE — 99204 OFFICE O/P NEW MOD 45 MIN: CPT | Performed by: PHYSICAL MEDICINE & REHABILITATION

## 2023-01-03 RX ORDER — TRIAMCINOLONE ACETONIDE 40 MG/ML
120 INJECTION, SUSPENSION INTRA-ARTICULAR; INTRAMUSCULAR ONCE
Status: COMPLETED | OUTPATIENT
Start: 2023-01-03 | End: 2023-01-03

## 2023-01-03 RX ORDER — BACLOFEN 10 MG/1
TABLET ORAL
Qty: 60 TABLET | Refills: 1 | Status: SHIPPED | OUTPATIENT
Start: 2023-01-03

## 2023-01-03 RX ADMIN — TRIAMCINOLONE ACETONIDE 120 MG: 40 INJECTION, SUSPENSION INTRA-ARTICULAR; INTRAMUSCULAR at 14:29

## 2023-01-03 NOTE — PROGRESS NOTES
Date:  01/03/23     HPI:  I am seeing Angelia Inman in evalution/folowup. Had follow-up appointment to address multiple issues to include cervical spine pain, thoracic spine pain, lumbosacral spine pain, leg cramping, gait abnormality. With regards to his lumbar spine, he has had more problems over the last 6 months. No accident or injury. He might of had some problems sooner. It is noted that about 10 years ago underwent lumbar spine procedure for radicular symptoms down one leg. Sounds like he was doing well after that. Currently he is complaining of pain in the buttocks. It is a sharp pain that is worse with walking. Better with resting, standing or sitting. The pain tends to gravitate down the lateral aspect of both lower extremities to just below the knees for which she feels there is weakness numbness and tingling. This is especially noted after walking about 100 feet. S and it will get better. He also complains of pain middle thoracic paraspinal areas that may gravitate to the inferior scapulae. This may go into the cervical paraspinal areas and occiput. He has no pain gravitating into either upper extremity. He also has leg cramping. Typically at night and has failed some type of over-the-counter or supplement treatment option. He was evaluated by an orthopedic hip subspecialist and an MRI of the hip reveals degenerative changes. He underwent a trochanteric injection. It does not appear that the hip is the main problem. He has had plain lumbosacral spine films. He cannot go on anti-inflammatory medications because he is on warfarin for atrial fibrillation. He does have an implanted pacemaker but it is MRI compatible since he had an MRI of his hip within the last 2 weeks without complication. His current pain scale is up to 8/10. He has trouble bathing, cleaning, dressing. Has trouble sleeping at night, especially.   He is not performing lumbar spine exercises or current physical therapy other than several visits that seems like it was more for leg strengthening. Past Medical History:   Diagnosis Date    Allergic rhinitis     Atrial fibrillation ([address]     Chronic kidney disease     \"a little\"    Chronic kidney disease, stage III (moderate) (HCC)     Chronic pain     Colon polyp     Coronary artery disease     Diabetic polyneuropathy associated with type 2 diabetes mellitus ([address] 7/22/2019    Erectile dysfunction     Fracture of left fibula     GERD (gastroesophageal reflux disease)     Glaucoma     Hypertension     Imbalance 6/11/2019    Multinodular goiter     Nephrolithiasis     Obesity (BMI 30-39. 9)          Peptic ulcer disease     Plantar fasciitis     Psychiatric disorder     Right inguinal hernia     Sciatica     Stroke ([address]     Tobacco use disorder     Type 2 diabetes mellitus ([address]         Past Surgical History:   Procedure Laterality Date    ABLATION OF DYSRHYTHMIC FOCUS      BACK SURGERY  2012    fusion    CARDIAC PROCEDURE N/A 10/14/2022    RIGHT HEART CATH performed by Kal Blanco MD at [facility] CATH LAB    COLONOSCOPY  2018    COLONOSCOPY  2008    GI  1980's    Rectal fistula repair    KNEE ARTHROSCOPY Right 2012    CT CARDIAC SURG PROCEDURE UNLIST  2019    pacemaker placed    CT CARDIAC SURG PROCEDURE UNLIST      atrial fib ablation        Family History   Problem Relation Age of Onset    Colon Cancer Father 80    Thyroid Disease Father         goiter    No Known Problems Paternal Grandfather     No Known Problems Paternal Grandmother     No Known Problems Maternal Grandfather     No Known Problems Maternal Grandmother     Crohn's Disease Son     No Known Problems Brother     No Known Problems Brother     No Known Problems Brother     Heart Disease Brother     Cancer Sister         liver    Heart Disease Sister     Heart Disease Brother     Breast Cancer Sister     Thyroid Cancer Neg Hx         Social History     Socioeconomic History    Marital status: Single     Spouse name: Not on file    Number of children: Not on file    Years of education: Not on file    Highest education level: Not on file   Occupational History    Not on file   Tobacco Use    Smoking status: Former     Packs/day: 0.25     Years: 44.00     Pack years: 11.00     Types: Cigarettes     Quit date: 1996     Years since quittin.5    Smokeless tobacco: Never    Tobacco comments:     Quit smoking: quit Patient formally smoked 3 packs a day but says he did not smoke the whole cigarette but burned 3 cigarettes 3 packs/day. He smoked close to 40 years but quit in . Vaping Use    Vaping Use: Never used   Substance and Sexual Activity    Alcohol use: No    Drug use: Yes     Types: Prescription    Sexual activity: Not Currently     Birth control/protection: None   Other Topics Concern    Not on file   Social History Narrative    Not on file     Social Determinants of Health     Financial Resource Strain: Not on file   Food Insecurity: Not on file   Transportation Needs: Not on file   Physical Activity: Not on file   Stress: Not on file   Social Connections: Not on file   Intimate Partner Violence: Not on file   Housing Stability: Not on file        Review of Systems       Current Outpatient Medications   Medication Sig Dispense Refill    gabapentin (NEURONTIN) 100 MG capsule Take 1 capsule by mouth nightly for 180 days.  Intended supply: 30 days 30 capsule 0    spironolactone (ALDACTONE) 25 MG tablet Take 1 tablet by mouth daily 30 tablet 11    losartan (COZAAR) 50 MG tablet Take 1 tablet by mouth daily 30 tablet 11    omeprazole (PRILOSEC) 40 MG delayed release capsule TAKE 1 CAPSULE BY MOUTH EVERY DAY BEFORE A MEAL 90 capsule 1    fluticasone (FLONASE) 50 MCG/ACT nasal spray 2 sprays by Nasal route daily SPRAY 2 SPRAYS INTO EACH NOSTRIL EVERY DAY 16 g 5    bumetanide (BUMEX) 1 MG tablet Take 1 tablet by mouth daily 90 tablet 1    cyanocobalamin 1000 MCG tablet Take 1 tablet by mouth daily 90 tablet 1    doxazosin (CARDURA) 4 MG tablet Take 1 tablet by mouth nightly TAKE 1 TABLET BY MOUTH EVERY DAY 90 tablet 1    ferrous sulfate (FE TABS 325) 325 (65 Fe) MG EC tablet Take 1 tablet by mouth every morning (before breakfast) 90 tablet 1    Insulin Degludec (TRESIBA FLEXTOUCH) 200 UNIT/ML SOPN Inject 26 Units into the skin daily 3 mL 5    linagliptin (TRADJENTA) 5 MG tablet Take 1 tablet by mouth daily 90 tablet 1    pravastatin (PRAVACHOL) 80 MG tablet Take 1 tablet by mouth daily 90 tablet 1    sertraline (ZOLOFT) 100 MG tablet Take 1 tablet by mouth daily 90 tablet 1    warfarin (COUMADIN) 5 MG tablet TAKING ONLY 2 DAYS A WEEK 90 tablet 1    warfarin (COUMADIN) 7.5 MG tablet Taking only five days a week 90 tablet 1    blood glucose monitor strips Use to check blood sugars twice daily DXE11.9 200 strip 5    Insulin Pen Needle 29G X 12MM MISC 1 each by Does not apply route daily Use daily to inject insulin. DXE11.9 100 each 5    fluticasone-umeclidin-vilant (TRELEGY ELLIPTA) 100-62.5-25 MCG/INH AEPB 1 inhalation every morning, rinse mouth after use (Patient not taking: Reported on 12/15/2022) 3 each 3    brimonidine (ALPHAGAN P) 0.15 % ophthalmic solution 1 drop      latanoprost (XALATAN) 0.005 % ophthalmic solution LOCATION: BOTH EYES. INSTILL ONE DROP INTO BOTH EYES AT NIGHT BEFORE BED.      magnesium oxide (MAG-OX) 400 MG tablet Take 400 mg by mouth daily      melatonin 5 MG TABS tablet Take by mouth (Patient not taking: Reported on 12/15/2022)      potassium gluconate 550 mg tablet Take by mouth daily       No current facility-administered medications for this visit.         No Known Allergies      PHYSICAL EXAM    General: Alert and cooperative    HEENT: Clear speech    Motor Exam: Good strength    Sensory Exam: No lateralizing findings    Deep Tendon Reflexes: Decreased reflexes in LE's    Cerebellar Exam: No ataxia in the Ue's    Extremities: Deferred    Gait / Station: Ambulates without assistance    Lumbar Spine: Has tenderness in the buttocks bilaterally. No midline tenderness. Good range of motion of the hips. Thoracic spine: Has tenderness in the middle thoracic paraspinal areas, mainly around level of T8. Cervical spine: Mild tenderness in the lower cervical paraspinal areas      IMPRESSION/PLAN:       1. Predominantly Musculoskeletal Lumbosacral Spine Pain. He has pain primarily in an incomplete bilateral L5 distribution but also gait abnormality after walking about 100 feet. This would be suggestive of latent lumbar spinal stenosis. I would advocate an MRI of the lumbar spine. Engage in provider directed lumbar spine exercises. Bilateral L5 nerve root blocks could be an option. 2.  Gait abnormality. Suspect lumbar spinal stenosis, as per impression #1 above    3. Thoracic spine pain. Offer trigger point injection into the affected area. Imaging would not change the current plan    4.  Cervical spine pain. More nonspecific. Could be radiating symptoms from the thoracic spine. Examination reveals no evidence of cervical myelopathy nor does his history suggest cervical radiculopathy. I will follow this along right now    5. Leg cramping. Although I typically do not treat leg cramping here POA, I think he could benefit from some baclofen. Will take 10-20 mg at night and see if that does not help his symptomatology. If no benefit, nothing further I can add or offer them would defer to his primary provider. Side effects of medication. Procedure note: Bilateral thoracic paraspinal trigger point injections. Level as above. 60 mg of log with additional 0.5 cc of 0.25% Marcaine were ministered each of the 2 sites. Patient tolerated well. No complication. Band-Aid applied. I will get back in touch with him once have MRI lumbar spine results. Further discussion at that time.     Nothing pertinent today    Olive Pope MD

## 2023-01-09 ENCOUNTER — OFFICE VISIT (OUTPATIENT)
Dept: CARDIOLOGY CLINIC | Age: 88
End: 2023-01-09
Payer: MEDICARE

## 2023-01-09 VITALS
HEART RATE: 88 BPM | SYSTOLIC BLOOD PRESSURE: 90 MMHG | DIASTOLIC BLOOD PRESSURE: 50 MMHG | BODY MASS INDEX: 28.89 KG/M2 | HEIGHT: 72 IN | WEIGHT: 213.3 LBS

## 2023-01-09 DIAGNOSIS — I10 PRIMARY HYPERTENSION: ICD-10-CM

## 2023-01-09 DIAGNOSIS — I48.0 PAROXYSMAL ATRIAL FIBRILLATION (HCC): ICD-10-CM

## 2023-01-09 DIAGNOSIS — Z95.0 PACEMAKER: ICD-10-CM

## 2023-01-09 DIAGNOSIS — I25.10 CORONARY ARTERY DISEASE INVOLVING NATIVE CORONARY ARTERY OF NATIVE HEART WITHOUT ANGINA PECTORIS: Primary | ICD-10-CM

## 2023-01-09 PROCEDURE — 3074F SYST BP LT 130 MM HG: CPT | Performed by: INTERNAL MEDICINE

## 2023-01-09 PROCEDURE — 3078F DIAST BP <80 MM HG: CPT | Performed by: INTERNAL MEDICINE

## 2023-01-09 PROCEDURE — 1123F ACP DISCUSS/DSCN MKR DOCD: CPT | Performed by: INTERNAL MEDICINE

## 2023-01-09 PROCEDURE — 99214 OFFICE O/P EST MOD 30 MIN: CPT | Performed by: INTERNAL MEDICINE

## 2023-01-09 RX ORDER — LOSARTAN POTASSIUM 25 MG/1
25 TABLET ORAL DAILY
Qty: 30 TABLET | Refills: 11 | Status: ON HOLD | OUTPATIENT
Start: 2023-01-09

## 2023-01-09 ASSESSMENT — ENCOUNTER SYMPTOMS
ABDOMINAL PAIN: 0
RESPIRATORY NEGATIVE: 1
EYES NEGATIVE: 1
ALLERGIC/IMMUNOLOGIC NEGATIVE: 1
EYE PAIN: 0
SHORTNESS OF BREATH: 0
BACK PAIN: 0
CHEST TIGHTNESS: 0
PHOTOPHOBIA: 0
GASTROINTESTINAL NEGATIVE: 1

## 2023-01-09 NOTE — PROGRESS NOTES
[facility] CARDIOLOGY  [address], [address]  PHONE: [phone]      23    NAME:  Elan Doonvan  : 1935  MRN: [id number]         SUBJECTIVE:   Elan Donovan is a 82 y.o. male seen for follow up of:      Chief Complaint   Patient presents with    Hypertension         Cardiac Hx (Reviewed and summarized by me):  1) Afib - on warfarin followed by Dr Solo Zapata  2) PPM/AVN ablation   3) Moderate/severe MR and Severe TR              ÁNGEL 22 - Mild/Mod MR, Mod TR  4) NM stress test 20 - Normal perfusion      HPI:  Recently had some back injections and now his blood sugar has become hard to control. He reports events where he lost continence both of his bowels and bladder recently. We changed him from amlodipine hydrochlorothiazide and losartan to spironolactone and losartan. After that change prior to the back injection his breathing was modestly improved. A ÁNGEL that was done late last year showed mild to moderate mitral valve regurgitation and tricuspid valve regurgitation indicating that these also not significantly contributing to his shortness of breath. He continues on warfarin. He is a bit confused about the medications he is to take. I have reviewed these medications with him and written down on some paperwork that he had with them. Past Medical History, Past Surgical History, Family history, Social History, and Medications were all reviewed with the patient today and updated as necessary. Current Outpatient Medications:     losartan (COZAAR) 25 MG tablet, Take 1 tablet by mouth daily, Disp: 30 tablet, Rfl: 11    baclofen (LIORESAL) 10 MG tablet, Take 1-2 tablets at bedtime by mouth as needed, Disp: 60 tablet, Rfl: 1    gabapentin (NEURONTIN) 100 MG capsule, Take 1 capsule by mouth nightly for 180 days.  Intended supply: 30 days, Disp: 30 capsule, Rfl: 0    spironolactone (ALDACTONE) 25 MG tablet, Take 1 tablet by mouth daily, Disp: 30 tablet, Rfl: 11    omeprazole (PRILOSEC) 40 MG delayed release capsule, TAKE 1 CAPSULE BY MOUTH EVERY DAY BEFORE A MEAL, Disp: 90 capsule, Rfl: 1    fluticasone (FLONASE) 50 MCG/ACT nasal spray, 2 sprays by Nasal route daily SPRAY 2 SPRAYS INTO EACH NOSTRIL EVERY DAY, Disp: 16 g, Rfl: 5    bumetanide (BUMEX) 1 MG tablet, Take 1 tablet by mouth daily, Disp: 90 tablet, Rfl: 1    cyanocobalamin 1000 MCG tablet, Take 1 tablet by mouth daily, Disp: 90 tablet, Rfl: 1    doxazosin (CARDURA) 4 MG tablet, Take 1 tablet by mouth nightly TAKE 1 TABLET BY MOUTH EVERY DAY, Disp: 90 tablet, Rfl: 1    ferrous sulfate (FE TABS 325) 325 (65 Fe) MG EC tablet, Take 1 tablet by mouth every morning (before breakfast), Disp: 90 tablet, Rfl: 1    Insulin Degludec (TRESIBA FLEXTOUCH) 200 UNIT/ML SOPN, Inject 26 Units into the skin daily, Disp: 3 mL, Rfl: 5    linagliptin (TRADJENTA) 5 MG tablet, Take 1 tablet by mouth daily, Disp: 90 tablet, Rfl: 1    pravastatin (PRAVACHOL) 80 MG tablet, Take 1 tablet by mouth daily, Disp: 90 tablet, Rfl: 1    sertraline (ZOLOFT) 100 MG tablet, Take 1 tablet by mouth daily, Disp: 90 tablet, Rfl: 1    warfarin (COUMADIN) 5 MG tablet, TAKING ONLY 2 DAYS A WEEK, Disp: 90 tablet, Rfl: 1    warfarin (COUMADIN) 7.5 MG tablet, Taking only five days a week, Disp: 90 tablet, Rfl: 1    blood glucose monitor strips, Use to check blood sugars twice daily DXE11.9, Disp: 200 strip, Rfl: 5    Insulin Pen Needle 29G X 12MM MISC, 1 each by Does not apply route daily Use daily to inject insulin.  DXE11.9, Disp: 100 each, Rfl: 5    brimonidine (ALPHAGAN P) 0.15 % ophthalmic solution, 1 drop, Disp: , Rfl:     latanoprost (XALATAN) 0.005 % ophthalmic solution, LOCATION: BOTH EYES. INSTILL ONE DROP INTO BOTH EYES AT NIGHT BEFORE BED., Disp: , Rfl:     magnesium oxide (MAG-OX) 400 MG tablet, Take 400 mg by mouth daily, Disp: , Rfl:     potassium gluconate 550 mg tablet, Take by mouth daily, Disp: , Rfl: fluticasone-umeclidin-vilant (Alberteen Wheeling) 100-62.5-25 MCG/INH AEPB, 1 inhalation every morning, rinse mouth after use (Patient not taking: No sig reported), Disp: 3 each, Rfl: 3    melatonin 5 MG TABS tablet, Take by mouth (Patient not taking: No sig reported), Disp: , Rfl:   No Known Allergies  Past Medical History:   Diagnosis Date    Allergic rhinitis     Atrial fibrillation (HCC)     Chronic kidney disease     \"a little\"    Chronic kidney disease, stage III (moderate) (HCC)     Chronic pain     Colon polyp     Coronary artery disease     Diabetic polyneuropathy associated with type 2 diabetes mellitus ([address] 7/22/2019    Erectile dysfunction     Fracture of left fibula     GERD (gastroesophageal reflux disease)     Glaucoma     Hypertension     Imbalance 6/11/2019    Multinodular goiter     Nephrolithiasis     Obesity (BMI 30-39. 9)          Peptic ulcer disease     Plantar fasciitis     Psychiatric disorder     Right inguinal hernia     Sciatica     Stroke ([address]     Tobacco use disorder     Type 2 diabetes mellitus ([facility]      Past Surgical History:   Procedure Laterality Date    ABLATION OF DYSRHYTHMIC FOCUS      BACK SURGERY  2012    fusion    CARDIAC PROCEDURE N/A 10/14/2022    RIGHT HEART CATH performed by Christiane Mohan MD at [facility] CATH LAB    COLONOSCOPY  2018    COLONOSCOPY  2008    GI  1980's    Rectal fistula repair    KNEE ARTHROSCOPY Right 2012 [id number] [facility] SURGERY  2019    pacemaker placed    NE UNLISTED PROCEDURE CARDIAC SURGERY      atrial fib ablation     Family History   Problem Relation Age of Onset    Colon Cancer Father 80    Thyroid Disease Father         goiter    No Known Problems Paternal Grandfather     No Known Problems Paternal Grandmother     No Known Problems Maternal Grandfather     No Known Problems Maternal Grandmother     Crohn's Disease Son     No Known Problems Brother     No Known Problems Brother     No Known Problems Brother     Heart Disease Brother     Cancer Sister         liver    Heart Disease Sister     Heart Disease Brother     Breast Cancer Sister     Thyroid Cancer Neg Hx      Social History     Tobacco Use    Smoking status: Former     Packs/day: 0.25     Years: 44.00     Pack years: 11.00     Types: Cigarettes     Quit date: 1996     Years since quittin.5    Smokeless tobacco: Never    Tobacco comments:     Quit smoking: quit Patient formally smoked 3 packs a day but says he did not smoke the whole cigarette but burned 3 cigarettes 3 packs/day. He smoked close to 40 years but quit in . Substance Use Topics    Alcohol use: No       ROS:  Review of Systems   Constitutional: Negative. Negative for fever. HENT:  Negative for hearing loss, nosebleeds and tinnitus. Eyes: Negative. Negative for photophobia and pain. Respiratory: Negative. Negative for chest tightness and shortness of breath. Cardiovascular: Negative. Negative for chest pain, palpitations and leg swelling. Gastrointestinal: Negative. Negative for abdominal pain. Endocrine: Negative. Negative for cold intolerance and heat intolerance. Genitourinary: Negative. Negative for dysuria. Musculoskeletal: Negative. Negative for back pain and joint swelling. Skin: Negative. Negative for rash. Allergic/Immunologic: Negative. Negative for immunocompromised state. Neurological: Negative. Negative for dizziness, syncope and light-headedness. Hematological: Negative. Does not bruise/bleed easily. Psychiatric/Behavioral: Negative. Negative for suicidal ideas. PHYSICAL EXAM:  Physical Exam  Constitutional:       General: He is not in acute distress. Appearance: He is not ill-appearing. HENT:      Head: Normocephalic and atraumatic. Nose: No congestion. Mouth/Throat:      Mouth: Mucous membranes are moist.   Eyes:      Extraocular Movements: Extraocular movements intact.       Pupils: Pupils are equal, round, and reactive to light. Cardiovascular:      Rate and Rhythm: Normal rate and regular rhythm. Heart sounds: No murmur heard. No friction rub. No gallop. Pulmonary:      Effort: No respiratory distress. Breath sounds: No wheezing or rhonchi. Musculoskeletal:         General: No swelling. Cervical back: Normal range of motion. Right lower leg: No edema. Left lower leg: No edema. Skin:     General: Skin is warm and dry. Findings: No rash. Neurological:      General: No focal deficit present. Mental Status: He is oriented to person, place, and time. Psychiatric:         Mood and Affect: Mood normal.         Behavior: Behavior normal.         Judgment: Judgment normal.        BP (!) 90/50   Pulse 88   Ht 6' (1.829 m)   Wt 213 lb 4.8 oz (96.8 kg)   BMI 28.93 kg/m²      Wt Readings from Last 10 Encounters:   01/09/23 213 lb 4.8 oz (96.8 kg)   12/15/22 224 lb (101.6 kg)   12/07/22 225 lb (102.1 kg)   12/02/22 225 lb 11.2 oz (102.4 kg)   11/17/22 230 lb (104.3 kg)   11/14/22 229 lb (103.9 kg)   11/04/22 229 lb (103.9 kg)   11/02/22 228 lb 8 oz (103.6 kg)   10/14/22 240 lb (108.9 kg)   10/11/22 245 lb (111.1 kg)           Medical problems and test results were reviewed with the patient today. Lab Results   Component Value Date/Time    BUN 50 12/16/2022 09:54 AM     No results found for: DHIRAJ, CREAPOC, CREA  Lab Results   Component Value Date/Time    K 5.1 12/16/2022 09:54 AM       Lab Results   Component Value Date/Time    CHOL 86 10/06/2022 08:45 AM    HDL 37 10/06/2022 08:45 AM    VLDL 14 01/25/2022 02:32 PM       ASSESSMENT and PLAN    ICD-10-CM    1. Coronary artery disease involving native coronary artery of native heart without angina pectoris  I25.10       2. Primary hypertension  I10       3. Pacemaker  Z95.0       4.  Paroxysmal atrial fibrillation (HCC)  I48.0           IMPRESSION:  1) mitral valve and tricuspid regurgitation -we will continue to monitor   3) he has a permanent pacemaker this followed by our device clinic he had AV node ablation  4) SOB -continue spironolactone 25 mg daily continue Bumex 1 mg daily  5) hypertension blood pressure is low in clinic today decrease losartan to 25 mg daily I believe he may have been taking 2 Bumex a day as well I have advised him to take 1 mg of Bumex daily    ALL ORDERS THIS ENCOUNTER  Orders Placed This Encounter    losartan (COZAAR) 25 MG tablet     Sig: Take 1 tablet by mouth daily     Dispense:  30 tablet     Refill:  11        Follow up in 3 months. Thank you for allowing me to participate in this patient's care. Please call or contact me if there are any questions or concerns regarding the above.       Yecenia Ch MD  01/09/23  2:49 PM

## 2023-01-11 ENCOUNTER — OFFICE VISIT (OUTPATIENT)
Dept: FAMILY MEDICINE CLINIC | Facility: CLINIC | Age: 88
End: 2023-01-11
Payer: MEDICARE

## 2023-01-11 DIAGNOSIS — N39.41 URGE URINARY INCONTINENCE: ICD-10-CM

## 2023-01-11 DIAGNOSIS — E11.21 TYPE 2 DIABETES WITH NEPHROPATHY (HCC): Primary | ICD-10-CM

## 2023-01-11 LAB
BILIRUBIN, URINE, POC: NEGATIVE
BLOOD URINE, POC: NORMAL
GLUCOSE URINE, POC: NORMAL
HBA1C MFR BLD: 10.9 %
KETONES, URINE, POC: NEGATIVE
LEUKOCYTE ESTERASE, URINE, POC: NEGATIVE
NITRITE, URINE, POC: NORMAL
PH, URINE, POC: 5 (ref 4.6–8)
PROTEIN,URINE, POC: NEGATIVE
SPECIFIC GRAVITY, URINE, POC: 1.01 (ref 1–1.03)
URINALYSIS CLARITY, POC: CLEAR
URINALYSIS COLOR, POC: YELLOW
UROBILINOGEN, POC: 0.2

## 2023-01-11 PROCEDURE — 83036 HEMOGLOBIN GLYCOSYLATED A1C: CPT | Performed by: STUDENT IN AN ORGANIZED HEALTH CARE EDUCATION/TRAINING PROGRAM

## 2023-01-11 PROCEDURE — 1123F ACP DISCUSS/DSCN MKR DOCD: CPT | Performed by: STUDENT IN AN ORGANIZED HEALTH CARE EDUCATION/TRAINING PROGRAM

## 2023-01-11 PROCEDURE — 81003 URINALYSIS AUTO W/O SCOPE: CPT | Performed by: STUDENT IN AN ORGANIZED HEALTH CARE EDUCATION/TRAINING PROGRAM

## 2023-01-11 PROCEDURE — 99214 OFFICE O/P EST MOD 30 MIN: CPT | Performed by: STUDENT IN AN ORGANIZED HEALTH CARE EDUCATION/TRAINING PROGRAM

## 2023-01-11 PROCEDURE — 3078F DIAST BP <80 MM HG: CPT | Performed by: STUDENT IN AN ORGANIZED HEALTH CARE EDUCATION/TRAINING PROGRAM

## 2023-01-11 PROCEDURE — 3074F SYST BP LT 130 MM HG: CPT | Performed by: STUDENT IN AN ORGANIZED HEALTH CARE EDUCATION/TRAINING PROGRAM

## 2023-01-11 RX ORDER — BUMETANIDE 2 MG/1
2 TABLET ORAL DAILY
Qty: 90 TABLET | Refills: 3 | Status: ON HOLD | OUTPATIENT
Start: 2023-01-11 | End: 2023-01-16 | Stop reason: SDUPTHER

## 2023-01-11 NOTE — PROGRESS NOTES
[facility]  Jeaneth Escalante  Phone [phone]  Fax:  [phone]    Ignacio Sandoval (:  1935) is a 82 y.o. male here for evaluation of the following chief complaint(s):  Diabetes (Blood sugars elevated-- had 2 steroid injections in back/Fbs 431)       ASSESSMENT/PLAN:  1. Type 2 diabetes with nephropathy (HCC)  -     AMB POC URINALYSIS DIP STICK AUTO W/O MICRO  -     Basic Metabolic Panel; Future  -     AMB POC HEMOGLOBIN A1C  2. Urge urinary incontinence  -     AMB POC URINALYSIS DIP STICK AUTO W/O MICRO    Patient reports significant hyperglycemia since he had steroid injection in his back. POC hemoglobin A1c today 10.9. Diabetes not at goal.  Reports some recent urinary urge incontinence. Urinalysis today showed 2+ glucose but negative for ketones or signs of infection. Check BMP today. Patient is followed by nephrology for his CKD. Will have patient increase his Tresiba from 34 units daily to 44 units, continue Tradjenta. Advised him to follow his blood sugars closely, follow-up in 1 week. Return/emergency precautions discussed. Return in about 1 week (around 2023). Subjective   SUBJECTIVE/OBJECTIVE:  HPI  29-year-old male with PMH of A. fib, CAD, DM2, CKD, pulmonary hypertension, CVA, HLD, ANURADHA (on BPAP), lumbar spinal stenosis, and restrictive lung disease who presents for regular 3-month follow-up chronic medical problems.  -Urinary urge incontinence past 1-2 weeks  -Glucose higher since steroid injection 1/3, reports fasting sugars >400  -On Tresiba 34u daily and Tradjenta for diabetes  -Chronic RODRIGUEZ, has seen cardiology and pulmonology, recent echo showed diastolic dysfunction and increased RVSP, PFTs showed restrictive defect, had right heart cath 10/14    Review of Systems   Genitourinary:  Negative for difficulty urinating, dysuria and hematuria.         Objective     Vitals:    23 1132   BP: 128/82   Pulse: 70   Temp: 97 °F (36.1 °C)   TempSrc: Skin   SpO2: 98%   Weight: 212 lb (96.2 kg)   Height: 6' (1.829 m)        Physical Exam  Vitals reviewed. Constitutional:       General: He is not in acute distress. HENT:      Head: Normocephalic and atraumatic. Cardiovascular:      Rate and Rhythm: Normal rate. Pulmonary:      Effort: Pulmonary effort is normal.   Neurological:      General: No focal deficit present. Mental Status: He is alert and oriented to person, place, and time. An electronic signature was used to authenticate this note.     --Christina Hart MD

## 2023-01-12 ENCOUNTER — TELEPHONE (OUTPATIENT)
Dept: PRIMARY CARE CLINIC | Facility: CLINIC | Age: 88
End: 2023-01-12

## 2023-01-12 ENCOUNTER — TELEPHONE (OUTPATIENT)
Dept: FAMILY MEDICINE CLINIC | Facility: CLINIC | Age: 88
End: 2023-01-12

## 2023-01-12 DIAGNOSIS — R89.9 ABNORMAL LABORATORY TEST: ICD-10-CM

## 2023-01-12 DIAGNOSIS — R00.1 SYMPTOMATIC BRADYCARDIA: ICD-10-CM

## 2023-01-12 DIAGNOSIS — N18.4 CKD (CHRONIC KIDNEY DISEASE) STAGE 4, GFR 15-29 ML/MIN (HCC): ICD-10-CM

## 2023-01-12 DIAGNOSIS — Z95.0 PACEMAKER: ICD-10-CM

## 2023-01-12 DIAGNOSIS — E11.21 TYPE 2 DIABETES WITH NEPHROPATHY (HCC): Primary | ICD-10-CM

## 2023-01-12 LAB
ANION GAP SERPL CALC-SCNC: 6 MMOL/L (ref 2–11)
BUN SERPL-MCNC: 106 MG/DL (ref 8–23)
CALCIUM SERPL-MCNC: 10.1 MG/DL (ref 8.3–10.4)
CHLORIDE SERPL-SCNC: 100 MMOL/L (ref 101–110)
CO2 SERPL-SCNC: 24 MMOL/L (ref 21–32)
CREAT SERPL-MCNC: 2.5 MG/DL (ref 0.8–1.5)
GLUCOSE SERPL-MCNC: 499 MG/DL (ref 65–100)
POTASSIUM SERPL-SCNC: 5.4 MMOL/L (ref 3.5–5.1)
SODIUM SERPL-SCNC: 130 MMOL/L (ref 133–143)

## 2023-01-12 NOTE — TELEPHONE ENCOUNTER
Attempted to contact pt X 2, but pt did not answer phone, unable to leave voicemail. Contacted grand daughter, Shavonne Urena at 7:58am and advised her to check on her grandfather, Maria Luz Blake and to be sure he is okay, advise him to drink plenty of fluids and contact Dr. Robyn Suh for further medical advice and care. She agrees to do so.

## 2023-01-12 NOTE — TELEPHONE ENCOUNTER
Per Dr Tyler Diaz pt to drink plenty of liquids and come in on 1- for repeat lab , already ordered for the BMP. Pt states he feels well and his blood sugar today is 334.

## 2023-01-13 ENCOUNTER — NURSE ONLY (OUTPATIENT)
Dept: FAMILY MEDICINE CLINIC | Facility: CLINIC | Age: 88
End: 2023-01-13

## 2023-01-13 ENCOUNTER — HOSPITAL ENCOUNTER (INPATIENT)
Age: 88
LOS: 3 days | Discharge: HOME | DRG: 683 | End: 2023-01-16
Attending: EMERGENCY MEDICINE | Admitting: FAMILY MEDICINE
Payer: MEDICARE

## 2023-01-13 ENCOUNTER — TELEPHONE (OUTPATIENT)
Dept: FAMILY MEDICINE CLINIC | Facility: CLINIC | Age: 88
End: 2023-01-13

## 2023-01-13 ENCOUNTER — APPOINTMENT (OUTPATIENT)
Dept: CT IMAGING | Age: 88
DRG: 683 | End: 2023-01-13
Payer: MEDICARE

## 2023-01-13 DIAGNOSIS — E86.0 DEHYDRATION: ICD-10-CM

## 2023-01-13 DIAGNOSIS — N17.9 ACUTE KIDNEY INJURY (HCC): Primary | ICD-10-CM

## 2023-01-13 DIAGNOSIS — R89.9 ABNORMAL LABORATORY TEST: ICD-10-CM

## 2023-01-13 DIAGNOSIS — E11.21 TYPE 2 DIABETES WITH NEPHROPATHY (HCC): ICD-10-CM

## 2023-01-13 DIAGNOSIS — S00.03XA CONTUSION OF SCALP, INITIAL ENCOUNTER: ICD-10-CM

## 2023-01-13 DIAGNOSIS — N18.4 CKD (CHRONIC KIDNEY DISEASE) STAGE 4, GFR 15-29 ML/MIN (HCC): ICD-10-CM

## 2023-01-13 DIAGNOSIS — R73.9 HYPERGLYCEMIA: ICD-10-CM

## 2023-01-13 PROBLEM — E87.5 HYPERKALEMIA: Status: ACTIVE | Noted: 2023-01-13

## 2023-01-13 PROBLEM — F41.1 GENERALIZED ANXIETY DISORDER: Status: ACTIVE | Noted: 2019-02-22

## 2023-01-13 PROBLEM — Z95.0 PACEMAKER: Status: ACTIVE | Noted: 2020-11-24

## 2023-01-13 LAB
ALBUMIN SERPL-MCNC: 3.4 G/DL (ref 3.2–4.6)
ALBUMIN/GLOB SERPL: 0.9 (ref 0.4–1.6)
ALP SERPL-CCNC: 63 U/L (ref 50–136)
ALT SERPL-CCNC: 35 U/L (ref 12–65)
ANION GAP SERPL CALC-SCNC: 10 MMOL/L (ref 2–11)
ANION GAP SERPL CALC-SCNC: 8 MMOL/L (ref 2–11)
AST SERPL-CCNC: 22 U/L (ref 15–37)
BASOPHILS # BLD: 0 K/UL (ref 0–0.2)
BASOPHILS NFR BLD: 0 % (ref 0–2)
BILIRUB SERPL-MCNC: 1.1 MG/DL (ref 0.2–1.1)
BUN SERPL-MCNC: 100 MG/DL (ref 8–23)
BUN SERPL-MCNC: 97 MG/DL (ref 8–23)
CALCIUM SERPL-MCNC: 11.2 MG/DL (ref 8.3–10.4)
CALCIUM SERPL-MCNC: 11.2 MG/DL (ref 8.3–10.4)
CHLORIDE SERPL-SCNC: 101 MMOL/L (ref 101–110)
CHLORIDE SERPL-SCNC: 99 MMOL/L (ref 101–110)
CO2 SERPL-SCNC: 23 MMOL/L (ref 21–32)
CO2 SERPL-SCNC: 25 MMOL/L (ref 21–32)
CREAT SERPL-MCNC: 2.8 MG/DL (ref 0.8–1.5)
CREAT SERPL-MCNC: 3.2 MG/DL (ref 0.8–1.5)
DIFFERENTIAL METHOD BLD: ABNORMAL
EOSINOPHIL # BLD: 0.1 K/UL (ref 0–0.8)
EOSINOPHIL NFR BLD: 1 % (ref 0.5–7.8)
ERYTHROCYTE [DISTWIDTH] IN BLOOD BY AUTOMATED COUNT: 12.8 % (ref 11.9–14.6)
GLOBULIN SER CALC-MCNC: 3.9 G/DL (ref 2.8–4.5)
GLUCOSE BLD STRIP.AUTO-MCNC: 230 MG/DL (ref 65–100)
GLUCOSE SERPL-MCNC: 246 MG/DL (ref 65–100)
GLUCOSE SERPL-MCNC: 264 MG/DL (ref 65–100)
HCT VFR BLD AUTO: 39.6 % (ref 41.1–50.3)
HGB BLD-MCNC: 13.9 G/DL (ref 13.6–17.2)
IMM GRANULOCYTES # BLD AUTO: 0.1 K/UL (ref 0–0.5)
IMM GRANULOCYTES NFR BLD AUTO: 1 % (ref 0–5)
LIPASE SERPL-CCNC: 277 U/L (ref 73–393)
LYMPHOCYTES # BLD: 1.4 K/UL (ref 0.5–4.6)
LYMPHOCYTES NFR BLD: 14 % (ref 13–44)
MAGNESIUM SERPL-MCNC: 2.4 MG/DL (ref 1.8–2.4)
MCH RBC QN AUTO: 32.9 PG (ref 26.1–32.9)
MCHC RBC AUTO-ENTMCNC: 35.1 G/DL (ref 31.4–35)
MCV RBC AUTO: 93.6 FL (ref 82–102)
MONOCYTES # BLD: 0.9 K/UL (ref 0.1–1.3)
MONOCYTES NFR BLD: 9 % (ref 4–12)
NEUTS SEG # BLD: 7.7 K/UL (ref 1.7–8.2)
NEUTS SEG NFR BLD: 75 % (ref 43–78)
NRBC # BLD: 0 K/UL (ref 0–0.2)
PLATELET # BLD AUTO: 194 K/UL (ref 150–450)
PMV BLD AUTO: 12.2 FL (ref 9.4–12.3)
POTASSIUM SERPL-SCNC: 5.3 MMOL/L (ref 3.5–5.1)
POTASSIUM SERPL-SCNC: 5.7 MMOL/L (ref 3.5–5.1)
PROT SERPL-MCNC: 7.3 G/DL (ref 6.3–8.2)
RBC # BLD AUTO: 4.23 M/UL (ref 4.23–5.6)
SERVICE CMNT-IMP: ABNORMAL
SODIUM SERPL-SCNC: 132 MMOL/L (ref 133–143)
SODIUM SERPL-SCNC: 134 MMOL/L (ref 133–143)
WBC # BLD AUTO: 10.2 K/UL (ref 4.3–11.1)

## 2023-01-13 PROCEDURE — 83735 ASSAY OF MAGNESIUM: CPT

## 2023-01-13 PROCEDURE — 70450 CT HEAD/BRAIN W/O DYE: CPT

## 2023-01-13 PROCEDURE — 85025 COMPLETE CBC W/AUTO DIFF WBC: CPT

## 2023-01-13 PROCEDURE — 80053 COMPREHEN METABOLIC PANEL: CPT

## 2023-01-13 PROCEDURE — 99285 EMERGENCY DEPT VISIT HI MDM: CPT

## 2023-01-13 PROCEDURE — 96360 HYDRATION IV INFUSION INIT: CPT

## 2023-01-13 PROCEDURE — 83690 ASSAY OF LIPASE: CPT

## 2023-01-13 PROCEDURE — 2580000003 HC RX 258: Performed by: EMERGENCY MEDICINE

## 2023-01-13 PROCEDURE — 1100000000 HC RM PRIVATE

## 2023-01-13 PROCEDURE — 82962 GLUCOSE BLOOD TEST: CPT

## 2023-01-13 PROCEDURE — 96361 HYDRATE IV INFUSION ADD-ON: CPT

## 2023-01-13 PROCEDURE — G0378 HOSPITAL OBSERVATION PER HR: HCPCS

## 2023-01-13 RX ORDER — DOXAZOSIN MESYLATE 4 MG/1
4 TABLET ORAL NIGHTLY
Status: DISCONTINUED | OUTPATIENT
Start: 2023-01-13 | End: 2023-01-16 | Stop reason: HOSPADM

## 2023-01-13 RX ORDER — 0.9 % SODIUM CHLORIDE 0.9 %
1000 INTRAVENOUS SOLUTION INTRAVENOUS
Status: COMPLETED | OUTPATIENT
Start: 2023-01-13 | End: 2023-01-13

## 2023-01-13 RX ORDER — ACETAMINOPHEN 650 MG/1
650 SUPPOSITORY RECTAL EVERY 6 HOURS PRN
Status: DISCONTINUED | OUTPATIENT
Start: 2023-01-13 | End: 2023-01-16 | Stop reason: HOSPADM

## 2023-01-13 RX ORDER — FLUTICASONE PROPIONATE 50 MCG
2 SPRAY, SUSPENSION (ML) NASAL DAILY
Status: DISCONTINUED | OUTPATIENT
Start: 2023-01-14 | End: 2023-01-15

## 2023-01-13 RX ORDER — DEXTROSE MONOHYDRATE 100 MG/ML
INJECTION, SOLUTION INTRAVENOUS CONTINUOUS PRN
Status: DISCONTINUED | OUTPATIENT
Start: 2023-01-13 | End: 2023-01-16 | Stop reason: HOSPADM

## 2023-01-13 RX ORDER — ACETAMINOPHEN 325 MG/1
650 TABLET ORAL EVERY 6 HOURS PRN
Status: DISCONTINUED | OUTPATIENT
Start: 2023-01-13 | End: 2023-01-16 | Stop reason: HOSPADM

## 2023-01-13 RX ORDER — FERROUS SULFATE 325(65) MG
325 TABLET ORAL
Status: DISCONTINUED | OUTPATIENT
Start: 2023-01-14 | End: 2023-01-16 | Stop reason: HOSPADM

## 2023-01-13 RX ORDER — SODIUM CHLORIDE 9 MG/ML
INJECTION, SOLUTION INTRAVENOUS PRN
Status: DISCONTINUED | OUTPATIENT
Start: 2023-01-13 | End: 2023-01-16 | Stop reason: HOSPADM

## 2023-01-13 RX ORDER — POLYETHYLENE GLYCOL 3350 17 G/17G
17 POWDER, FOR SOLUTION ORAL DAILY PRN
Status: DISCONTINUED | OUTPATIENT
Start: 2023-01-13 | End: 2023-01-16 | Stop reason: HOSPADM

## 2023-01-13 RX ORDER — SODIUM CHLORIDE 0.9 % (FLUSH) 0.9 %
5-40 SYRINGE (ML) INJECTION PRN
Status: DISCONTINUED | OUTPATIENT
Start: 2023-01-13 | End: 2023-01-16 | Stop reason: HOSPADM

## 2023-01-13 RX ORDER — SODIUM CHLORIDE 0.9 % (FLUSH) 0.9 %
5-40 SYRINGE (ML) INJECTION EVERY 12 HOURS SCHEDULED
Status: DISCONTINUED | OUTPATIENT
Start: 2023-01-13 | End: 2023-01-16 | Stop reason: HOSPADM

## 2023-01-13 RX ORDER — LATANOPROST 50 UG/ML
1 SOLUTION/ DROPS OPHTHALMIC NIGHTLY
Status: DISCONTINUED | OUTPATIENT
Start: 2023-01-13 | End: 2023-01-16 | Stop reason: HOSPADM

## 2023-01-13 RX ORDER — INSULIN LISPRO 100 [IU]/ML
5 INJECTION, SOLUTION INTRAVENOUS; SUBCUTANEOUS
Status: DISCONTINUED | OUTPATIENT
Start: 2023-01-14 | End: 2023-01-16 | Stop reason: HOSPADM

## 2023-01-13 RX ORDER — INSULIN LISPRO 100 [IU]/ML
0-4 INJECTION, SOLUTION INTRAVENOUS; SUBCUTANEOUS NIGHTLY
Status: DISCONTINUED | OUTPATIENT
Start: 2023-01-13 | End: 2023-01-16 | Stop reason: HOSPADM

## 2023-01-13 RX ORDER — 0.9 % SODIUM CHLORIDE 0.9 %
1000 INTRAVENOUS SOLUTION INTRAVENOUS
Status: COMPLETED | OUTPATIENT
Start: 2023-01-13 | End: 2023-01-14

## 2023-01-13 RX ORDER — INSULIN LISPRO 100 [IU]/ML
0-8 INJECTION, SOLUTION INTRAVENOUS; SUBCUTANEOUS
Status: DISCONTINUED | OUTPATIENT
Start: 2023-01-14 | End: 2023-01-16 | Stop reason: HOSPADM

## 2023-01-13 RX ORDER — INSULIN GLARGINE 100 [IU]/ML
34 INJECTION, SOLUTION SUBCUTANEOUS DAILY
Status: DISCONTINUED | OUTPATIENT
Start: 2023-01-14 | End: 2023-01-16 | Stop reason: HOSPADM

## 2023-01-13 RX ORDER — PRAVASTATIN SODIUM 20 MG
80 TABLET ORAL DAILY
Status: DISCONTINUED | OUTPATIENT
Start: 2023-01-13 | End: 2023-01-16 | Stop reason: HOSPADM

## 2023-01-13 RX ORDER — INSULIN ASPART 100 [IU]/ML
5 INJECTION, SOLUTION INTRAVENOUS; SUBCUTANEOUS
Qty: 5 ADJUSTABLE DOSE PRE-FILLED PEN SYRINGE | Refills: 3 | Status: ON HOLD | OUTPATIENT
Start: 2023-01-13

## 2023-01-13 RX ORDER — SERTRALINE HYDROCHLORIDE 100 MG/1
100 TABLET, FILM COATED ORAL DAILY
Status: DISCONTINUED | OUTPATIENT
Start: 2023-01-14 | End: 2023-01-16 | Stop reason: HOSPADM

## 2023-01-13 RX ORDER — BRIMONIDINE TARTRATE 2 MG/ML
1 SOLUTION/ DROPS OPHTHALMIC 2 TIMES DAILY
Status: DISCONTINUED | OUTPATIENT
Start: 2023-01-13 | End: 2023-01-16 | Stop reason: HOSPADM

## 2023-01-13 RX ORDER — PANTOPRAZOLE SODIUM 40 MG/1
40 TABLET, DELAYED RELEASE ORAL
Status: DISCONTINUED | OUTPATIENT
Start: 2023-01-14 | End: 2023-01-16 | Stop reason: HOSPADM

## 2023-01-13 RX ADMIN — SODIUM CHLORIDE 1000 ML: 9 INJECTION, SOLUTION INTRAVENOUS at 21:10

## 2023-01-13 RX ADMIN — SODIUM CHLORIDE 1000 ML: 9 INJECTION, SOLUTION INTRAVENOUS at 22:37

## 2023-01-13 NOTE — TELEPHONE ENCOUNTER
Called and talked with patient. His blood sugar has been running a lot higher over the past week or so since he had steroid injection. He brought blood glucose log by the office today which showed glucose in the 300-500s range recently. BMP from today currently pending. Patient denies any nausea/vomiting, fever, abdominal pain, or severe fatigue. Advised him to drink plenty of water. Checked his glucose while on the phone with me around 4 PM, 3 readings were 526, 491, and 461. He has been on increased dose of Tresiba 44 units daily over the past 2 days. Will send in NovoLog for mealtime use. Emergency precautions discussed. Patient instructed to go to the ER if blood sugars remain significantly elevated or he starts feeling worse. We will see him in the office after the weekend. Addendum: BMP from today resulted after I talked with patient. Glucose 264 but creatinine increased to 2.8, up from 2.5 two days prior. Patient has CKD, baseline around 2.0. Potassium also mildly elevated at 5.3. Normal bicarb and anion gap. I called patient back to go over his labs. Recommended he go to the ED given his worsening kidney function, suspect it is from dehydration from his hyperglycemia. Patient does not want to go to the ED, states he is going to  mealtime insulin and push fluids. Emergency precautions discussed. Advised patient to hold potassium supplementation and diuretic.

## 2023-01-14 LAB
ANION GAP SERPL CALC-SCNC: 7 MMOL/L (ref 2–11)
BASOPHILS # BLD: 0 K/UL (ref 0–0.2)
BASOPHILS NFR BLD: 0 % (ref 0–2)
BUN SERPL-MCNC: 90 MG/DL (ref 8–23)
CALCIUM SERPL-MCNC: 10.3 MG/DL (ref 8.3–10.4)
CHLORIDE SERPL-SCNC: 106 MMOL/L (ref 101–110)
CO2 SERPL-SCNC: 24 MMOL/L (ref 21–32)
CREAT SERPL-MCNC: 2.4 MG/DL (ref 0.8–1.5)
DIFFERENTIAL METHOD BLD: ABNORMAL
EOSINOPHIL # BLD: 0.1 K/UL (ref 0–0.8)
EOSINOPHIL NFR BLD: 1 % (ref 0.5–7.8)
ERYTHROCYTE [DISTWIDTH] IN BLOOD BY AUTOMATED COUNT: 12.7 % (ref 11.9–14.6)
GLUCOSE BLD STRIP.AUTO-MCNC: 152 MG/DL (ref 65–100)
GLUCOSE BLD STRIP.AUTO-MCNC: 209 MG/DL (ref 65–100)
GLUCOSE BLD STRIP.AUTO-MCNC: 213 MG/DL (ref 65–100)
GLUCOSE BLD STRIP.AUTO-MCNC: 220 MG/DL (ref 65–100)
GLUCOSE BLD STRIP.AUTO-MCNC: 95 MG/DL (ref 65–100)
GLUCOSE SERPL-MCNC: 220 MG/DL (ref 65–100)
HCT VFR BLD AUTO: 36.4 % (ref 41.1–50.3)
HGB BLD-MCNC: 13.1 G/DL (ref 13.6–17.2)
IMM GRANULOCYTES # BLD AUTO: 0.1 K/UL (ref 0–0.5)
IMM GRANULOCYTES NFR BLD AUTO: 1 % (ref 0–5)
INR PPP: 4.6
INR PPP: 4.8
LYMPHOCYTES # BLD: 1.3 K/UL (ref 0.5–4.6)
LYMPHOCYTES NFR BLD: 10 % (ref 13–44)
MCH RBC QN AUTO: 33.2 PG (ref 26.1–32.9)
MCHC RBC AUTO-ENTMCNC: 36 G/DL (ref 31.4–35)
MCV RBC AUTO: 92.4 FL (ref 82–102)
MONOCYTES # BLD: 0.9 K/UL (ref 0.1–1.3)
MONOCYTES NFR BLD: 7 % (ref 4–12)
NEUTS SEG # BLD: 10.6 K/UL (ref 1.7–8.2)
NEUTS SEG NFR BLD: 81 % (ref 43–78)
NRBC # BLD: 0 K/UL (ref 0–0.2)
PLATELET # BLD AUTO: 253 K/UL (ref 150–450)
PMV BLD AUTO: 12.5 FL (ref 9.4–12.3)
POTASSIUM SERPL-SCNC: 5.4 MMOL/L (ref 3.5–5.1)
POTASSIUM SERPL-SCNC: 5.6 MMOL/L (ref 3.5–5.1)
POTASSIUM SERPL-SCNC: 5.6 MMOL/L (ref 3.5–5.1)
PROTHROMBIN TIME: 44.1 SEC (ref 12.6–14.3)
PROTHROMBIN TIME: 46.1 SEC (ref 12.6–14.3)
RBC # BLD AUTO: 3.94 M/UL (ref 4.23–5.6)
SERVICE CMNT-IMP: ABNORMAL
SERVICE CMNT-IMP: NORMAL
SODIUM SERPL-SCNC: 137 MMOL/L (ref 133–143)
WBC # BLD AUTO: 13.1 K/UL (ref 4.3–11.1)

## 2023-01-14 PROCEDURE — G0378 HOSPITAL OBSERVATION PER HR: HCPCS

## 2023-01-14 PROCEDURE — 82962 GLUCOSE BLOOD TEST: CPT

## 2023-01-14 PROCEDURE — 2580000003 HC RX 258: Performed by: FAMILY MEDICINE

## 2023-01-14 PROCEDURE — 84132 ASSAY OF SERUM POTASSIUM: CPT

## 2023-01-14 PROCEDURE — 97161 PT EVAL LOW COMPLEX 20 MIN: CPT

## 2023-01-14 PROCEDURE — 6370000000 HC RX 637 (ALT 250 FOR IP): Performed by: FAMILY MEDICINE

## 2023-01-14 PROCEDURE — 36415 COLL VENOUS BLD VENIPUNCTURE: CPT

## 2023-01-14 PROCEDURE — 1100000000 HC RM PRIVATE

## 2023-01-14 PROCEDURE — 85610 PROTHROMBIN TIME: CPT

## 2023-01-14 PROCEDURE — 97530 THERAPEUTIC ACTIVITIES: CPT

## 2023-01-14 PROCEDURE — 85025 COMPLETE CBC W/AUTO DIFF WBC: CPT

## 2023-01-14 PROCEDURE — 80048 BASIC METABOLIC PNL TOTAL CA: CPT

## 2023-01-14 PROCEDURE — 6370000000 HC RX 637 (ALT 250 FOR IP): Performed by: INTERNAL MEDICINE

## 2023-01-14 PROCEDURE — 2580000003 HC RX 258: Performed by: INTERNAL MEDICINE

## 2023-01-14 RX ORDER — SODIUM CHLORIDE 9 MG/ML
INJECTION, SOLUTION INTRAVENOUS CONTINUOUS
Status: DISCONTINUED | OUTPATIENT
Start: 2023-01-14 | End: 2023-01-16 | Stop reason: HOSPADM

## 2023-01-14 RX ORDER — BACLOFEN 10 MG/1
5 TABLET ORAL 3 TIMES DAILY PRN
Status: DISCONTINUED | OUTPATIENT
Start: 2023-01-14 | End: 2023-01-16 | Stop reason: HOSPADM

## 2023-01-14 RX ORDER — LANOLIN ALCOHOL/MO/W.PET/CERES
400 CREAM (GRAM) TOPICAL DAILY
Status: DISCONTINUED | OUTPATIENT
Start: 2023-01-15 | End: 2023-01-16 | Stop reason: HOSPADM

## 2023-01-14 RX ORDER — LANOLIN ALCOHOL/MO/W.PET/CERES
1000 CREAM (GRAM) TOPICAL DAILY
Status: DISCONTINUED | OUTPATIENT
Start: 2023-01-14 | End: 2023-01-16 | Stop reason: HOSPADM

## 2023-01-14 RX ADMIN — CYANOCOBALAMIN TAB 1000 MCG 1000 MCG: 1000 TAB at 14:40

## 2023-01-14 RX ADMIN — BRIMONIDINE TARTRATE 1 DROP: 2 SOLUTION OPHTHALMIC at 21:53

## 2023-01-14 RX ADMIN — SODIUM CHLORIDE, PRESERVATIVE FREE 5 ML: 5 INJECTION INTRAVENOUS at 09:03

## 2023-01-14 RX ADMIN — BRIMONIDINE TARTRATE 1 DROP: 2 SOLUTION OPHTHALMIC at 10:00

## 2023-01-14 RX ADMIN — INSULIN LISPRO 5 UNITS: 100 INJECTION, SOLUTION INTRAVENOUS; SUBCUTANEOUS at 08:56

## 2023-01-14 RX ADMIN — INSULIN LISPRO 5 UNITS: 100 INJECTION, SOLUTION INTRAVENOUS; SUBCUTANEOUS at 11:58

## 2023-01-14 RX ADMIN — INSULIN LISPRO 2 UNITS: 100 INJECTION, SOLUTION INTRAVENOUS; SUBCUTANEOUS at 11:58

## 2023-01-14 RX ADMIN — SODIUM CHLORIDE: 9 INJECTION, SOLUTION INTRAVENOUS at 11:53

## 2023-01-14 RX ADMIN — PANTOPRAZOLE SODIUM 40 MG: 40 TABLET, DELAYED RELEASE ORAL at 05:40

## 2023-01-14 RX ADMIN — FLUTICASONE PROPIONATE 2 SPRAY: 50 SPRAY, METERED NASAL at 08:57

## 2023-01-14 RX ADMIN — INSULIN LISPRO 2 UNITS: 100 INJECTION, SOLUTION INTRAVENOUS; SUBCUTANEOUS at 08:56

## 2023-01-14 RX ADMIN — DOXAZOSIN 4 MG: 4 TABLET ORAL at 20:51

## 2023-01-14 RX ADMIN — LATANOPROST 1 DROP: 50 SOLUTION/ DROPS OPHTHALMIC at 20:54

## 2023-01-14 RX ADMIN — SERTRALINE 100 MG: 100 TABLET, FILM COATED ORAL at 08:56

## 2023-01-14 RX ADMIN — INSULIN GLARGINE 34 UNITS: 100 INJECTION, SOLUTION SUBCUTANEOUS at 08:57

## 2023-01-14 RX ADMIN — PRAVASTATIN SODIUM 80 MG: 20 TABLET ORAL at 20:51

## 2023-01-14 RX ADMIN — FERROUS SULFATE TAB 325 MG (65 MG ELEMENTAL FE) 325 MG: 325 (65 FE) TAB at 05:40

## 2023-01-14 ASSESSMENT — PAIN SCALES - GENERAL
PAINLEVEL_OUTOF10: 0
PAINLEVEL_OUTOF10: 0

## 2023-01-14 NOTE — PROGRESS NOTES
Hospitalist Progress Note         NAME:            Fidel Elam    Age:                82 y.o.    :               1935    MRN:              [id number]    PCP: Melodie Lema MD    Consulting MD:    Treatment Team: Attending Provider: Alley Gonzalez MD; Registered Nurse: Brett Frye RN; Hospitalist: Alley Goznalez MD       Chief complaint: Fall and hyperglycemia    As per prior documentation: \"Mr. Levon Singh is a 82-year-old gentleman with a past medical history significant for A. fib on Coumadin, hypertension, diabetes type 2, GERD, AV kary ablation with pacemaker, moderate mitral and tricuspid regurgitation, pulmonary hypertension, ANURADHA on CPAP, and lumbar spinal stenosis. \"    Patient lives at home with his wife. He is responsible for giving his own medications. He was admitted on 2023 for [place], and fall suspected to be secondary to dehydration with some orthostatic hypotension possibly secondary to overmedication      2023  Patient seen and evaluated. .    New patient for me today. Feels like lightheadedness dizziness and weakness have improved  Hyperkalemia on a.m. labs- hemolyzed  Orthostatics-ordered but not done yet  Feels he may have a better idea of home medications at this point    Objective:         Patient Vitals for the past 24 hrs:   Temp Pulse Resp BP SpO2   23 0508 -- 74 -- -- --   23 0505 97.7 °F (36.5 °C) 70 19 127/64 96 %   23 0445 98.3 °F (36.8 °C) 70 16 (!) 123/59 96 %   23 0200 -- 70 -- -- --   23 0115 97.5 °F (36.4 °C) 70 18 (!) 153/68 98 %   23 0012 -- 70 15 (!) 120/56 98 %   23 0002 -- 70 16 127/66 96 %   23 2332 -- 70 14 129/62 97 %   23 2312 -- -- -- -- 99 %   23 2302 -- 70 22 (!) 131/45 --   23 -- 72 18 (!) 124/58 98 %   232 -- 75 16 (!) 127/59 96 %              No intake/output data recorded.      1901 -  0700  In:    Out: 400 [Urine:400]         Data Review:   Recent Results (from the past 24 hour(s))   Basic Metabolic Panel    Collection Time: 01/13/23 10:36 AM   Result Value Ref Range    Sodium 134 133 - 143 mmol/L    Potassium 5.3 (H) 3.5 - 5.1 mmol/L    Chloride 101 101 - 110 mmol/L    CO2 25 21 - 32 mmol/L    Anion Gap 8 2 - 11 mmol/L    Glucose 264 (H) 65 - 100 mg/dL    BUN 97 (H) 8 - 23 MG/DL    Creatinine 2.80 (H) 0.8 - 1.5 MG/DL    Est, Glom Filt Rate 21 (L) >60 ml/min/1.73m2    Calcium 11.2 (H) 8.3 - 10.4 MG/DL   POCT Glucose    Collection Time: 01/13/23  8:44 PM   Result Value Ref Range    POC Glucose 230 (H) 65 - 100 mg/dL    Performed by: Anel    CBC with Auto Differential    Collection Time: 01/13/23  8:51 PM   Result Value Ref Range    WBC 10.2 4.3 - 11.1 K/uL    RBC 4.23 4.23 - 5.6 M/uL    Hemoglobin 13.9 13.6 - 17.2 g/dL    Hematocrit 39.6 (L) 41.1 - 50.3 %    MCV 93.6 82 - 102 FL    MCH 32.9 26.1 - 32.9 PG    MCHC 35.1 (H) 31.4 - 35.0 g/dL    RDW 12.8 11.9 - 14.6 %    Platelets 695 089 - 924 K/uL    MPV 12.2 9.4 - 12.3 FL    nRBC 0.00 0.0 - 0.2 K/uL    Differential Type AUTOMATED      Seg Neutrophils 75 43 - 78 %    Lymphocytes 14 13 - 44 %    Monocytes 9 4.0 - 12.0 %    Eosinophils % 1 0.5 - 7.8 %    Basophils 0 0.0 - 2.0 %    Immature Granulocytes 1 0.0 - 5.0 %    Segs Absolute 7.7 1.7 - 8.2 K/UL    Absolute Lymph # 1.4 0.5 - 4.6 K/UL    Absolute Mono # 0.9 0.1 - 1.3 K/UL    Absolute Eos # 0.1 0.0 - 0.8 K/UL    Basophils Absolute 0.0 0.0 - 0.2 K/UL    Absolute Immature Granulocyte 0.1 0.0 - 0.5 K/UL   Comprehensive Metabolic Panel    Collection Time: 01/13/23  8:51 PM   Result Value Ref Range    Sodium 132 (L) 133 - 143 mmol/L    Potassium 5.7 (H) 3.5 - 5.1 mmol/L    Chloride 99 (L) 101 - 110 mmol/L    CO2 23 21 - 32 mmol/L    Anion Gap 10 2 - 11 mmol/L    Glucose 246 (H) 65 - 100 mg/dL     (H) 8 - 23 MG/DL    Creatinine 3.20 (H) 0.8 - 1.5 MG/DL    Est, Glom Filt Rate 18 (L) >60 ml/min/1.73m2 Calcium 11.2 (H) 8.3 - 10.4 MG/DL    Total Bilirubin 1.1 0.2 - 1.1 MG/DL    ALT 35 12 - 65 U/L    AST 22 15 - 37 U/L    Alk Phosphatase 63 50 - 136 U/L    Total Protein 7.3 6.3 - 8.2 g/dL    Albumin 3.4 3.2 - 4.6 g/dL    Globulin 3.9 2.8 - 4.5 g/dL    Albumin/Globulin Ratio 0.9 0.4 - 1.6     Lipase    Collection Time: 01/13/23  8:51 PM   Result Value Ref Range    Lipase 277 73 - 393 U/L   Magnesium    Collection Time: 01/13/23  8:51 PM   Result Value Ref Range    Magnesium 2.4 1.8 - 2.4 mg/dL   Protime-INR    Collection Time: 01/14/23 12:30 AM   Result Value Ref Range    Protime 44.1 (H) 12.6 - 14.3 sec    INR 4.6     Potassium    Collection Time: 01/14/23 12:30 AM   Result Value Ref Range    Potassium 5.6 (H) 3.5 - 5.1 mmol/L   POCT Glucose    Collection Time: 01/14/23  2:11 AM   Result Value Ref Range    POC Glucose 220 (H) 65 - 100 mg/dL    Performed by: Price Carcamo    Collection Time: 01/14/23  4:23 AM   Result Value Ref Range    Protime 46.1 (H) 12.6 - 14.3 sec    INR 4.8     Basic Metabolic Panel w/ Reflex to MG    Collection Time: 01/14/23  4:23 AM   Result Value Ref Range    Sodium 137 133 - 143 mmol/L    Potassium 5.4 (H) 3.5 - 5.1 mmol/L    Chloride 106 101 - 110 mmol/L    CO2 24 21 - 32 mmol/L    Anion Gap 7 2 - 11 mmol/L    Glucose 220 (H) 65 - 100 mg/dL    BUN 90 (H) 8 - 23 MG/DL    Creatinine 2.40 (H) 0.8 - 1.5 MG/DL    Est, Glom Filt Rate 26 (L) >60 ml/min/1.73m2    Calcium 10.3 8.3 - 10.4 MG/DL   CBC with Auto Differential    Collection Time: 01/14/23  4:23 AM   Result Value Ref Range    WBC 13.1 (H) 4.3 - 11.1 K/uL    RBC 3.94 (L) 4.23 - 5.6 M/uL    Hemoglobin 13.1 (L) 13.6 - 17.2 g/dL    Hematocrit 36.4 (L) 41.1 - 50.3 %    MCV 92.4 82 - 102 FL    MCH 33.2 (H) 26.1 - 32.9 PG    MCHC 36.0 (H) 31.4 - 35.0 g/dL    RDW 12.7 11.9 - 14.6 %    Platelets 182 296 - 671 K/uL    MPV 12.5 (H) 9.4 - 12.3 FL    nRBC 0.00 0.0 - 0.2 K/uL    Differential Type AUTOMATED      Seg Neutrophils 81 (H) 43 - 78 %    Lymphocytes 10 (L) 13 - 44 %    Monocytes 7 4.0 - 12.0 %    Eosinophils % 1 0.5 - 7.8 %    Basophils 0 0.0 - 2.0 %    Immature Granulocytes 1 0.0 - 5.0 %    Segs Absolute 10.6 (H) 1.7 - 8.2 K/UL    Absolute Lymph # 1.3 0.5 - 4.6 K/UL    Absolute Mono # 0.9 0.1 - 1.3 K/UL    Absolute Eos # 0.1 0.0 - 0.8 K/UL    Basophils Absolute 0.0 0.0 - 0.2 K/UL    Absolute Immature Granulocyte 0.1 0.0 - 0.5 K/UL            Physical Exam:         General:    Alert, cooperative, very pleasant, soft spoken    Eyes:    Conjunctivae/corneas clear. PERRL    Ears:    Normal     Nose:    Nares normal.     Mouth/Throat:    Dry tongue     Neck:     no JVD. Back:     deferred    Lungs:     Clear to auscultation bilaterally. Heart:    Regular rate and rhythm, S1, S2 normal    Abdomen:     Soft, non-tender. Bowel sounds normal.     Extremities:    Extremities normal, atraumatic, no cyanosis or edema. Skin:    Skin tenting     Neurologic:    CNII-XII intact. Limited ROM in bed         Assessment and Plan         Principal Problem:    [place] (acute kidney injury) ([facility] [address]  Active Problems:    Nonrheumatic mitral valve regurgitation    At high risk for falls    Hyperkalemia    Generalized anxiety disorder    Pacemaker    Pulmonary HTN (HCC)    Type 2 diabetes with nephropathy (HCC)    Paroxysmal atrial fibrillation (HCC)  Resolved Problems:    * No resolved hospital problems. *    [place] - Holding Bumex, ARB, spironolactone. Dry on exam. Gentle fluids. 1/14/2023  Continue holding Bumex, ARB, spironolactone  Continue gentle IV fluids-these have finished- Will restart normal saline at 50 cc/h      Fall -   1/14/2023  Check orthostatics.   -Ask nursing to obtain-systolic blood pressure drops more than 20 points from lying to standing.  -Continue gentle IV fluid hydration as above  -PT and OT eval        Hyponatremia -  1/14/2023  Resolved    Hyperkalemia -   1/14/2023  Potassium elevated on repeat sample today but the sample was hemolyzed   We will send another lab     DM type II - SS, humalog 5 units before meals, Lantus 34 units in AM    1/14/2023  Continue to monitor his blood glucose and cover her with insulin    Depression -  1/14/2023  Continue Zoloft    HLD   1/14/2023  Continue statin      A fib  1/14/2023  Continue Coumadin  INR elevated at 4.8. Will hold for now  Pharmacy is dosing    Chronic low back pain -  1/14/2023  Continue to hold steroids for now      ANURADHA- CPAP at night   1/14/2023  Continue CPAP at night    Anticipated discharge: In the next 24 to 36 hours pending clinical stability and clinical /;course  Asked nursing to update his home medications in the computer-we will review and update    PPD/PT/OT. Will need detailed discussion about his meds at discharge. He is easily confused with meds but very pleasant.       DVT prophylaxis - warfarin  Signed By:    Jennifer Marshall MD    January 14, 2023

## 2023-01-14 NOTE — ED TRIAGE NOTES
arrives via ems for a fall in his kitchen where he lost his balance and hit head on leg of kitchen table. No LOC, no laceration. Painful hematoma on back of head. He takes warfarin for afib. He reports hx of DM and increased BGL for the past few days.  BGL via ems was 346

## 2023-01-14 NOTE — PROGRESS NOTES
TRANSFER - IN REPORT:    Verbal report received from NAKIA Chappell on Aramfreddy Benavides  being received from ED for routine progression of patient care      Report consisted of patients Situation, Background, Assessment and   Recommendations(SBAR). Information from the following report(s) ED SBAR was reviewed with the receiving nurse. Opportunity for questions and clarification was provided.       Assessment completed upon patients arrival to unit and care assume

## 2023-01-14 NOTE — PROGRESS NOTES
Warfarin dosing per pharmacist    Alejandra Carcamo is a 82 y.o. male. Indication:  Afib    Goal INR:  2 - 3    Home dose:  7.5 mg 5 times per week: 5 mg 2 times per week    Risk factors or significant drug interactions:  n/a    Other anticoagulants:  n/a      Lab Results   Component Value Date/Time    BUN 90 01/14/2023 04:23 AM    CREATININE 2.40 01/14/2023 04:23 AM     01/14/2023 04:23 AM    HGB 13.1 01/14/2023 04:23 AM    INR 4.8 01/14/2023 04:23 AM    INR 2.5 05/06/2022 10:01 AM           Daily Monitoring  Date  INR     Warfarin dose  Notes  1/14  4.8  Hold           Supratherapeutic INR. Continue to hold warfarin.       Thank you,  Al Amos, [facility]

## 2023-01-14 NOTE — PROGRESS NOTES
Patient arrived to floor. Alert and oriented, no signs of distress. Respirations even and unlabored on RA. Denies pain or needs at this time. Patient noted to have AV-pacemaker. Call light within reach.

## 2023-01-14 NOTE — ED PROVIDER NOTES
Emergency Department Provider Note                   PCP:                Alyson Tony MD               Age: 82 y.o. Sex: male     No diagnosis found. DISPOSITION          Medical Decision Making  53-year-old male presents after a fall. Etiology could be mechanical, vasovagal syncope, orthostatic hypotension, electrolyte abnormality. EKG shows a paced rhythm at 75 no diagnostic ST changes or ectopy. I reviewed the head CT which shows left occipital soft tissue contusion but no skull fracture or intracranial injury. Blood work shows acute kidney injury with creatinine 3.2  glucose 246 without DKA. CBC is unremarkable. Patient is being hydrated with 2L saline. Considering patient's age and degree of kidney injury which was likely the cause of his fall I feel he should be admitted for further hydration to confirm creatinine BUN and potassium improving with hydration. Hospitalist consulted for admission. Amount and/or Complexity of Data Reviewed  Labs: ordered. Decision-making details documented in ED Course. Radiology: ordered and independent interpretation performed. Decision-making details documented in ED Course. ECG/medicine tests: ordered and independent interpretation performed. Decision-making details documented in ED Course. Risk  Prescription drug management.                                 Orders Placed This Encounter   Procedures    CT HEAD WO CONTRAST    CBC with Auto Differential    Comprehensive Metabolic Panel    Lipase    Magnesium    EKG 12 Lead        Medications   0.9 % sodium chloride bolus (has no administration in time range)       New Prescriptions    No medications on file        Ibrahima Felder is a 82 y.o. male who presents to the Emergency Department with chief complaint of    Chief Complaint   Patient presents with    Fall    Hyperglycemia      53-year-old white male history of diabetes presents after a fall that occurred just prior to arrival. States he became dizzy and fell backward. He struck his head on a table. No loss of consciousness. No neck pain. He does report chronic back pain unchanged from baseline. Of note he has been placed on steroids because of his back pain and for the past week or so his glucose has been elevated. Denies nausea, vomiting, fever. No chest pain or shortness of breath. The history is provided by the patient and the EMS personnel. Review of Systems    Past Medical History:   Diagnosis Date    Allergic rhinitis     Atrial fibrillation ([address]     Chronic kidney disease     \"a little\"    Chronic kidney disease, stage III (moderate) ([address]     Chronic pain     Colon polyp     Coronary artery disease     Diabetic polyneuropathy associated with type 2 diabetes mellitus ([address] 7/22/2019    Erectile dysfunction     Fracture of left fibula     GERD (gastroesophageal reflux disease)     Glaucoma     Hypertension     Imbalance 6/11/2019    Multinodular goiter     Nephrolithiasis     Obesity (BMI 30-39. 9)          Peptic ulcer disease     Plantar fasciitis     Psychiatric disorder     Right inguinal hernia     Sciatica     Stroke ([address]     Tobacco use disorder     Type 2 diabetes mellitus ([address]         Past Surgical History:   Procedure Laterality Date    ABLATION OF DYSRHYTHMIC FOCUS      BACK SURGERY  2012    fusion    CARDIAC PROCEDURE N/A 10/14/2022    RIGHT HEART CATH performed by Ignacio Jackson MD at [facility] CATH [facility]    COLONOSCOPY  2018    COLONOSCOPY  2008    GI  1980's    Rectal fistula repair    KNEE ARTHROSCOPY Right 2012 [address] SURGERY  2019    pacemaker placed    LA UNLISTED PROCEDURE CARDIAC SURGERY      atrial fib ablation        Family History   Problem Relation Age of Onset    Colon Cancer Father 80    Thyroid Disease Father         goiter    No Known Problems Paternal Grandfather     No Known Problems Paternal Grandmother     No Known Problems Maternal Grandfather     No Known Problems Maternal Grandmother     Crohn's Disease Son     No Known Problems Brother     No Known Problems Brother     No Known Problems Brother     Heart Disease Brother     Cancer Sister         liver    Heart Disease Sister     Heart Disease Brother     Breast Cancer Sister     Thyroid Cancer Neg Hx         Social History     Socioeconomic History    Marital status: Single   Tobacco Use    Smoking status: Former     Packs/day: 0.25     Years: 44.00     Pack years: 11.00     Types: Cigarettes     Quit date: 1996     Years since quittin.5    Smokeless tobacco: Never    Tobacco comments:     Quit smoking: quit Patient formally smoked 3 packs a day but says he did not smoke the whole cigarette but burned 3 cigarettes 3 packs/day. He smoked close to 40 years but quit in . Vaping Use    Vaping Use: Never used   Substance and Sexual Activity    Alcohol use: No    Drug use: Yes     Types: Prescription    Sexual activity: Not Currently     Birth control/protection: None         Patient has no known allergies.      Previous Medications    BACLOFEN (LIORESAL) 10 MG TABLET    Take 1-2 tablets at bedtime by mouth as needed    BLOOD GLUCOSE MONITOR STRIPS    Use to check blood sugars twice daily DXE11.9    BRIMONIDINE (ALPHAGAN P) 0.15 % OPHTHALMIC SOLUTION    1 drop    BUMETANIDE (BUMEX) 2 MG TABLET    Take 1 tablet by mouth daily    CYANOCOBALAMIN 1000 MCG TABLET    Take 1 tablet by mouth daily    DOXAZOSIN (CARDURA) 4 MG TABLET    Take 1 tablet by mouth nightly TAKE 1 TABLET BY MOUTH EVERY DAY    FERROUS SULFATE (FE TABS 325) 325 (65 FE) MG EC TABLET    Take 1 tablet by mouth every morning (before breakfast)    FLUTICASONE (FLONASE) 50 MCG/ACT NASAL SPRAY    2 sprays by Nasal route daily SPRAY 2 SPRAYS INTO EACH NOSTRIL EVERY DAY    FLUTICASONE-UMECLIDIN-VILANT (TRELEGY ELLIPTA) 100-62.5-25 MCG/INH AEPB    1 inhalation every morning, rinse mouth after use    INSULIN ASPART (NOVOLOG FLEXPEN) 100 UNIT/ML INJECTION PEN    Inject 5 Units into the skin 3 times daily (before meals)    INSULIN DEGLUDEC (TRESIBA FLEXTOUCH) 200 UNIT/ML SOPN    Inject 26 Units into the skin daily    INSULIN PEN NEEDLE 29G X 12MM MISC    1 each by Does not apply route daily Use daily to inject insulin. DXE11.9    LATANOPROST (XALATAN) 0.005 % OPHTHALMIC SOLUTION    LOCATION: BOTH EYES. INSTILL ONE DROP INTO BOTH EYES AT NIGHT BEFORE BED. LINAGLIPTIN (TRADJENTA) 5 MG TABLET    Take 1 tablet by mouth daily    LOSARTAN (COZAAR) 25 MG TABLET    Take 1 tablet by mouth daily    MAGNESIUM OXIDE (MAG-OX) 400 MG TABLET    Take 400 mg by mouth daily    OMEPRAZOLE (PRILOSEC) 40 MG DELAYED RELEASE CAPSULE    TAKE 1 CAPSULE BY MOUTH EVERY DAY BEFORE A MEAL    POTASSIUM GLUCONATE 550 MG TABLET    Take by mouth daily    PRAVASTATIN (PRAVACHOL) 80 MG TABLET    Take 1 tablet by mouth daily    SERTRALINE (ZOLOFT) 100 MG TABLET    Take 1 tablet by mouth daily    SPIRONOLACTONE (ALDACTONE) 25 MG TABLET    Take 1 tablet by mouth daily    WARFARIN (COUMADIN) 5 MG TABLET    TAKING ONLY 2 DAYS A WEEK    WARFARIN (COUMADIN) 7.5 MG TABLET    Taking only five days a week        Vitals signs and nursing note reviewed. No data found. Physical Exam  Vitals and nursing note reviewed. Constitutional:       General: He is not in acute distress. Appearance: Normal appearance. He is not toxic-appearing. HENT:      Head: Normocephalic. Comments: Occipital contusion. No laceration     Nose: Nose normal.      Mouth/Throat:      Mouth: Mucous membranes are moist.   Eyes:      Extraocular Movements: Extraocular movements intact. Pupils: Pupils are equal, round, and reactive to light. Cardiovascular:      Rate and Rhythm: Normal rate and regular rhythm. Pulmonary:      Effort: Pulmonary effort is normal.      Breath sounds: Normal breath sounds.    Abdominal:      General: There is no distension. Palpations: Abdomen is soft. Tenderness: There is no abdominal tenderness. There is no guarding. Musculoskeletal:         General: No swelling. Normal range of motion. Cervical back: Normal range of motion and neck supple. No tenderness. Skin:     General: Skin is warm and dry. Neurological:      General: No focal deficit present. Mental Status: He is alert and oriented to person, place, and time. Psychiatric:         Mood and Affect: Mood normal.         Behavior: Behavior normal.        Procedures    Results for orders placed or performed in visit on 85/86/22   Basic Metabolic Panel   Result Value Ref Range    Sodium 134 133 - 143 mmol/L    Potassium 5.3 (H) 3.5 - 5.1 mmol/L    Chloride 101 101 - 110 mmol/L    CO2 25 21 - 32 mmol/L    Anion Gap 8 2 - 11 mmol/L    Glucose 264 (H) 65 - 100 mg/dL    BUN 97 (H) 8 - 23 MG/DL    Creatinine 2.80 (H) 0.8 - 1.5 MG/DL    Est, Glom Filt Rate 21 (L) >60 ml/min/1.73m2    Calcium 11.2 (H) 8.3 - 10.4 MG/DL        CT HEAD WO CONTRAST    (Results Pending)                       Voice dictation software was used during the making of this note. This software is not perfect and grammatical and other typographical errors may be present. This note has not been completely proofread for errors.      Samantha mOer MD  01/13/23 [id number]

## 2023-01-14 NOTE — PROGRESS NOTES
TRANSFER - OUT REPORT:    Verbal report given to NAKIA Simpson on Amanda Kiser  being transferred to 2nd floor for urgent transfer. Report consisted of patient's Situation, Background, Assessment and   Recommendations(SBAR). Information from the following report(s) Nurse Handoff Report, Adult Overview, Intake/Output, MAR, Recent Results, and Cardiac Rhythm AVPaced  was reviewed with the receiving nurse. [place] Assessment: No data recorded  Lines:   Peripheral IV 01/13/23 Right Antecubital (Active)   Site Assessment Clean, dry & intact 01/14/23 0805   Line Status Capped 01/14/23 0805   Line Care Connections checked and tightened 01/14/23 0509   Phlebitis Assessment No symptoms 01/14/23 0805   Infiltration Assessment 0 01/14/23 0805   Dressing Status Clean, dry & intact 01/14/23 0805   Dressing Type Transparent 01/14/23 0805        Opportunity for questions and clarification was provided.       Patient transported with:  [employer]

## 2023-01-14 NOTE — PROGRESS NOTES
TRANSFER - IN REPORT:    Verbal report received from Davis Ríos on Marco Antonio Peeling  being received from 8th floor for routine progression of patient care      Report consisted of patient's Situation, Background, Assessment and   Recommendations(SBAR). Information from the following report(s) Nurse Handoff Report, Intake/Output, MAR, and Recent Results was reviewed with the receiving nurse. Opportunity for questions and clarification was provided. Assessment completed upon patient's arrival to unit and care assumed.

## 2023-01-14 NOTE — PROGRESS NOTES
Patient awake, resting in bed. No signs of distress. Tolerated RA throughout shift. Completed some admission database. Patient unable to recall home medications. Continuous telemetry monitoring on. Call light within reach.

## 2023-01-14 NOTE — H&P
Hospitalist Admission History and Physical         NAME:            Monica Royal    Age:                82 y.o.    :               1935    MRN:              [id number]    PCP: Bobby Villatoro MD    Consulting MD:    Treatment Team: Attending Provider: Niles Goddard DO; Registered Nurse: Iron Martinez RN         Chief Complaint   Patient presents with    Fall    Hyperglycemia   HPI:    Patient is a 82 y.o. male who presented to the ED for cc fall just prior to arrival. He became dizzy and fell backward when going from a sitting to standing position. No loss of consciousness. Hx of chronic back pain currently on steroid burst, a fib on warfarin, GERD, HTN, DM type II, AV kary ablation with pacemaker, moderate mitral and tricuspid regurg, pulmonary HTN, ANURADHA on CPAP, lumbar spinal stenosis. Poor historian and admits to not knowing what meds he takes at home. He has no one to help him with his meds. Na 132, K 5.7. Creatine 3.2 from baseline 2.5, glucose 246. CT head-   No CT evidence of acute intracranial process. 2. There is a small left occipital subcutaneous hematoma. There is no underlying   fracture. Past Medical History:   Diagnosis Date    Allergic rhinitis     Atrial fibrillation ([address]     Chronic kidney disease     \"a little\"    Chronic kidney disease, stage III (moderate) (HCC)     Chronic pain     Colon polyp     Coronary artery disease     Diabetic polyneuropathy associated with type 2 diabetes mellitus ([address] 2019    Erectile dysfunction     Fracture of left fibula     GERD (gastroesophageal reflux disease)     Glaucoma     Hypertension     Imbalance 2019    Multinodular goiter     Nephrolithiasis     Obesity (BMI 30-39. 9)          Peptic ulcer disease     Plantar fasciitis     Psychiatric disorder     Right inguinal hernia     Sciatica     Stroke ([address]     Tobacco use disorder     Type 2 diabetes mellitus ([address]             Past Surgical History: Procedure Laterality Date    ABLATION OF DYSRHYTHMIC FOCUS      BACK SURGERY  2012    fusion    CARDIAC PROCEDURE N/A 10/14/2022    RIGHT HEART CATH performed by Ольга Cabrera MD at [facility] CATH LAB    COLONOSCOPY  2018    COLONOSCOPY      GI  1980's    Rectal fistula repair    KNEE ARTHROSCOPY Right 2012    GA UNLISTED PROCEDURE CARDIAC SURGERY  2019    pacemaker placed    GA UNLISTED PROCEDURE CARDIAC SURGERY      atrial fib ablation            Family History   Problem Relation Age of Onset    Colon Cancer Father 80    Thyroid Disease Father         goiter    No Known Problems Paternal Grandfather     No Known Problems Paternal Grandmother     No Known Problems Maternal Grandfather     No Known Problems Maternal Grandmother     Crohn's Disease Son     No Known Problems Brother     No Known Problems Brother     No Known Problems Brother     Heart Disease Brother     Cancer Sister         liver    Heart Disease Sister     Heart Disease Brother     Breast Cancer Sister     Thyroid Cancer Neg Hx        Family history reviewed and negative except as noted above. Social History     Social History Narrative    Not on file            Social History     Tobacco Use    Smoking status: Former     Packs/day: 0.25     Years: 44.00     Pack years: 11.00     Types: Cigarettes     Quit date: 1996     Years since quittin.5    Smokeless tobacco: Never    Tobacco comments:     Quit smoking: quit 1990Patient formally smoked 3 packs a day but says he did not smoke the whole cigarette but burned 3 cigarettes 3 packs/day. He smoked close to 40 years but quit in . Substance Use Topics    Alcohol use: No            Social History     Substance and Sexual Activity   Drug Use Yes    Types: Prescription                 No Known Allergies         Prior to Admission medications    Medication Sig Start Date End Date Taking?  Authorizing Provider   insulin aspart (NOVOLOG FLEXPEN) 100 UNIT/ML injection pen Inject 5 Units into the skin 3 times daily (before meals) 1/13/23   Johanne Pablo MD   bumetanide Oswaldo Weiss) 2 MG tablet Take 1 tablet by mouth daily 1/11/23   Johanne Pablo MD   losartan (COZAAR) 25 MG tablet Take 1 tablet by mouth daily 1/9/23   Abiola Singleton MD   baclofen (LIORESAL) 10 MG tablet Take 1-2 tablets at bedtime by mouth as needed 1/3/23   INESSA Robbins MD   spironolactone (ALDACTONE) 25 MG tablet Take 1 tablet by mouth daily 12/2/22   Abiola Singelton MD   omeprazole (PRILOSEC) 40 MG delayed release capsule TAKE 1 CAPSULE BY MOUTH EVERY DAY BEFORE A MEAL 10/11/22   Johanne Pablo MD   fluticasone Arminda Mussel) 50 MCG/ACT nasal spray 2 sprays by Nasal route daily SPRAY 2 SPRAYS INTO EACH NOSTRIL EVERY DAY 10/11/22   Johanne Pablo MD   cyanocobalamin 1000 MCG tablet Take 1 tablet by mouth daily 10/11/22   Johanne Pablo MD   doxazosin (CARDURA) 4 MG tablet Take 1 tablet by mouth nightly TAKE 1 TABLET BY MOUTH EVERY DAY 10/11/22   Johanne Pablo MD   ferrous sulfate (FE TABS 325) 325 (65 Fe) MG EC tablet Take 1 tablet by mouth every morning (before breakfast) 10/11/22   Johanne Pablo MD   Insulin Degludec (TRESIBA FLEXTOUCH) 200 UNIT/ML SOPN Inject 26 Units into the skin daily  Patient taking differently: Inject 34 Units into the skin daily 10/11/22   Johanne Pablo MD   linagliptin (TRADJENTA) 5 MG tablet Take 1 tablet by mouth daily 10/11/22   Johanne Pablo MD   pravastatin (PRAVACHOL) 80 MG tablet Take 1 tablet by mouth daily 10/11/22   Johanne Pablo MD   sertraline (ZOLOFT) 100 MG tablet Take 1 tablet by mouth daily 10/11/22   Johanne Pablo MD   warfarin (COUMADIN) 5 MG tablet TAKING ONLY 2 DAYS A WEEK 10/11/22   Johanne Pablo MD   warfarin (COUMADIN) 7.5 MG tablet Taking only five days a week 10/11/22   Johanne Pablo MD   blood glucose monitor strips Use to check blood sugars twice daily DXE11.9 8/18/22   Johanne Pablo MD   Insulin Pen Needle 29G X 12MM MISC 1 each by Does not apply route daily Use daily to inject insulin. DXE11.9 8/18/22   Alyson Tony MD   fluticasone-umeclidin-vilant ([facility]) 171-14.4-09 MCG/INH AEPB 1 inhalation every morning, rinse mouth after use  Patient not taking: No sig reported 8/8/22   Jeferson Alamo, APRN - CNP   brimonidine (ALPHAGAN P) 0.15 % ophthalmic solution 1 drop    Historical Provider, MD   latanoprost (XALATAN) 0.005 % ophthalmic solution LOCATION: BOTH EYES. INSTILL ONE DROP INTO BOTH EYES AT NIGHT BEFORE BED. 5/6/22   Historical Provider, MD   magnesium oxide (MAG-OX) 400 MG tablet Take 400 mg by mouth daily    Ar Automatic Reconciliation   potassium gluconate 550 mg tablet Take by mouth daily    Ar Automatic Reconciliation                      Review of Systems         Constitutional: NAD    Eyes:  no change in visual acuity, no photophobia    Ears, nose, mouth, throat, and face: no  Odynphagia, dysphagia, no thrush or exudate, negative for chronic sinus congestion, recurrent headaches    Respiratory: negative for SOB, hemoptysis or cough    Cardiovascular: negative for CP, palpitations, or PND    Gastrointestinal: thirsty     Genitourinary: no urgency, frequency, or dysuria, no nocturia    Integument/breast: negative for skin rash or skin lesions    Hematologic/lymphatic: negative for known bleeding disorder    Musculoskeletal:negative for joint pain or joint tenderness    Neurological: generalized weakness    Behavioral/Psych: negative for depression or chronic anxiety,    Endocrine: negative for polydyspia, polyuria or intolerance to heat or cold    Allergic/Immunologic: negative for chronic allergic rhinitis, or known connective tissue disorder              Objective:         Patient Vitals for the past 24 hrs:   Pulse Resp BP SpO2   01/13/23 2212 72 18 (!) 124/58 98 %   01/13/23 2142 75 16 (!) 127/59 96 %            No intake/output data recorded. No intake/output data recorded.          Data Review:   Recent Results (from the past 24 hour(s))   Basic Metabolic Panel    Collection Time: 01/13/23 10:36 AM   Result Value Ref Range    Sodium 134 133 - 143 mmol/L    Potassium 5.3 (H) 3.5 - 5.1 mmol/L    Chloride 101 101 - 110 mmol/L    CO2 25 21 - 32 mmol/L    Anion Gap 8 2 - 11 mmol/L    Glucose 264 (H) 65 - 100 mg/dL    BUN 97 (H) 8 - 23 MG/DL    Creatinine 2.80 (H) 0.8 - 1.5 MG/DL    Est, Glom Filt Rate 21 (L) >60 ml/min/1.73m2    Calcium 11.2 (H) 8.3 - 10.4 MG/DL   POCT Glucose    Collection Time: 01/13/23  8:44 PM   Result Value Ref Range    POC Glucose 230 (H) 65 - 100 mg/dL    Performed by: Anel    CBC with Auto Differential    Collection Time: 01/13/23  8:51 PM   Result Value Ref Range    WBC 10.2 4.3 - 11.1 K/uL    RBC 4.23 4.23 - 5.6 M/uL    Hemoglobin 13.9 13.6 - 17.2 g/dL    Hematocrit 39.6 (L) 41.1 - 50.3 %    MCV 93.6 82 - 102 FL    MCH 32.9 26.1 - 32.9 PG    MCHC 35.1 (H) 31.4 - 35.0 g/dL    RDW 12.8 11.9 - 14.6 %    Platelets 126 856 - 972 K/uL    MPV 12.2 9.4 - 12.3 FL    nRBC 0.00 0.0 - 0.2 K/uL    Differential Type AUTOMATED      Seg Neutrophils 75 43 - 78 %    Lymphocytes 14 13 - 44 %    Monocytes 9 4.0 - 12.0 %    Eosinophils % 1 0.5 - 7.8 %    Basophils 0 0.0 - 2.0 %    Immature Granulocytes 1 0.0 - 5.0 %    Segs Absolute 7.7 1.7 - 8.2 K/UL    Absolute Lymph # 1.4 0.5 - 4.6 K/UL    Absolute Mono # 0.9 0.1 - 1.3 K/UL    Absolute Eos # 0.1 0.0 - 0.8 K/UL    Basophils Absolute 0.0 0.0 - 0.2 K/UL    Absolute Immature Granulocyte 0.1 0.0 - 0.5 K/UL   Comprehensive Metabolic Panel    Collection Time: 01/13/23  8:51 PM   Result Value Ref Range    Sodium 132 (L) 133 - 143 mmol/L    Potassium 5.7 (H) 3.5 - 5.1 mmol/L    Chloride 99 (L) 101 - 110 mmol/L    CO2 23 21 - 32 mmol/L    Anion Gap 10 2 - 11 mmol/L    Glucose 246 (H) 65 - 100 mg/dL     (H) 8 - 23 MG/DL    Creatinine 3.20 (H) 0.8 - 1.5 MG/DL    Est, Glom Filt Rate 18 (L) >60 ml/min/1.73m2    Calcium 11.2 (H) 8.3 - 10.4 MG/DL    Total Bilirubin 1.1 0.2 - 1.1 MG/DL    ALT 35 12 - 65 U/L    AST 22 15 - 37 U/L    Alk Phosphatase 63 50 - 136 U/L    Total Protein 7.3 6.3 - 8.2 g/dL    Albumin 3.4 3.2 - 4.6 g/dL    Globulin 3.9 2.8 - 4.5 g/dL    Albumin/Globulin Ratio 0.9 0.4 - 1.6     Lipase    Collection Time: 01/13/23  8:51 PM   Result Value Ref Range    Lipase 277 73 - 393 U/L   Magnesium    Collection Time: 01/13/23  8:51 PM   Result Value Ref Range    Magnesium 2.4 1.8 - 2.4 mg/dL            Physical Exam:         General:    Alert, cooperative, very pleasant, soft spoken    Eyes:    Conjunctivae/corneas clear. PERRL    Ears:    Normal     Nose:    Nares normal.     Mouth/Throat:    Dry tongue     Neck:     no JVD. Back:     deferred    Lungs:     Clear to auscultation bilaterally. Heart:    Regular rate and rhythm, S1, S2 normal    Abdomen:     Soft, non-tender. Bowel sounds normal.     Extremities:    Extremities normal, atraumatic, no cyanosis or edema. Skin:    Skin tenting     Neurologic:    CNII-XII intact. Limited ROM in bed         Assessment and Plan         Principal Problem:    [place] (acute kidney injury) ([facility]  Active Problems:    Nonrheumatic mitral valve regurgitation    At high risk for falls    Hyperkalemia    Generalized anxiety disorder    Pacemaker    Pulmonary HTN (HCC)    Type 2 diabetes with nephropathy (HCC)    Paroxysmal atrial fibrillation (HCC)  Resolved Problems:    * No resolved hospital problems. *    [place] - Holding Bumex, ARB, spironolactone. Dry on exam. Gentle fluids. Fall - Check orthostatic BP    Hyponatremia - IV fluids should improve. Hyperkalemia - Insulin and fluids should improve. Recheck tonight. DM type II - SS, humalog 5 units before meals, Lantus 34 units in AM    Depression - Zoloft     HLD - statin    A fib - Coumadin. Daily INR    Chronic low back pain - Stop steroids    ANURADHA- CPAP at night     PPD/PT/OT. Will need detailed discussion about his meds at discharge.  He is easily confused with meds but very pleasant.       DVT prophylaxis - warfarin  Signed By:    Meghana Mirza DO    January 13, 2023

## 2023-01-14 NOTE — ED NOTES
TRANSFER - OUT REPORT:    Verbal report given to Benito Burroughs on Supriya Riser  being transferred to  for routine progression of patient care       Report consisted of patient's Situation, Background, Assessment and   Recommendations(SBAR). Information from the following report(s) ED SBAR was reviewed with the receiving nurse. [place] Assessment: No data recorded  Lines:   Peripheral IV 01/13/23 Right Antecubital (Active)        Opportunity for questions and clarification was provided.       Patient transported with:  Loretta Bautista RN  01/14/23 0020

## 2023-01-14 NOTE — PROGRESS NOTES
ACUTE PHYSICAL THERAPY GOALS:   (Developed with and agreed upon by patient and/or caregiver.)  STG:  (1.)Mr. Hayde Jasso will move from supine to sit and sit to supine , scoot up and down, and roll side to side with STAND BY ASSIST within 4 treatment day(s). (2.)Mr. Hayde Jasso will transfer from bed to chair and chair to bed with STAND BY ASSIST using the least restrictive device within 4 treatment day(s). (3.)Mr. Hayde Jasso will ambulate with STAND BY ASSIST for 150 feet with the least restrictive device within 4 treatment day(s). LTG:  (1.)Mr. Hayde Jasso will move from supine to sit and sit to supine , scoot up and down, and roll side to side in bed with MODIFIED INDEPENDENCE within 7 treatment day(s). (2.)Mr. Hayde Jasso will transfer from bed to chair and chair to bed with MODIFIED INDEPENDENCE using the least restrictive device within 7 treatment day(s). (3.)Mr. Hayde Jasso will ambulate with MODIFIED INDEPENDENCE for 300 feet with the least restrictive device within 7 treatment day(s).   ________________________________________________________________________________________________     PHYSICAL THERAPY Initial Assessment  (Link to Caseload Tracking: PT Visit Days : 1  Acknowledge Orders  Time In/Out  PT Charge Capture  Rehab Caseload Tracker    Dionte Pimentel is a 82 y.o. male   PRIMARY DIAGNOSIS: [place] (acute kidney injury) ([address]  Dehydration [E86.0]  Hyperglycemia [R73.9]  [place] (acute kidney injury) ([address] [N17.9]  Acute kidney injury ([address] [N17.9]  Contusion of scalp, initial encounter [S00.03XA]       Reason for Referral: Other lack of cordination (R27.8)  Difficulty in walking, Not elsewhere classified (R26.2)  Other abnormalities of gait and mobility (R26.89)  Inpatient: Payor: Emily Medina / Plan: Emily Medina HMO / Product Type: *No Product type* /     ASSESSMENT:     REHAB RECOMMENDATIONS:   Recommendation to date pending progress:  Setting:  Home Health Therapy    Equipment:    To Be Determined ASSESSMENT:  Mr. Annie Mcqueen   is a pleasant frail elderly male with above diagnosis who demonstrates with decreased transfers, ambulation and mobility below his prior functional baseline. All transfers are currently limited at [address] x 1 out of bed to sit and stand followed by ambulation for 40 feet with the deficits stated below. Milo Maldonado then returns to seated edge of bed before return to supine with CGA x 1. No dizziness, loss of balance or dynamic sway is observed with ambulation today. Skilled PT is indicated for this patient's functional mobility deficits. Vitals:    01/14/23 1116 01/14/23 1618 01/14/23 1700 01/14/23 1828   BP: 125/67 139/73  138/66   Pulse: 76 75  75   Resp: 18 18  18   Temp: 97.7 °F (36.5 °C) 98.1 °F (36.7 °C)  97.3 °F (36.3 °C)   TempSrc: Oral Oral  Axillary   SpO2: 95% 98%  97%   Weight:   210 lb 12.2 oz (95.6 kg)    Height:   6' (1.829 m)          [facility] 6 Clicks Basic Mobility Inpatient Short Form  -PAC Mobility Inpatient   How much difficulty turning over in bed?: None  How much difficulty sitting down on / standing up from a chair with arms?: A Little  How much difficulty moving from lying on back to sitting on side of bed?: A Little  How much help from another person moving to and from a bed to a chair?: A Little  How much help from another person needed to walk in hospital room?: A Little  How much help from another person for climbing 3-5 steps with a railing?: A Little  AM-[facility] Inpatient Mobility Raw Score : 19  AM-PAC Inpatient T-Scale Score : 45.44  Mobility Inpatient CMS 0-100% Score: 41.77  Mobility Inpatient CMS G-Code Modifier : CK    SUBJECTIVE:   Mr. Annie Mcqueen states, \"I am ready to get up and don't feel as dizzy. \"     Social/Functional Lives With: Spouse  Type of Home: House  Home Layout: One level  Home Access: Stairs to enter with rails  Entrance Stairs - Number of Steps: 1  Bathroom Shower/Tub: Walk-in shower  Ambulation Assistance: Independent  Transfer Assistance: Independent  Active : Yes  Mode of Transportation: Car  Occupation: Retired    OBJECTIVE:     PAIN: VITALS / O2: PRECAUTION / [place] / DRAINS:   Pre Treatment:  no pain reported. Post Treatment: no pain reported.   Vitals        Oxygen RA      None    RESTRICTIONS/PRECAUTIONS:  Restrictions/Precautions: Fall Risk                 GROSS EVALUATION: Intact Impaired (Comments):   AROM [x]     PROM [x]    Strength [x]     Balance [x]  Good standing today    Posture [] WFL   Sensation [x]     Coordination [x]      Tone [x]     Edema [x]    Activity Tolerance [x]  FAIR     []      COGNITION/  PERCEPTION: Intact Impaired (Comments):   Orientation [x]     Vision [x]     Hearing [x]     Cognition  [x]       MOBILITY: I Mod I S SBA CGA Min Mod Max Total  NT x2 Comments:   Bed Mobility    Rolling [] [] [] [] [x] [] [] [] [] [] []    Supine to Sit [] [] [] [] [x] [] [] [] [] [] []    Scooting [] [] [] [] [x] [] [] [] [] [] []    Sit to Supine [] [] [] [] [x] [] [] [] [] [] []    Transfers    Sit to Stand [] [] [] [] [x] [] [] [] [] [] []    Bed to Chair [] [] [] [] [] [] [] [] [] [] []    Stand to Sit [] [] [] [] [x] [] [] [] [] [] []     [] [] [] [] [] [] [] [] [] [] []    I=Independent, Mod I=Modified Independent, S=Supervision, SBA=Standby Assistance, CGA=Contact Guard Assistance,   Min=Minimal Assistance, Mod=Moderate Assistance, Max=Maximal Assistance, Total=Total Assistance, NT=Not Tested    GAIT: I Mod I S SBA CGA Min Mod Max Total  NT x2 Comments:   Level of Assistance [] [] [] [] [x] [] [] [] [] [] []    Distance 40 feet    DME None    Gait Quality Decreased my , Decreased step clearance, and Decreased step length    Weightbearing Status Restrictions/Precautions  Restrictions/Precautions: Fall Risk    Stairs      I=Independent, Mod I=Modified Independent, S=Supervision, SBA=Standby Assistance, CGA=Contact Guard Assistance,   Min=Minimal Assistance, Mod=Moderate Assistance, Max=Maximal Assistance, Total=Total Assistance, NT=Not Tested    PLAN:   FREQUENCY AND DURATION: 3 times/week for duration of hospital stay or until stated goals are met, whichever comes first.    THERAPY PROGNOSIS: Good    PROBLEM LIST:   (Skilled intervention is medically necessary to address:)  Decreased Activity Tolerance  Decreased AROM/PROM  Decreased Balance  Decreased Cognition  Decreased Coordination  Decreased Gait Ability  Decreased Safety Awareness  Decreased Strength  Decreased Transfer Abilities INTERVENTIONS PLANNED:   (Benefits and precautions of physical therapy have been discussed with the patient.)  Therapeutic Activity  Therapeutic Exercise/HEP  Neuromuscular Re-education  Gait Training       TREATMENT:   EVALUATION: LOW COMPLEXITY: (Untimed Charge)    TREATMENT:   Therapeutic Activity (23 Minutes): Therapeutic activity included Supine to Sit, Sit to Supine, Scooting, Lateral Scooting, Transfer Training, Ambulation on level ground, Sitting balance , and Standing balance to improve functional Activity tolerance, Balance, Coordination, Mobility, and Strength.     TREATMENT GRID:  N/A    AFTER TREATMENT PRECAUTIONS: Bed, Call light within reach, Needs within reach, RN notified, and Side rails x3    INTERDISCIPLINARY COLLABORATION:  RN/ PCT and PT/ PTA    EDUCATION: Education Given To: Patient  Education Provided: Role of Therapy  Education Method: Verbal  Barriers to Learning: None  Education Outcome: Verbalized understanding;Continued education needed    TIME IN/OUT:  Time In: [facility]  Time Out: [facility]  Minutes: [address], [place]

## 2023-01-15 LAB
ANION GAP SERPL CALC-SCNC: 5 MMOL/L (ref 2–11)
BASOPHILS # BLD: 0 K/UL (ref 0–0.2)
BASOPHILS NFR BLD: 0 % (ref 0–2)
BUN SERPL-MCNC: 63 MG/DL (ref 8–23)
CALCIUM SERPL-MCNC: 9.8 MG/DL (ref 8.3–10.4)
CHLORIDE SERPL-SCNC: 108 MMOL/L (ref 101–110)
CO2 SERPL-SCNC: 25 MMOL/L (ref 21–32)
CREAT SERPL-MCNC: 1.9 MG/DL (ref 0.8–1.5)
DIFFERENTIAL METHOD BLD: ABNORMAL
EOSINOPHIL # BLD: 0.1 K/UL (ref 0–0.8)
EOSINOPHIL NFR BLD: 1 % (ref 0.5–7.8)
ERYTHROCYTE [DISTWIDTH] IN BLOOD BY AUTOMATED COUNT: 12.8 % (ref 11.9–14.6)
GLUCOSE BLD STRIP.AUTO-MCNC: 103 MG/DL (ref 65–100)
GLUCOSE BLD STRIP.AUTO-MCNC: 150 MG/DL (ref 65–100)
GLUCOSE BLD STRIP.AUTO-MCNC: 192 MG/DL (ref 65–100)
GLUCOSE BLD STRIP.AUTO-MCNC: 225 MG/DL (ref 65–100)
GLUCOSE SERPL-MCNC: 123 MG/DL (ref 65–100)
HCT VFR BLD AUTO: 41.3 % (ref 41.1–50.3)
HGB BLD-MCNC: 14.3 G/DL (ref 13.6–17.2)
IMM GRANULOCYTES # BLD AUTO: 0.1 K/UL (ref 0–0.5)
IMM GRANULOCYTES NFR BLD AUTO: 1 % (ref 0–5)
INR PPP: 2.3
LYMPHOCYTES # BLD: 2.5 K/UL (ref 0.5–4.6)
LYMPHOCYTES NFR BLD: 19 % (ref 13–44)
MCH RBC QN AUTO: 33.3 PG (ref 26.1–32.9)
MCHC RBC AUTO-ENTMCNC: 34.6 G/DL (ref 31.4–35)
MCV RBC AUTO: 96 FL (ref 82–102)
MONOCYTES # BLD: 1 K/UL (ref 0.1–1.3)
MONOCYTES NFR BLD: 7 % (ref 4–12)
NEUTS SEG # BLD: 9.6 K/UL (ref 1.7–8.2)
NEUTS SEG NFR BLD: 72 % (ref 43–78)
NRBC # BLD: 0 K/UL (ref 0–0.2)
PLATELET # BLD AUTO: 198 K/UL (ref 150–450)
PMV BLD AUTO: 12.4 FL (ref 9.4–12.3)
POTASSIUM SERPL-SCNC: 5.4 MMOL/L (ref 3.5–5.1)
PROTHROMBIN TIME: 26 SEC (ref 12.6–14.3)
RBC # BLD AUTO: 4.3 M/UL (ref 4.23–5.6)
SERVICE CMNT-IMP: ABNORMAL
SODIUM SERPL-SCNC: 138 MMOL/L (ref 133–143)
WBC # BLD AUTO: 13.2 K/UL (ref 4.3–11.1)

## 2023-01-15 PROCEDURE — 82962 GLUCOSE BLOOD TEST: CPT

## 2023-01-15 PROCEDURE — 1100000000 HC RM PRIVATE

## 2023-01-15 PROCEDURE — 36415 COLL VENOUS BLD VENIPUNCTURE: CPT

## 2023-01-15 PROCEDURE — G0378 HOSPITAL OBSERVATION PER HR: HCPCS

## 2023-01-15 PROCEDURE — 80048 BASIC METABOLIC PNL TOTAL CA: CPT

## 2023-01-15 PROCEDURE — 6370000000 HC RX 637 (ALT 250 FOR IP): Performed by: FAMILY MEDICINE

## 2023-01-15 PROCEDURE — 2580000003 HC RX 258: Performed by: NURSE PRACTITIONER

## 2023-01-15 PROCEDURE — 85610 PROTHROMBIN TIME: CPT

## 2023-01-15 PROCEDURE — 2580000003 HC RX 258: Performed by: FAMILY MEDICINE

## 2023-01-15 PROCEDURE — 2580000003 HC RX 258: Performed by: INTERNAL MEDICINE

## 2023-01-15 PROCEDURE — 6370000000 HC RX 637 (ALT 250 FOR IP): Performed by: INTERNAL MEDICINE

## 2023-01-15 PROCEDURE — 6370000000 HC RX 637 (ALT 250 FOR IP): Performed by: NURSE PRACTITIONER

## 2023-01-15 PROCEDURE — 85025 COMPLETE CBC W/AUTO DIFF WBC: CPT

## 2023-01-15 RX ORDER — FLUTICASONE PROPIONATE 50 MCG
2 SPRAY, SUSPENSION (ML) NASAL NIGHTLY
Status: DISCONTINUED | OUTPATIENT
Start: 2023-01-15 | End: 2023-01-16 | Stop reason: HOSPADM

## 2023-01-15 RX ORDER — WARFARIN SODIUM 5 MG/1
7.5 TABLET ORAL DAILY
Status: DISCONTINUED | OUTPATIENT
Start: 2023-01-15 | End: 2023-01-16 | Stop reason: HOSPADM

## 2023-01-15 RX ADMIN — BRIMONIDINE TARTRATE 1 DROP: 2 SOLUTION OPHTHALMIC at 08:43

## 2023-01-15 RX ADMIN — SODIUM CHLORIDE: 9 INJECTION, SOLUTION INTRAVENOUS at 06:26

## 2023-01-15 RX ADMIN — PANTOPRAZOLE SODIUM 40 MG: 40 TABLET, DELAYED RELEASE ORAL at 06:26

## 2023-01-15 RX ADMIN — BRIMONIDINE TARTRATE 1 DROP: 2 SOLUTION OPHTHALMIC at 21:33

## 2023-01-15 RX ADMIN — CYANOCOBALAMIN TAB 1000 MCG 1000 MCG: 1000 TAB at 08:38

## 2023-01-15 RX ADMIN — INSULIN GLARGINE 34 UNITS: 100 INJECTION, SOLUTION SUBCUTANEOUS at 08:43

## 2023-01-15 RX ADMIN — SODIUM ZIRCONIUM CYCLOSILICATE 10 G: 10 POWDER, FOR SUSPENSION ORAL at 21:29

## 2023-01-15 RX ADMIN — SODIUM CHLORIDE, PRESERVATIVE FREE 10 ML: 5 INJECTION INTRAVENOUS at 08:38

## 2023-01-15 RX ADMIN — SODIUM CHLORIDE: 9 INJECTION, SOLUTION INTRAVENOUS at 21:35

## 2023-01-15 RX ADMIN — INSULIN LISPRO 5 UNITS: 100 INJECTION, SOLUTION INTRAVENOUS; SUBCUTANEOUS at 12:08

## 2023-01-15 RX ADMIN — INSULIN LISPRO 2 UNITS: 100 INJECTION, SOLUTION INTRAVENOUS; SUBCUTANEOUS at 12:07

## 2023-01-15 RX ADMIN — PRAVASTATIN SODIUM 80 MG: 20 TABLET ORAL at 21:29

## 2023-01-15 RX ADMIN — INSULIN LISPRO 5 UNITS: 100 INJECTION, SOLUTION INTRAVENOUS; SUBCUTANEOUS at 18:00

## 2023-01-15 RX ADMIN — INSULIN LISPRO 5 UNITS: 100 INJECTION, SOLUTION INTRAVENOUS; SUBCUTANEOUS at 08:44

## 2023-01-15 RX ADMIN — SODIUM ZIRCONIUM CYCLOSILICATE 10 G: 10 POWDER, FOR SUSPENSION ORAL at 11:10

## 2023-01-15 RX ADMIN — Medication 400 MG: at 08:38

## 2023-01-15 RX ADMIN — SERTRALINE 100 MG: 100 TABLET, FILM COATED ORAL at 08:38

## 2023-01-15 RX ADMIN — LATANOPROST 1 DROP: 50 SOLUTION/ DROPS OPHTHALMIC at 21:38

## 2023-01-15 RX ADMIN — WARFARIN SODIUM 7.5 MG: 5 TABLET ORAL at 18:51

## 2023-01-15 RX ADMIN — DOXAZOSIN 4 MG: 4 TABLET ORAL at 21:29

## 2023-01-15 RX ADMIN — Medication 2 SPRAY: at 21:34

## 2023-01-15 RX ADMIN — FERROUS SULFATE TAB 325 MG (65 MG ELEMENTAL FE) 325 MG: 325 (65 FE) TAB at 06:26

## 2023-01-15 NOTE — PROGRESS NOTES
END OF SHIFT NOTE:    INTAKE/OUTPUT  01/14 0701 - 01/15 0700  In: 36 [P.O.:120; I.V.:587]  Out: 400 [Urine:400]  Voiding: yes  Catheter: no  Drain:              Flatus: Patient does have flatus present. Stool:  no occurrences. Characteristics:       Emesis: 0 occurrences. Characteristics:        VITAL SIGNS  Patient Vitals for the past 12 hrs:   Temp Pulse Resp BP SpO2   01/15/23 0324 97.9 °F (36.6 °C) 70 18 (!) 147/78 96 %   01/14/23 2356 98.2 °F (36.8 °C) 70 20 (!) 149/68 96 %       Pain Assessment                Ambulating  Yes in room. Slept long periods last night. Shift report given to oncoming nurse at the bedside.     Alyce Moffett RN

## 2023-01-15 NOTE — PROGRESS NOTES
Pt sitting on side of bed. C/O some dizziness this AM.  VS and BS WNL. Instructed not to get up without assistance. Pt A/O x 4.  Verbalized understanding

## 2023-01-15 NOTE — PROGRESS NOTES
Comprehensive Nutrition Assessment    Type and Reason for Visit: Initial, Positive Nutrition Screen  Malnutrition Screening Tool: Malnutrition Screen  Have you recently lost weight without trying?: 2 to 13 pounds (1 point)  Have you been eating poorly because of a decreased appetite?: Yes (1 point)  Malnutrition Screening Tool Score: 2    Nutrition Recommendations/Plan:   Meals and Snacks:  Diet: Continue current order  Nutrition Supplement Therapy:  Medical food supplement therapy:  Initiate Ensure High Protein twice per day (this provides 160 kcal and 16 grams protein per bottle)     Malnutrition Assessment:  Malnutrition Status: At risk for malnutrition (Comment) (weight loss as above without changes to PO intake/appetite, advanced age)  Context: Chronic Illness  Findings of clinical characteristics of malnutrition:   Energy Intake:  No significant decrease in energy intake  Weight Loss:  Greater than 10% over 6 months     Body Fat Loss:  No significant body fat loss     Muscle Mass Loss:  No significant muscle mass loss    Fluid Accumulation:  Unable to assess     Strength:  Not Performed    Nutrition Assessment:  Nutrition History: Pt reports progressive weight loss for last ~6 months. Pt reports UBW of 240-250lb and \"one day I woke up and I was 210lb. \" Pt denies any changes to PO intake/appetite, states he typically consumes 3 meals/day. Pt does note he was given an \"exercise\" regimen by MD but has not started it d/t current admission. Pt denies prior intake of oral nutrition supplements, reports they drive BG up.       Do You Have Any Cultural, Zoroastrian, or Ethnic Food Preferences?: No   Nutrition Background:    Pt with PMH significant for A. fib, hypertension, diabetes type 2, GERD, AV kary ablation with pacemaker, moderate mitral and tricuspid regurgitation, pulmonary hypertension, ANURADHA on CPAP, and lumbar spinal stenosis who presented to [facility] 1/13 w/ W[place], and fall suspected to be secondary to dehydration with some orthostatic hypotension possibly secondary to overmedication. Nutrition Interval:  Pt seen at bedside, RN present. Pt provided nutrition hx as above. Pt just completed breakfast tray ~70% consumed. Pt notes he does not enjoy some foods served but eats as \"I know it is important. \" Pt agreeable to trying ensure high protein, RD explained lower carbohydrate content therefore would not raise BG. Current Nutrition Therapies:  ADULT DIET; Regular; 3 carb choices (45 gm/meal); Low Fat/Low Chol/High Fiber/2 gm Na    Current Intake:   Average Meal Intake: 51-75% Average Supplements Intake: None Ordered      Anthropometric Measures:  Height: 6' (182.9 cm)  Current Body Wt: 210 lb 12.2 oz (95.6 kg) (1/15), Weight source: Bed Scale  BMI: 28.6, Overweight (BMI 25.0-29. 9)  Admission Body Weight: 210 lb (95.3 kg) (1/13 ; unknown source)  Ideal Body Weight (Kg) (Calculated): 81 kg (178 lbs), 118.4 %  Usual Body Wt: 240 lb (108.9 kg) (MD office visit 10/22), Percent weight change: -12.2       Edema:Peripheral Vascular  Peripheral Vascular (WDL): Within Defined Limits   BMI Category Overweight (BMI 25.0-29. 9)  Estimated Daily Nutrient Needs:  Energy (kcal/day): 2933-7308 (15-20kcal/kg) (Kcal/kg Weight used: 95.6 kg Current  Protein (g/day): 76-96 (0.8-1g/kg) Weight Used: (Current) 95.6 kg  Fluid (ml/day):   (1 ml/kcal)    Nutrition Diagnosis:   Unintended weight loss related to  (unknown etiology) as evidenced by  (12% body weight loss x3 months without changes to PO intake / lifestyle)    Nutrition Interventions:   Food and/or Nutrient Delivery: Continue Current Diet, Start Oral Nutrition Supplement     Coordination of Nutrition Care: Continue to monitor while inpatient  Plan of Care discussed with: July Garcia RN    Goals:       Active Goal: Meet at least 75% of estimated needs, by next RD assessment       Nutrition Monitoring and Evaluation:      Food/Nutrient Intake Outcomes: Food and Nutrient Intake, Supplement Intake  Physical Signs/Symptoms Outcomes: Meal Time Behavior, Weight    Discharge Planning:    Continue Oral Nutrition Supplement    Radha Baca RD, LD  Contact: [phone]

## 2023-01-15 NOTE — PROGRESS NOTES
Warfarin dosing per pharmacist    Jordan Sharif is a 82 y.o. male. Indication:  Afib    Goal INR:  2 - 3    Home dose:  7.5 mg 5 times per week: 5 mg 2 times per week    Risk factors or significant drug interactions:  n/a    Other anticoagulants:  n/a      Lab Results   Component Value Date/Time    BUN 63 01/15/2023 08:11 AM    CREATININE 1.90 01/15/2023 08:11 AM     01/15/2023 08:11 AM    HGB 14.3 01/15/2023 08:11 AM    INR 2.3 01/15/2023 08:11 AM    INR 2.5 05/06/2022 10:01 AM           Daily Monitoring  Date  INR     Warfarin dose  Notes  1/14  4.8  Hold      1/15  2.3  7.5 mg        Pharmacy consulted to dose warfarin while patient is in house. Warfarin has been on hold 2/2 elevated INR on admission. INR down to 2.3 today. Will restart warfarin with 7.5 mg this evening. Following daily INR. Contact pharmacy with any questions.       Thank you,  Deann Russo, PharmD, BCOP  Clinical Pharmacist  Contact [facility] [employer]

## 2023-01-15 NOTE — PROGRESS NOTES
Hospitalist Progress Note   Admit Date:  2023  8:44 PM   Name:  Nakia Mendes   Age:  82 y.o. Sex:  male  :  1935   MRN:  [id number]   Room:  Replaced by [facility]/01    Presenting Complaint: Fall and Hyperglycemia     Reason(s) for Admission: Dehydration [E86.0]  Hyperglycemia [R73.9]  [place] (acute kidney injury) ([facility] [address] [N17.9]  Acute kidney injury ([facility] [address] [N17.9]  Contusion of scalp, initial encounter [address] Course:   Mr. Luis Price is an 29-year-old CM with a past medical history significant for A. fib on Coumadin, hypertension, diabetes type 2, GERD, AV kary ablation with pacemaker, moderate mitral and tricuspid regurgitation, pulmonary hypertension, ANURADHA on CPAP, and lumbar spinal stenosis. Patient lives at home with his wife. He is responsible for giving his own medications. He was admitted on 2023 for W[place], and fall suspected to be secondary to dehydration with some orthostatic hypotension possibly secondary to overmedication. Subjective & 24hr Events (01/15/23): The patient said he is feeling better and would like to go home. He said he wasn't dizzy this morning. Renal function approaching baseline. Will treat his elevated K+ today. INR in therapeutic range, warfarin being resumed this evening. Assessment & Plan:     WASHINGTON on CKD  Holding Bumex, ARB, spironolactone. Dry on exam. Gentle fluids. Nearly back to his baseline  Trend sCr tomorrow     Fall   Check orthostatics. Continue gentle IV fluid hydration as above  PT and OT recommend HH    Hyponatremia  Resolved     Hyperkalemia  Persistent  Give SZC x 2 doses today  Trend BMP     DM type II   Correction scale lispro  lispro 5 units before meals  Lantus 34 units in AM    A fib  INR was elevated  INR now 2.3  PharmD will resume warfarin this evening     ANURADHA  CPAP at night     Depression   Continue Zoloft      Anticipated discharge needs: Home health      Diet:  ADULT DIET;  Regular; 3 carb choices (45 gm/meal); Low Fat/Low Chol/High Fiber/2 gm Na  ADULT ORAL NUTRITION SUPPLEMENT; Lunch, Breakfast; Low Calorie/High Protein Oral Supplement  DVT PPx: warfarin  Code status: Full Code    Hospital Problems:  Principal Problem:    [place] (acute kidney injury) ([facility] [address]  Active Problems:    Nonrheumatic mitral valve regurgitation    At high risk for falls    Hyperkalemia    Generalized anxiety disorder    Pacemaker    Pulmonary HTN (HCC)    Type 2 diabetes with nephropathy (HCC)    Paroxysmal atrial fibrillation (HCC)  Resolved Problems:    * No resolved hospital problems. *      Objective:   Patient Vitals for the past 24 hrs:   Temp Pulse Resp BP SpO2   01/15/23 1141 97.5 °F (36.4 °C) 74 17 133/73 96 %   01/15/23 0733 97.9 °F (36.6 °C) 76 17 120/63 98 %   01/15/23 0324 97.9 °F (36.6 °C) 70 18 (!) 147/78 96 %   01/14/23 2356 98.2 °F (36.8 °C) 70 20 (!) 149/68 96 %   01/14/23 1828 97.3 °F (36.3 °C) 75 18 138/66 97 %   01/14/23 1618 98.1 °F (36.7 °C) 75 18 139/73 98 %       Oxygen Therapy  SpO2: 96 %  O2 Device: None (Room air)    Estimated body mass index is 28.58 kg/m² as calculated from the following:    Height as of this encounter: 6' (1.829 m). Weight as of this encounter: 210 lb 12.2 oz (95.6 kg). Intake/Output Summary (Last 24 hours) at 1/15/2023 1249  Last data filed at 1/15/2023 0925  Gross per 24 hour   Intake 942 ml   Output 750 ml   Net 192 ml         Physical Exam:   Blood pressure 133/73, pulse 74, temperature 97.5 °F (36.4 °C), temperature source Axillary, resp. rate 17, height 6' (1.829 m), weight 210 lb 12.2 oz (95.6 kg), SpO2 96 %. General:    NAD   Head:  Normocephalic, atraumatic  Eyes:  Sclerae appear normal.  Pupils equally round. ENT:  Nares appear normal.  Moist oral mucosa  Neck:  No restricted ROM. Trachea midline   CV:   RRR. No m/r/g. Lungs: No wheezing. Symmetric expansion. Abdomen:   Soft, nondistended. Extremities: No cyanosis or clubbing.    Skin:     No rashes and normal coloration. Warm and dry. Neuro:  CN II-XII grossly intact. Psych:  Normal mood and affect.       I have personally reviewed labs and tests showing:  Recent Labs:  Recent Results (from the past 48 hour(s))   POCT Glucose    Collection Time: 01/13/23  8:44 PM   Result Value Ref Range    POC Glucose 230 (H) 65 - 100 mg/dL    Performed by: Anel    CBC with Auto Differential    Collection Time: 01/13/23  8:51 PM   Result Value Ref Range    WBC 10.2 4.3 - 11.1 K/uL    RBC 4.23 4.23 - 5.6 M/uL    Hemoglobin 13.9 13.6 - 17.2 g/dL    Hematocrit 39.6 (L) 41.1 - 50.3 %    MCV 93.6 82 - 102 FL    MCH 32.9 26.1 - 32.9 PG    MCHC 35.1 (H) 31.4 - 35.0 g/dL    RDW 12.8 11.9 - 14.6 %    Platelets 221 025 - 126 K/uL    MPV 12.2 9.4 - 12.3 FL    nRBC 0.00 0.0 - 0.2 K/uL    Differential Type AUTOMATED      Seg Neutrophils 75 43 - 78 %    Lymphocytes 14 13 - 44 %    Monocytes 9 4.0 - 12.0 %    Eosinophils % 1 0.5 - 7.8 %    Basophils 0 0.0 - 2.0 %    Immature Granulocytes 1 0.0 - 5.0 %    Segs Absolute 7.7 1.7 - 8.2 K/UL    Absolute Lymph # 1.4 0.5 - 4.6 K/UL    Absolute Mono # 0.9 0.1 - 1.3 K/UL    Absolute Eos # 0.1 0.0 - 0.8 K/UL    Basophils Absolute 0.0 0.0 - 0.2 K/UL    Absolute Immature Granulocyte 0.1 0.0 - 0.5 K/UL   Comprehensive Metabolic Panel    Collection Time: 01/13/23  8:51 PM   Result Value Ref Range    Sodium 132 (L) 133 - 143 mmol/L    Potassium 5.7 (H) 3.5 - 5.1 mmol/L    Chloride 99 (L) 101 - 110 mmol/L    CO2 23 21 - 32 mmol/L    Anion Gap 10 2 - 11 mmol/L    Glucose 246 (H) 65 - 100 mg/dL     (H) 8 - 23 MG/DL    Creatinine 3.20 (H) 0.8 - 1.5 MG/DL    Est, Glom Filt Rate 18 (L) >60 ml/min/1.73m2    Calcium 11.2 (H) 8.3 - 10.4 MG/DL    Total Bilirubin 1.1 0.2 - 1.1 MG/DL    ALT 35 12 - 65 U/L    AST 22 15 - 37 U/L    Alk Phosphatase 63 50 - 136 U/L    Total Protein 7.3 6.3 - 8.2 g/dL    Albumin 3.4 3.2 - 4.6 g/dL    Globulin 3.9 2.8 - 4.5 g/dL    Albumin/Globulin Ratio 0.9 0.4 - 1.6 Lipase    Collection Time: 01/13/23  8:51 PM   Result Value Ref Range    Lipase 277 73 - 393 U/L   Magnesium    Collection Time: 01/13/23  8:51 PM   Result Value Ref Range    Magnesium 2.4 1.8 - 2.4 mg/dL   Protime-INR    Collection Time: 01/14/23 12:30 AM   Result Value Ref Range    Protime 44.1 (H) 12.6 - 14.3 sec    INR 4.6     Potassium    Collection Time: 01/14/23 12:30 AM   Result Value Ref Range    Potassium 5.6 (H) 3.5 - 5.1 mmol/L   POCT Glucose    Collection Time: 01/14/23  2:11 AM   Result Value Ref Range    POC Glucose 220 (H) 65 - 100 mg/dL    Performed by: Jojo Perrin    Collection Time: 01/14/23  4:23 AM   Result Value Ref Range    Protime 46.1 (H) 12.6 - 14.3 sec    INR 4.8     Basic Metabolic Panel w/ Reflex to MG    Collection Time: 01/14/23  4:23 AM   Result Value Ref Range    Sodium 137 133 - 143 mmol/L    Potassium 5.4 (H) 3.5 - 5.1 mmol/L    Chloride 106 101 - 110 mmol/L    CO2 24 21 - 32 mmol/L    Anion Gap 7 2 - 11 mmol/L    Glucose 220 (H) 65 - 100 mg/dL    BUN 90 (H) 8 - 23 MG/DL    Creatinine 2.40 (H) 0.8 - 1.5 MG/DL    Est, Glom Filt Rate 26 (L) >60 ml/min/1.73m2    Calcium 10.3 8.3 - 10.4 MG/DL   CBC with Auto Differential    Collection Time: 01/14/23  4:23 AM   Result Value Ref Range    WBC 13.1 (H) 4.3 - 11.1 K/uL    RBC 3.94 (L) 4.23 - 5.6 M/uL    Hemoglobin 13.1 (L) 13.6 - 17.2 g/dL    Hematocrit 36.4 (L) 41.1 - 50.3 %    MCV 92.4 82 - 102 FL    MCH 33.2 (H) 26.1 - 32.9 PG    MCHC 36.0 (H) 31.4 - 35.0 g/dL    RDW 12.7 11.9 - 14.6 %    Platelets 575 496 - 627 K/uL    MPV 12.5 (H) 9.4 - 12.3 FL    nRBC 0.00 0.0 - 0.2 K/uL    Differential Type AUTOMATED      Seg Neutrophils 81 (H) 43 - 78 %    Lymphocytes 10 (L) 13 - 44 %    Monocytes 7 4.0 - 12.0 %    Eosinophils % 1 0.5 - 7.8 %    Basophils 0 0.0 - 2.0 %    Immature Granulocytes 1 0.0 - 5.0 %    Segs Absolute 10.6 (H) 1.7 - 8.2 K/UL    Absolute Lymph # 1.3 0.5 - 4.6 K/UL    Absolute Mono # 0.9 0.1 - 1.3 K/UL Absolute Eos # 0.1 0.0 - 0.8 K/UL    Basophils Absolute 0.0 0.0 - 0.2 K/UL    Absolute Immature Granulocyte 0.1 0.0 - 0.5 K/UL   POCT Glucose    Collection Time: 01/14/23  8:06 AM   Result Value Ref Range    POC Glucose 209 (H) 65 - 100 mg/dL    Performed by: On The Bill    POCT Glucose    Collection Time: 01/14/23 11:17 AM   Result Value Ref Range    POC Glucose 213 (H) 65 - 100 mg/dL    Performed by: On The Bill    Potassium    Collection Time: 01/14/23  2:08 PM   Result Value Ref Range    Potassium 5.6 (H) 3.5 - 5.1 mmol/L   POCT Glucose    Collection Time: 01/14/23  5:10 PM   Result Value Ref Range    POC Glucose 95 65 - 100 mg/dL    Performed by: On The Bill    POCT Glucose    Collection Time: 01/14/23  9:18 PM   Result Value Ref Range    POC Glucose 152 (H) 65 - 100 mg/dL    Performed by: Andrei    POCT Glucose    Collection Time: 01/15/23  7:35 AM   Result Value Ref Range    POC Glucose 103 (H) 65 - 100 mg/dL    Performed by: Anita    Protime-INR    Collection Time: 01/15/23  8:11 AM   Result Value Ref Range    Protime 26.0 (H) 12.6 - 14.3 sec    INR 2.3     CBC with Auto Differential    Collection Time: 01/15/23  8:11 AM   Result Value Ref Range    WBC 13.2 (H) 4.3 - 11.1 K/uL    RBC 4.30 4.23 - 5.6 M/uL    Hemoglobin 14.3 13.6 - 17.2 g/dL    Hematocrit 41.3 41.1 - 50.3 %    MCV 96.0 82 - 102 FL    MCH 33.3 (H) 26.1 - 32.9 PG    MCHC 34.6 31.4 - 35.0 g/dL    RDW 12.8 11.9 - 14.6 %    Platelets 884 100 - 874 K/uL    MPV 12.4 (H) 9.4 - 12.3 FL    nRBC 0.00 0.0 - 0.2 K/uL    Differential Type AUTOMATED      Seg Neutrophils 72 43 - 78 %    Lymphocytes 19 13 - 44 %    Monocytes 7 4.0 - 12.0 %    Eosinophils % 1 0.5 - 7.8 %    Basophils 0 0.0 - 2.0 %    Immature Granulocytes 1 0.0 - 5.0 %    Segs Absolute 9.6 (H) 1.7 - 8.2 K/UL    Absolute Lymph # 2.5 0.5 - 4.6 K/UL    Absolute Mono # 1.0 0.1 - 1.3 K/UL    Absolute Eos # 0.1 0.0 - 0.8 K/UL    Basophils Absolute 0.0 0.0 - 0.2 K/UL Absolute Immature Granulocyte 0.1 0.0 - 0.5 K/UL   Basic Metabolic Panel w/ Reflex to MG    Collection Time: 01/15/23  8:11 AM   Result Value Ref Range    Sodium 138 133 - 143 mmol/L    Potassium 5.4 (H) 3.5 - 5.1 mmol/L    Chloride 108 101 - 110 mmol/L    CO2 25 21 - 32 mmol/L    Anion Gap 5 2 - 11 mmol/L    Glucose 123 (H) 65 - 100 mg/dL    BUN 63 (H) 8 - 23 MG/DL    Creatinine 1.90 (H) 0.8 - 1.5 MG/DL    Est, Glom Filt Rate 34 (L) >60 ml/min/1.73m2    Calcium 9.8 8.3 - 10.4 MG/DL   POCT Glucose    Collection Time: 01/15/23 11:42 AM   Result Value Ref Range    POC Glucose 225 (H) 65 - 100 mg/dL    Performed by: Anita        I have personally reviewed imaging studies showing: Other Studies:  CT HEAD WO CONTRAST   Final Result   1. No CT evidence of acute intracranial process. 2. There is a small left occipital subcutaneous hematoma. There is no underlying   fracture.              Current Meds:  Current Facility-Administered Medications   Medication Dose Route Frequency    fluticasone (FLONASE) 50 MCG/ACT nasal spray 2 spray  2 spray Nasal Nightly    warfarin (COUMADIN) tablet 7.5 mg  7.5 mg Oral Daily    sodium zirconium cyclosilicate (LOKELMA) oral suspension 10 g  10 g Oral BID    0.9 % sodium chloride infusion   IntraVENous Continuous    baclofen (LIORESAL) tablet 5 mg  5 mg Oral TID PRN    vitamin B-12 (CYANOCOBALAMIN) tablet 1,000 mcg  1,000 mcg Oral Daily    magnesium oxide (MAG-OX) tablet 400 mg  400 mg Oral Daily    brimonidine (ALPHAGAN) 0.2 % ophthalmic solution 1 drop  1 drop Both Eyes BID    doxazosin (CARDURA) tablet 4 mg  4 mg Oral Nightly    ferrous sulfate (IRON 325) tablet 325 mg  325 mg Oral QAM AC    insulin lispro (HUMALOG) injection vial 5 Units  5 Units SubCUTAneous TID WC    insulin glargine (LANTUS) injection vial 34 Units  34 Units SubCUTAneous Daily    latanoprost (XALATAN) 0.005 % ophthalmic solution 1 drop  1 drop Both Eyes Nightly    pantoprazole (PROTONIX) tablet 40 mg 40 mg Oral QAM AC    pravastatin (PRAVACHOL) tablet 80 mg  80 mg Oral Daily    sertraline (ZOLOFT) tablet 100 mg  100 mg Oral Daily    sodium chloride flush 0.9 % injection 5-40 mL  5-40 mL IntraVENous 2 times per day    sodium chloride flush 0.9 % injection 5-40 mL  5-40 mL IntraVENous PRN    0.9 % sodium chloride infusion   IntraVENous PRN    polyethylene glycol (GLYCOLAX) packet 17 g  17 g Oral Daily PRN    acetaminophen (TYLENOL) tablet 650 mg  650 mg Oral Q6H PRN    Or    acetaminophen (TYLENOL) suppository 650 mg  650 mg Rectal Q6H PRN    insulin lispro (HUMALOG) injection vial 0-8 Units  0-8 Units SubCUTAneous TID WC    insulin lispro (HUMALOG) injection vial 0-4 Units  0-4 Units SubCUTAneous Nightly    glucose chewable tablet 16 g  4 tablet Oral PRN    dextrose bolus 10% 125 mL  125 mL IntraVENous PRN    Or    dextrose bolus 10% 250 mL  250 mL IntraVENous PRN    glucagon (rDNA) injection 1 mg  1 mg SubCUTAneous PRN    dextrose 10 % infusion   IntraVENous Continuous PRN       Signed:  LEE Nelson - CNP    Part of this note may have been written by using a voice dictation software. The note has been proof read but may still contain some grammatical/other typographical errors.

## 2023-01-16 ENCOUNTER — TELEPHONE (OUTPATIENT)
Dept: FAMILY MEDICINE CLINIC | Facility: CLINIC | Age: 88
End: 2023-01-16

## 2023-01-16 VITALS
TEMPERATURE: 97.7 F | OXYGEN SATURATION: 97 % | DIASTOLIC BLOOD PRESSURE: 75 MMHG | SYSTOLIC BLOOD PRESSURE: 137 MMHG | HEART RATE: 75 BPM | HEIGHT: 72 IN | WEIGHT: 210.76 LBS | RESPIRATION RATE: 16 BRPM | BODY MASS INDEX: 28.55 KG/M2

## 2023-01-16 LAB
CREAT SERPL-MCNC: 1.7 MG/DL (ref 0.8–1.5)
ERYTHROCYTE [DISTWIDTH] IN BLOOD BY AUTOMATED COUNT: 12.7 % (ref 11.9–14.6)
GLUCOSE BLD STRIP.AUTO-MCNC: 144 MG/DL (ref 65–100)
HCT VFR BLD AUTO: 34.8 % (ref 41.1–50.3)
HGB BLD-MCNC: 12 G/DL (ref 13.6–17.2)
INR PPP: 1.8
MCH RBC QN AUTO: 32.5 PG (ref 26.1–32.9)
MCHC RBC AUTO-ENTMCNC: 34.5 G/DL (ref 31.4–35)
MCV RBC AUTO: 94.3 FL (ref 82–102)
NRBC # BLD: 0 K/UL (ref 0–0.2)
PLATELET # BLD AUTO: 153 K/UL (ref 150–450)
PMV BLD AUTO: 12.1 FL (ref 9.4–12.3)
POTASSIUM SERPL-SCNC: 4.3 MMOL/L (ref 3.5–5.1)
PROTHROMBIN TIME: 21.2 SEC (ref 12.6–14.3)
RBC # BLD AUTO: 3.69 M/UL (ref 4.23–5.6)
SERVICE CMNT-IMP: ABNORMAL
WBC # BLD AUTO: 8.6 K/UL (ref 4.3–11.1)

## 2023-01-16 PROCEDURE — 85610 PROTHROMBIN TIME: CPT

## 2023-01-16 PROCEDURE — 36415 COLL VENOUS BLD VENIPUNCTURE: CPT

## 2023-01-16 PROCEDURE — G0378 HOSPITAL OBSERVATION PER HR: HCPCS

## 2023-01-16 PROCEDURE — 84132 ASSAY OF SERUM POTASSIUM: CPT

## 2023-01-16 PROCEDURE — 6370000000 HC RX 637 (ALT 250 FOR IP): Performed by: FAMILY MEDICINE

## 2023-01-16 PROCEDURE — 85027 COMPLETE CBC AUTOMATED: CPT

## 2023-01-16 PROCEDURE — 82565 ASSAY OF CREATININE: CPT

## 2023-01-16 PROCEDURE — 82962 GLUCOSE BLOOD TEST: CPT

## 2023-01-16 PROCEDURE — 2580000003 HC RX 258: Performed by: FAMILY MEDICINE

## 2023-01-16 PROCEDURE — 6370000000 HC RX 637 (ALT 250 FOR IP): Performed by: INTERNAL MEDICINE

## 2023-01-16 RX ORDER — BUMETANIDE 2 MG/1
1 TABLET ORAL DAILY
Qty: 90 TABLET | Refills: 3 | Status: SHIPPED | OUTPATIENT
Start: 2023-01-16

## 2023-01-16 RX ORDER — POTASSIUM CHLORIDE 750 MG/1
10 TABLET, EXTENDED RELEASE ORAL DAILY
Qty: 30 TABLET | Refills: 0 | Status: SHIPPED | OUTPATIENT
Start: 2023-01-16 | End: 2023-01-19

## 2023-01-16 RX ORDER — SPIRONOLACTONE 25 MG/1
12.5 TABLET ORAL DAILY
Qty: 30 TABLET | Refills: 11 | Status: SHIPPED | OUTPATIENT
Start: 2023-01-16

## 2023-01-16 RX ADMIN — SODIUM CHLORIDE, PRESERVATIVE FREE 10 ML: 5 INJECTION INTRAVENOUS at 08:22

## 2023-01-16 RX ADMIN — SERTRALINE 100 MG: 100 TABLET, FILM COATED ORAL at 08:21

## 2023-01-16 RX ADMIN — CYANOCOBALAMIN TAB 1000 MCG 1000 MCG: 1000 TAB at 08:21

## 2023-01-16 RX ADMIN — FERROUS SULFATE TAB 325 MG (65 MG ELEMENTAL FE) 325 MG: 325 (65 FE) TAB at 06:24

## 2023-01-16 RX ADMIN — INSULIN LISPRO 5 UNITS: 100 INJECTION, SOLUTION INTRAVENOUS; SUBCUTANEOUS at 08:22

## 2023-01-16 RX ADMIN — INSULIN GLARGINE 34 UNITS: 100 INJECTION, SOLUTION SUBCUTANEOUS at 08:22

## 2023-01-16 RX ADMIN — Medication 400 MG: at 08:21

## 2023-01-16 RX ADMIN — PANTOPRAZOLE SODIUM 40 MG: 40 TABLET, DELAYED RELEASE ORAL at 06:24

## 2023-01-16 NOTE — DISCHARGE INSTRUCTIONS
Acute Kidney Injury: Care Instructions  Overview     Acute kidney injury ([place]) is a sudden decrease in kidney function. This can happen over a period of hours, days or, in some cases, weeks. WASHINGTON used to be called acute renal failure, but kidney failure doesn't always happen with WASHINGTON. Common causes of WASHINGTON are serious infection, blood loss, and some medicines. When WASHINGTON happens, the kidneys have trouble removing waste and excess fluids from the body. The waste and fluids build up and become harmful. Kidney function may return to normal if the cause of WASHINGTON is treated quickly. Your chance of a full recovery depends on what caused the problem, how quickly the cause was treated, and what other medical problems you have. You may have a treatment called dialysis. It does the work of healthy kidneys to remove waste and fluids for a short time. Follow-up care is a key part of your treatment and safety. Be sure to make and go to all appointments, and call your doctor if you are having problems. It's also a good idea to know your test results and keep a list of the medicines you take. How can you care for yourself at home? Talk to your doctor about how much fluid you should drink. Eat a balanced diet. Talk to your doctor or a dietitian about what type of diet may be best for you. You may need to limit sodium, potassium, and phosphorus. If you need dialysis, follow the instructions and schedule for dialysis that your doctor gives you. Do not smoke. Smoking can make your condition worse. If you need help quitting, talk to your doctor about stop-smoking programs and medicines. These can increase your chances of quitting for good. Limit alcohol. Review all of your medicines with your doctor. Do not take any medicines, including nonsteroidal anti-inflammatory drugs (NSAIDs), such as ibuprofen (Advil, Motrin) or naproxen (Aleve), unless your doctor says it is safe for you to do so.   Make sure that anyone treating you for any health problem knows that you have had W[place]. When should you call for help? Call 911 anytime you think you may need emergency care. For example, call if:    You passed out (lost consciousness). Call your doctor now or seek immediate medical care if:    You have new or worse nausea and vomiting. You have much less urine than normal, or you have no urine. You are feeling confused or cannot think clearly. You have new or more blood in your urine. You have new swelling. You are dizzy or lightheaded, or feel like you may faint. Watch closely for changes in your health, and be sure to contact your doctor if:    You do not get better as expected. Where can you learn more? Go to http://www.woods.com/ and enter D885 to learn more about \"Acute Kidney Injury: Care Instructions. \"  Current as of: May 4, 2022               Content Version: 13.5  © 8975-8633 [employer], [employer]orporated. Care instructions adapted under license by [facility] ([facility]). If you have questions about a medical condition or this instruction, always ask your healthcare professional. [address] any warranty or liability for your use of this information.

## 2023-01-16 NOTE — CARE COORDINATION
Pt is for discharge home today with family. LMSW met with pt prior to his discharge. He resides with spouse and reports that he normally manages his ADL's. Discussed recommendation for [facility] therapy for follow up care. Pt denied any need for [facility] therapy at this time. Encouraged to discuss care at follow up appointments if care needs arise after discharge. No needs/supportive care orders recieved for CM at this time. 01/16/23 0994   Service Assessment   Patient Orientation Alert and Oriented   Cognition Alert   History Provided By Patient   Primary Caregiver Self   Support Systems Spouse/Significant Other   Patient's Healthcare Decision Maker is: Legal Next of Kin   PCP Verified by CM Yes   Last Visit to PCP Within last 3 months   Prior Functional Level Independent in ADLs/IADLs   Current Functional Level Independent in ADLs/IADLs   Can patient return to prior living arrangement Yes   Ability to make needs known: Good   Family able to assist with home care needs: Yes   Would you like for me to discuss the discharge plan with any other family members/significant others, and if so, who? No   Financial Resources Other (Comment)  (Pt has a pvt HMO plan with [employer])   Social/Functional History   Lives With Spouse   Type of [address] [facility] One level   Home Access Stairs to enter with rails   Entrance Stairs - Number of Steps 1   Bathroom Shower/Tub Walk-in shower   ADL Assistance Independent   Ambulation Assistance Independent   Transfer Assistance Independent   Active  Yes   Mode of Transportation Car   Occupation Retired   Discharge Planning   Type of [address] Prior To Admission [address] [facility]   DME Ordered?  No   Type of Home Care Services None   Patient expects to be discharged to: [address] Discharge   Transition of Care Consult (CM Consult) Discharge Planning   Services At/After Discharge None   [employer] Information Provided? No   Mode of Transport at Discharge Other (see comment)  (spouse)   Confirm Follow Up Transport Family   Condition of Participation: Discharge Planning   The Plan for Transition of Care is related to the following treatment goals: Pt will return home with supportive family. The Patient and/Or Patient Representative agree with the Discharge Plan?  Yes patient without complaint   no sp tube   urine clear with sp tube

## 2023-01-16 NOTE — PROGRESS NOTES
Warfarin dosing per pharmacist    Marco Antonio Combs is a 82 y.o. male. Indication:  Afib    Goal INR:  2 - 3    Home dose:  7.5 mg 5 times per week: 5 mg 2 times per week    Risk factors or significant drug interactions:  n/a    Other anticoagulants:  n/a      Lab Results   Component Value Date/Time    BUN 63 01/15/2023 08:11 AM    CREATININE 1.70 01/16/2023 06:22 AM     01/16/2023 06:22 AM    HGB 12.0 01/16/2023 06:22 AM    INR 1.8 01/16/2023 06:22 AM    INR 2.5 05/06/2022 10:01 AM           Daily Monitoring  Date  INR     Warfarin dose  Notes  1/14  4.8  Hold      1/15  2.3  7.5 mg    1/16  1.8  7.5 mg      Pharmacy consulted to dose warfarin while patient is in house. Warfarin has been on hold 2/2 elevated INR on admission. INR down to 1.8 today.  Will repeat 7.5mg again tonight    Thank you,    Gopal Dee PharmD, BCPS  Pharmacist  Contact via uMentioned

## 2023-01-16 NOTE — TELEPHONE ENCOUNTER
Care Transitions Initial Follow Up Call    Call within 2 business days of discharge: Yes     Patient: Delicia Peguero Patient : 1935 MRN: [id number]    [unfilled]    RARS: Readmission Risk Score: 16.4       Spoke with: patient    Discharge department/facility: [facility]    Non-face-to-face services provided:  Scheduled appointment with [facility]    Follow Up  Future Appointments   Date Time Provider Jessica Mccord   2023 11:10 AM HEATHER Thacker PPS GVL AMB   2023 11:30 AM Clare Orosco MD PFP GVL AMB   2023 11:45 AM SFD MRI UNIT 1 SFDRMRI SFD   2023  9:20 AM Mariusz Sanchez MD POAG GVL AMB   2023 11:00 AM PFP NURSE PFP GVL AMB   4/3/2023  2:00 PM Ольга Cabrera MD UCDG GVL AMB       Denver Betters, RN

## 2023-01-16 NOTE — PROGRESS NOTES
END OF SHIFT NOTE:    INTAKE/OUTPUT  01/15 0701 - 01/16 0700  In: 2022 [P.O.:1080; I.V.:942]  Out: 1500 [Urine:1500]  Voiding: yes  Catheter: no  Drain:              Flatus: Patient does have flatus present. Stool:  0 occurrences. Characteristics:       Emesis: 0 occurrences. Characteristics:        VITAL SIGNS  Patient Vitals for the past 12 hrs:   Temp Pulse Resp BP SpO2   01/16/23 0255 98.1 °F (36.7 °C) 70 18 117/63 96 %   01/16/23 0008 98.4 °F (36.9 °C) 69 18 139/71 96 %   01/15/23 1914 97.5 °F (36.4 °C) 76 18 (!) 141/58 95 %       Pain Assessment                Ambulating  Yes. Aditya Yen to go home today,     Shift report given to oncoming nurse at the bedside.     Tea Garcia RN

## 2023-01-16 NOTE — DISCHARGE SUMMARY
Hospitalist Discharge Summary   Admit Date:  2023  8:44 PM   DC Note date: 2023  Name:  Uche Duarte   Age:  82 y.o. Sex:  male  :  1935   MRN:  [id number]   [place]  [facility] [place] [facility] [place]  PCP:  Jaime Dan MD    Presenting Complaint: Fall and Hyperglycemia     Initial Admission Diagnosis: Dehydration [E86.0]  Hyperglycemia [R73.9]  [place] (acute kidney injury) ([address] [N17.9]  Acute kidney injury ([address] [N17.9]  Contusion of scalp, initial encounter [S00.03XA]     Problem List for this Hospitalization (present on admission):    Principal Problem:    WASHINGTON (acute kidney injury) ([address]  Active Problems:    Nonrheumatic mitral valve regurgitation    At high risk for falls    Hyperkalemia    Generalized anxiety disorder    Pacemaker    Pulmonary HTN ([address]    Type 2 diabetes with nephropathy ([address]    Paroxysmal atrial fibrillation ([address]  Resolved Problems:    * No resolved hospital problems. [facility] Course:  Mr. Teodora Hernandez is an 82-year-old CM with a past medical history significant for A. fib on Coumadin, hypertension, diabetes type 2, GERD, AV kary ablation with pacemaker, moderate mitral and tricuspid regurgitation, pulmonary hypertension, ANURADHA on CPAP, and lumbar spinal stenosis. Patient lives at home with his wife. He is responsible for giving his own medications. He was admitted on 2023 for WASHINGTON, and fall suspected to be secondary to dehydration with some orthostatic hypotension possibly secondary to overmedication. Home diuretics were held and he improved with IV fluids. He will discharge at half of his previous doses and follow up with his PCP and HHRN to ensure stability and make any further adjustments as necessary. We also recommend a repeat BMP in 3-5 days. PT/OT recommended Home Health at discharge. Mr. Teodora Hernandez may return home on .      A&P  WASHINGTON on CKD  Was dry on exam.  Back to his baseline after IVF  Resume spironolactone and bumetanide at half of previous doses and follow up with your PCP this week     [address] Gentle IVF  PT and OT recommend Home Health     Hyponatremia  Resolved     Hyperkalemia  Resolved with Lokelma     DM type II   CBG AC/HS with insulin while inpatient     A fib  INR was elevated, now in therapeutic range  PharmD will resume warfarin dosing     ANURADHA  CPAP at night      Depression   Continue Zoloft      Disposition: Home w/ Home Health  Diet: ADULT DIET; Regular; 3 carb choices (45 gm/meal); Low Fat/Low Chol/High Fiber/2 gm Na  ADULT ORAL NUTRITION SUPPLEMENT; Lunch, Breakfast; Low Calorie/High Protein Oral Supplement  Code Status: Full Code    Follow Ups:   Follow-up Information     Aura Ferrell MD Follow up in 3 day(s). Specialty: Family Medicine  Why: post discharge check up and medication review  Contact information:  [address] [facility] [address] [place] [id number] [phone]                       Time spent in patient discharge and coordination 35 minutes. Follow up labs/diagnostics (ultimately defer to outpatient provider): BMP    Plan was discussed with the patient. All questions answered. Patient was stable at time of discharge. Instructions given to call a physician or return if any concerns.     Current Discharge Medication List        START taking these medications    Details   potassium chloride (KLOR-CON M) 10 MEQ extended release tablet Take 1 tablet by mouth daily  Qty: 30 tablet, Refills: 0           CONTINUE these medications which have CHANGED    Details   spironolactone (ALDACTONE) 25 MG tablet Take 0.5 tablets by mouth daily  Qty: 30 tablet, Refills: 11      bumetanide (BUMEX) 2 MG tablet Take 0.5 tablets by mouth daily  Qty: 90 tablet, Refills: 3           CONTINUE these medications which have NOT CHANGED    Details   insulin aspart (NOVOLOG FLEXPEN) 100 UNIT/ML injection pen Inject 5 Units into the skin 3 times daily (before meals)  Qty: 5 Adjustable Dose Pre-filled Pen Syringe, Refills: 3 losartan (COZAAR) 25 MG tablet Take 1 tablet by mouth daily  Qty: 30 tablet, Refills: 11      omeprazole (PRILOSEC) 40 MG delayed release capsule TAKE 1 CAPSULE BY MOUTH EVERY DAY BEFORE A MEAL  Qty: 90 capsule, Refills: 1      fluticasone (FLONASE) 50 MCG/ACT nasal spray 2 sprays by Nasal route daily SPRAY 2 SPRAYS INTO EACH NOSTRIL EVERY DAY  Qty: 16 g, Refills: 5      cyanocobalamin 1000 MCG tablet Take 1 tablet by mouth daily  Qty: 90 tablet, Refills: 1      doxazosin (CARDURA) 4 MG tablet Take 1 tablet by mouth nightly TAKE 1 TABLET BY MOUTH EVERY DAY  Qty: 90 tablet, Refills: 1      ferrous sulfate (FE TABS 325) 325 (65 Fe) MG EC tablet Take 1 tablet by mouth every morning (before breakfast)  Qty: 90 tablet, Refills: 1      Insulin Degludec (TRESIBA FLEXTOUCH) 200 UNIT/ML SOPN Inject 26 Units into the skin daily  Qty: 3 mL, Refills: 5      linagliptin (TRADJENTA) 5 MG tablet Take 1 tablet by mouth daily  Qty: 90 tablet, Refills: 1      pravastatin (PRAVACHOL) 80 MG tablet Take 1 tablet by mouth daily  Qty: 90 tablet, Refills: 1      sertraline (ZOLOFT) 100 MG tablet Take 1 tablet by mouth daily  Qty: 90 tablet, Refills: 1      !! warfarin (COUMADIN) 5 MG tablet TAKING ONLY 2 DAYS A WEEK  Qty: 90 tablet, Refills: 1      !! warfarin (COUMADIN) 7.5 MG tablet Taking only five days a week  Qty: 90 tablet, Refills: 1      blood glucose monitor strips Use to check blood sugars twice daily DXE11.9  Qty: 200 strip, Refills: 5    Comments: Brand per patient preference. May round up to next available package size. Insulin Pen Needle 29G X 12MM MISC 1 each by Does not apply route daily Use daily to inject insulin.  DXE11.9  Qty: 100 each, Refills: 5      brimonidine (ALPHAGAN P) 0.15 % ophthalmic solution 1 drop      latanoprost (XALATAN) 0.005 % ophthalmic solution LOCATION: BOTH EYES. INSTILL ONE DROP INTO BOTH EYES AT NIGHT BEFORE BED.      magnesium oxide (MAG-OX) 400 MG tablet Take 400 mg by mouth daily !! - Potential duplicate medications found. Please discuss with provider. STOP taking these medications       baclofen (LIORESAL) 10 MG tablet Comments:   Reason for Stopping:         fluticasone-umeclidin-vilant (Sophia Mariann) 100-62.5-25 MCG/INH AEPB Comments:   Reason for Stopping:         potassium gluconate 550 mg tablet Comments:   Reason for Stopping:               Procedures done this admission:  * No surgery found *    Consults this admission:  IP CONSULT TO PHARMACY  IP CONSULT TO PHYSICAL THERAPY  IP CONSULT TO OCCUPATIONAL THERAPY    Echocardiogram results:  11/14/22    TRANSESOPHAGEAL ECHOCARDIOGRAM (CONTRAST/3D PRN) 11/15/2022  3:50 PM, 11/15/2022 12:00 AM (Final)    Interpretation Summary    Left Ventricle: Normal left ventricular systolic function with a visually estimated EF of 55 - 60%. Left ventricle size is normal.    Mitral Valve: Mild to moderate regurgitation. Tricuspid Valve: Moderate regurgitation. Technical qualifiers: Color flow Doppler was performed and pulse wave and/or continuous wave Doppler was performed. Signed by: Imelda Garza MD on 11/15/2022  3:50 PM, Signed by: Unknown Provider Result on 11/15/2022 12:00 AM      Diagnostic Imaging/Tests:   CT HEAD WO CONTRAST    Result Date: 1/13/2023  1. No CT evidence of acute intracranial process. 2. There is a small left occipital subcutaneous hematoma. There is no underlying fracture.         Labs: Results:       BMP, Mg, Phos Recent Labs     01/13/23 2051 01/14/23  0030 01/14/23  0423 01/14/23  1408 01/15/23  0811 01/16/23  0622   *  --  137  --  138  --    K 5.7*   < > 5.4* 5.6* 5.4* 4.3   CL 99*  --  106  --  108  --    CO2 23  --  24  --  25  --    ANIONGAP 10  --  7  --  5  --    *  --  90*  --  63*  --    CREATININE 3.20*  --  2.40*  --  1.90* 1.70*   LABGLOM 18*  --  26*  --  34*  --    CALCIUM 11.2*  --  10.3  --  9.8  --    GLUCOSE 246*  --  220*  --  123*  --    MG 2.4  --   --   --   --   -- < > = values in this interval not displayed.       CBC Recent Labs     01/13/23 2051 01/14/23  0423 01/15/23  0811 01/16/23 0622   WBC 10.2 13.1* 13.2* 8.6   RBC 4.23 3.94* 4.30 3.69*   HGB 13.9 13.1* 14.3 12.0*   HCT 39.6* 36.4* 41.3 34.8*   MCV 93.6 92.4 96.0 94.3   MCH 32.9 33.2* 33.3* 32.5   MCHC 35.1* 36.0* 34.6 34.5   RDW 12.8 12.7 12.8 12.7    253 198 153   MPV 12.2 12.5* 12.4* 12.1   NRBC 0.00 0.00 0.00 0.00   SEGS 75 81* 72  --    LYMPHOPCT 14 10* 19  --    EOSRELPCT 1 1 1  --    MONOPCT 9 7 7  --    BASOPCT 0 0 0  --    IMMGRAN 1 1 1  --    SEGSABS 7.7 10.6* 9.6*  --    LYMPHSABS 1.4 1.3 2.5  --    EOSABS 0.1 0.1 0.1  --    MONOSABS 0.9 0.9 1.0  --    BASOSABS 0.0 0.0 0.0  --    ABSIMMGRAN 0.1 0.1 0.1  --       LFT Recent Labs     01/13/23 2051   BILITOT 1.1   ALKPHOS 63   AST 22   ALT 35   PROT 7.3   LABALBU 3.4   GLOB 3.9      Cardiac  No results found for: NTPROBNP, TROPHS   Coags Lab Results   Component Value Date/Time    PROTIME 21.2 01/16/2023 06:22 AM    PROTIME 26.0 01/15/2023 08:11 AM    PROTIME 46.1 01/14/2023 04:23 AM    INR 1.8 01/16/2023 06:22 AM    INR 2.3 01/15/2023 08:11 AM    INR 4.8 01/14/2023 04:23 AM    INR 2.5 05/06/2022 10:01 AM    INR 2.5 03/04/2022 01:00 PM    INR 2.4 02/01/2022 02:00 PM      A1c Lab Results   Component Value Date/Time    LABA1C 7.8 10/06/2022 08:45 AM    LABA1C 8.3 06/24/2022 11:36 AM    LABA1C 9.7 01/25/2022 02:32 PM     10/06/2022 08:45 AM     06/24/2022 11:36 AM     01/25/2022 02:32 PM      Lipids Lab Results   Component Value Date/Time    CHOL 86 10/06/2022 08:45 AM    LDLCALC 38 10/06/2022 08:45 AM    LABVLDL 11 10/06/2022 08:45 AM    HDL 37 10/06/2022 08:45 AM    CHOLHDLRATIO 2.3 10/06/2022 08:45 AM    TRIG 55 10/06/2022 08:45 AM      Thyroid  Lab Results   Component Value Date/Time    TSHELE 2.40 06/24/2022 11:36 AM    LUQ2VAB 2.290 10/06/2022 08:45 AM        Most Recent UA No results found for: COLORU, APPEARANCE, SPECGRAV, Christya Caitie, GLUCOSEU, KETUA, BILIRUBINUR, BLOODU, UROBILINOGEN, NITRU, LEUKOCYTESUR, WBCUA, RBCUA, EPITHUA, BACTERIA, LABCAST, MUCUS     No results for input(s): CULTURE in the last 720 hours.     All Labs from Last 24 Hrs:  Recent Results (from the past 24 hour(s))   Protime-INR    Collection Time: 01/15/23  8:11 AM   Result Value Ref Range    Protime 26.0 (H) 12.6 - 14.3 sec    INR 2.3     CBC with Auto Differential    Collection Time: 01/15/23  8:11 AM   Result Value Ref Range    WBC 13.2 (H) 4.3 - 11.1 K/uL    RBC 4.30 4.23 - 5.6 M/uL    Hemoglobin 14.3 13.6 - 17.2 g/dL    Hematocrit 41.3 41.1 - 50.3 %    MCV 96.0 82 - 102 FL    MCH 33.3 (H) 26.1 - 32.9 PG    MCHC 34.6 31.4 - 35.0 g/dL    RDW 12.8 11.9 - 14.6 %    Platelets 193 640 - 933 K/uL    MPV 12.4 (H) 9.4 - 12.3 FL    nRBC 0.00 0.0 - 0.2 K/uL    Differential Type AUTOMATED      Seg Neutrophils 72 43 - 78 %    Lymphocytes 19 13 - 44 %    Monocytes 7 4.0 - 12.0 %    Eosinophils % 1 0.5 - 7.8 %    Basophils 0 0.0 - 2.0 %    Immature Granulocytes 1 0.0 - 5.0 %    Segs Absolute 9.6 (H) 1.7 - 8.2 K/UL    Absolute Lymph # 2.5 0.5 - 4.6 K/UL    Absolute Mono # 1.0 0.1 - 1.3 K/UL    Absolute Eos # 0.1 0.0 - 0.8 K/UL    Basophils Absolute 0.0 0.0 - 0.2 K/UL    Absolute Immature Granulocyte 0.1 0.0 - 0.5 K/UL   Basic Metabolic Panel w/ Reflex to MG    Collection Time: 01/15/23  8:11 AM   Result Value Ref Range    Sodium 138 133 - 143 mmol/L    Potassium 5.4 (H) 3.5 - 5.1 mmol/L    Chloride 108 101 - 110 mmol/L    CO2 25 21 - 32 mmol/L    Anion Gap 5 2 - 11 mmol/L    Glucose 123 (H) 65 - 100 mg/dL    BUN 63 (H) 8 - 23 MG/DL    Creatinine 1.90 (H) 0.8 - 1.5 MG/DL    Est, Glom Filt Rate 34 (L) >60 ml/min/1.73m2    Calcium 9.8 8.3 - 10.4 MG/DL   POCT Glucose    Collection Time: 01/15/23 11:42 AM   Result Value Ref Range    POC Glucose 225 (H) 65 - 100 mg/dL    Performed by: Anita    POCT Glucose    Collection Time: 01/15/23  4:28 PM   Result Value Ref Range    POC Glucose 150 (H) 65 - 100 mg/dL    Performed by: Anita    POCT Glucose    Collection Time: 01/15/23  9:08 PM   Result Value Ref Range    POC Glucose 192 (H) 65 - 100 mg/dL    Performed by: Jacqueline Stanton    Protime-INR    Collection Time: 01/16/23  6:22 AM   Result Value Ref Range    Protime 21.2 (H) 12.6 - 14.3 sec    INR 1.8     CBC    Collection Time: 01/16/23  6:22 AM   Result Value Ref Range    WBC 8.6 4.3 - 11.1 K/uL    RBC 3.69 (L) 4.23 - 5.6 M/uL    Hemoglobin 12.0 (L) 13.6 - 17.2 g/dL    Hematocrit 34.8 (L) 41.1 - 50.3 %    MCV 94.3 82 - 102 FL    MCH 32.5 26.1 - 32.9 PG    MCHC 34.5 31.4 - 35.0 g/dL    RDW 12.7 11.9 - 14.6 %    Platelets 416 149 - 645 K/uL    MPV 12.1 9.4 - 12.3 FL    nRBC 0.00 0.0 - 0.2 K/uL   Potassium    Collection Time: 01/16/23  6:22 AM   Result Value Ref Range    Potassium 4.3 3.5 - 5.1 mmol/L   Creatinine    Collection Time: 01/16/23  6:22 AM   Result Value Ref Range    Creatinine 1.70 (H) 0.8 - 1.5 MG/DL   POCT Glucose    Collection Time: 01/16/23  6:29 AM   Result Value Ref Range    POC Glucose 144 (H) 65 - 100 mg/dL    Performed by: Stephen        No Known Allergies  Immunization History   Administered Date(s) Administered    COVID-19, MODERNA BLUE border, Primary or Immunocompromised, (age 12y+), IM, 100 mcg/0.5mL 04/20/2021, 05/18/2021, 12/25/2021    Influenza Virus Vaccine 10/15/2014    Influenza, FLUAD, (age 72 y+), Adjuvanted, 0.5mL 10/07/2020, 11/11/2021, 11/04/2022    Influenza, High Dose (Fluzone 65 yrs and older) 10/15/2014, 10/08/2015, 09/29/2016, 10/27/2017, 10/22/2018    Influenza, Triv, inactivated, subunit, adjuvanted, IM (Fluad 65 yrs and older) 10/11/2019    Pneumococcal Polysaccharide ([id number]) 04/09/2016    Pneumococcal Vaccine 04/28/2015    Zoster Live (Zostavax) 01/01/2013    Zoster Recombinant (Shingrix) 08/11/2020, 10/11/2020       Recent Vital Data:  Patient Vitals for the past 24 hrs:   Temp Pulse Resp BP SpO2 01/16/23 0714 97.7 °F (36.5 °C) 75 16 137/75 97 %   01/16/23 0255 98.1 °F (36.7 °C) 70 18 117/63 96 %   01/16/23 0008 98.4 °F (36.9 °C) 69 18 139/71 96 %   01/15/23 1914 97.5 °F (36.4 °C) 76 18 (!) 141/58 95 %   01/15/23 1534 97.7 °F (36.5 °C) 75 16 137/64 95 %   01/15/23 1141 97.5 °F (36.4 °C) 74 17 133/73 96 %       Oxygen Therapy  SpO2: 97 %  O2 Device: None (Room air)    Estimated body mass index is 28.58 kg/m² as calculated from the following:    Height as of this encounter: 6' (1.829 m). Weight as of this encounter: 210 lb 12.2 oz (95.6 kg). Intake/Output Summary (Last 24 hours) at 1/16/2023 0810  Last data filed at 1/16/2023 2583  Gross per 24 hour   Intake 2780 ml   Output 2100 ml   Net 680 ml         Physical Exam:  General:    No overt distress  Head:  Normocephalic, atraumatic  Eyes:  Sclerae appear normal.  Pupils equally round. HENT:  Nares appear normal, no drainage. Moist mucous membranes  Neck:  No restricted ROM. Trachea midline  CV:   RRR. No m/r/g. Lungs:   CTAB. No wheezing, rhonchi, or rales. Respirations even, unlabored  Abdomen:   Soft, nontender, nondistended. Extremities: Warm and dry. No cyanosis or clubbing. Skin:     No rashes. Normal coloration  Neuro:  CN II-XII grossly intact. Psych:  Normal mood and affect. Signed:  LEE Chin CNP    Part of this note may have been written by using a voice dictation software. The note has been proof read but may still contain some grammatical/other typographical errors.

## 2023-01-17 ENCOUNTER — CARE COORDINATION (OUTPATIENT)
Dept: CARE COORDINATION | Facility: CLINIC | Age: 88
End: 2023-01-17

## 2023-01-17 NOTE — CARE COORDINATION
[facility] Care Transitions Initial Follow Up Call    Call within 2 business days of discharge: Yes    PCP office nurse  contacted the family by telephone to perform post hospital discharge assessment. Patient: Ignacio Sandoval Patient : 1935   MRN: [id number]  Reason for Admission: dehydration  Discharge Date: 23 RARS: Readmission Risk Score: 16.4      Last Discharge  Street       Date Complaint Diagnosis Description Type Department Provider    23 Fall; Hyperglycemia Acute kidney injury ([facility] [address] . .. ED to Hosp-Admission (Discharged) (ADMITTED) Starla Albright MD; Ashley aMnzano. .. Was this an external facility discharge? No Discharge Facility: sfd    Challenges to be reviewed by the provider   Additional needs identified to be addressed with provider: No  none               Method of communication with provider: phone. Care Transitions 24 Hour Call    Care Transitions Interventions         Follow Up  Future Appointments   Date Time Provider Jessica Mccord   2023 11:10 AM HEATHER Ceja PPS GVL AMB   2023 11:30 AM Efrain Lawler MD PFP GVL AMB   2023 11:45 AM SFD MRI UNIT 1 SFDRMRI SFD   2023  9:20 AM Juliet Jones MD POAG GVL AMB   2023 11:00 AM PFP NURSE PFP GVL AMB   4/3/2023  2:00 PM Alan Barnett MD UCDG GVL AMB     Plan for next call:  Ctn to  follow up after PC Poffice MICHAEL call and OV with PCP.  CTN to follow up next week on any outstanding needs  or concerns    Haris Lunsford RN

## 2023-01-19 ENCOUNTER — HOSPITAL ENCOUNTER (OUTPATIENT)
Dept: MRI IMAGING | Age: 88
Discharge: HOME | End: 2023-01-19
Payer: MEDICARE

## 2023-01-19 ENCOUNTER — OFFICE VISIT (OUTPATIENT)
Dept: FAMILY MEDICINE CLINIC | Facility: CLINIC | Age: 88
End: 2023-01-19

## 2023-01-19 VITALS
SYSTOLIC BLOOD PRESSURE: 122 MMHG | OXYGEN SATURATION: 100 % | BODY MASS INDEX: 28.85 KG/M2 | HEIGHT: 72 IN | WEIGHT: 213 LBS | HEART RATE: 66 BPM | DIASTOLIC BLOOD PRESSURE: 82 MMHG | TEMPERATURE: 97 F

## 2023-01-19 DIAGNOSIS — Z09 HOSPITAL DISCHARGE FOLLOW-UP: Primary | ICD-10-CM

## 2023-01-19 DIAGNOSIS — D50.9 IRON DEFICIENCY ANEMIA, UNSPECIFIED IRON DEFICIENCY ANEMIA TYPE: ICD-10-CM

## 2023-01-19 DIAGNOSIS — N18.4 ANEMIA DUE TO STAGE 4 CHRONIC KIDNEY DISEASE (HCC): ICD-10-CM

## 2023-01-19 DIAGNOSIS — M48.061 SPINAL STENOSIS OF LUMBAR REGION, UNSPECIFIED WHETHER NEUROGENIC CLAUDICATION PRESENT: ICD-10-CM

## 2023-01-19 DIAGNOSIS — D63.1 ANEMIA DUE TO STAGE 4 CHRONIC KIDNEY DISEASE (HCC): ICD-10-CM

## 2023-01-19 DIAGNOSIS — E11.21 TYPE 2 DIABETES WITH NEPHROPATHY (HCC): ICD-10-CM

## 2023-01-19 LAB
BASOPHILS # BLD: 0 K/UL (ref 0–0.2)
BASOPHILS NFR BLD: 0 % (ref 0–2)
DIFFERENTIAL METHOD BLD: ABNORMAL
EOSINOPHIL # BLD: 0.3 K/UL (ref 0–0.8)
EOSINOPHIL NFR BLD: 3 % (ref 0.5–7.8)
ERYTHROCYTE [DISTWIDTH] IN BLOOD BY AUTOMATED COUNT: 13.2 % (ref 11.9–14.6)
HCT VFR BLD AUTO: 36.7 % (ref 41.1–50.3)
HGB BLD-MCNC: 12.4 G/DL (ref 13.6–17.2)
IMM GRANULOCYTES # BLD AUTO: 0.1 K/UL (ref 0–0.5)
IMM GRANULOCYTES NFR BLD AUTO: 0 % (ref 0–5)
INTERNATIONAL NORMALIZATION RATIO, POC: 1.9
LYMPHOCYTES # BLD: 1.3 K/UL (ref 0.5–4.6)
LYMPHOCYTES NFR BLD: 12 % (ref 13–44)
MCH RBC QN AUTO: 33 PG (ref 26.1–32.9)
MCHC RBC AUTO-ENTMCNC: 33.8 G/DL (ref 31.4–35)
MCV RBC AUTO: 97.6 FL (ref 82–102)
MONOCYTES # BLD: 0.9 K/UL (ref 0.1–1.3)
MONOCYTES NFR BLD: 8 % (ref 4–12)
NEUTS SEG # BLD: 8.8 K/UL (ref 1.7–8.2)
NEUTS SEG NFR BLD: 77 % (ref 43–78)
NRBC # BLD: 0 K/UL (ref 0–0.2)
PLATELET # BLD AUTO: 204 K/UL (ref 150–450)
PMV BLD AUTO: 12.1 FL (ref 9.4–12.3)
RBC # BLD AUTO: 3.76 M/UL (ref 4.23–5.6)
WBC # BLD AUTO: 11.3 K/UL (ref 4.3–11.1)

## 2023-01-19 PROCEDURE — 72148 MRI LUMBAR SPINE W/O DYE: CPT

## 2023-01-19 RX ORDER — BACLOFEN 10 MG/1
10 TABLET ORAL 2 TIMES DAILY
COMMUNITY

## 2023-01-19 RX ORDER — LOSARTAN POTASSIUM 50 MG/1
50 TABLET ORAL DAILY
Qty: 30 TABLET | Refills: 0 | Status: SHIPPED
Start: 2023-01-19

## 2023-01-19 RX ORDER — GLUCOSAMINE HCL/CHONDROITIN SU 500-400 MG
CAPSULE ORAL
Qty: 200 STRIP | Refills: 5 | Status: SHIPPED | OUTPATIENT
Start: 2023-01-19

## 2023-01-19 NOTE — PROGRESS NOTES
Post-Discharge Transitional Care  Follow Up      Manuel Jewell   YOB: 1935    Date of Office Visit:  1/19/2023  Date of Hospital Admission: 1/13/23  Date of Hospital Discharge: 1/16/23  Risk of hospital readmission (high >=14%. Medium >=10%) :Readmission Risk Score: 16.4      Care management risk score Rising risk (score 2-5) and Complex Care (Scores >=6): No Risk Score On File     Non face to face  following discharge, date last encounter closed (first attempt may have been earlier): 01/17/2023    Call initiated 2 business days of discharge: Yes    ASSESSMENT/PLAN:   Hospital discharge follow-up  -     [place] DISCHARGE MEDS RECONCILED W/ CURRENT OUTPATIENT MED LIST  -     POCT INR  Type 2 diabetes with nephropathy ([facility] [address]  -     Basic Metabolic Panel; Future  -     POCT INR  Anemia due to stage 4 chronic kidney disease (HCC)  -     CBC with Auto Differential; Future  -     Ferritin; Future  -     POCT INR  Iron deficiency anemia, unspecified iron deficiency anemia type  -     POCT INR    History of DANIA, reports constipation with iron pill. Will have patient stop iron supplementation and check CBC/ferritin. Will have patient stop Kcl supplementation, check BMP. INR today 1.9, goal of 2-3. Improved from 1.8 on 1/16. Will have patient continue current warfarin regimen and recheck INR in the few weeks. Advised patient to get back in with nephrology for his CKD. Medical Decision Making: moderate complexity  Return in about 4 weeks (around 2/16/2023) for recheck. Subjective:   HPI:  Follow up of Hospital problems/diagnosis(es): WASHINGTON    Inpatient course: Discharge summary reviewed- see chart. 53-year-old male with PMH of A. fib, CAD, DM2, CKD, pulmonary hypertension, CVA, HLD, ANURADHA (on BPAP), lumbar spinal stenosis, and restrictive lung disease who presents for hospital follow-up. Hospitalized for WASHINGTON with dehydration and hyperglycemia.   Presented to the ED after fall that was thought secondary to orthostatic hypotension. Home diuretics were held and he improved with IV fluids. Hyperkalemia resolved with Lokelma. Discharged home with home health. Discharged home on half dose of spironolactone and Bumex. CT head without contrast showed no acute intracranial process but did show small left occipital subcutaneous hematoma.       Interval history/Current status:   -Having some posterior neck pain but no headache  -Taking Tresiba 34u daily, glucose has improved   -Taking Novolog 6u with some of his meals  -Taking Kcl daily  -Has been taking full 25mg Spironolactone tab  -Warfarin 5mg Tue/Thur, other days 7.5mg  -Breathing fine  -Reports constipation from iron pill    Patient Active Problem List   Diagnosis    Generalized anxiety disorder    Diabetic polyneuropathy associated with type 2 diabetes mellitus ([facility])    Pacemaker    Pulmonary HTN ([facility])    Arrhythmia    Chronic calculous cholecystitis    Type 2 diabetes with nephropathy ([facility])    Chronic pain    Hyperlipidemia, mixed    Cervicalgia    Muscle spasm of left shoulder    Occipital cerebral infarction ([address]    Hypertension    History of vertebral fracture    Coronary artery disease involving native coronary artery of native heart without angina pectoris    Multinodular goiter    Type 2 diabetes mellitus with stage 3 chronic kidney disease, without long-term current use of insulin ([facility])    GERD (gastroesophageal reflux disease)    Tobacco use disorder    Paroxysmal atrial fibrillation (HCC)    Stage 3 chronic kidney disease ([address]    Long term current use of anticoagulant    Imbalance    URI (upper respiratory infection)    Symptomatic bradycardia    Chronic pain of right knee    Anemia due to stage 4 chronic kidney disease ([facility])    Nonrheumatic mitral valve regurgitation    At high risk for falls    Nocturnal leg cramps    WASHINGTON (acute kidney injury) ([facility])    Hyperkalemia       Medications listed as ordered at the time of discharge from hospital     Medication List            Accurate as of January 19, 2023  5:05 PM. If you have any questions, ask your nurse or doctor. CHANGE how you take these medications      fluticasone 50 MCG/ACT nasal spray  Commonly known as: FLONASE  2 sprays by Nasal route daily SPRAY 2 SPRAYS INTO EACH NOSTRIL EVERY DAY  What changed: when to take this     losartan 50 MG tablet  Commonly known as: COZAAR  Take 1 tablet by mouth daily  What changed:   medication strength  how much to take  Changed by: MD Antoinette Miranda Hasting FlexTouch 200 UNIT/ML Sopn  Generic drug: Insulin Degludec  Inject 26 Units into the skin daily  What changed: how much to take            CONTINUE taking these medications      baclofen 10 MG tablet  Commonly known as: LIORESAL     blood glucose test strips  Use to check blood sugars twice daily DXE11.9     brimonidine 0.15 % ophthalmic solution  Commonly known as: ALPHAGAN P     bumetanide 2 MG tablet  Commonly known as: BUMEX  Take 0.5 tablets by mouth daily     cyanocobalamin 1000 MCG tablet  Take 1 tablet by mouth daily     doxazosin 4 MG tablet  Commonly known as: CARDURA  Take 1 tablet by mouth nightly TAKE 1 TABLET BY MOUTH EVERY DAY     ferrous sulfate 325 (65 Fe) MG EC tablet  Commonly known as: FE TABS 325  Take 1 tablet by mouth every morning (before breakfast)     Insulin Pen Needle 29G X 12MM Misc  1 each by Does not apply route daily Use daily to inject insulin.  DXE11.9     latanoprost 0.005 % ophthalmic solution  Commonly known as: XALATAN     linagliptin 5 MG tablet  Commonly known as: Tradjenta  Take 1 tablet by mouth daily     magnesium oxide 400 MG tablet  Commonly known as: MAG-OX     NovoLOG FlexPen 100 UNIT/ML injection pen  Generic drug: insulin aspart  Inject 5 Units into the skin 3 times daily (before meals)     omeprazole 40 MG delayed release capsule  Commonly known as: PRILOSEC  TAKE 1 CAPSULE BY MOUTH EVERY DAY BEFORE A MEAL     pravastatin 80 MG tablet  Commonly known as: PRAVACHOL  Take 1 tablet by mouth daily     sertraline 100 MG tablet  Commonly known as: ZOLOFT  Take 1 tablet by mouth daily     spironolactone 25 MG tablet  Commonly known as: ALDACTONE  Take 0.5 tablets by mouth daily     * warfarin 5 MG tablet  Commonly known as: COUMADIN  Take as directed by the anticoagulation clinic. If you are unsure how to take this medication, talk to your nurse or doctor. Original instructions: TAKING ONLY 2 DAYS A WEEK     * warfarin 7.5 MG tablet  Commonly known as: COUMADIN  Take as directed by the anticoagulation clinic. If you are unsure how to take this medication, talk to your nurse or doctor. Original instructions: Taking only five days a week           * This list has 2 medication(s) that are the same as other medications prescribed for you. Read the directions carefully, and ask your doctor or other care provider to review them with you.                    Where to Get Your Medications        These medications were sent to [address],[facility], [address], [address] [place] [address]      Phone: [phone]   blood glucose test strips  Insulin Pen Needle 29G X 12MM Misc       Information about where to get these medications is not yet available    Ask your nurse or doctor about these medications  losartan 50 MG tablet           Medications marked \"taking\" at this time  Outpatient Medications Marked as Taking for the 1/19/23 encounter (Office Visit) with Rigo Godfrey MD   Medication Sig Dispense Refill    baclofen (LIORESAL) 10 MG tablet Take 10 mg by mouth 2 times daily      losartan (COZAAR) 50 MG tablet Take 1 tablet by mouth daily 30 tablet 0    spironolactone (ALDACTONE) 25 MG tablet Take 0.5 tablets by mouth daily 30 tablet 11    bumetanide (BUMEX) 2 MG tablet Take 0.5 tablets by mouth daily 90 tablet 3    insulin aspart (NOVOLOG FLEXPEN) 100 UNIT/ML injection pen Inject 5 Units into the skin 3 times daily (before meals) 5 Adjustable Dose Pre-filled Pen Syringe 3    omeprazole (PRILOSEC) 40 MG delayed release capsule TAKE 1 CAPSULE BY MOUTH EVERY DAY BEFORE A MEAL 90 capsule 1    fluticasone (FLONASE) 50 MCG/ACT nasal spray 2 sprays by Nasal route daily SPRAY 2 SPRAYS INTO EACH NOSTRIL EVERY DAY (Patient taking differently: 2 sprays by Nasal route nightly SPRAY 2 SPRAYS INTO EACH NOSTRIL EVERY DAY) 16 g 5    cyanocobalamin 1000 MCG tablet Take 1 tablet by mouth daily 90 tablet 1    doxazosin (CARDURA) 4 MG tablet Take 1 tablet by mouth nightly TAKE 1 TABLET BY MOUTH EVERY DAY 90 tablet 1    ferrous sulfate (FE TABS 325) 325 (65 Fe) MG EC tablet Take 1 tablet by mouth every morning (before breakfast) 90 tablet 1    Insulin Degludec (TRESIBA FLEXTOUCH) 200 UNIT/ML SOPN Inject 26 Units into the skin daily (Patient taking differently: Inject 34 Units into the skin daily) 3 mL 5    linagliptin (TRADJENTA) 5 MG tablet Take 1 tablet by mouth daily 90 tablet 1    pravastatin (PRAVACHOL) 80 MG tablet Take 1 tablet by mouth daily 90 tablet 1    sertraline (ZOLOFT) 100 MG tablet Take 1 tablet by mouth daily 90 tablet 1    warfarin (COUMADIN) 5 MG tablet TAKING ONLY 2 DAYS A WEEK 90 tablet 1    warfarin (COUMADIN) 7.5 MG tablet Taking only five days a week 90 tablet 1    [DISCONTINUED] blood glucose monitor strips Use to check blood sugars twice daily DXE11.9 200 strip 5    [DISCONTINUED] Insulin Pen Needle 29G X 12MM MISC 1 each by Does not apply route daily Use daily to inject insulin.  DXE11.9 100 each 5    brimonidine (ALPHAGAN P) 0.15 % ophthalmic solution 1 drop      latanoprost (XALATAN) 0.005 % ophthalmic solution LOCATION: BOTH EYES. INSTILL ONE DROP INTO BOTH EYES AT NIGHT BEFORE BED.      magnesium oxide (MAG-OX) 400 MG tablet Take 400 mg by mouth daily          Medications patient taking as of now reconciled against medications ordered at time of hospital discharge: Yes        Objective: /82   Pulse 66   Temp 97 °F (36.1 °C) (Skin)   Ht 6' (1.829 m)   Wt 213 lb (96.6 kg)   SpO2 100%   BMI 28.89 kg/m²     Physical Exam  Vitals reviewed. Constitutional:       General: He is not in acute distress. HENT:      Head: Normocephalic and atraumatic. Cardiovascular:      Rate and Rhythm: Normal rate and regular rhythm. Pulmonary:      Effort: Pulmonary effort is normal.      Breath sounds: Normal breath sounds. Musculoskeletal:      Cervical back: Normal range of motion. Tenderness (Posterior neck) present. Right lower leg: No edema. Left lower leg: No edema. Skin:     General: Skin is warm and dry. Findings: Bruising (Left side of neck and occipital region) present. Neurological:      General: No focal deficit present. Mental Status: He is alert and oriented to person, place, and time. An electronic signature was used to authenticate this note.   --Lila Franklin MD

## 2023-01-20 LAB
ANION GAP SERPL CALC-SCNC: 7 MMOL/L (ref 2–11)
BUN SERPL-MCNC: 61 MG/DL (ref 8–23)
CALCIUM SERPL-MCNC: 9.5 MG/DL (ref 8.3–10.4)
CHLORIDE SERPL-SCNC: 113 MMOL/L (ref 101–110)
CO2 SERPL-SCNC: 24 MMOL/L (ref 21–32)
CREAT SERPL-MCNC: 2 MG/DL (ref 0.8–1.5)
FERRITIN SERPL-MCNC: 574 NG/ML (ref 8–388)
GLUCOSE SERPL-MCNC: 114 MG/DL (ref 65–100)
POTASSIUM SERPL-SCNC: 4.9 MMOL/L (ref 3.5–5.1)
SODIUM SERPL-SCNC: 144 MMOL/L (ref 133–143)

## 2023-01-22 VITALS
WEIGHT: 212 LBS | HEIGHT: 72 IN | DIASTOLIC BLOOD PRESSURE: 82 MMHG | BODY MASS INDEX: 28.71 KG/M2 | SYSTOLIC BLOOD PRESSURE: 128 MMHG | HEART RATE: 70 BPM | TEMPERATURE: 97 F | OXYGEN SATURATION: 98 %

## 2023-01-23 ENCOUNTER — OFFICE VISIT (OUTPATIENT)
Dept: ORTHOPEDIC SURGERY | Age: 88
End: 2023-01-23

## 2023-01-23 ENCOUNTER — TELEPHONE (OUTPATIENT)
Dept: ORTHOPEDIC SURGERY | Age: 88
End: 2023-01-23

## 2023-01-23 DIAGNOSIS — M48.061 SPINAL STENOSIS OF LUMBAR REGION WITHOUT NEUROGENIC CLAUDICATION: Primary | ICD-10-CM

## 2023-01-23 DIAGNOSIS — M16.12 UNILATERAL PRIMARY OSTEOARTHRITIS, LEFT HIP: Primary | ICD-10-CM

## 2023-01-23 NOTE — TELEPHONE ENCOUNTER
I have tried on several occasions to reach the patient but his mailbox is full and does not sound like that is going to be rectified in the near future. I did reach his granddaughter who is on the HIPAA form and relayed information. I was concerned over latent lumbar spinal stenosis to explain his gait abnormality and he does have severe spinal stenosis at the L4-L5 level. I do not think it would be surgical due to his advanced age but I felt that some spine injections might offer some palliation. He is on anticoagulant therapy so I thought we could start with bilateral L5 selective nerve root blocks. I asked his granddaughter to get in touch with him and confirmed that he is at least interested in trying some injections so I can start the approval process through his insurance provider.   Not a whole lot I think I can add or offer otherwise with the presumption that lumbar spine surgery is not going to be a great idea

## 2023-01-23 NOTE — PROGRESS NOTES
Name: Bello Melendrez  YOB: 1935  Gender: male  MRN: [id number]      Mr. Zahida Bojorquez comes in follow-up after an intra-articular injection of his left hip joint. His radiographic and other findings suggest that his problems are primarily coming from his lower back. Since I last saw him he is also had a lumbar spine injection. He notes that all the steroid injections he is given has caused his blood sugars to be elevated and this concerns him. He does seem to be doing much better. He does have some difficulty  out his various hip injections in his back injection in terms of outcome and effect. He is now ambulating without assistive device and within his normal functional level. On exam there is no limp. Range of motion of the hips is roughly symmetric not reproducing any significant groin or thigh pain today. Impression and plan: Doing reasonably well following a series of injections in both his hip and back. I continue to believe his back is his biggest contributor here and that his hip joint is unlikely to be a big factor. If he were to develop increasing anterior thigh and groin pain then an intra-articular ejection would be certainly a reasonable thing to try again with his understanding that it would likely cause the blood sugar to be up for period of time. Given his functional level I will see him back as needed.     Irasema Santiago MD

## 2023-01-24 ENCOUNTER — CARE COORDINATION (OUTPATIENT)
Dept: CARE COORDINATION | Facility: CLINIC | Age: 88
End: 2023-01-24

## 2023-01-24 NOTE — CARE COORDINATION
CTN unable to contact patient for follow up call after recent IP admission and PCP MICHAEL call. CTN to attempt again at a later time. Patient compliant with recent PCP follow up after d/c.

## 2023-01-25 ENCOUNTER — CARE COORDINATION (OUTPATIENT)
Dept: CARE COORDINATION | Facility: CLINIC | Age: 88
End: 2023-01-25

## 2023-01-25 NOTE — CARE COORDINATION
Care Transitions Outreach Attempt    Call within 2 business days of discharge: Yes   Attempted to reach patient for transitions of care follow up. Unable to reach patient. Patient: Makenna Settler Patient : 1935 MRN: [id number]    Last Discharge  Street       Date Complaint Diagnosis Description Type Department Provider    23 Fall; Hyperglycemia Acute kidney injury ([facility] [address] . .. ED to Hosp-Admission (Discharged) (ADMITTED) Ebony Martino MD; Delbert Lopez. .. Was this an external facility discharge?  No Discharge Facility: [facility]    Noted following upcoming appointments from discharge chart review:   [facility] follow up appointment(s):   Future Appointments   Date Time Provider Jessica Mccord   2023 11:00 AM [facility] NURSE [facility] AMB   2023 11:30 AM Tracy Valenzuela MD [facility] AMB   4/3/2023  2:00 PM Kal Blanco MD [facility] AM[facility]     Non-[facility] follow up appointment(s): na

## 2023-01-27 ENCOUNTER — NURSE ONLY (OUTPATIENT)
Dept: FAMILY MEDICINE CLINIC | Facility: CLINIC | Age: 88
End: 2023-01-27

## 2023-01-27 DIAGNOSIS — Z79.01 LONG TERM CURRENT USE OF ANTICOAGULANT: ICD-10-CM

## 2023-01-27 DIAGNOSIS — I48.0 PAROXYSMAL ATRIAL FIBRILLATION (HCC): Primary | ICD-10-CM

## 2023-01-27 LAB — INTERNATIONAL NORMALIZATION RATIO, POC: 1.8

## 2023-02-02 NOTE — PROGRESS NOTES
Physician Progress Note      Sarah Wagner  CSN #:                  [id number]  :                       1935  ADMIT DATE:       2023 8:44 PM  [address] [place] DATE:        2023 9:55 AM  RESPONDING  PROVIDER #:        Mara Nix NP          QUERY TEXT:    Patient admitted with WASHINGTON, noted to have orthostatic hypotension. If possible,   please document in progress notes and discharge summary if you are evaluating   and/or treating any of the following: The medical record reflects the following:  Risk Factors: Poor historian and admits to not knowing what meds he takes at   home. He has no one to help him with his meds. Takes spironolactone 25 MG   daily PO and bumetanide 2 MG daily PO at home. Clinical Indicators: Admitted for WASHINGTON, and fall suspected to be secondary to   dehydration with some orthostatic hypotension possibly secondary to   overmedication. Treatment: Resume spironolactone and bumetanide at half of previous doses and   follow up with PCP this week  Options provided:  -- Accidental overdose of home spironolactone and bumetanide  -- Adverse effect of spironolactone and bumetanide, properly administered  -- Other - I will add my own diagnosis  -- Disagree - Not applicable / Not valid  -- Disagree - Clinically unable to determine / Unknown  -- Refer to Clinical Documentation Reviewer    PROVIDER RESPONSE TEXT:    Orthostatic hypotension are due to an adverse effect of properly administered   spironolactone and bumetanide.     Query created by: Janette Weaver on 2023 9:29 AM      Electronically signed by:  Mara Nix NP 2023 9:53 AM

## 2023-02-03 ENCOUNTER — NURSE ONLY (OUTPATIENT)
Dept: FAMILY MEDICINE CLINIC | Facility: CLINIC | Age: 88
End: 2023-02-03

## 2023-02-03 DIAGNOSIS — Z79.01 LONG TERM CURRENT USE OF ANTICOAGULANT: Primary | ICD-10-CM

## 2023-02-03 LAB — INR BLD: 2.3

## 2023-02-07 DIAGNOSIS — I48.0 PAROXYSMAL ATRIAL FIBRILLATION (HCC): ICD-10-CM

## 2023-02-07 DIAGNOSIS — R00.1 SYMPTOMATIC BRADYCARDIA: ICD-10-CM

## 2023-02-07 DIAGNOSIS — Z95.0 PACEMAKER: Primary | ICD-10-CM

## 2023-02-22 ENCOUNTER — OFFICE VISIT (OUTPATIENT)
Dept: FAMILY MEDICINE CLINIC | Facility: CLINIC | Age: 88
End: 2023-02-22
Payer: MEDICARE

## 2023-02-22 VITALS
OXYGEN SATURATION: 97 % | WEIGHT: 222 LBS | BODY MASS INDEX: 30.07 KG/M2 | TEMPERATURE: 97 F | DIASTOLIC BLOOD PRESSURE: 60 MMHG | HEIGHT: 72 IN | SYSTOLIC BLOOD PRESSURE: 122 MMHG | HEART RATE: 98 BPM

## 2023-02-22 DIAGNOSIS — N18.4 CKD (CHRONIC KIDNEY DISEASE) STAGE 4, GFR 15-29 ML/MIN (HCC): ICD-10-CM

## 2023-02-22 DIAGNOSIS — E11.21 TYPE 2 DIABETES WITH NEPHROPATHY (HCC): Primary | ICD-10-CM

## 2023-02-22 DIAGNOSIS — G47.62 NOCTURNAL LEG CRAMPS: ICD-10-CM

## 2023-02-22 PROCEDURE — 3078F DIAST BP <80 MM HG: CPT | Performed by: STUDENT IN AN ORGANIZED HEALTH CARE EDUCATION/TRAINING PROGRAM

## 2023-02-22 PROCEDURE — 99214 OFFICE O/P EST MOD 30 MIN: CPT | Performed by: STUDENT IN AN ORGANIZED HEALTH CARE EDUCATION/TRAINING PROGRAM

## 2023-02-22 PROCEDURE — 3074F SYST BP LT 130 MM HG: CPT | Performed by: STUDENT IN AN ORGANIZED HEALTH CARE EDUCATION/TRAINING PROGRAM

## 2023-02-22 PROCEDURE — 1123F ACP DISCUSS/DSCN MKR DOCD: CPT | Performed by: STUDENT IN AN ORGANIZED HEALTH CARE EDUCATION/TRAINING PROGRAM

## 2023-02-22 RX ORDER — BACLOFEN 10 MG/1
10 TABLET ORAL NIGHTLY PRN
Qty: 30 TABLET | Refills: 5 | Status: SHIPPED | OUTPATIENT
Start: 2023-02-22

## 2023-02-22 SDOH — ECONOMIC STABILITY: HOUSING INSECURITY
IN THE LAST 12 MONTHS, WAS THERE A TIME WHEN YOU DID NOT HAVE A STEADY PLACE TO SLEEP OR SLEPT IN A SHELTER (INCLUDING NOW)?: NO

## 2023-02-22 SDOH — ECONOMIC STABILITY: INCOME INSECURITY: HOW HARD IS IT FOR YOU TO PAY FOR THE VERY BASICS LIKE FOOD, HOUSING, MEDICAL CARE, AND HEATING?: NOT HARD AT ALL

## 2023-02-22 SDOH — ECONOMIC STABILITY: FOOD INSECURITY: WITHIN THE PAST 12 MONTHS, THE FOOD YOU BOUGHT JUST DIDN'T LAST AND YOU DIDN'T HAVE MONEY TO GET MORE.: NEVER TRUE

## 2023-02-22 SDOH — ECONOMIC STABILITY: FOOD INSECURITY: WITHIN THE PAST 12 MONTHS, YOU WORRIED THAT YOUR FOOD WOULD RUN OUT BEFORE YOU GOT MONEY TO BUY MORE.: NEVER TRUE

## 2023-02-22 ASSESSMENT — PATIENT HEALTH QUESTIONNAIRE - PHQ9
SUM OF ALL RESPONSES TO PHQ QUESTIONS 1-9: 0
SUM OF ALL RESPONSES TO PHQ QUESTIONS 1-9: 0
SUM OF ALL RESPONSES TO PHQ9 QUESTIONS 1 & 2: 0
2. FEELING DOWN, DEPRESSED OR HOPELESS: 0
SUM OF ALL RESPONSES TO PHQ QUESTIONS 1-9: 0
1. LITTLE INTEREST OR PLEASURE IN DOING THINGS: 0
SUM OF ALL RESPONSES TO PHQ QUESTIONS 1-9: 0

## 2023-02-22 NOTE — PROGRESS NOTES
[facility]  Jeaneth Escalante  Phone [phone]  Fax:  [phone]    Cassandra Osborne (:  1935) is a 82 y.o. male here for evaluation of the following chief complaint(s):  Diabetes (Follow up/FBS this am was 200) and Muscle Pain (Cramps-- baclofen helps-- needs a refill)       ASSESSMENT/PLAN:  1. Type 2 diabetes with nephropathy ([address]  2. Nocturnal leg cramps  3. CKD (chronic kidney disease) stage 4, GFR 15-29 ml/min (HCC)    Most recent creatinine in January elevated at 2.0, advised patient to get back in with his nephrologist.  Diabetes has been poorly controlled but patient does report glucose sometimes goes a little low during the afternoon, will have him try stopping/cutting back lunchtime Novolog dose. Continue Tresiba 26 units daily and Tradjenta. Reportedly takes baclofen nightly for leg cramps which he tolerates well, will refill this. Return in about 3 months (around 2023) for routine f/u. Subjective   SUBJECTIVE/OBJECTIVE:  HPI  72-year-old male with PMH of A. fib, CAD, DM2, CKD, pulmonary hypertension, CVA, HLD, ANURADHA (on BPAP), lumbar spinal stenosis, and restrictive lung disease who presents for follow-up of his diabetes. -Started going back to the gym  -Glucose sometimes goes low during the afternoon  -On Tresiba 26u daily and Novolog 10u with meals  -Issues with urinary urge incontinence   -Takes Baclofen nightly for leg cramps, tolerating well    Review of Systems       Objective     Vitals:    23 1136   BP: 122/60   Pulse: 98   Temp: 97 °F (36.1 °C)   SpO2: 97%       Physical Exam  Vitals reviewed. Constitutional:       General: He is not in acute distress. Appearance: He is obese. HENT:      Head: Normocephalic and atraumatic. Cardiovascular:      Rate and Rhythm: Normal rate and regular rhythm. Pulmonary:      Effort: Pulmonary effort is normal.      Breath sounds: Normal breath sounds.    Musculoskeletal: Comments: Mild LE edema   Skin:     General: Skin is warm and dry. Neurological:      General: No focal deficit present. Mental Status: He is alert and oriented to person, place, and time. An electronic signature was used to authenticate this note.     --Whit Patel MD

## 2023-03-03 ENCOUNTER — NURSE ONLY (OUTPATIENT)
Dept: FAMILY MEDICINE CLINIC | Facility: CLINIC | Age: 88
End: 2023-03-03

## 2023-03-03 DIAGNOSIS — Z79.01 LONG TERM CURRENT USE OF ANTICOAGULANT: Primary | ICD-10-CM

## 2023-03-03 LAB — INR BLD: 2.1

## 2023-03-09 ENCOUNTER — TELEPHONE (OUTPATIENT)
Dept: FAMILY MEDICINE CLINIC | Facility: CLINIC | Age: 88
End: 2023-03-09

## 2023-03-15 ENCOUNTER — TELEPHONE (OUTPATIENT)
Dept: FAMILY MEDICINE CLINIC | Facility: CLINIC | Age: 88
End: 2023-03-15

## 2023-03-15 RX ORDER — GLUCOSAMINE HCL/CHONDROITIN SU 500-400 MG
CAPSULE ORAL
Qty: 400 STRIP | Refills: 5 | Status: SHIPPED | OUTPATIENT
Start: 2023-03-15

## 2023-03-30 NOTE — TELEPHONE ENCOUNTER
error Patient's Lab Activated Partial Thromboplastin Time Result is 174.4. Patient is asymptomatic. Patient is Alert and Oriented times Four. Patient is free from signs and symptoms of bleeding. No bleeding noted. Patient has an active order for Heparin Infusion Full Anticoagulation Nomogram and Heparin infusing at 14 mL/Hr as ordered and as per Nomogram. Patient has been therapeutic at 14 mL/Hr. Noted that gauze with tape is near the site of patient intravenous access where the Heparin Infusion is infusing and patient states that is the site where blood was drawn this morning. aPTT greater than 200 Palpitations

## 2023-04-03 ENCOUNTER — OFFICE VISIT (OUTPATIENT)
Dept: CARDIOLOGY CLINIC | Age: 88
End: 2023-04-03
Payer: MEDICARE

## 2023-04-03 VITALS
WEIGHT: 226.8 LBS | HEART RATE: 76 BPM | DIASTOLIC BLOOD PRESSURE: 50 MMHG | SYSTOLIC BLOOD PRESSURE: 100 MMHG | BODY MASS INDEX: 30.72 KG/M2 | HEIGHT: 72 IN

## 2023-04-03 DIAGNOSIS — I25.10 CORONARY ARTERY DISEASE INVOLVING NATIVE CORONARY ARTERY OF NATIVE HEART WITHOUT ANGINA PECTORIS: Primary | ICD-10-CM

## 2023-04-03 DIAGNOSIS — I10 PRIMARY HYPERTENSION: ICD-10-CM

## 2023-04-03 DIAGNOSIS — I34.0 NONRHEUMATIC MITRAL VALVE REGURGITATION: ICD-10-CM

## 2023-04-03 PROCEDURE — 99214 OFFICE O/P EST MOD 30 MIN: CPT | Performed by: INTERNAL MEDICINE

## 2023-04-03 PROCEDURE — 1123F ACP DISCUSS/DSCN MKR DOCD: CPT | Performed by: INTERNAL MEDICINE

## 2023-04-03 PROCEDURE — 3074F SYST BP LT 130 MM HG: CPT | Performed by: INTERNAL MEDICINE

## 2023-04-03 PROCEDURE — 3078F DIAST BP <80 MM HG: CPT | Performed by: INTERNAL MEDICINE

## 2023-04-03 RX ORDER — AMLODIPINE BESYLATE 5 MG/1
TABLET ORAL
COMMUNITY
Start: 2023-02-13 | End: 2023-04-03

## 2023-04-03 RX ORDER — BUMETANIDE 2 MG/1
2 TABLET ORAL DAILY
Qty: 90 TABLET | Refills: 3 | Status: SHIPPED | OUTPATIENT
Start: 2023-04-03

## 2023-04-03 ASSESSMENT — ENCOUNTER SYMPTOMS
EYES NEGATIVE: 1
PHOTOPHOBIA: 0
CHEST TIGHTNESS: 0
SHORTNESS OF BREATH: 1
ALLERGIC/IMMUNOLOGIC NEGATIVE: 1
GASTROINTESTINAL NEGATIVE: 1
EYE PAIN: 0
BACK PAIN: 0
ABDOMINAL PAIN: 0

## 2023-04-03 NOTE — PROGRESS NOTES
Units into the skin 3 times daily (before meals), Disp: 5 Adjustable Dose Pre-filled Pen Syringe, Rfl: 3    omeprazole (PRILOSEC) 40 MG delayed release capsule, TAKE 1 CAPSULE BY MOUTH EVERY DAY BEFORE A MEAL, Disp: 90 capsule, Rfl: 1    fluticasone (FLONASE) 50 MCG/ACT nasal spray, 2 sprays by Nasal route daily SPRAY 2 SPRAYS INTO EACH NOSTRIL EVERY DAY (Patient taking differently: 2 sprays by Nasal route nightly SPRAY 2 SPRAYS INTO EACH NOSTRIL EVERY DAY), Disp: 16 g, Rfl: 5    cyanocobalamin 1000 MCG tablet, Take 1 tablet by mouth daily, Disp: 90 tablet, Rfl: 1    doxazosin (CARDURA) 4 MG tablet, Take 1 tablet by mouth nightly TAKE 1 TABLET BY MOUTH EVERY DAY, Disp: 90 tablet, Rfl: 1    Insulin Degludec (TRESIBA FLEXTOUCH) 200 UNIT/ML SOPN, Inject 26 Units into the skin daily, Disp: 3 mL, Rfl: 5    linagliptin (TRADJENTA) 5 MG tablet, Take 1 tablet by mouth daily, Disp: 90 tablet, Rfl: 1    pravastatin (PRAVACHOL) 80 MG tablet, Take 1 tablet by mouth daily, Disp: 90 tablet, Rfl: 1    warfarin (COUMADIN) 7.5 MG tablet, Taking only five days a week, Disp: 90 tablet, Rfl: 1    brimonidine (ALPHAGAN P) 0.15 % ophthalmic solution, 1 drop, Disp: , Rfl:     latanoprost (XALATAN) 0.005 % ophthalmic solution, LOCATION: BOTH EYES. INSTILL ONE DROP INTO BOTH EYES AT NIGHT BEFORE BED., Disp: , Rfl:     magnesium oxide (MAG-OX) 400 MG tablet, Take 1 tablet by mouth daily, Disp: , Rfl:     ferrous sulfate (FE TABS 325) 325 (65 Fe) MG EC tablet, Take 1 tablet by mouth every morning (before breakfast) (Patient not taking: Reported on 4/3/2023), Disp: 90 tablet, Rfl: 1    sertraline (ZOLOFT) 100 MG tablet, Take 1 tablet by mouth daily (Patient not taking: Reported on 4/3/2023), Disp: 90 tablet, Rfl: 1    warfarin (COUMADIN) 5 MG tablet, TAKING ONLY 2 DAYS A WEEK (Patient not taking: Reported on 4/3/2023), Disp: 90 tablet, Rfl: 1  No Known Allergies  Past Medical History:   Diagnosis Date    Allergic rhinitis     Atrial

## 2023-04-06 ENCOUNTER — NURSE ONLY (OUTPATIENT)
Dept: FAMILY MEDICINE CLINIC | Facility: CLINIC | Age: 88
End: 2023-04-06

## 2023-04-06 DIAGNOSIS — Z79.01 LONG TERM CURRENT USE OF ANTICOAGULANT: ICD-10-CM

## 2023-04-06 DIAGNOSIS — I48.0 PAROXYSMAL ATRIAL FIBRILLATION (HCC): Primary | ICD-10-CM

## 2023-04-06 LAB — INTERNATIONAL NORMALIZATION RATIO, POC: 3.1

## 2023-04-06 RX ORDER — OMEPRAZOLE 40 MG/1
CAPSULE, DELAYED RELEASE ORAL
Qty: 90 CAPSULE | Refills: 1 | OUTPATIENT
Start: 2023-04-06

## 2023-04-13 ENCOUNTER — HOSPITAL ENCOUNTER (EMERGENCY)
Age: 88
Discharge: HOME | End: 2023-04-14
Attending: EMERGENCY MEDICINE
Payer: MEDICARE

## 2023-04-13 ENCOUNTER — APPOINTMENT (OUTPATIENT)
Dept: GENERAL RADIOLOGY | Age: 88
End: 2023-04-13
Payer: MEDICARE

## 2023-04-13 ENCOUNTER — APPOINTMENT (OUTPATIENT)
Dept: CT IMAGING | Age: 88
End: 2023-04-13
Payer: MEDICARE

## 2023-04-13 DIAGNOSIS — M54.2 NECK PAIN: Primary | ICD-10-CM

## 2023-04-13 PROCEDURE — 93005 ELECTROCARDIOGRAM TRACING: CPT | Performed by: EMERGENCY MEDICINE

## 2023-04-13 PROCEDURE — 72125 CT NECK SPINE W/O DYE: CPT

## 2023-04-13 PROCEDURE — 71045 X-RAY EXAM CHEST 1 VIEW: CPT

## 2023-04-13 PROCEDURE — 99285 EMERGENCY DEPT VISIT HI MDM: CPT

## 2023-04-13 RX ORDER — DEXAMETHASONE SODIUM PHOSPHATE 10 MG/ML
8 INJECTION INTRAMUSCULAR; INTRAVENOUS ONCE
Status: COMPLETED | OUTPATIENT
Start: 2023-04-13 | End: 2023-04-14

## 2023-04-13 RX ORDER — ONDANSETRON 2 MG/ML
4 INJECTION INTRAMUSCULAR; INTRAVENOUS
Status: COMPLETED | OUTPATIENT
Start: 2023-04-13 | End: 2023-04-14

## 2023-04-13 RX ORDER — MORPHINE SULFATE 10 MG/ML
6 INJECTION, SOLUTION INTRAMUSCULAR; INTRAVENOUS
Status: COMPLETED | OUTPATIENT
Start: 2023-04-13 | End: 2023-04-14

## 2023-04-13 ASSESSMENT — ENCOUNTER SYMPTOMS
WHEEZING: 0
ABDOMINAL PAIN: 0
DIARRHEA: 0
SHORTNESS OF BREATH: 1
CONSTIPATION: 0
SPUTUM PRODUCTION: 0
NAUSEA: 0
BACK PAIN: 1
SORE THROAT: 0
RHINORRHEA: 0
COUGH: 0
COLOR CHANGE: 0
VOMITING: 0

## 2023-04-13 ASSESSMENT — PAIN - FUNCTIONAL ASSESSMENT: PAIN_FUNCTIONAL_ASSESSMENT: 0-10

## 2023-04-13 ASSESSMENT — PAIN SCALES - GENERAL: PAINLEVEL_OUTOF10: 10

## 2023-04-14 VITALS
RESPIRATION RATE: 18 BRPM | TEMPERATURE: 98.1 F | OXYGEN SATURATION: 97 % | DIASTOLIC BLOOD PRESSURE: 65 MMHG | SYSTOLIC BLOOD PRESSURE: 152 MMHG | HEART RATE: 70 BPM

## 2023-04-14 LAB
ALBUMIN SERPL-MCNC: 3 G/DL (ref 3.2–4.6)
ALBUMIN/GLOB SERPL: 0.7 (ref 0.4–1.6)
ALP SERPL-CCNC: 56 U/L (ref 50–136)
ALT SERPL-CCNC: 27 U/L (ref 12–65)
ANION GAP SERPL CALC-SCNC: 4 MMOL/L (ref 2–11)
AST SERPL-CCNC: 18 U/L (ref 15–37)
BASOPHILS # BLD: 0 K/UL (ref 0–0.2)
BASOPHILS NFR BLD: 0 % (ref 0–2)
BILIRUB SERPL-MCNC: 0.5 MG/DL (ref 0.2–1.1)
BUN SERPL-MCNC: 60 MG/DL (ref 8–23)
CALCIUM SERPL-MCNC: 9.3 MG/DL (ref 8.3–10.4)
CHLORIDE SERPL-SCNC: 104 MMOL/L (ref 101–110)
CO2 SERPL-SCNC: 28 MMOL/L (ref 21–32)
CREAT SERPL-MCNC: 2.3 MG/DL (ref 0.8–1.5)
DIFFERENTIAL METHOD BLD: ABNORMAL
EKG ATRIAL RATE: 71 BPM
EKG DIAGNOSIS: NORMAL
EKG P AXIS: 0 DEGREES
EKG P-R INTERVAL: 182 MS
EKG Q-T INTERVAL: 400 MS
EKG QRS DURATION: 135 MS
EKG QTC CALCULATION (BAZETT): 432 MS
EKG R AXIS: 255 DEGREES
EKG T AXIS: 41 DEGREES
EKG VENTRICULAR RATE: 70 BPM
EOSINOPHIL # BLD: 0.1 K/UL (ref 0–0.8)
EOSINOPHIL NFR BLD: 1 % (ref 0.5–7.8)
ERYTHROCYTE [DISTWIDTH] IN BLOOD BY AUTOMATED COUNT: 12 % (ref 11.9–14.6)
GLOBULIN SER CALC-MCNC: 4.4 G/DL (ref 2.8–4.5)
GLUCOSE SERPL-MCNC: 307 MG/DL (ref 65–100)
HCT VFR BLD AUTO: 34.1 % (ref 41.1–50.3)
HGB BLD-MCNC: 11.2 G/DL (ref 13.6–17.2)
IMM GRANULOCYTES # BLD AUTO: 0 K/UL (ref 0–0.5)
IMM GRANULOCYTES NFR BLD AUTO: 0 % (ref 0–5)
LYMPHOCYTES # BLD: 0.9 K/UL (ref 0.5–4.6)
LYMPHOCYTES NFR BLD: 9 % (ref 13–44)
MCH RBC QN AUTO: 34.1 PG (ref 26.1–32.9)
MCHC RBC AUTO-ENTMCNC: 32.8 G/DL (ref 31.4–35)
MCV RBC AUTO: 104 FL (ref 82–102)
MONOCYTES # BLD: 1.2 K/UL (ref 0.1–1.3)
MONOCYTES NFR BLD: 11 % (ref 4–12)
NEUTS SEG # BLD: 8.1 K/UL (ref 1.7–8.2)
NEUTS SEG NFR BLD: 79 % (ref 43–78)
NRBC # BLD: 0 K/UL (ref 0–0.2)
PLATELET # BLD AUTO: 249 K/UL (ref 150–450)
PMV BLD AUTO: 10.6 FL (ref 9.4–12.3)
POTASSIUM SERPL-SCNC: 4.6 MMOL/L (ref 3.5–5.1)
PROT SERPL-MCNC: 7.4 G/DL (ref 6.3–8.2)
RBC # BLD AUTO: 3.28 M/UL (ref 4.23–5.6)
SODIUM SERPL-SCNC: 136 MMOL/L (ref 133–143)
WBC # BLD AUTO: 10.2 K/UL (ref 4.3–11.1)

## 2023-04-14 PROCEDURE — 6360000002 HC RX W HCPCS: Performed by: EMERGENCY MEDICINE

## 2023-04-14 PROCEDURE — 85025 COMPLETE CBC W/AUTO DIFF WBC: CPT

## 2023-04-14 PROCEDURE — 80053 COMPREHEN METABOLIC PANEL: CPT

## 2023-04-14 PROCEDURE — 96375 TX/PRO/DX INJ NEW DRUG ADDON: CPT

## 2023-04-14 PROCEDURE — 96374 THER/PROPH/DIAG INJ IV PUSH: CPT

## 2023-04-14 RX ORDER — KETOROLAC TROMETHAMINE 10 MG/1
10 TABLET, FILM COATED ORAL EVERY 6 HOURS PRN
Qty: 15 TABLET | Refills: 0 | Status: SHIPPED | OUTPATIENT
Start: 2023-04-14

## 2023-04-14 RX ORDER — METAXALONE 800 MG/1
800 TABLET ORAL 3 TIMES DAILY
Qty: 30 TABLET | Refills: 0 | Status: SHIPPED | OUTPATIENT
Start: 2023-04-14 | End: 2023-04-24

## 2023-04-14 RX ADMIN — MORPHINE SULFATE 6 MG: 10 INJECTION INTRAVENOUS at 00:51

## 2023-04-14 RX ADMIN — DEXAMETHASONE SODIUM PHOSPHATE 8 MG: 10 INJECTION INTRAMUSCULAR; INTRAVENOUS at 00:51

## 2023-04-14 RX ADMIN — ONDANSETRON 4 MG: 2 INJECTION INTRAMUSCULAR; INTRAVENOUS at 00:51

## 2023-04-14 NOTE — ED PROVIDER NOTES
twice daily DXE11.9    BLOOD GLUCOSE MONITOR STRIPS    Use to check blood sugars 4 times daily. E11.42    BRIMONIDINE (ALPHAGAN P) 0.15 % OPHTHALMIC SOLUTION    1 drop    BUMETANIDE (BUMEX) 2 MG TABLET    Take 1 tablet by mouth daily    CYANOCOBALAMIN 1000 MCG TABLET    Take 1 tablet by mouth daily    DOXAZOSIN (CARDURA) 4 MG TABLET    Take 1 tablet by mouth nightly TAKE 1 TABLET BY MOUTH EVERY DAY    FERROUS SULFATE (FE TABS 325) 325 (65 FE) MG EC TABLET    Take 1 tablet by mouth every morning (before breakfast)    FLUTICASONE (FLONASE) 50 MCG/ACT NASAL SPRAY    2 sprays by Nasal route daily SPRAY 2 SPRAYS INTO EACH NOSTRIL EVERY DAY    INSULIN ASPART (NOVOLOG FLEXPEN) 100 UNIT/ML INJECTION PEN    Inject 5 Units into the skin 3 times daily (before meals)    INSULIN DEGLUDEC (TRESIBA FLEXTOUCH) 200 UNIT/ML SOPN    Inject 26 Units into the skin daily    INSULIN PEN NEEDLE 29G X 12MM MISC    1 each by Does not apply route daily Use daily to inject insulin. DXE11.9    LATANOPROST (XALATAN) 0.005 % OPHTHALMIC SOLUTION    LOCATION: BOTH EYES. INSTILL ONE DROP INTO BOTH EYES AT NIGHT BEFORE BED.     LINAGLIPTIN (TRADJENTA) 5 MG TABLET    Take 1 tablet by mouth daily    LOSARTAN (COZAAR) 50 MG TABLET    Take 1 tablet by mouth daily    MAGNESIUM OXIDE (MAG-OX) 400 MG TABLET    Take 1 tablet by mouth daily    OMEPRAZOLE (PRILOSEC) 40 MG DELAYED RELEASE CAPSULE    TAKE 1 CAPSULE BY MOUTH EVERY DAY BEFORE A MEAL    PRAVASTATIN (PRAVACHOL) 80 MG TABLET    Take 1 tablet by mouth daily    SERTRALINE (ZOLOFT) 100 MG TABLET    Take 1 tablet by mouth daily    SPIRONOLACTONE (ALDACTONE) 25 MG TABLET    Take 0.5 tablets by mouth daily    WARFARIN (COUMADIN) 5 MG TABLET    TAKING ONLY 2 DAYS A WEEK    WARFARIN (COUMADIN) 7.5 MG TABLET    Taking only five days a week        Results for orders placed or performed during the hospital encounter of 04/13/23   XR CHEST PORTABLE    Narrative    EXAMINATION: XR CHEST PORTABLE    DATE: 4/13/2023

## 2023-04-14 NOTE — ED NOTES
I have reviewed discharge instructions with the patient. The patient verbalized understanding. Patient left ED via Discharge Method: wheelchair to Home with wife    Opportunity for questions and clarification provided. Patient given 2 scripts. To continue your aftercare when you leave the hospital, you may receive an automated call from our care team to check in on how you are doing. This is a free service and part of our promise to provide the best care and service to meet your aftercare needs.  If you have questions, or wish to unsubscribe from this service please call [phone]. Thank you for Choosing our [facility] Emergency Department.         Anisa Sigala RN  04/14/23 [id number]

## 2023-04-14 NOTE — ED TRIAGE NOTES
Pt arrives to ed via [facility] ems from home with c/o shob and chest pain. Pain in right shoulder that radiates to chest. Pt states his pacemaker feels like it is \"shocking\" him but not defibrillating him. Pt was pale cool diaphoretic on ems arrival. Pt now still wob increased but pink warm and dry.  Pacemaker for afib 4 years ago

## 2023-04-19 RX ORDER — FLUTICASONE PROPIONATE 50 MCG
SPRAY, SUSPENSION (ML) NASAL
Qty: 48 G | Refills: 1 | Status: SHIPPED | OUTPATIENT
Start: 2023-04-19

## 2023-04-20 ENCOUNTER — NURSE ONLY (OUTPATIENT)
Dept: FAMILY MEDICINE CLINIC | Facility: CLINIC | Age: 88
End: 2023-04-20

## 2023-04-20 DIAGNOSIS — I48.0 PAROXYSMAL ATRIAL FIBRILLATION (HCC): Primary | ICD-10-CM

## 2023-04-20 DIAGNOSIS — Z79.01 LONG TERM CURRENT USE OF ANTICOAGULANT: ICD-10-CM

## 2023-04-20 LAB — INR BLD: 3.4

## 2023-04-20 NOTE — PROGRESS NOTES
INR above goal of 2-3. On warfarin 7.5 mg 4 times per week and 5 mg 3 times per week. Decrease warfarin dose to 5 mg 4 times per week. Recheck INR in 1 week. Having difficulty keeping patient's INR in range, will refer to Coumadin clinic.

## 2023-04-21 DIAGNOSIS — R00.1 SYMPTOMATIC BRADYCARDIA: ICD-10-CM

## 2023-04-21 DIAGNOSIS — Z95.0 PACEMAKER: ICD-10-CM

## 2023-04-25 ENCOUNTER — OFFICE VISIT (OUTPATIENT)
Dept: ORTHOPEDIC SURGERY | Age: 88
End: 2023-04-25
Payer: MEDICARE

## 2023-04-25 DIAGNOSIS — M54.17 LUMBOSACRAL RADICULOPATHY: Primary | ICD-10-CM

## 2023-04-25 PROCEDURE — 64483 NJX AA&/STRD TFRM EPI L/S 1: CPT | Performed by: PHYSICAL MEDICINE & REHABILITATION

## 2023-04-25 PROCEDURE — 64484 NJX AA&/STRD TFRM EPI L/S EA: CPT | Performed by: PHYSICAL MEDICINE & REHABILITATION

## 2023-04-25 RX ORDER — TRIAMCINOLONE ACETONIDE 40 MG/ML
160 INJECTION, SUSPENSION INTRA-ARTICULAR; INTRAMUSCULAR ONCE
Status: COMPLETED | OUTPATIENT
Start: 2023-04-25 | End: 2023-04-25

## 2023-04-25 RX ADMIN — TRIAMCINOLONE ACETONIDE 160 MG: 40 INJECTION, SUSPENSION INTRA-ARTICULAR; INTRAMUSCULAR at 12:45

## 2023-04-27 ENCOUNTER — NURSE ONLY (OUTPATIENT)
Dept: FAMILY MEDICINE CLINIC | Facility: CLINIC | Age: 88
End: 2023-04-27

## 2023-04-27 DIAGNOSIS — I48.0 PAROXYSMAL ATRIAL FIBRILLATION (HCC): Primary | ICD-10-CM

## 2023-04-27 DIAGNOSIS — Z79.01 LONG TERM CURRENT USE OF ANTICOAGULANT: ICD-10-CM

## 2023-04-27 LAB — INR BLD: 3.3

## 2023-04-28 ENCOUNTER — TELEPHONE (OUTPATIENT)
Dept: ORTHOPEDIC SURGERY | Age: 88
End: 2023-04-28

## 2023-05-05 ENCOUNTER — NURSE ONLY (OUTPATIENT)
Dept: FAMILY MEDICINE CLINIC | Facility: CLINIC | Age: 88
End: 2023-05-05

## 2023-05-05 DIAGNOSIS — Z79.01 LONG TERM CURRENT USE OF ANTICOAGULANT: Primary | ICD-10-CM

## 2023-05-05 LAB — INR BLD: 2.3

## 2023-05-08 ENCOUNTER — TELEPHONE (OUTPATIENT)
Dept: ORTHOPEDIC SURGERY | Age: 88
End: 2023-05-08

## 2023-05-11 ENCOUNTER — OFFICE VISIT (OUTPATIENT)
Dept: ORTHOPEDIC SURGERY | Age: 88
End: 2023-05-11

## 2023-05-11 DIAGNOSIS — M54.50 LUMBAR BACK PAIN: Primary | ICD-10-CM

## 2023-05-11 DIAGNOSIS — M54.2 CERVICALGIA: ICD-10-CM

## 2023-05-11 RX ORDER — METHYLPREDNISOLONE ACETATE 40 MG/ML
180 INJECTION, SUSPENSION INTRA-ARTICULAR; INTRALESIONAL; INTRAMUSCULAR; SOFT TISSUE ONCE
Status: COMPLETED | OUTPATIENT
Start: 2023-05-11 | End: 2023-05-11

## 2023-05-11 RX ADMIN — METHYLPREDNISOLONE ACETATE 180 MG: 40 INJECTION, SUSPENSION INTRA-ARTICULAR; INTRALESIONAL; INTRAMUSCULAR; SOFT TISSUE at 16:14

## 2023-05-11 NOTE — PROGRESS NOTES
Date:  05/11/23      Diagnosis Orders   1. Lumbar back pain  INJECT TRIGGER POINT, 1 OR 2    methylPREDNISolone acetate (DEPO-MEDROL) injection 180 mg      2. Cervicalgia  INJECT TRIGGER POINT, 1 OR 2    methylPREDNISolone acetate (DEPO-MEDROL) injection 180 mg          HPI:  I am seeing Gregorio Reyna in evalution/folowup. I last saw him a few weeks ago. Full details are in office note from about a month ago but he has had pain in the cervical and thoracic spine, also some pain that was gravitating down either lower extremity laterally. When I saw him A few weeks ago the pain was more in the left buttock area and the symptoms jump to the right. He underwent right L5 and S1 selective nerve root blocks and tells me actually the pain in the buttock and down the legs is doing pretty well. Does have an aching pain in his left lower leg area above his ankle and that is nonspecific. There is no swelling or anything like that with it. He notes this at night. The pain now is really more in the lower lumbar paraspinal areas. He also has pain in the right trapezius area. He has some degree of trouble walking, feels little bit weaker but does have significant spinal stenosis at the L4-L5 level on MR imaging from earlier this year. He continues on warfarin for his atrial fibrillation. ROS: As above    PE: Cooperative. Does have some weakness of dorsiflexion bilaterally. No lateralizing sensory findings. No heightened reflexes. He has tenderness in the lower lumbar paraspinal areas as well as the right middle trapezius area. Assessment/Plan:    1. PREDOMINANTLY MUSCULOSKELETAL LUMBOSACRAL SPINE PAIN he is having pain more referable to facet joint arthropathy. The last set of selective nerve root blocks may have had some benefit because he tells me the pain down his right leg is better.   I do not like the fact he feels his legs are little bit weaker, known he does have significant spinal

## 2023-05-19 ENCOUNTER — NURSE ONLY (OUTPATIENT)
Dept: FAMILY MEDICINE CLINIC | Facility: CLINIC | Age: 88
End: 2023-05-19

## 2023-05-19 DIAGNOSIS — Z79.01 LONG TERM CURRENT USE OF ANTICOAGULANT: Primary | ICD-10-CM

## 2023-05-19 DIAGNOSIS — I48.0 PAROXYSMAL ATRIAL FIBRILLATION (HCC): ICD-10-CM

## 2023-05-19 LAB — INTERNATIONAL NORMALIZATION RATIO, POC: 2.1

## 2023-06-16 ENCOUNTER — NURSE ONLY (OUTPATIENT)
Dept: FAMILY MEDICINE CLINIC | Facility: CLINIC | Age: 88
End: 2023-06-16

## 2023-06-16 DIAGNOSIS — Z79.01 LONG TERM CURRENT USE OF ANTICOAGULANT: Primary | ICD-10-CM

## 2023-06-16 DIAGNOSIS — I48.0 PAROXYSMAL ATRIAL FIBRILLATION (HCC): ICD-10-CM

## 2023-06-16 LAB — INR BLD: 1.6

## 2023-06-20 ENCOUNTER — TELEPHONE (OUTPATIENT)
Dept: FAMILY MEDICINE CLINIC | Facility: CLINIC | Age: 88
End: 2023-06-20

## 2023-06-28 ENCOUNTER — TELEPHONE (OUTPATIENT)
Dept: FAMILY MEDICINE CLINIC | Facility: CLINIC | Age: 88
End: 2023-06-28

## 2023-06-30 ENCOUNTER — TELEPHONE (OUTPATIENT)
Dept: FAMILY MEDICINE CLINIC | Facility: CLINIC | Age: 88
End: 2023-06-30

## 2023-07-05 ENCOUNTER — TELEPHONE (OUTPATIENT)
Dept: ORTHOPEDIC SURGERY | Age: 88
End: 2023-07-05

## 2023-07-10 ENCOUNTER — TELEPHONE (OUTPATIENT)
Dept: ORTHOPEDIC SURGERY | Age: 88
End: 2023-07-10

## 2023-07-10 RX ORDER — FLURBIPROFEN SODIUM 0.3 MG/ML
SOLUTION/ DROPS OPHTHALMIC
Qty: 100 EACH | Refills: 5 | Status: SHIPPED | OUTPATIENT
Start: 2023-07-10

## 2023-07-10 NOTE — TELEPHONE ENCOUNTER
----- Message from Angelia Lópezagustin sent at 7/10/2023 10:26 AM EDT -----  Subject: Refill Request    QUESTIONS  Name of Medication? Insulin Pen Needle 29G X 12MM MISC  Patient-reported dosage and instructions? 1 each by Does not apply route   daily Use daily to inject insulin. DXE11.9  How many days do you have left? 0  Preferred Pharmacy? [id number]  Pharmacy phone number (if available)? [phone]  Additional Information for Provider? pt. is requesting for today he is   completely out pt. is also requesting for more then 100 per prescription   because he is using 4 a day pt. is requesting a phone call to discuss the   needed quantity per prescription   ---------------------------------------------------------------------------  --------------  CALL BACK INFO  What is the best way for the office to contact you? OK to leave message on   voicemail  Preferred Call Back Phone Number? [phone]  ---------------------------------------------------------------------------  --------------  SCRIPT ANSWERS  Relationship to Patient?  Self

## 2023-07-11 ENCOUNTER — TELEPHONE (OUTPATIENT)
Dept: ORTHOPEDIC SURGERY | Age: 88
End: 2023-07-11

## 2023-07-12 NOTE — TELEPHONE ENCOUNTER
Patient has been scheduled for repeat trigger point injections per [facility] 7/13 GR 4:30pm

## 2023-07-13 ENCOUNTER — OFFICE VISIT (OUTPATIENT)
Dept: ORTHOPEDIC SURGERY | Age: 88
End: 2023-07-13

## 2023-07-13 DIAGNOSIS — M54.50 LUMBAR BACK PAIN: Primary | ICD-10-CM

## 2023-07-13 RX ORDER — TRIAMCINOLONE ACETONIDE 40 MG/ML
160 INJECTION, SUSPENSION INTRA-ARTICULAR; INTRAMUSCULAR ONCE
Status: COMPLETED | OUTPATIENT
Start: 2023-07-13 | End: 2023-07-13

## 2023-07-13 RX ADMIN — TRIAMCINOLONE ACETONIDE 160 MG: 40 INJECTION, SUSPENSION INTRA-ARTICULAR; INTRAMUSCULAR at 16:51

## 2023-07-13 NOTE — PROGRESS NOTES
Date: 07/13/23   Name: Hemal Cifuentes    Pre-Procedural Diagnosis:    Diagnosis Orders   1. Lumbar back pain  triamcinolone acetonide (KENALOG-40) injection 160 mg    INJECT TRIGGER POINT, 1 OR 2          LUMBAR TRIGGER POINT INJECTION(S)    Patient presents for repeat lumbar trigger point injections. Had a nice response to injections 2 months ago. Brief exam is consistent with above impression. Will repeat injections today. Trigger Point Injection(s):  After informed oral consent, patient was prepped per routine. The bilateral lumbar paraspinal area(s) were injected. 80 mg of Kenalog with 1 cc of 0.25% Marcaine were injected at each site. Patient tolerated well. Band aid(s) applied. A total of two muscles/sites injected today for trigger point charge. A total of 4 cc of Kenalog were administered during this procedure.     Praveen Valentin MD

## 2023-07-25 ENCOUNTER — TELEPHONE (OUTPATIENT)
Dept: ORTHOPEDIC SURGERY | Age: 88
End: 2023-07-25

## 2023-07-25 DIAGNOSIS — M54.17 LUMBOSACRAL RADICULOPATHY: Primary | ICD-10-CM

## 2023-08-10 RX ORDER — WARFARIN SODIUM 7.5 MG/1
TABLET ORAL
Qty: 80 TABLET | Refills: 2 | Status: SHIPPED | OUTPATIENT
Start: 2023-08-10

## 2023-08-14 RX ORDER — WARFARIN SODIUM 5 MG/1
TABLET ORAL
Qty: 90 TABLET | Refills: 1 | OUTPATIENT
Start: 2023-08-14

## 2023-08-24 ENCOUNTER — OFFICE VISIT (OUTPATIENT)
Dept: FAMILY MEDICINE CLINIC | Facility: CLINIC | Age: 88
End: 2023-08-24
Payer: MEDICARE

## 2023-08-24 ENCOUNTER — TELEPHONE (OUTPATIENT)
Dept: FAMILY MEDICINE CLINIC | Facility: CLINIC | Age: 88
End: 2023-08-24

## 2023-08-24 VITALS
SYSTOLIC BLOOD PRESSURE: 122 MMHG | TEMPERATURE: 98 F | HEIGHT: 72 IN | WEIGHT: 218 LBS | OXYGEN SATURATION: 98 % | BODY MASS INDEX: 29.53 KG/M2 | HEART RATE: 86 BPM | DIASTOLIC BLOOD PRESSURE: 62 MMHG

## 2023-08-24 DIAGNOSIS — E11.21 TYPE 2 DIABETES WITH NEPHROPATHY (HCC): Primary | ICD-10-CM

## 2023-08-24 DIAGNOSIS — D64.9 ANEMIA, UNSPECIFIED TYPE: ICD-10-CM

## 2023-08-24 DIAGNOSIS — I48.0 PAROXYSMAL ATRIAL FIBRILLATION (HCC): ICD-10-CM

## 2023-08-24 DIAGNOSIS — Z79.01 LONG TERM CURRENT USE OF ANTICOAGULANT: ICD-10-CM

## 2023-08-24 DIAGNOSIS — N18.4 CKD (CHRONIC KIDNEY DISEASE) STAGE 4, GFR 15-29 ML/MIN (HCC): ICD-10-CM

## 2023-08-24 DIAGNOSIS — R26.81 UNSTEADINESS ON FEET: ICD-10-CM

## 2023-08-24 LAB
BASOPHILS # BLD: 0 K/UL (ref 0–0.2)
BASOPHILS NFR BLD: 0 % (ref 0–2)
BILIRUBIN, URINE, POC: NEGATIVE
BLOOD URINE, POC: NEGATIVE
DIFFERENTIAL METHOD BLD: ABNORMAL
EOSINOPHIL # BLD: 0.1 K/UL (ref 0–0.8)
EOSINOPHIL NFR BLD: 1 % (ref 0.5–7.8)
ERYTHROCYTE [DISTWIDTH] IN BLOOD BY AUTOMATED COUNT: 13.3 % (ref 11.9–14.6)
FOLATE SERPL-MCNC: 30.3 NG/ML (ref 3.1–17.5)
GLUCOSE URINE, POC: NEGATIVE
HBA1C MFR BLD: 8.7 %
HCT VFR BLD AUTO: 35.7 % (ref 41.1–50.3)
HGB BLD-MCNC: 11.7 G/DL (ref 13.6–17.2)
IMM GRANULOCYTES # BLD AUTO: 0.1 K/UL (ref 0–0.5)
IMM GRANULOCYTES NFR BLD AUTO: 1 % (ref 0–5)
INTERNATIONAL NORMALIZATION RATIO, POC: 1.4
IRON SATN MFR SERPL: 35 %
IRON SERPL-MCNC: 82 UG/DL (ref 35–150)
KETONES, URINE, POC: NEGATIVE
LEUKOCYTE ESTERASE, URINE, POC: NEGATIVE
LYMPHOCYTES # BLD: 1.1 K/UL (ref 0.5–4.6)
LYMPHOCYTES NFR BLD: 12 % (ref 13–44)
MCH RBC QN AUTO: 33.5 PG (ref 26.1–32.9)
MCHC RBC AUTO-ENTMCNC: 32.8 G/DL (ref 31.4–35)
MCV RBC AUTO: 102.3 FL (ref 82–102)
MONOCYTES # BLD: 0.6 K/UL (ref 0.1–1.3)
MONOCYTES NFR BLD: 7 % (ref 4–12)
NEUTS SEG # BLD: 7.4 K/UL (ref 1.7–8.2)
NEUTS SEG NFR BLD: 79 % (ref 43–78)
NITRITE, URINE, POC: NEGATIVE
NRBC # BLD: 0 K/UL (ref 0–0.2)
PH, URINE, POC: 5 (ref 4.6–8)
PLATELET # BLD AUTO: 131 K/UL (ref 150–450)
PMV BLD AUTO: 12.4 FL (ref 9.4–12.3)
POC INR: 1.4
PROTEIN,URINE, POC: NEGATIVE
PROTHROMBIN TIME, POC: ABNORMAL
RBC # BLD AUTO: 3.49 M/UL (ref 4.23–5.6)
SPECIFIC GRAVITY, URINE, POC: 1.01 (ref 1–1.03)
TIBC SERPL-MCNC: 233 UG/DL (ref 250–450)
URINALYSIS CLARITY, POC: CLEAR
URINALYSIS COLOR, POC: YELLOW
UROBILINOGEN, POC: NORMAL
VIT B12 SERPL-MCNC: 1183 PG/ML (ref 193–986)
WBC # BLD AUTO: 9.3 K/UL (ref 4.3–11.1)

## 2023-08-24 PROCEDURE — 99214 OFFICE O/P EST MOD 30 MIN: CPT | Performed by: STUDENT IN AN ORGANIZED HEALTH CARE EDUCATION/TRAINING PROGRAM

## 2023-08-24 PROCEDURE — 85610 PROTHROMBIN TIME: CPT | Performed by: STUDENT IN AN ORGANIZED HEALTH CARE EDUCATION/TRAINING PROGRAM

## 2023-08-24 PROCEDURE — 1123F ACP DISCUSS/DSCN MKR DOCD: CPT | Performed by: STUDENT IN AN ORGANIZED HEALTH CARE EDUCATION/TRAINING PROGRAM

## 2023-08-24 PROCEDURE — 81003 URINALYSIS AUTO W/O SCOPE: CPT | Performed by: STUDENT IN AN ORGANIZED HEALTH CARE EDUCATION/TRAINING PROGRAM

## 2023-08-24 PROCEDURE — 83036 HEMOGLOBIN GLYCOSYLATED A1C: CPT | Performed by: STUDENT IN AN ORGANIZED HEALTH CARE EDUCATION/TRAINING PROGRAM

## 2023-08-24 RX ORDER — CALCIUM CARBONATE 160(400)MG
TABLET,CHEWABLE ORAL
Qty: 1 EACH | Refills: 0 | Status: SHIPPED | OUTPATIENT
Start: 2023-08-24

## 2023-08-24 NOTE — PROGRESS NOTES
[address], [address] [facility]  Phone [phone]  Fax:  [phone]    Carlos Kirkpatrick (:  1935) is a 82 y.o. male here for evaluation of the following chief complaint(s):  Diabetes (No refills) and Office Visit for Anticoagulation Management       ASSESSMENT/PLAN:  1. Type 2 diabetes with nephropathy (HCC)  -     AMB POC HEMOGLOBIN A1C  -     POCT INR  2. Long term current use of anticoagulant  -     AMB POC PT/INR  -     POCT INR  3. Paroxysmal atrial fibrillation (HCC)  -     AMB POC PT/INR  -     POCT INR  4. CKD (chronic kidney disease) stage 4, GFR 15-29 ml/min (HCC)  -     POCT INR  -     AMB POC URINALYSIS DIP STICK AUTO W/O MICRO  5. Anemia, unspecified type  -     CBC with Auto Differential; Future  -     Vitamin B12; Future  -     Folate; Future  -     Transferrin Saturation; Future  6. Unsteadiness on feet    INR today 1.4, under goal.  Patient is not compliant with following up for his INR checks. Currently taking warfarin 7.5 mg 5 days/week and 5 mg the other 2 days. Increase warfarin dose to 7.5 mg daily and recheck in 6 days. Advised patient to establish with Coumadin clinic for monitoring of this. POC hemoglobin A1c today 8.7, not at goal.  Continue Tresiba 32 units daily. Advised patient to take his NovoLog 10 units before instead of after each meal.  Continue Tradjenta 5 mg daily. He is monitoring his glucose with Dexcom. Mild anemia -- check vitamin B12, folate, iron saturation, and CBC. Reports some unsteadiness with walking, prescription for rolling walker provided. MMSE today 29/30. Patient denies any memory problems. Followed by nephrology for CKD. Followed by Ortho for lumbosacral radiculopathy. Followed by cardiology for CAD and A-fib. Return in about 3 months (around 2023) for routine f/u.          Subjective   SUBJECTIVE/OBJECTIVE:  HPI  81-year-old male with PMH of A. fib, CAD, DM2, HTN, CKD, pulmonary

## 2023-08-24 NOTE — TELEPHONE ENCOUNTER
Pt called and stated he wants Dr. Elli Davey to get in touch with Dr. Hieu Bustamante about his 'brain problem'. States he been getting injections from Dr. Hieu Bustamante' office for his uncontrolable shaking and his brain problems. Also wants you to know he called his cardio and is seeing Dr. Gretchen Auguste tomorrow.

## 2023-08-25 ENCOUNTER — OFFICE VISIT (OUTPATIENT)
Age: 88
End: 2023-08-25
Payer: MEDICARE

## 2023-08-25 VITALS
HEART RATE: 72 BPM | WEIGHT: 215 LBS | HEIGHT: 72 IN | DIASTOLIC BLOOD PRESSURE: 50 MMHG | SYSTOLIC BLOOD PRESSURE: 100 MMHG | BODY MASS INDEX: 29.12 KG/M2

## 2023-08-25 DIAGNOSIS — R06.09 DOE (DYSPNEA ON EXERTION): Primary | ICD-10-CM

## 2023-08-25 LAB — NT PRO BNP: 2921 PG/ML

## 2023-08-25 PROCEDURE — 1123F ACP DISCUSS/DSCN MKR DOCD: CPT | Performed by: INTERNAL MEDICINE

## 2023-08-25 PROCEDURE — 99214 OFFICE O/P EST MOD 30 MIN: CPT | Performed by: INTERNAL MEDICINE

## 2023-08-25 ASSESSMENT — ENCOUNTER SYMPTOMS
BACK PAIN: 0
ABDOMINAL PAIN: 0
SHORTNESS OF BREATH: 1
CHEST TIGHTNESS: 0
EYE PAIN: 0
EYES NEGATIVE: 1
ALLERGIC/IMMUNOLOGIC NEGATIVE: 1
PHOTOPHOBIA: 0
GASTROINTESTINAL NEGATIVE: 1

## 2023-08-25 NOTE — PROGRESS NOTES
(gastroesophageal reflux disease)     Glaucoma     Hypertension     Imbalance 2019    Multinodular goiter     Nephrolithiasis     Obesity (BMI 30-39. 9)          Peptic ulcer disease     Plantar fasciitis     Psychiatric disorder     Right inguinal hernia     Sciatica     Stroke ([address])     Tobacco use disorder     Type 2 diabetes mellitus ([address])      Past Surgical History:   Procedure Laterality Date    ABLATION OF DYSRHYTHMIC FOCUS      BACK SURGERY      fusion    CARDIAC PROCEDURE N/A 10/14/2022    RIGHT HEART CATH performed by Shirley Stewart MD at [facility] CATH LAB    COLONOSCOPY  2018    COLONOSCOPY      GI  's    Rectal fistula repair    KNEE ARTHROSCOPY Right 2012    UT UNLISTED PROCEDURE CARDIAC SURGERY  2019    pacemaker placed    UT UNLISTED PROCEDURE CARDIAC SURGERY      atrial fib ablation     Family History   Problem Relation Age of Onset    Colon Cancer Father 80    Thyroid Disease Father         goiter    No Known Problems Paternal Grandfather     No Known Problems Paternal Grandmother     No Known Problems Maternal Grandfather     No Known Problems Maternal Grandmother     Crohn's Disease Son     No Known Problems Brother     No Known Problems Brother     No Known Problems Brother     Heart Disease Brother     Cancer Sister         liver    Heart Disease Sister     Heart Disease Brother     Breast Cancer Sister     Thyroid Cancer Neg Hx      Social History     Tobacco Use    Smoking status: Former     Packs/day: 0.25     Years: 44.00     Pack years: 11.00     Types: Cigarettes     Quit date: 1996     Years since quittin.1    Smokeless tobacco: Never    Tobacco comments:     Quit smoking: quit Patient formally smoked 3 packs a day but says he did not smoke the whole cigarette but burned 3 cigarettes 3 packs/day. He smoked close to 40 years but quit in . Substance Use Topics    Alcohol use: No       ROS:  Review of Systems   Constitutional:  Positive for fatigue.

## 2023-08-28 ENCOUNTER — TELEPHONE (OUTPATIENT)
Age: 88
End: 2023-08-28

## 2023-08-28 DIAGNOSIS — R60.9 EDEMA: ICD-10-CM

## 2023-08-28 DIAGNOSIS — I48.0 PAROXYSMAL ATRIAL FIBRILLATION (HCC): ICD-10-CM

## 2023-08-28 DIAGNOSIS — I25.10 CORONARY ARTERY DISEASE INVOLVING NATIVE CORONARY ARTERY OF NATIVE HEART WITHOUT ANGINA PECTORIS: Primary | ICD-10-CM

## 2023-08-28 DIAGNOSIS — R00.1 SYMPTOMATIC BRADYCARDIA: ICD-10-CM

## 2023-08-28 NOTE — TELEPHONE ENCOUNTER
Notified pt to increase bumex to twice daily for a week & get labs drawn next Monday. Pt voiced understanding.

## 2023-08-28 NOTE — TELEPHONE ENCOUNTER
----- Message from Mirian Ray MD sent at 8/28/2023  9:08 AM EDT -----  Please have Mr. Maryam Winston increase his Bumex dose to 1 mg 2 times daily (Qam and one at 2:00 pm). Check a BMP in 1 week.     Pau Moon MD

## 2023-08-30 ENCOUNTER — TELEPHONE (OUTPATIENT)
Age: 88
End: 2023-08-30

## 2023-08-30 ENCOUNTER — NURSE ONLY (OUTPATIENT)
Dept: FAMILY MEDICINE CLINIC | Facility: CLINIC | Age: 88
End: 2023-08-30
Payer: MEDICARE

## 2023-08-30 DIAGNOSIS — I48.0 PAROXYSMAL ATRIAL FIBRILLATION (HCC): ICD-10-CM

## 2023-08-30 DIAGNOSIS — I48.0 PAROXYSMAL ATRIAL FIBRILLATION (HCC): Primary | ICD-10-CM

## 2023-08-30 DIAGNOSIS — Z79.01 LONG TERM CURRENT USE OF ANTICOAGULANT: ICD-10-CM

## 2023-08-30 DIAGNOSIS — Z79.01 LONG TERM CURRENT USE OF ANTICOAGULANT: Primary | ICD-10-CM

## 2023-08-30 LAB — INTERNATIONAL NORMALIZATION RATIO, POC: 1.6

## 2023-08-30 PROCEDURE — 85610 PROTHROMBIN TIME: CPT | Performed by: STUDENT IN AN ORGANIZED HEALTH CARE EDUCATION/TRAINING PROGRAM

## 2023-08-30 NOTE — TELEPHONE ENCOUNTER
----- Message from Rochelle Dakins, MD sent at 8/25/2023 11:28 AM EDT -----  Mr. Zulema Mathew would like to get his INRs checked through our office. He takes warfarin for stroke prevention due to A-fib. Goal INR 2-3.     Lilia Zamora MD

## 2023-08-30 NOTE — TELEPHONE ENCOUNTER
I spoke with the patient. Warfarin tablet strength and weekly dosing schedule confirmed today. He states, \"I have a 7.5 mg tablet\". Dosing calender and medication list indicates he is using a 5 mg tablet,(take 1.5 tablets daily). Continue current maintenance plan (see Anticoag Dosing Calendar). INR to be rechecked on 08/31/2023, [address] [facility].

## 2023-08-31 ENCOUNTER — ANTI-COAG VISIT (OUTPATIENT)
Age: 88
End: 2023-08-31

## 2023-08-31 DIAGNOSIS — Z79.01 LONG TERM CURRENT USE OF ANTICOAGULANT: ICD-10-CM

## 2023-08-31 DIAGNOSIS — I48.0 PAROXYSMAL ATRIAL FIBRILLATION (HCC): Primary | ICD-10-CM

## 2023-08-31 LAB
POC INR: 1.9
PROTHROMBIN TIME, POC: ABNORMAL

## 2023-08-31 NOTE — PATIENT INSTRUCTIONS
Reminder: Please contact the Coumadin Clinic at [phone] when you have medication changes. Examples, new medications, antibiotics, discontinued medications, new supplements, missed doses of warfarin or if you took extra doses of warfarin. This also includes OTC medications. Notifying us helps reduce the possibility of high and low INR's. In addition, if warfarin needs to be held for any procedures, please have surgeon or physician's office contact us before holding anticoagulant. Thanks, [facility] Coumadin Clinic.

## 2023-09-01 ENCOUNTER — TELEPHONE (OUTPATIENT)
Age: 88
End: 2023-09-01

## 2023-09-01 NOTE — TELEPHONE ENCOUNTER
Notified pt of result with recommendation for OV per Dr. Giles Garcia. Pt voiced understanding.  Appt 9/13/23 at 9:15am

## 2023-09-01 NOTE — TELEPHONE ENCOUNTER
----- Message from Tejal Mcginnis MD sent at 9/1/2023  3:19 PM EDT -----  Stress test shows that there may be some blockage in your heart arteries, will need to see you back to discuss.     Ryder Woods MD

## 2023-09-06 PROCEDURE — 93296 REM INTERROG EVL PM/IDS: CPT | Performed by: INTERNAL MEDICINE

## 2023-09-06 PROCEDURE — 93294 REM INTERROG EVL PM/LDLS PM: CPT | Performed by: INTERNAL MEDICINE

## 2023-09-13 ENCOUNTER — OFFICE VISIT (OUTPATIENT)
Age: 88
End: 2023-09-13
Payer: MEDICARE

## 2023-09-13 ENCOUNTER — ANTI-COAG VISIT (OUTPATIENT)
Age: 88
End: 2023-09-13

## 2023-09-13 VITALS
HEART RATE: 80 BPM | SYSTOLIC BLOOD PRESSURE: 166 MMHG | WEIGHT: 219 LBS | HEIGHT: 72 IN | BODY MASS INDEX: 29.66 KG/M2 | DIASTOLIC BLOOD PRESSURE: 80 MMHG

## 2023-09-13 DIAGNOSIS — R94.39 ABNORMAL STRESS TEST: ICD-10-CM

## 2023-09-13 DIAGNOSIS — I48.0 PAROXYSMAL ATRIAL FIBRILLATION (HCC): Primary | ICD-10-CM

## 2023-09-13 DIAGNOSIS — I25.10 CORONARY ARTERY DISEASE INVOLVING NATIVE CORONARY ARTERY OF NATIVE HEART WITHOUT ANGINA PECTORIS: Primary | ICD-10-CM

## 2023-09-13 DIAGNOSIS — I48.0 PAROXYSMAL ATRIAL FIBRILLATION (HCC): ICD-10-CM

## 2023-09-13 DIAGNOSIS — R60.0 LEG EDEMA, LEFT: ICD-10-CM

## 2023-09-13 DIAGNOSIS — Z79.01 LONG TERM CURRENT USE OF ANTICOAGULANT: ICD-10-CM

## 2023-09-13 DIAGNOSIS — E78.2 HYPERLIPIDEMIA, MIXED: ICD-10-CM

## 2023-09-13 DIAGNOSIS — D68.69 SECONDARY HYPERCOAGULABLE STATE (HCC): ICD-10-CM

## 2023-09-13 PROBLEM — R06.00 DYSPNEA: Status: ACTIVE | Noted: 2023-09-13

## 2023-09-13 LAB
ANION GAP SERPL CALC-SCNC: 7 MMOL/L (ref 2–11)
BUN SERPL-MCNC: 62 MG/DL (ref 8–23)
CALCIUM SERPL-MCNC: 9.3 MG/DL (ref 8.3–10.4)
CHLORIDE SERPL-SCNC: 109 MMOL/L (ref 101–110)
CO2 SERPL-SCNC: 31 MMOL/L (ref 21–32)
CREAT SERPL-MCNC: 2.4 MG/DL (ref 0.8–1.5)
ERYTHROCYTE [DISTWIDTH] IN BLOOD BY AUTOMATED COUNT: 14.1 % (ref 11.9–14.6)
GLUCOSE SERPL-MCNC: 168 MG/DL (ref 65–100)
HCT VFR BLD AUTO: 35.5 % (ref 41.1–50.3)
HGB BLD-MCNC: 11.5 G/DL (ref 13.6–17.2)
MCH RBC QN AUTO: 33.3 PG (ref 26.1–32.9)
MCHC RBC AUTO-ENTMCNC: 32.4 G/DL (ref 31.4–35)
MCV RBC AUTO: 102.9 FL (ref 82–102)
NRBC # BLD: 0 K/UL (ref 0–0.2)
PLATELET # BLD AUTO: 133 K/UL (ref 150–450)
PMV BLD AUTO: 12.7 FL (ref 9.4–12.3)
POC INR: 2.2
POTASSIUM SERPL-SCNC: 3.9 MMOL/L (ref 3.5–5.1)
PROTHROMBIN TIME, POC: NORMAL
RBC # BLD AUTO: 3.45 M/UL (ref 4.23–5.6)
SODIUM SERPL-SCNC: 147 MMOL/L (ref 133–143)
WBC # BLD AUTO: 6.8 K/UL (ref 4.3–11.1)

## 2023-09-13 PROCEDURE — 1123F ACP DISCUSS/DSCN MKR DOCD: CPT | Performed by: INTERNAL MEDICINE

## 2023-09-13 PROCEDURE — 99214 OFFICE O/P EST MOD 30 MIN: CPT | Performed by: INTERNAL MEDICINE

## 2023-09-13 RX ORDER — BUMETANIDE 1 MG/1
1 TABLET ORAL DAILY
COMMUNITY

## 2023-09-13 ASSESSMENT — ENCOUNTER SYMPTOMS
EYE PAIN: 0
CHEST TIGHTNESS: 0
RESPIRATORY NEGATIVE: 1
BACK PAIN: 0
PHOTOPHOBIA: 0
ALLERGIC/IMMUNOLOGIC NEGATIVE: 1
ABDOMINAL PAIN: 0
EYES NEGATIVE: 1
GASTROINTESTINAL NEGATIVE: 1
SHORTNESS OF BREATH: 0

## 2023-09-13 NOTE — PROGRESS NOTES
[facility] CARDIOLOGY  [address] [facility], [address] [place] [address]  PHONE: [phone]      23    NAME:  Alyssa Malave  : 1935  MRN: [id number]         SUBJECTIVE:   Alyssa Malave is a 82 y.o. male seen for follow up of:      Chief Complaint   Patient presents with    Shortness of Breath     Abnormal Nuke        Cardiac Hx (Reviewed and summarized by me):  1) Afib - on warfarin followed by Dr Rianna Carlson  2) PPM/AVN ablation   3) Moderate/severe MR and Severe TR              ÁNGEL 22 - Mild/Mod MR, Mod TR  4) NM stress test   20 - Normal perfusion   23 - distal apical lateral ischemia noted      HPI:  Stress test indicated some areas of ischemia in the apical and lateral wall concerning for LAD lesion. He remains with New York Heart Association class III shortness of breath or symptoms. He has swelling in his lower extremities worse on the left. He has some toes that are turning purple. Past Medical History, Past Surgical History, Family history, Social History, and Medications were all reviewed with the patient today and updated as necessary. Current Outpatient Medications:     bumetanide (BUMEX) 1 MG tablet, Take 1 tablet by mouth daily In the afternoon, Disp: , Rfl:     Multiple Vitamins-Minerals (CENTRUM SILVER 50+MEN PO), Take by mouth, Disp: , Rfl:     Misc. Devices (ROLLATOR ULTRA-LIGHT) MISC, Use while walking, Disp: 1 each, Rfl: 0    warfarin (COUMADIN) 7.5 MG tablet, TAKING ONLY FIVE DAYS A WEEK, Disp: 80 tablet, Rfl: 2    B-D UF III MINI PEN NEEDLES 31G X 5 MM MISC, USE AS DIRECTED DAILY, Disp: 100 each, Rfl: 5    Insulin Pen Needle 29G X 12MM MISC, 1 each by Does not apply route daily Use daily to inject insulin.  DXE11.9, Disp: 500 each, Rfl: 5    warfarin (COUMADIN) 5 MG tablet, TAKING ONLY 2 DAYS A WEEK, Disp: 90 tablet, Rfl: 1    sertraline (ZOLOFT) 100 MG tablet, Take 1 tablet by mouth daily, Disp: 90 tablet, Rfl: 1    pravastatin

## 2023-09-13 NOTE — PATIENT INSTRUCTIONS
Reminder: Please contact the Coumadin Clinic at [phone] when you have medication changes. Examples, new medications, antibiotics, discontinued medications, new supplements, missed doses of warfarin or if you took extra doses of warfarin. This also includes OTC medications. Notifying us helps reduce the possibility of high and low INR's. In addition, if warfarin needs to be held for any procedures, please have surgeon or physician's office contact us before holding anticoagulant. Thanks, [facility] Coumadin Clinic.

## 2023-09-14 NOTE — PROGRESS NOTES
Patient pre-assessment complete for [facility] poss with Dr Refugio Herrera scheduled for 9/15/23, arrival time 6am. Patient verified using . Patient instructed to bring a list of all home medications on the day of procedure. NPO status reinforced. Patient informed to take a full dose aspirin 325mg  or 81 mg x 4 on the day of procedure. Patient instructed to HOLD coumadin (last dose 23). Instructed they can take all other medications excluding vitamins & supplements. Patient verbalizes understanding of all instructions & denies any questions at this time.

## 2023-09-15 ENCOUNTER — HOSPITAL ENCOUNTER (OUTPATIENT)
Age: 88
Setting detail: OUTPATIENT SURGERY
Discharge: HOME | End: 2023-09-15
Attending: INTERNAL MEDICINE | Admitting: INTERNAL MEDICINE
Payer: MEDICARE

## 2023-09-15 VITALS
WEIGHT: 219 LBS | SYSTOLIC BLOOD PRESSURE: 119 MMHG | BODY MASS INDEX: 29.66 KG/M2 | DIASTOLIC BLOOD PRESSURE: 57 MMHG | HEART RATE: 75 BPM | HEIGHT: 72 IN | TEMPERATURE: 98.1 F | OXYGEN SATURATION: 94 % | RESPIRATION RATE: 15 BRPM

## 2023-09-15 DIAGNOSIS — R06.00 DYSPNEA: ICD-10-CM

## 2023-09-15 DIAGNOSIS — R06.09 DYSPNEA ON EXERTION: Primary | ICD-10-CM

## 2023-09-15 LAB
ACT BLD: 251 SECS (ref 70–128)
ANION GAP SERPL CALC-SCNC: 6 MMOL/L (ref 2–11)
BUN SERPL-MCNC: 46 MG/DL (ref 8–23)
CALCIUM SERPL-MCNC: 9.2 MG/DL (ref 8.3–10.4)
CHLORIDE SERPL-SCNC: 113 MMOL/L (ref 101–110)
CO2 SERPL-SCNC: 27 MMOL/L (ref 21–32)
CREAT SERPL-MCNC: 1.9 MG/DL (ref 0.8–1.5)
ECHO BSA: 2.25 M2
EKG ATRIAL RATE: 68 BPM
EKG DIAGNOSIS: NORMAL
EKG Q-T INTERVAL: 412 MS
EKG QRS DURATION: 134 MS
EKG QTC CALCULATION (BAZETT): 460 MS
EKG R AXIS: 252 DEGREES
EKG T AXIS: 60 DEGREES
EKG VENTRICULAR RATE: 75 BPM
GLUCOSE SERPL-MCNC: 328 MG/DL (ref 65–100)
INR PPP: 1.9
POTASSIUM SERPL-SCNC: 4.7 MMOL/L (ref 3.5–5.1)
PROTHROMBIN TIME: 22.6 SEC (ref 12.6–14.3)
SODIUM SERPL-SCNC: 146 MMOL/L (ref 133–143)

## 2023-09-15 PROCEDURE — 93460 R&L HRT ART/VENTRICLE ANGIO: CPT | Performed by: INTERNAL MEDICINE

## 2023-09-15 PROCEDURE — 93571 IV DOP VEL&/PRESS C FLO 1ST: CPT | Performed by: INTERNAL MEDICINE

## 2023-09-15 PROCEDURE — C1769 GUIDE WIRE: HCPCS | Performed by: INTERNAL MEDICINE

## 2023-09-15 PROCEDURE — 6360000002 HC RX W HCPCS: Performed by: INTERNAL MEDICINE

## 2023-09-15 PROCEDURE — C1887 CATHETER, GUIDING: HCPCS | Performed by: INTERNAL MEDICINE

## 2023-09-15 PROCEDURE — 93005 ELECTROCARDIOGRAM TRACING: CPT | Performed by: INTERNAL MEDICINE

## 2023-09-15 PROCEDURE — 99152 MOD SED SAME PHYS/QHP 5/>YRS: CPT | Performed by: INTERNAL MEDICINE

## 2023-09-15 PROCEDURE — 2580000003 HC RX 258: Performed by: INTERNAL MEDICINE

## 2023-09-15 PROCEDURE — 80048 BASIC METABOLIC PNL TOTAL CA: CPT

## 2023-09-15 PROCEDURE — 85610 PROTHROMBIN TIME: CPT

## 2023-09-15 PROCEDURE — C1894 INTRO/SHEATH, NON-LASER: HCPCS | Performed by: INTERNAL MEDICINE

## 2023-09-15 PROCEDURE — 6360000004 HC RX CONTRAST MEDICATION: Performed by: INTERNAL MEDICINE

## 2023-09-15 PROCEDURE — C1751 CATH, INF, PER/CENT/MIDLINE: HCPCS | Performed by: INTERNAL MEDICINE

## 2023-09-15 PROCEDURE — 6370000000 HC RX 637 (ALT 250 FOR IP): Performed by: INTERNAL MEDICINE

## 2023-09-15 PROCEDURE — 2500000003 HC RX 250 WO HCPCS: Performed by: INTERNAL MEDICINE

## 2023-09-15 PROCEDURE — 2709999900 HC NON-CHARGEABLE SUPPLY: Performed by: INTERNAL MEDICINE

## 2023-09-15 PROCEDURE — 93458 L HRT ARTERY/VENTRICLE ANGIO: CPT | Performed by: INTERNAL MEDICINE

## 2023-09-15 PROCEDURE — 99153 MOD SED SAME PHYS/QHP EA: CPT | Performed by: INTERNAL MEDICINE

## 2023-09-15 PROCEDURE — 93010 ELECTROCARDIOGRAM REPORT: CPT | Performed by: INTERNAL MEDICINE

## 2023-09-15 PROCEDURE — 85347 COAGULATION TIME ACTIVATED: CPT

## 2023-09-15 RX ORDER — HEPARIN SODIUM 200 [USP'U]/100ML
INJECTION, SOLUTION INTRAVENOUS CONTINUOUS PRN
Status: DISCONTINUED | OUTPATIENT
Start: 2023-09-15 | End: 2023-09-15 | Stop reason: HOSPADM

## 2023-09-15 RX ORDER — SODIUM CHLORIDE 9 MG/ML
INJECTION, SOLUTION INTRAVENOUS PRN
Status: CANCELLED | OUTPATIENT
Start: 2023-09-15

## 2023-09-15 RX ORDER — SODIUM CHLORIDE 0.9 % (FLUSH) 0.9 %
5-40 SYRINGE (ML) INJECTION PRN
Status: CANCELLED | OUTPATIENT
Start: 2023-09-15

## 2023-09-15 RX ORDER — LIDOCAINE HYDROCHLORIDE 10 MG/ML
INJECTION, SOLUTION INFILTRATION; PERINEURAL PRN
Status: DISCONTINUED | OUTPATIENT
Start: 2023-09-15 | End: 2023-09-15 | Stop reason: HOSPADM

## 2023-09-15 RX ORDER — SODIUM CHLORIDE 0.9 % (FLUSH) 0.9 %
5-40 SYRINGE (ML) INJECTION EVERY 12 HOURS SCHEDULED
Status: CANCELLED | OUTPATIENT
Start: 2023-09-15

## 2023-09-15 RX ORDER — SODIUM CHLORIDE 9 MG/ML
INJECTION, SOLUTION INTRAVENOUS CONTINUOUS
Status: DISCONTINUED | OUTPATIENT
Start: 2023-09-15 | End: 2023-09-15 | Stop reason: HOSPADM

## 2023-09-15 RX ORDER — NITROGLYCERIN 20 MG/100ML
INJECTION INTRAVENOUS PRN
Status: DISCONTINUED | OUTPATIENT
Start: 2023-09-15 | End: 2023-09-15 | Stop reason: HOSPADM

## 2023-09-15 RX ORDER — HEPARIN SODIUM 10000 [USP'U]/ML
INJECTION, SOLUTION INTRAVENOUS; SUBCUTANEOUS PRN
Status: DISCONTINUED | OUTPATIENT
Start: 2023-09-15 | End: 2023-09-15 | Stop reason: HOSPADM

## 2023-09-15 RX ORDER — MIDAZOLAM HYDROCHLORIDE 1 MG/ML
INJECTION INTRAMUSCULAR; INTRAVENOUS PRN
Status: DISCONTINUED | OUTPATIENT
Start: 2023-09-15 | End: 2023-09-15 | Stop reason: HOSPADM

## 2023-09-15 RX ORDER — INSULIN LISPRO 100 [IU]/ML
5 INJECTION, SOLUTION INTRAVENOUS; SUBCUTANEOUS ONCE
Status: COMPLETED | OUTPATIENT
Start: 2023-09-15 | End: 2023-09-15

## 2023-09-15 RX ORDER — ASPIRIN 81 MG/1
324 TABLET, CHEWABLE ORAL DAILY
Status: DISCONTINUED | OUTPATIENT
Start: 2023-09-15 | End: 2023-09-15 | Stop reason: HOSPADM

## 2023-09-15 RX ORDER — ACETAMINOPHEN 325 MG/1
650 TABLET ORAL EVERY 4 HOURS PRN
Status: CANCELLED | OUTPATIENT
Start: 2023-09-15

## 2023-09-15 RX ADMIN — SODIUM CHLORIDE: 9 INJECTION, SOLUTION INTRAVENOUS at 06:50

## 2023-09-15 RX ADMIN — INSULIN LISPRO 5 UNITS: 100 INJECTION, SOLUTION INTRAVENOUS; SUBCUTANEOUS at 07:29

## 2023-09-15 ASSESSMENT — PAIN - FUNCTIONAL ASSESSMENT
PAIN_FUNCTIONAL_ASSESSMENT: NONE - DENIES PAIN
PAIN_FUNCTIONAL_ASSESSMENT: NONE - DENIES PAIN

## 2023-09-15 NOTE — PROGRESS NOTES
7Fr sheath pulled from Right brachial vein. Manual pressure held until hemostasis achieved. Approx 20 mins. Site dressed with Quickclot, 4x4, tegaderm and Coban. Will continue to monitor.

## 2023-09-15 NOTE — PROGRESS NOTES
R/LHC by Cem Ego  Right radial arterial and Right brachial venous access  Pressure wire to LAD/ No interventions  Right wrist TR band with 12ml  RBV 7FR sheath left in place; covered with a tegaderm  Versed 3mg iV  Fentanyl 50mcg IV  Heparin 9000units total  Access site soft. Clean, dry, and intact; with no signs of bleeding or hematoma  Pt. Tolerated procedure    TRANSFER - OUT REPORT:    Verbal report given to Manisha Mcdonnell on Mariely Alta  being transferred to [facility] for routine progression of patient care       Report consisted of patient's Situation, Background, Assessment and   Recommendations(SBAR). Information from the following report(s) Nurse Handoff Report and MAR was reviewed with the receiving nurse. Lines:   Peripheral IV 09/15/23 Left Antecubital (Active)       Peripheral IV 09/15/23 Right Antecubital (Active)        Opportunity for questions and clarification was provided.       Patient transported with:  Registered Nurse

## 2023-09-15 NOTE — PROGRESS NOTES
TRANSFER - IN REPORT:    Verbal report received from Richie Barton on Odalys Ennis  being received from [facility] for routine progression of patient care      Report consisted of patient's Situation, Background, Assessment and   Recommendations(SBAR). Information from the following report(s) Nurse Handoff Report and MAR was reviewed with the receiving nurse. Opportunity for questions and clarification was provided. Assessment completed upon patient's arrival to unit and care assumed.

## 2023-09-15 NOTE — PROGRESS NOTES
Patient received to [facility] room # 10  Ambulatory from [facility]. Patient scheduled for [facility] today with Dr Dennie Ledger. Procedure reviewed & questions answered, voiced good understanding consent obtained & placed on chart. All medications and medical history reviewed. Will prep patient per orders. Patient & family updated on plan of care. The patient has a fraility score of 3-MANAGING WELL, based on ambulation. 324mg Aspirin taken at 0430.

## 2023-09-15 NOTE — DISCHARGE INSTRUCTIONS
HEART CATHETERIZATION/ANGIOGRAPHY DISCHARGE INSTRUCTIONS    Check puncture site frequently for swelling or bleeding. If there is any bleeding, apply pressure over the area with a clean towel or washcloth and call 911. Notify your doctor for any redness, swelling, drainage, or oozing from the puncture site. Notify your doctor for any fever or chills. If the extremity becomes cold, numb, or painful call Dr. Mirtha Rankin at [phone]. Activity should be limited for the next 48 hours. No heavy lifting, pushing, pulling  or strenuous activity for 48 hours. No heavy lifting (anything over 10 pounds) for 3 days. You may resume your usual diet. Drink more fluids than usual.  Have a responsible person drive you home and stay with you for at least 24 hours after your heart catheterization/angiography. You may remove bandage from your Right arm in 24 hours. You may shower in 24 hours. No tub baths, hot tubs, or swimming for 1 week. Do not place any lotions, creams, powders, or ointments over puncture site for 1 week. You may place a clean band-aid over the puncture site each day for 5 days. Change daily. Sedation for a Medical Procedure: Care Instructions     You were given a sedative medication during your visit. While many of the effects will have worn   off before you leave; you may continue to feel some effects for several hours. Common side effects from sedation include:  Feeling sleepy. (Your doctors and nurses will make sure you are not too sleepy to go home.)  Nausea and vomiting. This usually does not last long. Feeling tired. How can you care for yourself at home? Activity    Don't do anything for 24 hours that requires attention to detail. It takes time for the medicine effects to completely wear off. Do not make important legal decisions for 24 hours. Do not sign any legal documents for 24 hours.      Do not drink alcohol today     For your safety, you should not drive or operate heavy machinery for the remainder of the day     Rest when you feel tired. Getting enough sleep will help you recover. Diet    You can eat your normal diet, unless your doctor gives you other instructions. If your stomach is upset, try clear liquids and bland, low-fat foods like plain toast or rice. Drink plenty of fluids (unless your doctor tells you not to). Don't drink alcohol for 24 hours. Medicines    Be safe with medicines. Read and follow all instructions on the label. If the doctor gave you a prescription medicine for pain, take it as prescribed. If you are not taking a prescription pain medicine, ask your doctor if you can take an over-the-counter medicine. If you think your pain medicine is making you sick to your stomach: Take your medicine after meals (unless your doctor has told you not to). Ask your doctor for a different pain medicine. I have read the above instructions and have had the opportunity to ask questions.       Patient: ________________________   Date: _____________    Witness: _______________________   Date: _____________

## 2023-09-18 DIAGNOSIS — Z95.0 PACEMAKER: ICD-10-CM

## 2023-09-18 DIAGNOSIS — R00.1 SYMPTOMATIC BRADYCARDIA: ICD-10-CM

## 2023-09-20 NOTE — PROGRESS NOTES
Patient pre-assessment complete for PCI with Dr Rangel Santos scheduled for 23, arrival time 6am - HYDRATION. Patient verified using . Patient instructed to bring a list of all home medications on the day of procedure. NPO status reinforced. Patient informed to take a full dose aspirin 325mg  or 81 mg x 4 on the day of procedure. Patient instructed to HOLD coumadin (last dose 23)- insulin & bumex. Instructed they can take all other medications excluding vitamins & supplements. Patient verbalizes understanding of all instructions & denies any questions at this time.

## 2023-09-21 ENCOUNTER — HOSPITAL ENCOUNTER (INPATIENT)
Age: 88
LOS: 1 days | Discharge: HOME | DRG: 246 | End: 2023-09-23
Attending: INTERNAL MEDICINE | Admitting: INTERNAL MEDICINE
Payer: MEDICARE

## 2023-09-21 DIAGNOSIS — R33.9 URINARY RETENTION: ICD-10-CM

## 2023-09-21 DIAGNOSIS — I48.91 ATRIAL FIBRILLATION, UNSPECIFIED TYPE (HCC): ICD-10-CM

## 2023-09-21 DIAGNOSIS — Z09 HOSPITAL DISCHARGE FOLLOW-UP: Primary | ICD-10-CM

## 2023-09-21 DIAGNOSIS — R07.9 CHEST PAIN, UNSPECIFIED TYPE: ICD-10-CM

## 2023-09-21 PROBLEM — I25.10 CAD S/P PERCUTANEOUS CORONARY ANGIOPLASTY: Status: ACTIVE | Noted: 2023-09-21

## 2023-09-21 PROBLEM — Z98.61 CAD S/P PERCUTANEOUS CORONARY ANGIOPLASTY: Status: ACTIVE | Noted: 2023-09-21

## 2023-09-21 LAB
ACT BLD: 492 SECS (ref 70–128)
ANION GAP SERPL CALC-SCNC: 2 MMOL/L (ref 2–11)
ANION GAP SERPL CALC-SCNC: 4 MMOL/L (ref 2–11)
BASOPHILS # BLD: 0.1 K/UL (ref 0–0.2)
BASOPHILS NFR BLD: 1 % (ref 0–2)
BUN SERPL-MCNC: 30 MG/DL (ref 8–23)
BUN SERPL-MCNC: 38 MG/DL (ref 8–23)
CALCIUM SERPL-MCNC: 8.7 MG/DL (ref 8.3–10.4)
CALCIUM SERPL-MCNC: 9.6 MG/DL (ref 8.3–10.4)
CHLORIDE SERPL-SCNC: 109 MMOL/L (ref 101–110)
CHLORIDE SERPL-SCNC: 113 MMOL/L (ref 101–110)
CO2 SERPL-SCNC: 28 MMOL/L (ref 21–32)
CO2 SERPL-SCNC: 30 MMOL/L (ref 21–32)
CREAT SERPL-MCNC: 1.5 MG/DL (ref 0.8–1.5)
CREAT SERPL-MCNC: 2 MG/DL (ref 0.8–1.5)
DIFFERENTIAL METHOD BLD: ABNORMAL
ECHO BSA: 2.25 M2
EKG ATRIAL RATE: 84 BPM
EKG DIAGNOSIS: NORMAL
EKG Q-T INTERVAL: 420 MS
EKG QRS DURATION: 134 MS
EKG QTC CALCULATION (BAZETT): 469 MS
EKG R AXIS: 252 DEGREES
EKG T AXIS: 61 DEGREES
EKG VENTRICULAR RATE: 75 BPM
EOSINOPHIL # BLD: 0.4 K/UL (ref 0–0.8)
EOSINOPHIL NFR BLD: 4 % (ref 0.5–7.8)
ERYTHROCYTE [DISTWIDTH] IN BLOOD BY AUTOMATED COUNT: 14.5 % (ref 11.9–14.6)
ERYTHROCYTE [DISTWIDTH] IN BLOOD BY AUTOMATED COUNT: 14.6 % (ref 11.9–14.6)
GLUCOSE SERPL-MCNC: 147 MG/DL (ref 65–100)
GLUCOSE SERPL-MCNC: 175 MG/DL (ref 65–100)
HCT VFR BLD AUTO: 35.6 % (ref 41.1–50.3)
HCT VFR BLD AUTO: 38 % (ref 41.1–50.3)
HGB BLD-MCNC: 11 G/DL (ref 13.6–17.2)
HGB BLD-MCNC: 12 G/DL (ref 13.6–17.2)
IMM GRANULOCYTES # BLD AUTO: 0.1 K/UL (ref 0–0.5)
IMM GRANULOCYTES NFR BLD AUTO: 1 % (ref 0–5)
INR PPP: 1.3
LYMPHOCYTES # BLD: 1.1 K/UL (ref 0.5–4.6)
LYMPHOCYTES NFR BLD: 11 % (ref 13–44)
MCH RBC QN AUTO: 33.2 PG (ref 26.1–32.9)
MCH RBC QN AUTO: 33.3 PG (ref 26.1–32.9)
MCHC RBC AUTO-ENTMCNC: 30.9 G/DL (ref 31.4–35)
MCHC RBC AUTO-ENTMCNC: 31.6 G/DL (ref 31.4–35)
MCV RBC AUTO: 105.3 FL (ref 82–102)
MCV RBC AUTO: 107.9 FL (ref 82–102)
MONOCYTES # BLD: 0.7 K/UL (ref 0.1–1.3)
MONOCYTES NFR BLD: 7 % (ref 4–12)
NEUTS SEG # BLD: 7.9 K/UL (ref 1.7–8.2)
NEUTS SEG NFR BLD: 76 % (ref 43–78)
NRBC # BLD: 0.02 K/UL (ref 0–0.2)
NRBC # BLD: 0.04 K/UL (ref 0–0.2)
PLATELET # BLD AUTO: 168 K/UL (ref 150–450)
PLATELET # BLD AUTO: 195 K/UL (ref 150–450)
PMV BLD AUTO: 11.2 FL (ref 9.4–12.3)
PMV BLD AUTO: 11.6 FL (ref 9.4–12.3)
POTASSIUM SERPL-SCNC: 4.2 MMOL/L (ref 3.5–5.1)
POTASSIUM SERPL-SCNC: 4.6 MMOL/L (ref 3.5–5.1)
PROTHROMBIN TIME: 16.9 SEC (ref 12.6–14.3)
RBC # BLD AUTO: 3.3 M/UL (ref 4.23–5.6)
RBC # BLD AUTO: 3.61 M/UL (ref 4.23–5.6)
SODIUM SERPL-SCNC: 143 MMOL/L (ref 133–143)
SODIUM SERPL-SCNC: 143 MMOL/L (ref 133–143)
WBC # BLD AUTO: 10.3 K/UL (ref 4.3–11.1)
WBC # BLD AUTO: 10.4 K/UL (ref 4.3–11.1)

## 2023-09-21 PROCEDURE — 92979 ENDOLUMINL IVUS OCT C EA: CPT | Performed by: INTERNAL MEDICINE

## 2023-09-21 PROCEDURE — C1724 CATH, TRANS ATHEREC,ROTATION: HCPCS | Performed by: INTERNAL MEDICINE

## 2023-09-21 PROCEDURE — 2580000003 HC RX 258: Performed by: INTERNAL MEDICINE

## 2023-09-21 PROCEDURE — 85025 COMPLETE CBC W/AUTO DIFF WBC: CPT

## 2023-09-21 PROCEDURE — 92978 ENDOLUMINL IVUS OCT C 1ST: CPT | Performed by: INTERNAL MEDICINE

## 2023-09-21 PROCEDURE — 99152 MOD SED SAME PHYS/QHP 5/>YRS: CPT | Performed by: INTERNAL MEDICINE

## 2023-09-21 PROCEDURE — 93458 L HRT ARTERY/VENTRICLE ANGIO: CPT | Performed by: INTERNAL MEDICINE

## 2023-09-21 PROCEDURE — 027136Z DILATION OF CORONARY ARTERY, TWO ARTERIES WITH THREE DRUG-ELUTING INTRALUMINAL DEVICES, PERCUTANEOUS APPROACH: ICD-10-PCS | Performed by: INTERNAL MEDICINE

## 2023-09-21 PROCEDURE — C9602 PERC D-E COR STENT ATHER S: HCPCS | Performed by: INTERNAL MEDICINE

## 2023-09-21 PROCEDURE — 6360000004 HC RX CONTRAST MEDICATION: Performed by: INTERNAL MEDICINE

## 2023-09-21 PROCEDURE — C1894 INTRO/SHEATH, NON-LASER: HCPCS | Performed by: INTERNAL MEDICINE

## 2023-09-21 PROCEDURE — 6370000000 HC RX 637 (ALT 250 FOR IP): Performed by: INTERNAL MEDICINE

## 2023-09-21 PROCEDURE — 6360000002 HC RX W HCPCS: Performed by: INTERNAL MEDICINE

## 2023-09-21 PROCEDURE — G0378 HOSPITAL OBSERVATION PER HR: HCPCS

## 2023-09-21 PROCEDURE — 99153 MOD SED SAME PHYS/QHP EA: CPT | Performed by: INTERNAL MEDICINE

## 2023-09-21 PROCEDURE — 93005 ELECTROCARDIOGRAM TRACING: CPT | Performed by: INTERNAL MEDICINE

## 2023-09-21 PROCEDURE — C1887 CATHETER, GUIDING: HCPCS | Performed by: INTERNAL MEDICINE

## 2023-09-21 PROCEDURE — 02C03ZZ EXTIRPATION OF MATTER FROM CORONARY ARTERY, ONE ARTERY, PERCUTANEOUS APPROACH: ICD-10-PCS | Performed by: INTERNAL MEDICINE

## 2023-09-21 PROCEDURE — 93010 ELECTROCARDIOGRAM REPORT: CPT | Performed by: INTERNAL MEDICINE

## 2023-09-21 PROCEDURE — 92928 PRQ TCAT PLMT NTRAC ST 1 LES: CPT | Performed by: INTERNAL MEDICINE

## 2023-09-21 PROCEDURE — 92933 PRQ TRLML C ATHRC ST ANGIOP1: CPT | Performed by: INTERNAL MEDICINE

## 2023-09-21 PROCEDURE — 99221 1ST HOSP IP/OBS SF/LOW 40: CPT | Performed by: PHYSICIAN ASSISTANT

## 2023-09-21 PROCEDURE — 85347 COAGULATION TIME ACTIVATED: CPT

## 2023-09-21 PROCEDURE — C9600 PERC DRUG-EL COR STENT SING: HCPCS | Performed by: INTERNAL MEDICINE

## 2023-09-21 PROCEDURE — 2500000003 HC RX 250 WO HCPCS: Performed by: INTERNAL MEDICINE

## 2023-09-21 PROCEDURE — C1713 ANCHOR/SCREW BN/BN,TIS/BN: HCPCS | Performed by: INTERNAL MEDICINE

## 2023-09-21 PROCEDURE — C1753 CATH, INTRAVAS ULTRASOUND: HCPCS | Performed by: INTERNAL MEDICINE

## 2023-09-21 PROCEDURE — C1874 STENT, COATED/COV W/DEL SYS: HCPCS | Performed by: INTERNAL MEDICINE

## 2023-09-21 PROCEDURE — C1725 CATH, TRANSLUMIN NON-LASER: HCPCS | Performed by: INTERNAL MEDICINE

## 2023-09-21 PROCEDURE — C1761 HC CATH TRANSLUM INTRAVASCULAR LITHOTRIPSY CORONARY: HCPCS | Performed by: INTERNAL MEDICINE

## 2023-09-21 PROCEDURE — B2111ZZ FLUOROSCOPY OF MULTIPLE CORONARY ARTERIES USING LOW OSMOLAR CONTRAST: ICD-10-PCS | Performed by: INTERNAL MEDICINE

## 2023-09-21 PROCEDURE — 02F03ZZ FRAGMENTATION IN CORONARY ARTERY, ONE ARTERY, PERCUTANEOUS APPROACH: ICD-10-PCS | Performed by: INTERNAL MEDICINE

## 2023-09-21 PROCEDURE — 36415 COLL VENOUS BLD VENIPUNCTURE: CPT

## 2023-09-21 PROCEDURE — B241ZZ3 ULTRASONOGRAPHY OF MULTIPLE CORONARY ARTERIES, INTRAVASCULAR: ICD-10-PCS | Performed by: INTERNAL MEDICINE

## 2023-09-21 PROCEDURE — 85610 PROTHROMBIN TIME: CPT

## 2023-09-21 PROCEDURE — 80048 BASIC METABOLIC PNL TOTAL CA: CPT

## 2023-09-21 PROCEDURE — 2709999900 HC NON-CHARGEABLE SUPPLY: Performed by: INTERNAL MEDICINE

## 2023-09-21 PROCEDURE — 85027 COMPLETE CBC AUTOMATED: CPT

## 2023-09-21 PROCEDURE — 92924 PRQ TRLUML C ATHRC 1 ART&/BR: CPT | Performed by: INTERNAL MEDICINE

## 2023-09-21 PROCEDURE — 4A023N7 MEASUREMENT OF CARDIAC SAMPLING AND PRESSURE, LEFT HEART, PERCUTANEOUS APPROACH: ICD-10-PCS | Performed by: INTERNAL MEDICINE

## 2023-09-21 PROCEDURE — C1769 GUIDE WIRE: HCPCS | Performed by: INTERNAL MEDICINE

## 2023-09-21 DEVICE — STENT ONYXNG27538UX ONYX 2.75X38RX
Type: IMPLANTABLE DEVICE | Status: FUNCTIONAL
Brand: ONYX FRONTIER™

## 2023-09-21 DEVICE — STENT ONYXNG30018UX ONYX 3.00X18RX
Type: IMPLANTABLE DEVICE | Status: FUNCTIONAL
Brand: ONYX FRONTIER™

## 2023-09-21 DEVICE — STENT ONYXNG35015UX ONYX 3.50X15RX
Type: IMPLANTABLE DEVICE | Status: FUNCTIONAL
Brand: ONYX FRONTIER™

## 2023-09-21 RX ORDER — MAGNESIUM HYDROXIDE/ALUMINUM HYDROXICE/SIMETHICONE 120; 1200; 1200 MG/30ML; MG/30ML; MG/30ML
SUSPENSION ORAL PRN
Status: DISCONTINUED | OUTPATIENT
Start: 2023-09-21 | End: 2023-09-21 | Stop reason: HOSPADM

## 2023-09-21 RX ORDER — ATROPINE SULFATE 0.4 MG/ML
0.5 INJECTION, SOLUTION INTRAVENOUS
Status: DISPENSED | OUTPATIENT
Start: 2023-09-21 | End: 2023-09-22

## 2023-09-21 RX ORDER — ACETAMINOPHEN 325 MG/1
650 TABLET ORAL EVERY 4 HOURS PRN
Status: DISCONTINUED | OUTPATIENT
Start: 2023-09-21 | End: 2023-09-23 | Stop reason: HOSPADM

## 2023-09-21 RX ORDER — MIDAZOLAM HYDROCHLORIDE 1 MG/ML
INJECTION INTRAMUSCULAR; INTRAVENOUS PRN
Status: DISCONTINUED | OUTPATIENT
Start: 2023-09-21 | End: 2023-09-21 | Stop reason: HOSPADM

## 2023-09-21 RX ORDER — CLOPIDOGREL BISULFATE 75 MG/1
TABLET ORAL PRN
Status: DISCONTINUED | OUTPATIENT
Start: 2023-09-21 | End: 2023-09-21 | Stop reason: HOSPADM

## 2023-09-21 RX ORDER — MORPHINE SULFATE 2 MG/ML
2 INJECTION, SOLUTION INTRAMUSCULAR; INTRAVENOUS
Status: ACTIVE | OUTPATIENT
Start: 2023-09-21 | End: 2023-09-22

## 2023-09-21 RX ORDER — MORPHINE SULFATE 2 MG/ML
2 INJECTION, SOLUTION INTRAMUSCULAR; INTRAVENOUS ONCE
Status: COMPLETED | OUTPATIENT
Start: 2023-09-21 | End: 2023-09-21

## 2023-09-21 RX ORDER — LIDOCAINE HYDROCHLORIDE 10 MG/ML
INJECTION, SOLUTION INFILTRATION; PERINEURAL PRN
Status: DISCONTINUED | OUTPATIENT
Start: 2023-09-21 | End: 2023-09-21 | Stop reason: HOSPADM

## 2023-09-21 RX ORDER — SODIUM CHLORIDE 0.9 % (FLUSH) 0.9 %
5-40 SYRINGE (ML) INJECTION PRN
Status: DISCONTINUED | OUTPATIENT
Start: 2023-09-21 | End: 2023-09-23 | Stop reason: HOSPADM

## 2023-09-21 RX ORDER — SODIUM CHLORIDE 9 MG/ML
INJECTION, SOLUTION INTRAVENOUS PRN
Status: DISCONTINUED | OUTPATIENT
Start: 2023-09-21 | End: 2023-09-23 | Stop reason: HOSPADM

## 2023-09-21 RX ORDER — BIVALIRUDIN 250 MG/5ML
INJECTION, POWDER, LYOPHILIZED, FOR SOLUTION INTRAVENOUS PRN
Status: DISCONTINUED | OUTPATIENT
Start: 2023-09-21 | End: 2023-09-21 | Stop reason: HOSPADM

## 2023-09-21 RX ORDER — SODIUM CHLORIDE 9 MG/ML
INJECTION, SOLUTION INTRAVENOUS CONTINUOUS
Status: DISCONTINUED | OUTPATIENT
Start: 2023-09-21 | End: 2023-09-22

## 2023-09-21 RX ORDER — HYDRALAZINE HYDROCHLORIDE 20 MG/ML
10 INJECTION INTRAMUSCULAR; INTRAVENOUS EVERY 10 MIN PRN
Status: DISCONTINUED | OUTPATIENT
Start: 2023-09-21 | End: 2023-09-23 | Stop reason: HOSPADM

## 2023-09-21 RX ORDER — SODIUM CHLORIDE 0.9 % (FLUSH) 0.9 %
5-40 SYRINGE (ML) INJECTION EVERY 12 HOURS SCHEDULED
Status: DISCONTINUED | OUTPATIENT
Start: 2023-09-21 | End: 2023-09-23 | Stop reason: HOSPADM

## 2023-09-21 RX ORDER — TRAMADOL HYDROCHLORIDE 50 MG/1
100 TABLET ORAL EVERY 6 HOURS PRN
Status: DISCONTINUED | OUTPATIENT
Start: 2023-09-21 | End: 2023-09-23 | Stop reason: HOSPADM

## 2023-09-21 RX ORDER — HEPARIN SODIUM 200 [USP'U]/100ML
INJECTION, SOLUTION INTRAVENOUS CONTINUOUS PRN
Status: DISCONTINUED | OUTPATIENT
Start: 2023-09-21 | End: 2023-09-21 | Stop reason: HOSPADM

## 2023-09-21 RX ORDER — CLOPIDOGREL BISULFATE 75 MG/1
75 TABLET ORAL DAILY
Status: DISCONTINUED | OUTPATIENT
Start: 2023-09-22 | End: 2023-09-23 | Stop reason: HOSPADM

## 2023-09-21 RX ORDER — 0.9 % SODIUM CHLORIDE 0.9 %
500 INTRAVENOUS SOLUTION INTRAVENOUS PRN
Status: DISCONTINUED | OUTPATIENT
Start: 2023-09-21 | End: 2023-09-23 | Stop reason: HOSPADM

## 2023-09-21 RX ORDER — TRAMADOL HYDROCHLORIDE 50 MG/1
50 TABLET ORAL EVERY 6 HOURS PRN
Status: DISCONTINUED | OUTPATIENT
Start: 2023-09-21 | End: 2023-09-23 | Stop reason: HOSPADM

## 2023-09-21 RX ORDER — ASPIRIN 81 MG/1
324 TABLET, CHEWABLE ORAL DAILY
Status: DISCONTINUED | OUTPATIENT
Start: 2023-09-21 | End: 2023-09-21 | Stop reason: HOSPADM

## 2023-09-21 RX ADMIN — SODIUM CHLORIDE: 9 INJECTION, SOLUTION INTRAVENOUS at 06:50

## 2023-09-21 RX ADMIN — MORPHINE SULFATE 2 MG: 2 INJECTION, SOLUTION INTRAMUSCULAR; INTRAVENOUS at 14:19

## 2023-09-21 RX ADMIN — BIVALIRUDIN: 250 INJECTION, POWDER, LYOPHILIZED, FOR SOLUTION INTRAVENOUS at 09:15

## 2023-09-21 RX ADMIN — SODIUM CHLORIDE, PRESERVATIVE FREE 10 ML: 5 INJECTION INTRAVENOUS at 20:52

## 2023-09-21 RX ADMIN — MORPHINE SULFATE 2 MG: 2 INJECTION, SOLUTION INTRAMUSCULAR; INTRAVENOUS at 13:30

## 2023-09-21 ASSESSMENT — PAIN SCALES - GENERAL
PAINLEVEL_OUTOF10: 4
PAINLEVEL_OUTOF10: 8

## 2023-09-21 ASSESSMENT — PAIN DESCRIPTION - LOCATION
LOCATION: GROIN
LOCATION: GROIN

## 2023-09-21 ASSESSMENT — PAIN DESCRIPTION - ORIENTATION: ORIENTATION: RIGHT

## 2023-09-21 NOTE — PROGRESS NOTES
Patients urine looks like blood. HEATHER Norris notified and came in room to look at urine. New orders placed for a H&H, CBC, and BMP.

## 2023-09-21 NOTE — PROGRESS NOTES
Pt has multiple skin tears on both arms from prior IV dressings, requests no tape. Has large water blister on left anterior ankle, pt very concerned of it being injured. Covered with gauze and coban.

## 2023-09-21 NOTE — PLAN OF CARE
Problem: Chronic Conditions and Co-morbidities  Goal: Patient's chronic conditions and co-morbidity symptoms are monitored and maintained or improved  Outcome: Progressing  Flowsheets (Taken 9/21/2023 6550)  Care Plan - Patient's Chronic Conditions and Co-Morbidity Symptoms are Monitored and Maintained or Improved:   Monitor and assess patient's chronic conditions and comorbid symptoms for stability, deterioration, or improvement   Update acute care plan with appropriate goals if chronic or comorbid symptoms are exacerbated and prevent overall improvement and discharge   Collaborate with multidisciplinary team to address chronic and comorbid conditions and prevent exacerbation or deterioration     Problem: Discharge Planning  Goal: Discharge to home or other facility with appropriate resources  Outcome: Progressing  Flowsheets (Taken 9/21/2023 2673)  Discharge to home or other facility with appropriate resources:   Identify barriers to discharge with patient and caregiver   Identify discharge learning needs (meds, wound care, etc)   Arrange for needed discharge resources and transportation as appropriate   Refer to discharge planning if patient needs post-hospital services based on physician order or complex needs related to functional status, cognitive ability or social support system     Problem: Safety - Adult  Goal: Free from fall injury  Outcome: Progressing     Problem: ABCDS Injury Assessment  Goal: Absence of physical injury  Outcome: Progressing

## 2023-09-21 NOTE — PROGRESS NOTES
6FR arterial sheath removed from right groin using sterile technique. Manual pressure held over site for 1 hour 10 min minutes. Hemostasis achieved. Right groin without bleeding without hematoma. Sterile gauze placed over site and secured with tegaderm. 5lb sandbag placed over site. Patient instructed to keep right leg straight and still. Patient verbalizes understanding.

## 2023-09-21 NOTE — PROGRESS NOTES
Patient received to [facility] room # 10  Ambulatory from [facility]. Patient scheduled for PCI today with Dr Shantal Kendall. Procedure reviewed & questions answered, voiced good understanding consent obtained & placed on chart. All medications and medical history reviewed. Will prep patient per orders. Patient & family updated on plan of care. The patient has a fraility score of 4-VULNERABLE, based on elderly state, comorbidities.

## 2023-09-21 NOTE — PROGRESS NOTES
TRANSFER - OUT REPORT:    PCI w/ Dr. Aditi Medina  Will be admitted to tele     Atherectomy to RCA  IVUS to RCA  Stent to mRCA x1    IVUS to LCx  Stent to pLCx x1    IVUS to LAD  Stent to mLAD x1    RFA access  6fr sheath left in place, sutured in place, connected to pressured ART line, covered w/ gauze/tegaderm  Small hematoma noted to RFA access site    Versed 5mg IV  Fentanyl 75mcg IV  Dilaudid 1mg IV  Angiomax stopped @ 1057   @ 0846  Plavix 600mg PO  Maalox 30mL PO    Verbal report given to Shira Seo RN on Carlos Kirkpatrick  being transferred to [facility] for routine progression of patient care       Report consisted of patient's Situation, Background, Assessment and   Recommendations(SBAR). Information from the following report(s) Nurse Handoff Report and MAR was reviewed with the receiving nurse. Lines:   Peripheral IV 09/21/23 Anterior;Right Wrist (Active)       Peripheral IV 09/21/23 Left Antecubital (Active)        Opportunity for questions and clarification was provided.       Patient transported with:  Registered Nurse

## 2023-09-21 NOTE — CONSULTS
CONSULT                      Date: 9/21/2023        Patient Name: Lucy Campbell     YOB: 1935      Age:  82 y.o. History of Present Illness     Urology consult was requested for this 82 y.o.   male  with hx of BPH who underwent PCI today. Pt was unable to void in recovery and was straight cathed with return of 1000 cc bloody urine. No resistance in passing catheter per pt's nurse. Pt able to void large volume of bloody urine on his own upon return to the floor. Pt still awakening from anesthesia at time of exam so unable to obtain history. Chart reviewed and pt was seen by Dr. Fran Spurling in 2020 for BPH with obstructive symptoms which have been managed with doxazosin since that time. Past Medical History     Past Medical History:   Diagnosis Date    Allergic rhinitis     Atrial fibrillation ([address])     Chronic kidney disease     \"a little\"    Chronic kidney disease, stage III (moderate) (HCC)     Chronic pain     Colon polyp     Coronary artery disease     Diabetic polyneuropathy associated with type 2 diabetes mellitus ([address]) 7/22/2019    Erectile dysfunction     Fracture of left fibula     GERD (gastroesophageal reflux disease)     Glaucoma     Hypertension     Imbalance 6/11/2019    Multinodular goiter     Nephrolithiasis     Obesity (BMI 30-39. 9)          Peptic ulcer disease     Plantar fasciitis     Psychiatric disorder     Right inguinal hernia     Sciatica     Stroke ([address])     Tobacco use disorder     Type 2 diabetes mellitus [facility])         Past Surgical History     Past Surgical History:   Procedure Laterality Date    ABLATION OF DYSRHYTHMIC FOCUS      BACK SURGERY  2012    fusion    CARDIAC PROCEDURE N/A 10/14/2022    RIGHT HEART CATH performed by Danny Dick MD at [address] N/A 9/15/2023    Left and right heart cath / coronary angiography performed by Danny Dick MD at [address] N/A 9/15/2023

## 2023-09-22 PROBLEM — E78.2 MIXED HYPERLIPIDEMIA: Status: ACTIVE | Noted: 2023-09-22

## 2023-09-22 PROBLEM — R31.0 GROSS HEMATURIA: Status: ACTIVE | Noted: 2023-09-22

## 2023-09-22 LAB
ANION GAP SERPL CALC-SCNC: 2 MMOL/L (ref 2–11)
BUN SERPL-MCNC: 35 MG/DL (ref 8–23)
CALCIUM SERPL-MCNC: 8.8 MG/DL (ref 8.3–10.4)
CHLORIDE SERPL-SCNC: 114 MMOL/L (ref 101–110)
CO2 SERPL-SCNC: 29 MMOL/L (ref 21–32)
CREAT SERPL-MCNC: 1.7 MG/DL (ref 0.8–1.5)
EKG ATRIAL RATE: 208 BPM
EKG DIAGNOSIS: NORMAL
EKG Q-T INTERVAL: 422 MS
EKG QRS DURATION: 130 MS
EKG QTC CALCULATION (BAZETT): 471 MS
EKG R AXIS: 250 DEGREES
EKG T AXIS: 69 DEGREES
EKG VENTRICULAR RATE: 75 BPM
ERYTHROCYTE [DISTWIDTH] IN BLOOD BY AUTOMATED COUNT: 14.5 % (ref 11.9–14.6)
GLUCOSE SERPL-MCNC: 293 MG/DL (ref 65–100)
HCT VFR BLD AUTO: 31.1 % (ref 41.1–50.3)
HGB BLD-MCNC: 9.9 G/DL (ref 13.6–17.2)
MCH RBC QN AUTO: 33.7 PG (ref 26.1–32.9)
MCHC RBC AUTO-ENTMCNC: 31.8 G/DL (ref 31.4–35)
MCV RBC AUTO: 105.8 FL (ref 82–102)
NRBC # BLD: 0.03 K/UL (ref 0–0.2)
PLATELET # BLD AUTO: 155 K/UL (ref 150–450)
PMV BLD AUTO: 11.7 FL (ref 9.4–12.3)
POTASSIUM SERPL-SCNC: 4.7 MMOL/L (ref 3.5–5.1)
RBC # BLD AUTO: 2.94 M/UL (ref 4.23–5.6)
SODIUM SERPL-SCNC: 145 MMOL/L (ref 133–143)
WBC # BLD AUTO: 10.8 K/UL (ref 4.3–11.1)

## 2023-09-22 PROCEDURE — 6370000000 HC RX 637 (ALT 250 FOR IP): Performed by: NURSE PRACTITIONER

## 2023-09-22 PROCEDURE — 36415 COLL VENOUS BLD VENIPUNCTURE: CPT

## 2023-09-22 PROCEDURE — 51702 INSERT TEMP BLADDER CATH: CPT

## 2023-09-22 PROCEDURE — 6370000000 HC RX 637 (ALT 250 FOR IP): Performed by: INTERNAL MEDICINE

## 2023-09-22 PROCEDURE — G0378 HOSPITAL OBSERVATION PER HR: HCPCS

## 2023-09-22 PROCEDURE — 2580000003 HC RX 258: Performed by: INTERNAL MEDICINE

## 2023-09-22 PROCEDURE — 80048 BASIC METABOLIC PNL TOTAL CA: CPT

## 2023-09-22 PROCEDURE — 99232 SBSQ HOSP IP/OBS MODERATE 35: CPT | Performed by: INTERNAL MEDICINE

## 2023-09-22 PROCEDURE — 93010 ELECTROCARDIOGRAM REPORT: CPT | Performed by: INTERNAL MEDICINE

## 2023-09-22 PROCEDURE — 85027 COMPLETE CBC AUTOMATED: CPT

## 2023-09-22 PROCEDURE — 6370000000 HC RX 637 (ALT 250 FOR IP): Performed by: PHYSICIAN ASSISTANT

## 2023-09-22 PROCEDURE — 51700 IRRIGATION OF BLADDER: CPT

## 2023-09-22 PROCEDURE — 99231 SBSQ HOSP IP/OBS SF/LOW 25: CPT | Performed by: NURSE PRACTITIONER

## 2023-09-22 RX ORDER — PRAVASTATIN SODIUM 80 MG/1
80 TABLET ORAL NIGHTLY
Status: DISCONTINUED | OUTPATIENT
Start: 2023-09-22 | End: 2023-09-23 | Stop reason: HOSPADM

## 2023-09-22 RX ORDER — INSULIN GLARGINE 100 [IU]/ML
25 INJECTION, SOLUTION SUBCUTANEOUS DAILY
Status: DISCONTINUED | OUTPATIENT
Start: 2023-09-23 | End: 2023-09-23 | Stop reason: HOSPADM

## 2023-09-22 RX ORDER — SERTRALINE HYDROCHLORIDE 100 MG/1
100 TABLET, FILM COATED ORAL DAILY
Status: DISCONTINUED | OUTPATIENT
Start: 2023-09-22 | End: 2023-09-23 | Stop reason: HOSPADM

## 2023-09-22 RX ORDER — OXYBUTYNIN CHLORIDE 5 MG/1
5 TABLET ORAL 3 TIMES DAILY PRN
Status: DISCONTINUED | OUTPATIENT
Start: 2023-09-22 | End: 2023-09-23 | Stop reason: HOSPADM

## 2023-09-22 RX ORDER — ALOGLIPTIN 12.5 MG/1
12.5 TABLET, FILM COATED ORAL DAILY
Status: DISCONTINUED | OUTPATIENT
Start: 2023-09-23 | End: 2023-09-23 | Stop reason: HOSPADM

## 2023-09-22 RX ORDER — ASPIRIN 81 MG/1
81 TABLET, CHEWABLE ORAL DAILY
Status: DISCONTINUED | OUTPATIENT
Start: 2023-09-22 | End: 2023-09-23 | Stop reason: HOSPADM

## 2023-09-22 RX ORDER — MAGNESIUM OXIDE 400 MG/1
400 TABLET ORAL DAILY
Status: DISCONTINUED | OUTPATIENT
Start: 2023-09-23 | End: 2023-09-22 | Stop reason: SDUPTHER

## 2023-09-22 RX ORDER — LIDOCAINE HYDROCHLORIDE 20 MG/ML
JELLY TOPICAL PRN
Status: DISCONTINUED | OUTPATIENT
Start: 2023-09-22 | End: 2023-09-23 | Stop reason: HOSPADM

## 2023-09-22 RX ORDER — DOXAZOSIN MESYLATE 4 MG/1
4 TABLET ORAL NIGHTLY
Status: DISCONTINUED | OUTPATIENT
Start: 2023-09-22 | End: 2023-09-23 | Stop reason: HOSPADM

## 2023-09-22 RX ORDER — LANOLIN ALCOHOL/MO/W.PET/CERES
400 CREAM (GRAM) TOPICAL DAILY
Status: DISCONTINUED | OUTPATIENT
Start: 2023-09-23 | End: 2023-09-23 | Stop reason: HOSPADM

## 2023-09-22 RX ORDER — LATANOPROST 50 UG/ML
1 SOLUTION/ DROPS OPHTHALMIC NIGHTLY
Status: DISCONTINUED | OUTPATIENT
Start: 2023-09-22 | End: 2023-09-23 | Stop reason: HOSPADM

## 2023-09-22 RX ORDER — LOSARTAN POTASSIUM 50 MG/1
50 TABLET ORAL DAILY
Status: DISCONTINUED | OUTPATIENT
Start: 2023-09-22 | End: 2023-09-23 | Stop reason: HOSPADM

## 2023-09-22 RX ORDER — PANTOPRAZOLE SODIUM 40 MG/1
40 TABLET, DELAYED RELEASE ORAL
Status: DISCONTINUED | OUTPATIENT
Start: 2023-09-23 | End: 2023-09-23 | Stop reason: HOSPADM

## 2023-09-22 RX ADMIN — TRAMADOL HYDROCHLORIDE 100 MG: 50 TABLET ORAL at 02:18

## 2023-09-22 RX ADMIN — OXYBUTYNIN CHLORIDE 5 MG: 5 TABLET ORAL at 23:32

## 2023-09-22 RX ADMIN — SODIUM CHLORIDE, PRESERVATIVE FREE 5 ML: 5 INJECTION INTRAVENOUS at 20:00

## 2023-09-22 RX ADMIN — HYOSCYAMINE SULFATE 125 MCG: 0.12 TABLET ORAL; SUBLINGUAL at 16:26

## 2023-09-22 RX ADMIN — OXYBUTYNIN CHLORIDE 5 MG: 5 TABLET ORAL at 05:11

## 2023-09-22 RX ADMIN — TRAMADOL HYDROCHLORIDE 100 MG: 50 TABLET ORAL at 08:28

## 2023-09-22 RX ADMIN — WARFARIN SODIUM 7.5 MG: 5 TABLET ORAL at 17:21

## 2023-09-22 RX ADMIN — CLOPIDOGREL BISULFATE 75 MG: 75 TABLET ORAL at 08:22

## 2023-09-22 RX ADMIN — LOSARTAN POTASSIUM 50 MG: 50 TABLET, FILM COATED ORAL at 23:33

## 2023-09-22 RX ADMIN — PRAVASTATIN SODIUM 80 MG: 80 TABLET ORAL at 23:32

## 2023-09-22 RX ADMIN — SERTRALINE HYDROCHLORIDE 100 MG: 100 TABLET ORAL at 23:33

## 2023-09-22 RX ADMIN — DOXAZOSIN 4 MG: 4 TABLET ORAL at 23:32

## 2023-09-22 RX ADMIN — SODIUM CHLORIDE, PRESERVATIVE FREE 10 ML: 5 INJECTION INTRAVENOUS at 08:29

## 2023-09-22 RX ADMIN — ASPIRIN 81 MG: 81 TABLET, CHEWABLE ORAL at 08:22

## 2023-09-22 RX ADMIN — LIDOCAINE HYDROCHLORIDE: 20 JELLY TOPICAL at 01:29

## 2023-09-22 RX ADMIN — OXYBUTYNIN CHLORIDE 5 MG: 5 TABLET ORAL at 14:11

## 2023-09-22 ASSESSMENT — PAIN SCALES - GENERAL
PAINLEVEL_OUTOF10: 0
PAINLEVEL_OUTOF10: 8
PAINLEVEL_OUTOF10: 0
PAINLEVEL_OUTOF10: 0
PAINLEVEL_OUTOF10: 6
PAINLEVEL_OUTOF10: 0

## 2023-09-22 ASSESSMENT — PAIN - FUNCTIONAL ASSESSMENT: PAIN_FUNCTIONAL_ASSESSMENT: ACTIVITIES ARE NOT PREVENTED

## 2023-09-22 ASSESSMENT — PAIN DESCRIPTION - LOCATION
LOCATION: ABDOMEN;PENIS
LOCATION: ABDOMEN;PENIS

## 2023-09-22 ASSESSMENT — PAIN DESCRIPTION - PAIN TYPE: TYPE: ACUTE PAIN

## 2023-09-22 ASSESSMENT — PAIN DESCRIPTION - DESCRIPTORS: DESCRIPTORS: TENDER;SORE

## 2023-09-22 ASSESSMENT — PAIN DESCRIPTION - ORIENTATION
ORIENTATION: MID;LOWER
ORIENTATION: MID;LOWER

## 2023-09-22 NOTE — PROGRESS NOTES
Patient now with complaints of urinary retention. Aided patient to stand by bedside commode and attempt to urinate. Patient unable to urinate. Bladder scan showed 273 mL urine. Notified Rivas ROMERO. Orders received to place 22 Fr 3 way barrett, irrigate to ensure no clot, and start CBI.      0115: Orders received to give Uro-jet prior to barrett insertion.

## 2023-09-22 NOTE — PROGRESS NOTES
0. 03 0.0 - 0.2 K/uL   Basic Metabolic Panel    Collection Time: 09/22/23  3:04 AM   Result Value Ref Range    Sodium 145 (H) 133 - 143 mmol/L    Potassium 4.7 3.5 - 5.1 mmol/L    Chloride 114 (H) 101 - 110 mmol/L    CO2 29 21 - 32 mmol/L    Anion Gap 2 2 - 11 mmol/L    Glucose 293 (H) 65 - 100 mg/dL    BUN 35 (H) 8 - 23 MG/DL    Creatinine 1.70 (H) 0.8 - 1.5 MG/DL    Est, Glom Filt Rate 39 (L) >60 ml/min/1.73m2    Calcium 8.8 8.3 - 10.4 MG/DL       Assessment:     Principal Problem:    CAD S/P percutaneous coronary angioplasty  Resolved Problems:    * No resolved hospital problems. *    Wilson with CBI on mod drip. Clear UOP. Cr. 1.7. HGB 9.9. VSS. Pre-Op Diagnosis: Chest pain, unspecified type [R07.9]    Post-Op Diagnosis:  cad    Procedures: Procedure(s):  Percutaneous coronary intervention  Left heart cath / coronary angiography  Intravascular ultrasound  Atherectomy coronary  Intravascular lithotripsy coronary      Plan:   Wean CBI today. Will continue to follow. Will need F/U at D/C. Signed By: ANGÉLICA Soto     September 22, 2023      [facility]. I have reviewed the above note. I agree with the HPI, exam, assessment and plan as outlined by the nurse practitioner. Des Bazzi M.D.     [facility]  [facility], [address] [place] [address]  Phone: [phone]  Fax: [phone]

## 2023-09-22 NOTE — DISCHARGE SUMMARY
[facility] Cardiology Discharge Summary     Patient ID:  Donaldo Harris  [id number]  25 y.o.  1935    Admit date: 9/21/2023    Discharge date:  9/22/2023    Admitting Physician: Criselda Wyman MD     Discharge Physician: Dr. Eri Ramirez    Admission Diagnoses: Chest pain, unspecified type [R07.9]  CAD S/P percutaneous coronary angioplasty [I25.10, Z98.61]    Discharge Diagnoses:   Patient Active Problem List    Diagnosis    CAD S/P percutaneous coronary angioplasty    Secondary hypercoagulable state [facility])    Stage 3 chronic kidney disease [facility])       Cardiology Procedures this admission:  Left heart catheterization with PCI  Consults: none    Hospital Course: Patient was seen at the office of [facility] Cardiology by Dr. Kolleen Kanner for management of multi-vessel CAD and was subsequently scheduled for an AM Admission [facility] on 9/21/23. Patient underwent cardiac catheterization by Dr. Kolleen Kanner. [facility] results include:  -- Percutaneous intervention to proximal LAD 3.5 x 15 drug-eluting stent IVUS guided  -- Percutaneous intervention to proximal circumflex 3.0 x 18 drug-eluting stent IVUS guided  -- Continues intervention to proximal and mid RCA 3.0 x 38 Giuseppe drug-eluting stent IVUS guided required rotational atherectomy. Patient tolerated the procedure well and was taken to the telemetry floor for recovery. The patient had reported urinary retention and hematuria with urology consult placed. The patient received a 3 way cathter with flushing. The barrett was pulled and the patient passed a trial voiding period and will see urology as an outpatient. The morning of discharge, patient was up feeling well without any complaints of chest pain or shortness of breath. Patient's right groin cath site was clean, dry and intact without hematoma or bruit. Patient's labs were WNL. Patient was seen and examined by Dr. Eri Ramirez and determined stable and ready for discharge.  Patient was instructed on 90 capsule, Refills: 1      insulin aspart (NOVOLOG FLEXPEN) 100 UNIT/ML injection pen Inject 10 Units into the skin 3 times daily (before meals)  Qty: 5 Adjustable Dose Pre-filled Pen Syringe, Refills: 5      losartan (COZAAR) 50 MG tablet Take 1 tablet by mouth daily  Qty: 90 tablet, Refills: 1      baclofen (LIORESAL) 10 MG tablet Take 1 tablet by mouth nightly as needed (Leg cramps)  Qty: 30 tablet, Refills: 5      Insulin Degludec (TRESIBA FLEXTOUCH) 200 UNIT/ML SOPN Inject 32 Units into the skin daily  Qty: 3 mL, Refills: 5      fluticasone (FLONASE) 50 MCG/ACT nasal spray USE 2 SPRAYS INTO EACH NOSTRIL EVERY DAY  Qty: 48 g, Refills: 1      !! bumetanide (BUMEX) 2 MG tablet Take 1 tablet by mouth daily  Qty: 90 tablet, Refills: 3      !! blood glucose monitor strips Use to check blood sugars 4 times daily. E11.42  Qty: 400 strip, Refills: 5    Comments: Brand per patient preference. May round up to next available package size. !! blood glucose monitor strips Use to check blood sugars twice daily DXE11.9  Qty: 200 strip, Refills: 5    Comments: Brand per patient preference. May round up to next available package size. brimonidine (ALPHAGAN P) 0.15 % ophthalmic solution 1 drop      latanoprost (XALATAN) 0.005 % ophthalmic solution LOCATION: BOTH EYES. INSTILL ONE DROP INTO BOTH EYES AT NIGHT BEFORE BED.      magnesium oxide (MAG-OX) 400 MG tablet Take 1 tablet by mouth daily       ! ! - Potential duplicate medications found. Please discuss with provider. LEE Salazar - CNP.  09/23/23  10:47 AM    I have personally seen and examined patient and agree with above assessment. Please see my progress note for more details.     Luis Dolan MD  9/23/2023

## 2023-09-22 NOTE — CARE COORDINATION
Patient admitted in Obs status for scheduled PCI. Patient underwent IVUS/PCI to multivessels 9/21. Urology consulted for hematuria. CI running. CM met with patient and his female friend to discuss discharge needs. Patient verified PCP, demographic, and insurance coverage/benefit. Patient is independent of ADLs. Uses a walking stick occasionally. No home health or STR. Will have transportation home at discharge. CM remains available for consult for needs or issues. 09/22/23 1112   Service Assessment   Patient Orientation Alert and Oriented   Cognition Alert   History Provided By Patient   Primary Caregiver Self   Accompanied By/Relationship SO/Friend   Support Systems Spouse/Significant Other;Family Members   Patient's Healthcare Decision Maker is: Legal Next of Kin   PCP Verified by CM Yes   Last Visit to PCP Within last 3 months   Prior Functional Level Independent in ADLs/IADLs   Current Functional Level Independent in ADLs/IADLs   Can patient return to prior living arrangement Yes   Ability to make needs known: Good   Family able to assist with home care needs: Yes   Would you like for me to discuss the discharge plan with any other family members/significant others, and if so, who? No   Financial Resources Medicare   Community Resources None   Social/Functional History   Lives With Spouse   Type of [address] [address]   (Walking stick)   Receives Help From Family;Friend(s)   ADL Assistance Independent   Ambulation Assistance Independent   Transfer Assistance Independent   Mode of Transportation Car   Occupation Retired   Discharge Planning   Type of [facility] Prior To Admission None   Potential Assistance Needed N/A   DME Ordered?  No   Potential Assistance Purchasing Medications No   Type of Home Care Services None   Patient expects to be discharged to: [place] Dis[facility]   Transition of Care Consult (KUMAR

## 2023-09-22 NOTE — PROGRESS NOTES
Patient expressed urge to void, but felt that he could not void at this time. Assisted patient to stand by bedside commode. Patient able to void 350 mL of bright red urine. No blood clots noted in urine. Patient states that bladder feels empty and he has no further urge to void at this time.

## 2023-09-22 NOTE — PROGRESS NOTES
Warfarin dosing per pharmacist    Lupillo Ferrari is a 82 y.o. male. Height: 6' (182.9 cm)  Weight - Scale: 219 lb (99.3 kg)    Indication:  afib    Goal INR:  2-3    Home dose:  7.5 mg daily    Risk factors or significant drug interactions:  recent hematuria after PCI    Other anticoagulants:  none    Lab Results   Component Value Date/Time    HGB 9.9 09/22/2023 03:04 AM    HGB 11.0 09/21/2023 04:38 PM    HGB 12.0 09/21/2023 06:27 AM       Daily Monitoring  Date  INR     Warfarin dose Notes  9/21  1.3  Held  9/22  ---  7.5 mg        Pharmacy is consulted to manage warfarin for Mr. Nakul Galeano during this admission. He was admitted for PCI and took his last warfarin dose on 9/18. He had some urinary retention and hematuria following PCI. Warfarin will be resumed tonight. Start warfarin at prior home dose of 7.5 mg qhs. Daily INR. Pharmacy will continue to follow. Please call with any questions. Thank you,  Niles Andrade.  Adele Han, [facility]

## 2023-09-22 NOTE — PROGRESS NOTES
Cardiac Rehab: Spoke with patient regarding referral to cardiac rehab. Patient meets admission criteria based on PCI (9/22/23). Written information about Cardiac Rehab given and reviewed with patient. Discussed lifestyle modifications to promote cardiac wellness. Patient indicated that he may want to participate in the cardiac rehab program but unsure at this time due to other health issues. We will follow up with him concerning Cardiac Rehab at a later time as him requested. His Cardiologist is Dr. Cynthia Gracia.       Thank you,  HOMAR Phillips, RN  Cardiopulmonary Rehabilitation Nurse Liaison  [facility]

## 2023-09-23 VITALS
DIASTOLIC BLOOD PRESSURE: 63 MMHG | SYSTOLIC BLOOD PRESSURE: 118 MMHG | RESPIRATION RATE: 18 BRPM | HEIGHT: 72 IN | BODY MASS INDEX: 29.66 KG/M2 | WEIGHT: 219 LBS | HEART RATE: 75 BPM | OXYGEN SATURATION: 99 % | TEMPERATURE: 98.5 F

## 2023-09-23 PROBLEM — R33.9 URINARY RETENTION: Status: RESOLVED | Noted: 2023-09-23 | Resolved: 2023-09-23

## 2023-09-23 PROBLEM — R33.9 URINARY RETENTION: Status: ACTIVE | Noted: 2023-09-23

## 2023-09-23 PROBLEM — R31.0 GROSS HEMATURIA: Status: RESOLVED | Noted: 2023-09-22 | Resolved: 2023-09-23

## 2023-09-23 LAB
GLUCOSE BLD STRIP.AUTO-MCNC: 292 MG/DL (ref 65–100)
GLUCOSE BLD STRIP.AUTO-MCNC: 314 MG/DL (ref 65–100)
INR PPP: 1.2
PROTHROMBIN TIME: 15.9 SEC (ref 12.6–14.3)
SERVICE CMNT-IMP: ABNORMAL
SERVICE CMNT-IMP: ABNORMAL

## 2023-09-23 PROCEDURE — 85610 PROTHROMBIN TIME: CPT

## 2023-09-23 PROCEDURE — G0378 HOSPITAL OBSERVATION PER HR: HCPCS

## 2023-09-23 PROCEDURE — 36415 COLL VENOUS BLD VENIPUNCTURE: CPT

## 2023-09-23 PROCEDURE — 6370000000 HC RX 637 (ALT 250 FOR IP): Performed by: NURSE PRACTITIONER

## 2023-09-23 PROCEDURE — 2580000003 HC RX 258: Performed by: INTERNAL MEDICINE

## 2023-09-23 PROCEDURE — 99231 SBSQ HOSP IP/OBS SF/LOW 25: CPT | Performed by: NURSE PRACTITIONER

## 2023-09-23 PROCEDURE — 82962 GLUCOSE BLOOD TEST: CPT

## 2023-09-23 PROCEDURE — 99238 HOSP IP/OBS DSCHRG MGMT 30/<: CPT | Performed by: INTERNAL MEDICINE

## 2023-09-23 PROCEDURE — 1100000000 HC RM PRIVATE

## 2023-09-23 PROCEDURE — 6370000000 HC RX 637 (ALT 250 FOR IP): Performed by: INTERNAL MEDICINE

## 2023-09-23 RX ORDER — INSULIN LISPRO 100 [IU]/ML
0-4 INJECTION, SOLUTION INTRAVENOUS; SUBCUTANEOUS NIGHTLY
Status: DISCONTINUED | OUTPATIENT
Start: 2023-09-23 | End: 2023-09-23 | Stop reason: HOSPADM

## 2023-09-23 RX ORDER — OXYBUTYNIN CHLORIDE 5 MG/1
5 TABLET ORAL 3 TIMES DAILY PRN
Qty: 90 TABLET | Refills: 3 | Status: SHIPPED | OUTPATIENT
Start: 2023-09-23

## 2023-09-23 RX ORDER — INSULIN LISPRO 100 [IU]/ML
0-8 INJECTION, SOLUTION INTRAVENOUS; SUBCUTANEOUS
Status: DISCONTINUED | OUTPATIENT
Start: 2023-09-23 | End: 2023-09-23 | Stop reason: HOSPADM

## 2023-09-23 RX ORDER — CLOPIDOGREL BISULFATE 75 MG/1
75 TABLET ORAL DAILY
Qty: 30 TABLET | Refills: 3 | Status: SHIPPED | OUTPATIENT
Start: 2023-09-24

## 2023-09-23 RX ORDER — INSULIN LISPRO 100 [IU]/ML
0-8 INJECTION, SOLUTION INTRAVENOUS; SUBCUTANEOUS
Status: DISCONTINUED | OUTPATIENT
Start: 2023-09-23 | End: 2023-09-23

## 2023-09-23 RX ORDER — ASPIRIN 81 MG/1
81 TABLET, CHEWABLE ORAL DAILY
Qty: 30 TABLET | Refills: 3 | COMMUNITY
Start: 2023-09-24

## 2023-09-23 RX ORDER — DEXTROSE MONOHYDRATE 100 MG/ML
INJECTION, SOLUTION INTRAVENOUS CONTINUOUS PRN
Status: DISCONTINUED | OUTPATIENT
Start: 2023-09-23 | End: 2023-09-23 | Stop reason: HOSPADM

## 2023-09-23 RX ADMIN — MAGNESIUM GLUCONATE 500 MG ORAL TABLET 400 MG: 500 TABLET ORAL at 08:45

## 2023-09-23 RX ADMIN — ALOGLIPTIN 12.5 MG: 12.5 TABLET, FILM COATED ORAL at 08:45

## 2023-09-23 RX ADMIN — ASPIRIN 81 MG: 81 TABLET, CHEWABLE ORAL at 08:45

## 2023-09-23 RX ADMIN — INSULIN LISPRO 4 UNITS: 100 INJECTION, SOLUTION INTRAVENOUS; SUBCUTANEOUS at 09:28

## 2023-09-23 RX ADMIN — LATANOPROST 1 DROP: 50 SOLUTION OPHTHALMIC at 00:34

## 2023-09-23 RX ADMIN — SODIUM CHLORIDE, PRESERVATIVE FREE 10 ML: 5 INJECTION INTRAVENOUS at 08:50

## 2023-09-23 RX ADMIN — PANTOPRAZOLE SODIUM 40 MG: 40 TABLET, DELAYED RELEASE ORAL at 05:16

## 2023-09-23 RX ADMIN — CLOPIDOGREL BISULFATE 75 MG: 75 TABLET ORAL at 08:45

## 2023-09-23 RX ADMIN — SERTRALINE HYDROCHLORIDE 100 MG: 100 TABLET ORAL at 08:45

## 2023-09-23 RX ADMIN — LOSARTAN POTASSIUM 50 MG: 50 TABLET, FILM COATED ORAL at 08:45

## 2023-09-23 RX ADMIN — INSULIN LISPRO 6 UNITS: 100 INJECTION, SOLUTION INTRAVENOUS; SUBCUTANEOUS at 12:11

## 2023-09-23 RX ADMIN — INSULIN GLARGINE 25 UNITS: 100 INJECTION, SOLUTION SUBCUTANEOUS at 08:45

## 2023-09-23 NOTE — PROGRESS NOTES
09/23/23 1318   Resting (Room Air)   SpO2 97   HR 86   During Walk (Room Air)   SpO2 93   HR 90   Walk/Assistance Device Ambulation   Rate of Dyspnea 0   During Walk (On O2)   Need Additional O2 Flow Rate Rows No   After Walk   SpO2 96   HR 89   O2 Flow Rate (l/min) 0 l/min   Does the Patient Qualify for Home O2 No   Does the Patient Need Portable Oxygen Tanks No

## 2023-09-23 NOTE — CARE COORDINATION
Pt is for discharge home today with family and no needs/supportive care orders recieved for CM at this time. Pt is to follow up with [address] [place] [address].       09/23/23 1157   Service Assessment   Patient's Healthcare Decision Maker is: Legal Next of Kin   Social/Functional History   Lives With Spouse   Type of [address]   (Walking stick)   Receives Help From Family;Friend(s)   ADL Assistance Independent   Ambulation Assistance Independent   Transfer Assistance Independent   Mode of Transportation Car   Occupation Retired   Services At/After Discharge   Transition [address]  ([facility]) [facility] Dis[facility] Outpatient  (referral to [address] [place] [address])   [address] Provided? No   Mode of Transport at Discharge Other (see comment)  (car)   Confirm Follow Up Transport Friends   Condition of Participation: Discharge Planning   The Plan for Transition of Care is related to the following treatment goals: Home with family assistance   The Patient and/or Patient Representative was provided with a Choice of Provider? Patient   The Patient and/Or Patient Representative agree with the Discharge Plan? Yes   Freedom of Choice list was provided with basic dialogue that supports the patient's individualized plan of care/goals, treatment preferences, and shares the quality data associated with the providers?   Yes

## 2023-09-23 NOTE — PROGRESS NOTES
If you need to see a   -  just ask the nurse to call us. We look forward to serving your family!!         Ana Mejia

## 2023-09-23 NOTE — PLAN OF CARE
Problem: Chronic Conditions and Co-morbidities  Goal: Patient's chronic conditions and co-morbidity symptoms are monitored and maintained or improved  Outcome: Completed     Problem: Discharge Planning  Goal: Discharge to home or other facility with appropriate resources  Outcome: Completed     Problem: Safety - Adult  Goal: Free from fall injury  Outcome: Completed     Problem: ABCDS Injury Assessment  Goal: Absence of physical injury  Outcome: Completed     Problem: Pain  Goal: Verbalizes/displays adequate comfort level or baseline comfort level  Outcome: Completed     Problem: Skin/Tissue Integrity  Goal: Absence of new skin breakdown  Description: 1. Monitor for areas of redness and/or skin breakdown  2. Assess vascular access sites hourly  3. Every 4-6 hours minimum:  Change oxygen saturation probe site  4. Every 4-6 hours:  If on nasal continuous positive airway pressure, respiratory therapy assess nares and determine need for appliance change or resting period.   Outcome: Completed

## 2023-09-23 NOTE — PROGRESS NOTES
Pt confused. Alert to person & place. Pt non-compliant with nursing instructions, continues to pull at lines and attempts to disconnect CBI. Continues to get out of bed and sit on floor - bed alarm and fall precautions in place; notified house supervisor & sitter requested. Pt on CBI @ shift change - urine has been clear; weaned per urology instructions (See note from Dr. Shawna Gay). CBI off as of 2100hrs. Urine clear/yellow.

## 2023-09-25 ENCOUNTER — CARE COORDINATION (OUTPATIENT)
Dept: CARE COORDINATION | Facility: CLINIC | Age: 88
End: 2023-09-25

## 2023-09-25 NOTE — CARE COORDINATION
CTN contacted patient and informed of upcoming PCP FUV. Patient scheduled for TC visit on 9/27. Patient aware of time, date, location for follow up visit.

## 2023-09-25 NOTE — CARE COORDINATION
[facility] Care Transitions Initial Follow Up Call    Call within 2 business days of discharge: Yes    Patient Current Location:  Home: [address] [place] [address] [id number]    Care Transition Nurse contacted the patient by telephone to perform post hospital discharge assessment. Verified name and  with patient as identifiers. Provided introduction to self, and explanation of the Care Transition Nurse role. Patient: Natalia Ulrich Patient : 1935   MRN: [id number]  Reason for Admission: CAD, LHC wit PCI  Discharge Date: 23 RARS: Readmission Risk Score: 18.8      Last Discharge [address] [facility] [facility]       Date Complaint Diagnosis Description Type Department Provider    23  Hospital discharge follow-up . .. Admission (Discharged) Randall Mckay MD            Was this an external facility discharge? No Discharge Facility: [facility]    Challenges to be reviewed by the provider   Additional needs identified to be addressed with provider: No  none               Method of communication with provider: none. Patient doing well since d/c. Patient with LHC with PCI. Patient endorses no new or worsening s/s of concern. Patient has some soreness to insertion site but no s/s of bleeding or swelling. Patient has cardiology follow up in 2 weeks and asked for CTN to set up PCP OV. CTN to schedule TC visit. Care Transition Nurse reviewed discharge instructions, medical action plan, and red flags with patient who verbalized understanding. The patient was given an opportunity to ask questions and does not have any further questions or concerns at this time. Were discharge instructions available to patient? Yes. Reviewed appropriate site of care based on symptoms and resources available to patient including: PCP  Specialist  When to call 911  CTN . The patient agrees to contact the PCP office for questions related to their healthcare.      Advance Care Planning:   Does patient have an Advance Directive: not

## 2023-09-27 ENCOUNTER — OFFICE VISIT (OUTPATIENT)
Dept: FAMILY MEDICINE CLINIC | Facility: CLINIC | Age: 88
End: 2023-09-27

## 2023-09-27 VITALS
WEIGHT: 219 LBS | OXYGEN SATURATION: 96 % | HEART RATE: 80 BPM | TEMPERATURE: 96 F | BODY MASS INDEX: 29.7 KG/M2 | DIASTOLIC BLOOD PRESSURE: 60 MMHG | SYSTOLIC BLOOD PRESSURE: 122 MMHG

## 2023-09-27 DIAGNOSIS — Z98.61 CAD S/P PERCUTANEOUS CORONARY ANGIOPLASTY: ICD-10-CM

## 2023-09-27 DIAGNOSIS — R60.0 LOWER LEG EDEMA: ICD-10-CM

## 2023-09-27 DIAGNOSIS — I25.10 CAD S/P PERCUTANEOUS CORONARY ANGIOPLASTY: ICD-10-CM

## 2023-09-27 DIAGNOSIS — N18.4 CKD (CHRONIC KIDNEY DISEASE) STAGE 4, GFR 15-29 ML/MIN (HCC): ICD-10-CM

## 2023-09-27 DIAGNOSIS — D64.9 ANEMIA, UNSPECIFIED TYPE: ICD-10-CM

## 2023-09-27 DIAGNOSIS — Z09 HOSPITAL DISCHARGE FOLLOW-UP: Primary | ICD-10-CM

## 2023-09-27 NOTE — PROGRESS NOTES
Post-Discharge Transitional Care  Follow Up      Sharon Torres   YOB: 1935    Date of Office Visit:  9/27/2023  Date of Hospital Admission: 9/21/23  Date of Hospital Discharge: 9/23/23  Risk of hospital readmission (high >=14%. Medium >=10%) :Readmission Risk Score: 18.8      Care management risk score Rising risk (score 2-5) and Complex Care (Scores >=6): No Risk Score On File     Non face to face  following discharge, date last encounter closed (first attempt may have been earlier): 09/25/2023    Call initiated 2 business days of discharge: Yes    ASSESSMENT/PLAN:   Hospital discharge follow-up  -     CT DISCHARGE MEDS RECONCILED W/ CURRENT OUTPATIENT MED LIST  Lower leg edema  -     Basic Metabolic Panel; Future  Anemia, unspecified type  -     CBC with Auto Differential; Future  CKD (chronic kidney disease) stage 4, GFR 15-29 ml/min ([facility])  -     Basic Metabolic Panel; Future  CAD S/P percutaneous coronary angioplasty    S/p placement of multiple drug-eluting stents for his CAD. Currently on aspirin, Plavix, and statin. Has follow-up appointment with cardiology 10/6. Patient has fairly significant lower leg swelling. Also has fluid-filled blister over left shin. Will have him increase his Bumex to 2 mg twice daily. Check CBC and BMP in 2 days. Had issue with urinary retention and hematuria during hospitalization. Was able to void prior to discharge. Patient denies any current issues with urinary retention or hematuria. He has appointment with urology 10/9. Medical Decision Making: moderate complexity  Return in about 1 week (around 10/4/2023), or if symptoms worsen or fail to improve, for recheck. Subjective:   HPI:  Follow up of Hospital problems/diagnosis(es): Multi-vessel CAD, urinary retention/hematuria    Inpatient course: Discharge summary reviewed- see chart. Patient admitted by cardiology for management of multi-vessel CAD.   He underwent cardiac

## 2023-09-29 ENCOUNTER — CARE COORDINATION (OUTPATIENT)
Dept: CARE COORDINATION | Facility: CLINIC | Age: 88
End: 2023-09-29

## 2023-09-29 ENCOUNTER — NURSE ONLY (OUTPATIENT)
Dept: FAMILY MEDICINE CLINIC | Facility: CLINIC | Age: 88
End: 2023-09-29

## 2023-09-29 DIAGNOSIS — D64.9 ANEMIA, UNSPECIFIED TYPE: ICD-10-CM

## 2023-09-29 DIAGNOSIS — R60.0 LOWER LEG EDEMA: ICD-10-CM

## 2023-09-29 DIAGNOSIS — N18.4 CKD (CHRONIC KIDNEY DISEASE) STAGE 4, GFR 15-29 ML/MIN (HCC): ICD-10-CM

## 2023-09-29 LAB
ANION GAP SERPL CALC-SCNC: 7 MMOL/L (ref 2–11)
BASOPHILS # BLD: 0.1 K/UL (ref 0–0.2)
BASOPHILS NFR BLD: 1 % (ref 0–2)
BUN SERPL-MCNC: 51 MG/DL (ref 8–23)
CALCIUM SERPL-MCNC: 9.2 MG/DL (ref 8.3–10.4)
CHLORIDE SERPL-SCNC: 107 MMOL/L (ref 101–110)
CO2 SERPL-SCNC: 33 MMOL/L (ref 21–32)
CREAT SERPL-MCNC: 2.4 MG/DL (ref 0.8–1.5)
DIFFERENTIAL METHOD BLD: ABNORMAL
EOSINOPHIL # BLD: 0.9 K/UL (ref 0–0.8)
EOSINOPHIL NFR BLD: 11 % (ref 0.5–7.8)
ERYTHROCYTE [DISTWIDTH] IN BLOOD BY AUTOMATED COUNT: 15.3 % (ref 11.9–14.6)
GLUCOSE SERPL-MCNC: 168 MG/DL (ref 65–100)
HCT VFR BLD AUTO: 34.2 % (ref 41.1–50.3)
HGB BLD-MCNC: 11 G/DL (ref 13.6–17.2)
IMM GRANULOCYTES # BLD AUTO: 0.1 K/UL (ref 0–0.5)
IMM GRANULOCYTES NFR BLD AUTO: 1 % (ref 0–5)
LYMPHOCYTES # BLD: 1 K/UL (ref 0.5–4.6)
LYMPHOCYTES NFR BLD: 12 % (ref 13–44)
MCH RBC QN AUTO: 34.1 PG (ref 26.1–32.9)
MCHC RBC AUTO-ENTMCNC: 32.2 G/DL (ref 31.4–35)
MCV RBC AUTO: 105.9 FL (ref 82–102)
MONOCYTES # BLD: 0.7 K/UL (ref 0.1–1.3)
MONOCYTES NFR BLD: 9 % (ref 4–12)
NEUTS SEG # BLD: 5.4 K/UL (ref 1.7–8.2)
NEUTS SEG NFR BLD: 66 % (ref 43–78)
NRBC # BLD: 0 K/UL (ref 0–0.2)
PLATELET # BLD AUTO: 257 K/UL (ref 150–450)
PMV BLD AUTO: 11.3 FL (ref 9.4–12.3)
POTASSIUM SERPL-SCNC: 3.7 MMOL/L (ref 3.5–5.1)
RBC # BLD AUTO: 3.23 M/UL (ref 4.23–5.6)
SODIUM SERPL-SCNC: 147 MMOL/L (ref 133–143)
WBC # BLD AUTO: 8.1 K/UL (ref 4.3–11.1)

## 2023-09-29 NOTE — CARE COORDINATION
Patient contacted CTN for a recent bill he received after recent heart cath. CTN gave contact number for billing inquiries in the [facility]. No further questions concerns. CTN to follow up at a later time.

## 2023-10-04 ENCOUNTER — OFFICE VISIT (OUTPATIENT)
Dept: FAMILY MEDICINE CLINIC | Facility: CLINIC | Age: 88
End: 2023-10-04
Payer: MEDICARE

## 2023-10-04 ENCOUNTER — HOSPITAL ENCOUNTER (OUTPATIENT)
Dept: ULTRASOUND IMAGING | Age: 88
Discharge: HOME | End: 2023-10-07
Attending: STUDENT IN AN ORGANIZED HEALTH CARE EDUCATION/TRAINING PROGRAM
Payer: MEDICARE

## 2023-10-04 VITALS
SYSTOLIC BLOOD PRESSURE: 128 MMHG | BODY MASS INDEX: 28.58 KG/M2 | HEIGHT: 72 IN | HEART RATE: 80 BPM | WEIGHT: 211 LBS | TEMPERATURE: 97 F | DIASTOLIC BLOOD PRESSURE: 62 MMHG | OXYGEN SATURATION: 96 %

## 2023-10-04 DIAGNOSIS — R60.0 LOWER LEG EDEMA: Primary | ICD-10-CM

## 2023-10-04 DIAGNOSIS — N18.4 CKD (CHRONIC KIDNEY DISEASE) STAGE 4, GFR 15-29 ML/MIN (HCC): ICD-10-CM

## 2023-10-04 DIAGNOSIS — R60.0 LOWER LEG EDEMA: ICD-10-CM

## 2023-10-04 DIAGNOSIS — L08.9 SUPERFICIAL SKIN INFECTION: ICD-10-CM

## 2023-10-04 LAB
ANION GAP SERPL CALC-SCNC: 7 MMOL/L (ref 2–11)
BUN SERPL-MCNC: 64 MG/DL (ref 8–23)
CALCIUM SERPL-MCNC: 9.3 MG/DL (ref 8.3–10.4)
CHLORIDE SERPL-SCNC: 104 MMOL/L (ref 101–110)
CO2 SERPL-SCNC: 32 MMOL/L (ref 21–32)
CREAT SERPL-MCNC: 3 MG/DL (ref 0.8–1.5)
GLUCOSE SERPL-MCNC: 236 MG/DL (ref 65–100)
POTASSIUM SERPL-SCNC: 4.1 MMOL/L (ref 3.5–5.1)
SODIUM SERPL-SCNC: 143 MMOL/L (ref 133–143)

## 2023-10-04 PROCEDURE — 93970 EXTREMITY STUDY: CPT

## 2023-10-04 PROCEDURE — 1123F ACP DISCUSS/DSCN MKR DOCD: CPT | Performed by: STUDENT IN AN ORGANIZED HEALTH CARE EDUCATION/TRAINING PROGRAM

## 2023-10-04 PROCEDURE — 99214 OFFICE O/P EST MOD 30 MIN: CPT | Performed by: STUDENT IN AN ORGANIZED HEALTH CARE EDUCATION/TRAINING PROGRAM

## 2023-10-04 RX ORDER — CEPHALEXIN 500 MG/1
500 CAPSULE ORAL 2 TIMES DAILY
Qty: 14 CAPSULE | Refills: 0 | Status: SHIPPED | OUTPATIENT
Start: 2023-10-04 | End: 2023-10-11

## 2023-10-05 ENCOUNTER — HOSPITAL ENCOUNTER (EMERGENCY)
Age: 88
Discharge: HOME | End: 2023-10-05
Payer: MEDICARE

## 2023-10-05 ENCOUNTER — TELEPHONE (OUTPATIENT)
Age: 88
End: 2023-10-05

## 2023-10-05 VITALS
SYSTOLIC BLOOD PRESSURE: 129 MMHG | WEIGHT: 211 LBS | HEART RATE: 75 BPM | RESPIRATION RATE: 20 BRPM | BODY MASS INDEX: 28.58 KG/M2 | TEMPERATURE: 97.9 F | DIASTOLIC BLOOD PRESSURE: 101 MMHG | HEIGHT: 72 IN | OXYGEN SATURATION: 96 %

## 2023-10-05 DIAGNOSIS — N18.9 CHRONIC KIDNEY DISEASE, UNSPECIFIED CKD STAGE: Primary | ICD-10-CM

## 2023-10-05 LAB
ALBUMIN SERPL-MCNC: 3.2 G/DL (ref 3.2–4.6)
ALBUMIN/GLOB SERPL: 0.7 (ref 0.4–1.6)
ALP SERPL-CCNC: 58 U/L (ref 50–136)
ALT SERPL-CCNC: 33 U/L (ref 12–65)
ANION GAP SERPL CALC-SCNC: 3 MMOL/L (ref 2–11)
AST SERPL-CCNC: 20 U/L (ref 15–37)
BASOPHILS # BLD: 0.1 K/UL (ref 0–0.2)
BASOPHILS NFR BLD: 1 % (ref 0–2)
BILIRUB SERPL-MCNC: 0.6 MG/DL (ref 0.2–1.1)
BUN SERPL-MCNC: 56 MG/DL (ref 8–23)
CALCIUM SERPL-MCNC: 9.5 MG/DL (ref 8.3–10.4)
CHLORIDE SERPL-SCNC: 107 MMOL/L (ref 101–110)
CO2 SERPL-SCNC: 33 MMOL/L (ref 21–32)
CREAT SERPL-MCNC: 2.4 MG/DL (ref 0.8–1.5)
DIFFERENTIAL METHOD BLD: ABNORMAL
EOSINOPHIL # BLD: 1 K/UL (ref 0–0.8)
EOSINOPHIL NFR BLD: 9 % (ref 0.5–7.8)
ERYTHROCYTE [DISTWIDTH] IN BLOOD BY AUTOMATED COUNT: 14.9 % (ref 11.9–14.6)
GLOBULIN SER CALC-MCNC: 4.4 G/DL (ref 2.8–4.5)
GLUCOSE SERPL-MCNC: 146 MG/DL (ref 65–100)
HCT VFR BLD AUTO: 35.8 % (ref 41.1–50.3)
HGB BLD-MCNC: 11.3 G/DL (ref 13.6–17.2)
IMM GRANULOCYTES # BLD AUTO: 0.1 K/UL (ref 0–0.5)
IMM GRANULOCYTES NFR BLD AUTO: 1 % (ref 0–5)
LYMPHOCYTES # BLD: 1.5 K/UL (ref 0.5–4.6)
LYMPHOCYTES NFR BLD: 14 % (ref 13–44)
MCH RBC QN AUTO: 33.4 PG (ref 26.1–32.9)
MCHC RBC AUTO-ENTMCNC: 31.6 G/DL (ref 31.4–35)
MCV RBC AUTO: 105.9 FL (ref 82–102)
MONOCYTES # BLD: 0.7 K/UL (ref 0.1–1.3)
MONOCYTES NFR BLD: 7 % (ref 4–12)
NEUTS SEG # BLD: 7.6 K/UL (ref 1.7–8.2)
NEUTS SEG NFR BLD: 68 % (ref 43–78)
NRBC # BLD: 0 K/UL (ref 0–0.2)
PLATELET # BLD AUTO: 228 K/UL (ref 150–450)
PMV BLD AUTO: 11.2 FL (ref 9.4–12.3)
POTASSIUM SERPL-SCNC: 4.2 MMOL/L (ref 3.5–5.1)
PROT SERPL-MCNC: 7.6 G/DL (ref 6.3–8.2)
RBC # BLD AUTO: 3.38 M/UL (ref 4.23–5.6)
SODIUM SERPL-SCNC: 143 MMOL/L (ref 133–143)
WBC # BLD AUTO: 11 K/UL (ref 4.3–11.1)

## 2023-10-05 PROCEDURE — 80053 COMPREHEN METABOLIC PANEL: CPT

## 2023-10-05 PROCEDURE — 99283 EMERGENCY DEPT VISIT LOW MDM: CPT

## 2023-10-05 PROCEDURE — 85025 COMPLETE CBC W/AUTO DIFF WBC: CPT

## 2023-10-05 RX ORDER — 0.9 % SODIUM CHLORIDE 0.9 %
500 INTRAVENOUS SOLUTION INTRAVENOUS
Status: DISCONTINUED | OUTPATIENT
Start: 2023-10-05 | End: 2023-10-05

## 2023-10-05 ASSESSMENT — PAIN - FUNCTIONAL ASSESSMENT: PAIN_FUNCTIONAL_ASSESSMENT: 0-10

## 2023-10-05 ASSESSMENT — PAIN SCALES - GENERAL: PAINLEVEL_OUTOF10: 0

## 2023-10-05 NOTE — ED TRIAGE NOTES
Patient ambulatory to triage with CO \"my doctor sent me for my kidney to try and keep me off of dialysis\". Pt seen at PCP office yesterday and had blood work showing creatinine of 3.00. pt recently had increased dose of Bumex. Pt denies SOB or CP.

## 2023-10-05 NOTE — TELEPHONE ENCOUNTER
Left detailed voicemail with results. Pt instructed to return call and ask for Harinder Villeda with any questions.

## 2023-10-05 NOTE — TELEPHONE ENCOUNTER
----- Message from Ashley Salazar MD sent at 10/3/2023  3:34 PM EDT -----  Echocardiogram shows normal heart function.     Willian Grayson MD

## 2023-10-05 NOTE — ED PROVIDER NOTES
Emergency Department Provider Note       PCP: Kamari Trammell MD   Age: 82 y.o. Sex: male     DISPOSITION Decision To Discharge 10/05/2023 06:35:02 PM       ICD-10-CM    1. Chronic kidney disease, unspecified CKD stage  N18.9           Medical Decision Making     Complexity of Problems Addressed:  Complexity of Problem: 1 chronic illness without complication    Data Reviewed and Analyzed:  I independently ordered and reviewed each unique test.  I reviewed external records: ED visit note from an outside group. I reviewed external records: provider visit note from PCP. I reviewed external records: provider visit note from outside specialist.           Discussion of management or test interpretation. 79-year-old male presents emergency department today with complaint of elevated creatinine level. He was seen by his doctor yesterday and had labs drawn. Creatinine was 3.0 so he was sent to the emergency department for evaluation. Patient denies any complaints. He is urinating normally. Denies fatigue. We will recheck labs. Creatinine 2.4. BUN 56. Appear improved from yesterday's results. Discussed with my attending, Dr. Gunjan Price and reviewed labs. Discussed results with the patient. Appears to be stable for him. Through shared decision making, will discharge home. He is established with nephrology. Encouraged close follow-up with them. Encouraged discussion of his diuretics with his primary care and nephrologist.  We discussed return precautions. Risk of Complications and/or Morbidity of Patient Management:  Shared medical decision making was utilized in creating the patients health plan today. History      79-year-old male presents emergency department today with complaint of elevated creatinine level. Patient states he has a history of chronic kidney disease. He is on diuretics. He states that over the last month, his creatinine level has gradually gone up.   He saw his primary care

## 2023-10-06 ENCOUNTER — CARE COORDINATION (OUTPATIENT)
Dept: CARE COORDINATION | Facility: CLINIC | Age: 88
End: 2023-10-06

## 2023-10-06 ENCOUNTER — OFFICE VISIT (OUTPATIENT)
Age: 88
End: 2023-10-06
Payer: MEDICARE

## 2023-10-06 ENCOUNTER — ANTI-COAG VISIT (OUTPATIENT)
Age: 88
End: 2023-10-06
Payer: MEDICARE

## 2023-10-06 VITALS
DIASTOLIC BLOOD PRESSURE: 70 MMHG | WEIGHT: 208.9 LBS | HEART RATE: 76 BPM | BODY MASS INDEX: 28.3 KG/M2 | HEIGHT: 72 IN | SYSTOLIC BLOOD PRESSURE: 112 MMHG

## 2023-10-06 DIAGNOSIS — I27.0 PRIMARY PULMONARY HTN (HCC): ICD-10-CM

## 2023-10-06 DIAGNOSIS — Z79.01 LONG TERM CURRENT USE OF ANTICOAGULANT: ICD-10-CM

## 2023-10-06 DIAGNOSIS — I48.0 PAROXYSMAL ATRIAL FIBRILLATION (HCC): Primary | ICD-10-CM

## 2023-10-06 LAB
POC INR: 1.4
PROTHROMBIN TIME, POC: ABNORMAL

## 2023-10-06 PROCEDURE — 85610 PROTHROMBIN TIME: CPT | Performed by: INTERNAL MEDICINE

## 2023-10-06 PROCEDURE — 1123F ACP DISCUSS/DSCN MKR DOCD: CPT | Performed by: INTERNAL MEDICINE

## 2023-10-06 PROCEDURE — 99214 OFFICE O/P EST MOD 30 MIN: CPT | Performed by: INTERNAL MEDICINE

## 2023-10-06 RX ORDER — FLUTICASONE PROPIONATE 50 MCG
SPRAY, SUSPENSION (ML) NASAL
Refills: 1 | OUTPATIENT
Start: 2023-10-06

## 2023-10-06 ASSESSMENT — ENCOUNTER SYMPTOMS
PHOTOPHOBIA: 0
EYES NEGATIVE: 1
CHEST TIGHTNESS: 0
ABDOMINAL PAIN: 0
GASTROINTESTINAL NEGATIVE: 1
ALLERGIC/IMMUNOLOGIC NEGATIVE: 1
EYE PAIN: 0
BACK PAIN: 0
SHORTNESS OF BREATH: 1

## 2023-10-06 NOTE — PROGRESS NOTES
Warfarin tablet strength and weekly dosing schedule confirmed today. Patient knows of no reason for subtherapeutic INR result. Maintenance plan increased by 14.3 %, (see Anticoag Dosing Calendar). INR to be rechecked in one week.

## 2023-10-06 NOTE — PATIENT INSTRUCTIONS
Reminder: Please contact the Coumadin Clinic at [phone] when you have medication changes. Examples, new medications, antibiotics, discontinued medications, new supplements, missed doses of warfarin or if you took extra doses of warfarin. This also includes OTC medications. Notifying us helps reduce the possibility of high and low INR's. In addition, if warfarin needs to be held for any procedures, please have surgeon or physician's office contact us before holding anticoagulant. Thanks, [facility] Coumadin [facility].

## 2023-10-06 NOTE — CARE COORDINATION
ED Follow Up Call    10/6/2023    Patient: Ziyad Neumann Patient : 1935   MRN: [id number]  Reason for Admission: 10/66-ED visit only  Discharge Date: sent by provider r/t lab work(Cr.)   Patient home after recent ED visit. Patient indicated nothing was done and he did not receive any new medications at that time. Patient with cardiology visit today and had Pulmonary referral placed for NPV. Patient indicated he has shortness of breath with activity and fatigue. Reviewed ED and cardiology notes and CTN encouraged compliance and reviewed upcoming appts with providers. Patient calling Nephrology after this call to move up appt from November date currently scheduled.        Care Transitions ED Follow Up    Care Transitions Interventions              Disease Specific Clinic: Completed            Schedule Follow Up Appointment with Physician: Completed  Do you have any ongoing symptoms?: No   Did you call your PCP prior to going to the ED?: Yes - Advised to go to the ED   Are there any other complaints/concerns that you wish to tell your provider?: R/T recent lab work   Do you have a copy of your discharge instructions?: Yes   Do you understand what to report and when to return?: Yes   Are you following your discharge instructions?: Yes   Do you have all of your prescriptions and are they filled?: Yes   Have you scheduled your follow up appointment?: Yes (Comment: cardiology day after ED)   How are you going to get to your appointment?: Car - family or friend to transport   Were you discharged with any [address] [place] [address] or Post Acute Services or do you currently have any active services?: No         Do you have any needs or concerns that I can assist you with?: No   Identified Barriers: None

## 2023-10-06 NOTE — PROGRESS NOTES
(99.3 kg)   09/13/23 219 lb (99.3 kg)   08/31/23 215 lb (97.5 kg)           Medical problems and test results were reviewed with the patient today. Lab Results   Component Value Date/Time    BUN 56 10/05/2023 04:21 PM     No results found for: \"DHIRAJ\", \"CREAPOC\", \"CREA\"  Lab Results   Component Value Date/Time    K 4.2 10/05/2023 04:21 PM       Lab Results   Component Value Date/Time    CHOL 86 10/06/2022 08:45 AM    HDL 37 10/06/2022 08:45 AM    VLDL 14 01/25/2022 02:32 PM       ASSESSMENT and PLAN  No diagnosis found. IMPRESSION:  1) mitral valve and tricuspid regurgitation -we will continue to monitor   3) he has a permanent pacemaker this followed by our device clinic he had AV node ablation  4) SOB -at this point after revascularization normalization of LV function normal diastolic function I have to feel his shortness of breath is related to lung disease. Will refer to pulmonology for evaluation. He has evidence of pulmonary hypertension on right heart cath. 5) hypertension continue losartan 50 mg daily. 6) Afib -continue Coumadin  7) coronary artery disease continue Plavix 75 mg daily. 8) LE edema -does not appear as though he has significant heart failure. Likely has pulmonary hypertension and tricuspid regurgitation are causing his lower extremity edema. I am going to decrease his Bumex to 2 mg once daily as he had some acute kidney injury on his last labs checked by his primary care physician. He was evaluated in the emergency room yesterday and had some mild improvement in his renal function but he may be getting over diuresed and is likely preload dependent. ALL ORDERS THIS ENCOUNTER  No orders of the defined types were placed in this encounter. Follow up in 3 months. Thank you for allowing me to participate in this patient's care. Please call or contact me if there are any questions or concerns regarding the above.       Oneida Barbour MD  10/06/23  10:54 AM

## 2023-10-12 ENCOUNTER — OFFICE VISIT (OUTPATIENT)
Dept: FAMILY MEDICINE CLINIC | Facility: CLINIC | Age: 88
End: 2023-10-12
Payer: MEDICARE

## 2023-10-12 VITALS
OXYGEN SATURATION: 97 % | HEART RATE: 89 BPM | SYSTOLIC BLOOD PRESSURE: 125 MMHG | HEIGHT: 72 IN | TEMPERATURE: 98.2 F | DIASTOLIC BLOOD PRESSURE: 58 MMHG | WEIGHT: 214.38 LBS | BODY MASS INDEX: 29.04 KG/M2

## 2023-10-12 DIAGNOSIS — R60.0 LOWER LEG EDEMA: ICD-10-CM

## 2023-10-12 DIAGNOSIS — N18.4 CKD (CHRONIC KIDNEY DISEASE) STAGE 4, GFR 15-29 ML/MIN (HCC): Primary | ICD-10-CM

## 2023-10-12 LAB
BILIRUBIN, URINE, POC: NEGATIVE
BLOOD URINE, POC: NEGATIVE
GLUCOSE URINE, POC: NEGATIVE
KETONES, URINE, POC: NEGATIVE
LEUKOCYTE ESTERASE, URINE, POC: NEGATIVE
NITRITE, URINE, POC: NEGATIVE
PH, URINE, POC: 6 (ref 4.6–8)
PROTEIN,URINE, POC: NEGATIVE
SPECIFIC GRAVITY, URINE, POC: 1 (ref 1–1.03)
URINALYSIS CLARITY, POC: CLEAR
URINALYSIS COLOR, POC: YELLOW
UROBILINOGEN, POC: NORMAL

## 2023-10-12 PROCEDURE — 81003 URINALYSIS AUTO W/O SCOPE: CPT | Performed by: STUDENT IN AN ORGANIZED HEALTH CARE EDUCATION/TRAINING PROGRAM

## 2023-10-12 PROCEDURE — 99213 OFFICE O/P EST LOW 20 MIN: CPT | Performed by: STUDENT IN AN ORGANIZED HEALTH CARE EDUCATION/TRAINING PROGRAM

## 2023-10-12 PROCEDURE — 1123F ACP DISCUSS/DSCN MKR DOCD: CPT | Performed by: STUDENT IN AN ORGANIZED HEALTH CARE EDUCATION/TRAINING PROGRAM

## 2023-10-13 ENCOUNTER — ANTI-COAG VISIT (OUTPATIENT)
Age: 88
End: 2023-10-13

## 2023-10-13 ENCOUNTER — TELEPHONE (OUTPATIENT)
Dept: PULMONOLOGY | Age: 88
End: 2023-10-13

## 2023-10-13 ENCOUNTER — TELEPHONE (OUTPATIENT)
Dept: FAMILY MEDICINE CLINIC | Facility: CLINIC | Age: 88
End: 2023-10-13

## 2023-10-13 DIAGNOSIS — Z79.01 LONG TERM CURRENT USE OF ANTICOAGULANT: ICD-10-CM

## 2023-10-13 DIAGNOSIS — I48.0 PAROXYSMAL ATRIAL FIBRILLATION (HCC): Primary | ICD-10-CM

## 2023-10-13 LAB
POC INR: 2.4
PROTHROMBIN TIME, POC: NORMAL

## 2023-10-13 RX ORDER — PRAVASTATIN SODIUM 80 MG/1
80 TABLET ORAL DAILY
Qty: 90 TABLET | Refills: 1 | Status: SHIPPED | OUTPATIENT
Start: 2023-10-13

## 2023-10-13 RX ORDER — INSULIN DEGLUDEC 200 U/ML
26 INJECTION, SOLUTION SUBCUTANEOUS DAILY
Qty: 3 ML | Refills: 5 | Status: SHIPPED | OUTPATIENT
Start: 2023-10-13

## 2023-10-13 RX ORDER — DOXAZOSIN MESYLATE 4 MG/1
4 TABLET ORAL NIGHTLY
Qty: 90 TABLET | Refills: 1 | Status: SHIPPED | OUTPATIENT
Start: 2023-10-13

## 2023-10-13 RX ORDER — SERTRALINE HYDROCHLORIDE 100 MG/1
100 TABLET, FILM COATED ORAL DAILY
Qty: 90 TABLET | Refills: 1 | Status: SHIPPED | OUTPATIENT
Start: 2023-10-13

## 2023-10-13 RX ORDER — CLOPIDOGREL BISULFATE 75 MG/1
75 TABLET ORAL DAILY
Qty: 30 TABLET | Refills: 3 | Status: SHIPPED | OUTPATIENT
Start: 2023-10-13

## 2023-10-13 RX ORDER — INSULIN ASPART 100 [IU]/ML
10 INJECTION, SOLUTION INTRAVENOUS; SUBCUTANEOUS
Qty: 5 ADJUSTABLE DOSE PRE-FILLED PEN SYRINGE | Refills: 5 | Status: SHIPPED | OUTPATIENT
Start: 2023-10-13

## 2023-10-13 RX ORDER — OXYBUTYNIN CHLORIDE 5 MG/1
5 TABLET ORAL 3 TIMES DAILY PRN
Qty: 90 TABLET | Refills: 3 | Status: SHIPPED | OUTPATIENT
Start: 2023-10-13

## 2023-10-13 RX ORDER — LOSARTAN POTASSIUM 50 MG/1
50 TABLET ORAL DAILY
Qty: 90 TABLET | Refills: 1 | Status: SHIPPED | OUTPATIENT
Start: 2023-10-13

## 2023-10-13 NOTE — TELEPHONE ENCOUNTER
81 yo M with PMH of Afib on warfarin, CAD, DM2, HTN, CKD, pulmonary hypertension, CVA, HLD, ANURADHA on BiPAP, lumbar spinal stenosis, and restrictive lung disease. Pt was referred back to [facility] by Dr Luis Arana after recent TTE with elevated RVSP. Known to  for several years and last seen by Dr Jere Caballero in October 2022    Diagnostic Review:    TTE 12/22/2020 8/30/22 10/2/23   LV EF: 60-65%  Indeterminate diastolic function EF: 96-57%  Diastolic dysfunction present with normal LV EF EF: 66%  Diastolic Function Normal   RV Mildly dilated; lead present Mildly dilated; lead present Mildly dilated; Low normal systolic function. Peak TRV 3.09 m/s 2.98 m/s 3.38 m/s   RVSP 46.23 mmHg 51 mmHg 49 mmHg   COMMENTS RA & LA dilated; MV, TV, & PV regurg RA dilated; TV and MV regurg;  RA dilated; TV and MV regurg; PV regurg       Right Heart Cath 10/14/22 9/15/23   RA (16) 13/9 (8)   RV 43/1 46/1 (10)   PA 45/23 (33) 48/15 (26)   PCWP 23 17   CO 4.93 4.63   PVR 2.03 maciel 1.94 maciel   VASOREACTIVE? Lab Diagnostics:  HIV  *  HCV *  SHARAN *  BNP: 8/25/23  2,921  DDIMER*    Oximetry and/or sleep study result:  ALETA 7/14/21 @ [facility]      Split Night PSG 9/5/21        PAP TITRATION 10/10/21          CT HIGH RES:   5/28/21  IMPRESSION:  Negative for interstitial lung disease.  Minimal scarring right costophrenic      V/Q Scan:  *      Spirometry:   Date    10/7/16 10/17/18 4/2/21 2/11/22      FVC    4.64 (66%) 4.60 (61%) 2.36 (52%) 4.53 (53%)      FEV1    3.46 (72%) 3.41 (68%) 1.87 (56%) 3.34 (55%)      FEV1/FVC    75 74 79 74      Bronchodilator Response             TLC    7.47 (74%) 7.47 (68%)  7.48 (59%)      RV    2.75 (89%) 2.79 (76%)  2.85 (71%)      DLCO    32.5 32.0  31.4            Lennox Chris BSN, RN

## 2023-10-13 NOTE — TELEPHONE ENCOUNTER
Pt states needs all his med refills sent to [facility] in [address] [place] [address] to send message to [place] and she will know what to do.

## 2023-10-13 NOTE — PATIENT INSTRUCTIONS
Reminder: Please contact the Coumadin Clinic at [phone] when you have medication changes. Examples, new medications, antibiotics, discontinued medications, new supplements, missed doses of warfarin or if you took extra doses of warfarin. This also includes OTC medications. Notifying us helps reduce the possibility of high and low INR's. In addition, if warfarin needs to be held for any procedures, please have surgeon or physician's office contact us before holding anticoagulant. Thanks, [facility] Coumadin Clinic.

## 2023-10-16 ENCOUNTER — CARE COORDINATION (OUTPATIENT)
Dept: CARE COORDINATION | Facility: CLINIC | Age: 88
End: 2023-10-16

## 2023-10-16 NOTE — CARE COORDINATION
Care Transitions Follow Up Call    Patient Current Location:  Home: [address] [place] [id number]    Care Transition Nurse contacted the patient by telephone to follow up after admission on CAD, -. Verified name and  with patient as identifiers. Patient: Rox Greco  Patient : 1935   MRN: [id number]  Reason for Admission: CAD angioplasty  Discharge Date: 10/5/23 RARS: Readmission Risk Score: 18.8      Needs to be reviewed by the provider   Additional needs identified to be addressed with provider: No  none             Method of communication with provider: none. Patient with recent follow up appt and pulmonary referral. Patient was scheduled to see pulmonary today but indicated he was not aware of appt. CTN gave contact number for office to call about appt and/or reschedule if needed. Patient indicated he would call. Patient has c/o continued lower extremity edema that has been relatively chronic in nature since d/c despite diuretics. Patient has not noticed an increase in shortness of breath over this time and does not have any further symptoms of concern. Patient has recent drop to Cr. After being 3.0 at last lab visit. Patient most recently was at 2.4 and also has labs scheduled for 10/19, this week. CTN to follow up again for final MICHAEL call and encouraged patient to contact with any questions/concerns. Addressed changes since last contact:   see above  Discussed follow-up appointments. If no appointment was previously scheduled, appointment scheduling offered: Yes. Is follow up appointment scheduled within 7 days of discharge? Yes.     Follow Up  Future Appointments   Date Time Provider [facility] [address]   10/16/2023  2:30 PM PP NURSE ONLY PPS [facility] AMB   10/16/2023  3:10 PM Arelis Milner MD PPS [facility] AMB   10/19/2023  2:00 PM PFP LAB PFP [facility] AMB   10/27/2023 10:45 AM TREVOR GOMEZ PT BELEMDG [facility] AMB   2023  9:30 AM Kelli Taylor, APRN - CNP [id number] [facility]

## 2023-10-19 ENCOUNTER — NURSE ONLY (OUTPATIENT)
Dept: FAMILY MEDICINE CLINIC | Facility: CLINIC | Age: 88
End: 2023-10-19

## 2023-10-19 DIAGNOSIS — N18.4 CKD (CHRONIC KIDNEY DISEASE) STAGE 4, GFR 15-29 ML/MIN (HCC): ICD-10-CM

## 2023-10-19 LAB
ANION GAP SERPL CALC-SCNC: 4 MMOL/L (ref 2–11)
BUN SERPL-MCNC: 48 MG/DL (ref 8–23)
CALCIUM SERPL-MCNC: 9.3 MG/DL (ref 8.3–10.4)
CHLORIDE SERPL-SCNC: 110 MMOL/L (ref 101–110)
CO2 SERPL-SCNC: 31 MMOL/L (ref 21–32)
CREAT SERPL-MCNC: 2.5 MG/DL (ref 0.8–1.5)
GLUCOSE SERPL-MCNC: 95 MG/DL (ref 65–100)
POTASSIUM SERPL-SCNC: 4.2 MMOL/L (ref 3.5–5.1)
SODIUM SERPL-SCNC: 145 MMOL/L (ref 133–143)

## 2023-10-30 ENCOUNTER — CARE COORDINATION (OUTPATIENT)
Dept: CARE COORDINATION | Facility: CLINIC | Age: 88
End: 2023-10-30

## 2023-10-30 DIAGNOSIS — M54.17 LUMBOSACRAL RADICULOPATHY: Primary | ICD-10-CM

## 2023-10-30 NOTE — CARE COORDINATION
Patient has graduated from the Care Transitions program on 10/30. Patient/family has the ability to self-manage at this time. Patient has no further care management needs, no referral to the [facility] team for further management. Patient indicated his recent edema is much improved since medication changes and patient has upcoming lab work. Patient would like CTN to contact Dr Dayna Balderas' office for inquiry into injection scheduling. CTN to call office and request follow up with patient. No further questions/concerns at this time. Patient has Care Transition Nurse's contact information for any further questions, concerns, or needs.   Patients upcoming visits:    Future Appointments   Date Time Provider [address]   11/1/2023  9:30 AM Charlesetta Burkitt, APRN - CNP [id number] [facility] AMB   11/28/2023  1:30 PM Marjorie Coles MD PFP [facility] AMB   11/29/2023 10:30 AM PP NURSE ONLY PPS [facility] AMB   11/29/2023 11:10 AM Alicja Salas MD PPS [facility] AMB   1/8/2024 10:15 AM Dianne Newton MD [facility]

## 2023-10-31 RX ORDER — FLUTICASONE PROPIONATE 50 MCG
SPRAY, SUSPENSION (ML) NASAL
Qty: 48 G | Refills: 5 | Status: SHIPPED | OUTPATIENT
Start: 2023-10-31

## 2023-10-31 NOTE — TELEPHONE ENCOUNTER
----- Message from Nancy Perrin sent at 10/31/2023  9:39 AM EDT -----  Subject: Refill Request    QUESTIONS  Name of Medication? Other - metolazone  Patient-reported dosage and instructions? 5 MG  1 PER DAY  How many days do you have left? 0  Preferred Pharmacy? [id number]  Pharmacy phone number (if available)? [phone]  ---------------------------------------------------------------------------  --------------,  Name of Medication? fluticasone (FLONASE) 50 MCG/ACT nasal spray  Patient-reported dosage and instructions? 50 MG  AS NEEDED   How many days do you have left? 0  Preferred Pharmacy? [id number]  Pharmacy phone number (if available)? [phone]  ---------------------------------------------------------------------------  --------------  Enrique STEVENS  What is the best way for the office to contact you? OK to leave message on   voicemail  Preferred Call Back Phone Number? [phone]  ---------------------------------------------------------------------------  --------------  SCRIPT ANSWERS  Relationship to Patient?  Self

## 2023-11-03 ENCOUNTER — OFFICE VISIT (OUTPATIENT)
Dept: ORTHOPEDIC SURGERY | Age: 88
End: 2023-11-03
Payer: MEDICARE

## 2023-11-03 DIAGNOSIS — M54.17 LUMBOSACRAL RADICULOPATHY: Primary | ICD-10-CM

## 2023-11-03 PROCEDURE — 64484 NJX AA&/STRD TFRM EPI L/S EA: CPT | Performed by: PHYSICAL MEDICINE & REHABILITATION

## 2023-11-03 PROCEDURE — 64483 NJX AA&/STRD TFRM EPI L/S 1: CPT | Performed by: PHYSICAL MEDICINE & REHABILITATION

## 2023-11-03 RX ORDER — TRIAMCINOLONE ACETONIDE 40 MG/ML
160 INJECTION, SUSPENSION INTRA-ARTICULAR; INTRAMUSCULAR ONCE
Status: COMPLETED | OUTPATIENT
Start: 2023-11-03 | End: 2023-11-03

## 2023-11-03 RX ADMIN — TRIAMCINOLONE ACETONIDE 160 MG: 40 INJECTION, SUSPENSION INTRA-ARTICULAR; INTRAMUSCULAR at 10:47

## 2023-11-03 NOTE — PROGRESS NOTES
Date: 11/03/23   Name: Marlee Hadley    Pre-Procedural Diagnosis:    Diagnosis Orders   1. Lumbosacral radiculopathy  FL NERVE BLOCK LUMBOSACRAL 1ST    FL NERVE BLOCK LUMBOSACRAL EACH ADD      2. Lumbar back pain            Procedure: Selective Nerve Root Blocks (Transforaminal) - Multiple Level    Precautions: [facility] Precautions spine injections: None. Patient denies any prior sensitivity to steroid, local anesthetic, contrast dye, iodine or shellfish. The procedure was discussed at length with the patient and informed consent was signed. The patient was placed in a prone position on the fluoroscopy table and the skin was prepped and draped in a routine sterile fashion. The areas to be injected were each anesthetized with approximately 5 cc of 1% Lidocaine. A 22-gauge 3.5 inch spinal needle was carefully advanced under fluoroscopic guidance to the right L5 transforaminal space and subsequently the right S1 transforaminal space. At this time 0.25 cc of omnipaque administered. . Once proper placement was confirmed, 2 cc of 0.25% Marcaine and 80 mg of Kenalog were injected through the spinal needle at each site. Fluoroscopic guidance was used intermittently over a 10-minute period to insure proper needle placement and patient safety. A hard copy of the fluoroscopic  images has been placed in the patient's chart. The patient was monitored after the procedure and discharged home in stable fashion. A total of 4 cc of Kenalog were administered during this procedure.     Resume Meds:   N/A    Marbin Taylor MD  11/03/23

## 2023-11-06 ENCOUNTER — ANTI-COAG VISIT (OUTPATIENT)
Age: 88
End: 2023-11-06
Payer: MEDICARE

## 2023-11-06 DIAGNOSIS — Z79.01 LONG TERM CURRENT USE OF ANTICOAGULANT: ICD-10-CM

## 2023-11-06 DIAGNOSIS — I48.0 PAROXYSMAL ATRIAL FIBRILLATION (HCC): Primary | ICD-10-CM

## 2023-11-06 LAB
POC INR: 1.5
PROTHROMBIN TIME, POC: ABNORMAL

## 2023-11-06 PROCEDURE — 85610 PROTHROMBIN TIME: CPT | Performed by: INTERNAL MEDICINE

## 2023-11-06 PROCEDURE — 93793 ANTICOAG MGMT PT WARFARIN: CPT | Performed by: INTERNAL MEDICINE

## 2023-11-06 NOTE — PROGRESS NOTES
Warfarin tablet strength and weekly dosing schedule confirmed today. Patient knows of no reason for subtherapeutic INR result. Maintenance plan increased by 6.2 %, (see Anticoag Dosing Calendar). INR to be rechecked in one week.

## 2023-11-06 NOTE — PATIENT INSTRUCTIONS
Reminder: Please contact the Coumadin Clinic at [phone] when you have medication changes. Examples, new medications, antibiotics, discontinued medications, new supplements, missed doses of warfarin or if you took extra doses of warfarin. This also includes OTC medications. Notifying us helps reduce the possibility of high and low INR's. In addition, if warfarin needs to be held for any procedures, please have surgeon or physician's office contact us before holding anticoagulant. Thanks, [facility] Coumadin [facility].

## 2023-11-07 ENCOUNTER — TELEPHONE (OUTPATIENT)
Dept: ORTHOPEDIC SURGERY | Age: 88
End: 2023-11-07

## 2023-11-10 RX ORDER — BACLOFEN 10 MG/1
10 TABLET ORAL NIGHTLY PRN
Qty: 90 TABLET | Refills: 1 | Status: SHIPPED | OUTPATIENT
Start: 2023-11-10

## 2023-11-13 ENCOUNTER — ANTI-COAG VISIT (OUTPATIENT)
Age: 88
End: 2023-11-13
Payer: MEDICARE

## 2023-11-13 DIAGNOSIS — Z79.01 LONG TERM CURRENT USE OF ANTICOAGULANT: ICD-10-CM

## 2023-11-13 DIAGNOSIS — I48.0 PAROXYSMAL ATRIAL FIBRILLATION (HCC): Primary | ICD-10-CM

## 2023-11-13 LAB
POC INR: 2.4
PROTHROMBIN TIME, POC: NORMAL

## 2023-11-13 PROCEDURE — 93793 ANTICOAG MGMT PT WARFARIN: CPT | Performed by: INTERNAL MEDICINE

## 2023-11-13 PROCEDURE — 85610 PROTHROMBIN TIME: CPT | Performed by: INTERNAL MEDICINE

## 2023-11-13 NOTE — PATIENT INSTRUCTIONS
Reminder: Please contact the Coumadin Clinic at [phone] when you have medication changes. Examples, new medications, antibiotics, discontinued medications, new supplements, missed doses of warfarin or if you took extra doses of warfarin. This also includes OTC medications. Notifying us helps reduce the possibility of high and low INR's. In addition, if warfarin needs to be held for any procedures, please have surgeon or physician's office contact us before holding anticoagulant. Thanks, [place] [facility].

## 2023-11-30 ENCOUNTER — TELEPHONE (OUTPATIENT)
Dept: FAMILY MEDICINE CLINIC | Facility: CLINIC | Age: 88
End: 2023-11-30

## 2023-12-01 NOTE — TELEPHONE ENCOUNTER
See hospital admission for fall. Spoke with Summer and she states that he has had multiple falls and syncopal episodes. Advised need an appt to discuss. Advise where to schedule.

## 2023-12-04 ENCOUNTER — NURSE ONLY (OUTPATIENT)
Age: 88
End: 2023-12-04

## 2023-12-04 ENCOUNTER — ANTI-COAG VISIT (OUTPATIENT)
Age: 88
End: 2023-12-04
Payer: MEDICARE

## 2023-12-04 DIAGNOSIS — Z79.01 LONG TERM CURRENT USE OF ANTICOAGULANT: ICD-10-CM

## 2023-12-04 DIAGNOSIS — I48.0 PAROXYSMAL ATRIAL FIBRILLATION (HCC): Primary | ICD-10-CM

## 2023-12-04 LAB
POC INR: 1.1
PROTHROMBIN TIME, POC: ABNORMAL

## 2023-12-04 PROCEDURE — 85610 PROTHROMBIN TIME: CPT | Performed by: INTERNAL MEDICINE

## 2023-12-04 RX ORDER — OMEPRAZOLE 40 MG/1
CAPSULE, DELAYED RELEASE ORAL
Qty: 90 CAPSULE | Refills: 1 | Status: SHIPPED | OUTPATIENT
Start: 2023-12-04

## 2023-12-04 NOTE — PROGRESS NOTES
patient was in office for INR check, states glucose was low according to dexcom sensor. Pt felt weak and shaky. Glucose  was at 66 for pt, when we checked it against our glucometer it was at 72. Pt was given a glucose tab rechecked after 6 mins it went to 67. Pt was given 4 crackers with peanut butter and water before letting pt leave glucose had gone up to 100 and pt felt better. Sent message to PCP after multiple attempts to contact office via phone.

## 2023-12-04 NOTE — PROGRESS NOTES
Warfarin tablet strength and weekly dosing schedule confirmed today. Patient stated he has not been taking warfarin for about two weeks, stated it was held by [facility] physician\" and was to restart today. Resume maintenance plan and recheck in one week.

## 2023-12-05 NOTE — PROGRESS NOTES
Post-Discharge Transitional Care Management Progress Note      Xenia England   YOB: 1935    Date of Office Visit:  12/5/2023  Date of Hospital Admission: 11/14/2023  Date of Hospital Discharge: 11/21/2023    Care management risk score Rising risk (score 2-5) and Complex Care (Scores >=6): No Risk Score On File     Non face to face  following discharge, date last encounter closed (first attempt may have been earlier): *No documented post hospital discharge outreach found in the last 14 days *No documented post hospital discharge outreach found in the last 14 days    Call initiated 2 business days of discharge: *No response recorded in the last 14 days    ASSESSMENT/PLAN:   Hospital discharge follow-up  SAH (subarachnoid hemorrhage) ([address])  Type 2 diabetes with nephropathy ([address])  Bilateral leg edema    Recently hospitalized for traumatic subarachnoid hemorrhage after syncope and fall. Was restarted on warfarin yesterday by cardiology after holding it for 2 weeks per neurosurgery recommendation. Patient at baseline mental status currently. Glucose borderline low at cardiology office yesterday. He reportedly felt weak and shaky there while getting his INR checked. Overall patient's diabetes has not been very well-controlled but given his age and medical history, will allow for higher glucose levels to prevent hypoglycemia. Decrease Tresiba from 26 to 20 units daily. Decrease NovoLog to 5 units with meals. Medical Decision Making: moderate complexity  Return in about 8 days (around 12/13/2023) for recheck. Subjective:   HPI:  Follow up of Hospital problems/diagnosis(es): Traumatic subarachnoid hemorrhage, syncope, T1 compression fracture    Inpatient course: Discharge summary reviewed- see chart.     72-year-old male with PMH of A. fib, CAD, DM2, HTN, CKD, pulmonary hypertension, CVA, HLD, ANURADHA (on BPAP), lumbar spinal stenosis, and restrictive lung disease who presents for

## 2023-12-11 ENCOUNTER — ANTI-COAG VISIT (OUTPATIENT)
Age: 88
End: 2023-12-11
Payer: MEDICARE

## 2023-12-11 DIAGNOSIS — Z79.01 LONG TERM CURRENT USE OF ANTICOAGULANT: ICD-10-CM

## 2023-12-11 DIAGNOSIS — I48.0 PAROXYSMAL ATRIAL FIBRILLATION (HCC): Primary | ICD-10-CM

## 2023-12-11 LAB
POC INR: 1.8
PROTHROMBIN TIME, POC: ABNORMAL

## 2023-12-11 PROCEDURE — 85610 PROTHROMBIN TIME: CPT | Performed by: INTERNAL MEDICINE

## 2023-12-11 PROCEDURE — 93793 ANTICOAG MGMT PT WARFARIN: CPT | Performed by: INTERNAL MEDICINE

## 2023-12-11 NOTE — PATIENT INSTRUCTIONS
Reminder: Please contact the Coumadin Clinic at [phone] when you have medication changes. Examples, new medications, antibiotics, discontinued medications, new supplements, missed doses of warfarin or if you took extra doses of warfarin. This also includes OTC medications. Notifying us helps reduce the possibility of high and low INR's. In addition, if warfarin needs to be held for any procedures, please have surgeon or physician's office contact us before holding anticoagulant. Thanks, [place] [facility].

## 2023-12-22 PROBLEM — R06.00 DYSPNEA: Status: RESOLVED | Noted: 2023-01-01 | Resolved: 2023-01-01

## 2023-12-22 PROBLEM — I69.10 SEQUELAE OF NONTRAUMATIC INTRACEREBRAL HEMORRHAGE: Status: ACTIVE | Noted: 2023-01-01

## 2023-12-22 PROBLEM — E53.8 DEFICIENCY OF OTHER SPECIFIED B GROUP VITAMINS: Status: RESOLVED | Noted: 2023-01-01 | Resolved: 2023-01-01

## 2023-12-22 PROBLEM — K80.10 CHRONIC CALCULOUS CHOLECYSTITIS: Status: RESOLVED | Noted: 2022-02-24 | Resolved: 2023-01-01

## 2023-12-22 PROBLEM — S22.019A CLOSED T1 FRACTURE (HCC): Status: ACTIVE | Noted: 2023-01-01

## 2023-12-22 PROBLEM — M54.2 CERVICALGIA: Status: RESOLVED | Noted: 2018-03-05 | Resolved: 2023-01-01

## 2023-12-22 PROBLEM — H40.9 UNSPECIFIED GLAUCOMA: Status: RESOLVED | Noted: 2023-01-01 | Resolved: 2023-01-01

## 2023-12-22 PROBLEM — R79.1 SUPRATHERAPEUTIC INR: Status: RESOLVED | Noted: 2023-01-01 | Resolved: 2023-01-01

## 2023-12-22 PROBLEM — D64.9 ANEMIA, UNSPECIFIED: Status: RESOLVED | Noted: 2023-01-01 | Resolved: 2023-01-01

## 2023-12-22 PROBLEM — R26.89 IMBALANCE: Status: RESOLVED | Noted: 2019-06-11 | Resolved: 2023-01-01

## 2023-12-22 PROBLEM — S06.5XAA SUBDURAL HEMATOMA, ACUTE (HCC): Status: ACTIVE | Noted: 2023-01-01

## 2023-12-22 PROBLEM — D68.69 SECONDARY HYPERCOAGULABLE STATE (HCC): Status: RESOLVED | Noted: 2023-01-01 | Resolved: 2023-01-01

## 2023-12-22 PROBLEM — N28.9 LESION OF LEFT NATIVE KIDNEY: Status: ACTIVE | Noted: 2023-01-01

## 2023-12-22 PROBLEM — M62.838 MUSCLE SPASM OF LEFT SHOULDER: Status: RESOLVED | Noted: 2018-03-05 | Resolved: 2023-01-01

## 2023-12-22 PROBLEM — R79.1 SUPRATHERAPEUTIC INR: Status: ACTIVE | Noted: 2023-01-01

## 2023-12-22 PROBLEM — D64.9 ANEMIA, UNSPECIFIED: Status: ACTIVE | Noted: 2023-01-01

## 2023-12-22 PROBLEM — E78.2 MIXED HYPERLIPIDEMIA: Status: RESOLVED | Noted: 2023-01-01 | Resolved: 2023-01-01

## 2023-12-22 PROBLEM — S06.6X0D TRAUMATIC SUBARACHNOID HEMORRHAGE WITHOUT LOSS OF CONSCIOUSNESS, SUBSEQUENT ENCOUNTER: Status: ACTIVE | Noted: 2023-01-01

## 2023-12-22 PROBLEM — K86.2 PANCREAS CYST: Status: ACTIVE | Noted: 2023-01-01

## 2023-12-22 PROBLEM — F33.9 MAJOR DEPRESSIVE DISORDER, RECURRENT, UNSPECIFIED (HCC): Status: ACTIVE | Noted: 2023-01-01

## 2023-12-22 PROBLEM — M19.90 OSTEOARTHROSIS: Status: ACTIVE | Noted: 2022-01-21

## 2023-12-22 PROBLEM — I60.9 SAH (SUBARACHNOID HEMORRHAGE) (HCC): Status: ACTIVE | Noted: 2023-01-01

## 2023-12-22 PROBLEM — E53.8 DEFICIENCY OF OTHER SPECIFIED B GROUP VITAMINS: Status: ACTIVE | Noted: 2023-01-01

## 2023-12-22 PROBLEM — G47.62 NOCTURNAL LEG CRAMPS: Status: RESOLVED | Noted: 2022-11-04 | Resolved: 2023-01-01

## 2023-12-22 PROBLEM — M62.81 MUSCLE WEAKNESS (GENERALIZED): Status: RESOLVED | Noted: 2023-01-01 | Resolved: 2023-01-01

## 2023-12-22 PROBLEM — M62.81 MUSCLE WEAKNESS (GENERALIZED): Status: ACTIVE | Noted: 2023-01-01

## 2023-12-22 PROBLEM — R26.89 OTHER ABNORMALITIES OF GAIT AND MOBILITY: Status: ACTIVE | Noted: 2023-01-01

## 2023-12-22 PROBLEM — I72.4 ANEURYSM OF ARTERY OF LOWER EXTREMITY (HCC): Status: RESOLVED | Noted: 2023-01-01 | Resolved: 2023-01-01

## 2023-12-22 PROBLEM — G93.41 ENCEPHALOPATHY, METABOLIC: Status: ACTIVE | Noted: 2023-01-01

## 2023-12-22 PROBLEM — N17.9 AKI (ACUTE KIDNEY INJURY) (HCC): Status: RESOLVED | Noted: 2023-01-01 | Resolved: 2023-01-01

## 2023-12-22 PROBLEM — R94.39 ABNORMAL STRESS TEST: Status: RESOLVED | Noted: 2023-01-01 | Resolved: 2023-01-01

## 2023-12-22 PROBLEM — R60.9 EDEMA: Status: ACTIVE | Noted: 2023-01-01

## 2023-12-22 PROBLEM — K86.2 PANCREAS CYST: Status: RESOLVED | Noted: 2023-01-01 | Resolved: 2023-01-01

## 2023-12-22 PROBLEM — N39.0 URINARY TRACT INFECTION, SITE NOT SPECIFIED: Status: ACTIVE | Noted: 2023-01-01

## 2023-12-22 PROBLEM — H40.9 UNSPECIFIED GLAUCOMA: Status: ACTIVE | Noted: 2023-01-01

## 2023-12-22 PROBLEM — I72.4 ANEURYSM OF ARTERY OF LOWER EXTREMITY (HCC): Status: ACTIVE | Noted: 2023-01-01

## 2023-12-22 PROBLEM — R55 SYNCOPE AND COLLAPSE: Status: ACTIVE | Noted: 2023-01-01

## 2023-12-22 PROBLEM — S06.5X9S: Status: ACTIVE | Noted: 2023-01-01

## 2023-12-22 PROBLEM — Z51.5 HOSPICE CARE: Status: ACTIVE | Noted: 2023-01-01

## (undated) DEVICE — PERCUTANEOUS ENTRY THINWALL NEEDLE  ONE-PART: Brand: COOK

## (undated) DEVICE — CATH BLLN ANGIO 2.75X15MM NC EUPHORIA RX

## (undated) DEVICE — CATHETER DIAG AD 6FR L100CM 0.038IN COR POLYUR JUDKINS R 5

## (undated) DEVICE — Device

## (undated) DEVICE — CATHETER DIAG 6FR L110CM PIGTAILS CRV STYL PIG145 DXTERITY

## (undated) DEVICE — CATHETER GUID EXTRA BACKUP 3.5 0.070IN 6FR 100CM VISTA BRITE TIP

## (undated) DEVICE — CATH BLLN ANGIO 3X20MM NC EUPHORIA RX

## (undated) DEVICE — GLIDESHEATH SLENDER STAINLESS STEEL KIT: Brand: GLIDESHEATH SLENDER

## (undated) DEVICE — BAND COMPR L24CM REG CLR PLAS HEMSTAT EXT HK AND LOOP RETEN

## (undated) DEVICE — CATH BLLN ANGIO 3.50X15MM NC EUPHORA RX

## (undated) DEVICE — EXCHANGEABLE BURR CATHETER: Brand: ROTALINK™ BURR

## (undated) DEVICE — MICROSURGICAL DILATATION DEVICE: Brand: WOLVERINE CORONARY CUTTING BALLOON

## (undated) DEVICE — PRE-CONNECTED EXCHANGEABLE BURR CATHETER AND BURR ADVANCING DEVICE: Brand: ROTAPRO™

## (undated) DEVICE — CATH BLLN ANGIO 2.75X15MM SC EUPHORA RX

## (undated) DEVICE — CATH BLLN ANGIO 2.50X15MM SC EUPHORA RX

## (undated) DEVICE — DISPOSABLE PULLBACK SLED FOR MOTORDRIVE

## (undated) DEVICE — SUTURE PERMAHAND SZ 2-0 L30IN NONABSORBABLE BLK L17MM BB K883H

## (undated) DEVICE — Device: Brand: PROWATER

## (undated) DEVICE — HI-TORQUE VERSACORE MODIFIED J GUIDE WIRE SYSTEM 260 CM: Brand: HI-TORQUE VERSACORE

## (undated) DEVICE — VALVE HEMSTAT 8FR INNR LUMN CRSS SLT SEAL DSGN WATCHDOG

## (undated) DEVICE — CATHETER THRMDIL 7FR L110CM STD PULM ART 4 INFUS LUMN SWN

## (undated) DEVICE — GUIDE NDL ST W/ 14 X 147CM TELESCOPICALLY FLD CIV FLX CVR

## (undated) DEVICE — GUIDEWIRE VASC L260CM DIA0.035IN RAD 3MM J TIP L7CM PTFE

## (undated) DEVICE — CATH BLLN ANGIO 3.50X12MM SC EUPHORA RX

## (undated) DEVICE — HEMOSTASIS VALVE PHD SM BORE WITH SPRING

## (undated) DEVICE — CATH BLLN ANGIO 3.50X20MM NC EUPHORIA RX

## (undated) DEVICE — CATH BLLN ANGIO 3X15MM NC EUPHORIA RX

## (undated) DEVICE — PRESSURE GUIDEWIRE: Brand: COMET™ II

## (undated) DEVICE — CATH BLLN ANGIO 2X12MM NC EUPHORIA RX

## (undated) DEVICE — GUIDEWIRE VASC J 3 CM 0.014 INX190 CM BAL MIDWT 1001780J

## (undated) DEVICE — CATH LITHOPLSTY 2.5X12MM SHOCKWAVE

## (undated) DEVICE — CATHETER GUID 6FR L100CM GRN PTFE JR4 TRUELUMEN HYBRID

## (undated) DEVICE — PRESSURE MONITORING SET: Brand: TRUWAVE, VAMP PLUS

## (undated) DEVICE — INTRODUCER GUIDEWIRE 0.010-0.018 IN

## (undated) DEVICE — GUIDEWIRE WITH ICE™ HYDROPHILIC COATING: Brand: CHOICE™ PT

## (undated) DEVICE — CATHETER COR DIAG JUDKINS L 3.5 CRV 6FR 100CM 0 SIDE H

## (undated) DEVICE — CATHETER GUID 6FR L150CM RAP EXCHG L25CM TIP 5.1FR PUSHROD

## (undated) DEVICE — CORONARY IMAGING CATHETER: Brand: OPTICROSS™ HD

## (undated) DEVICE — INTRODUCER SHTH 6FR L11CM 0.038IN STD SIDEPRT EXTN 3 W

## (undated) DEVICE — GUIDEWIRE VASC L150CM DIA0.025IN TIP L7CM J RAD 3MM PTFE